# Patient Record
Sex: MALE | Race: WHITE | NOT HISPANIC OR LATINO | Employment: UNEMPLOYED | URBAN - NONMETROPOLITAN AREA
[De-identification: names, ages, dates, MRNs, and addresses within clinical notes are randomized per-mention and may not be internally consistent; named-entity substitution may affect disease eponyms.]

---

## 2017-01-03 ENCOUNTER — OFFICE VISIT (OUTPATIENT)
Dept: PULMONOLOGY | Facility: CLINIC | Age: 82
End: 2017-01-03

## 2017-01-03 VITALS
WEIGHT: 179 LBS | DIASTOLIC BLOOD PRESSURE: 70 MMHG | HEIGHT: 69 IN | OXYGEN SATURATION: 93 % | BODY MASS INDEX: 26.51 KG/M2 | SYSTOLIC BLOOD PRESSURE: 115 MMHG | TEMPERATURE: 97.6 F | HEART RATE: 99 BPM

## 2017-01-03 DIAGNOSIS — J44.9 CHRONIC OBSTRUCTIVE PULMONARY DISEASE, UNSPECIFIED COPD TYPE (HCC): Primary | ICD-10-CM

## 2017-01-03 LAB
FEV1: 2.31 LITERS
FVC VOL RESPIRATORY: 3.47 LITERS

## 2017-01-03 PROCEDURE — 94010 BREATHING CAPACITY TEST: CPT | Performed by: INTERNAL MEDICINE

## 2017-01-03 PROCEDURE — 99212 OFFICE O/P EST SF 10 MIN: CPT | Performed by: INTERNAL MEDICINE

## 2017-01-03 ASSESSMENT — PULMONARY FUNCTION TESTS
FVC: 3.47
FEV1: 2.31

## 2017-01-03 NOTE — PROGRESS NOTES
History of Present Illness 81-year-old gentleman returning for follow-up of COPD that seems to be mild and stable he has episodes of shortness of breath and to use his nebulizers 3 times a day and Advair twice a day and theophylline 600 mg once a day.  He also has obstructive apnea using CPAP at night      Review of Systems episodes of shortness of breath not much cough or expectoration    Active Problems:  Problem List Items Addressed This Visit     Chronic obstructive pulmonary disease - Primary    Relevant Orders    Pulmonary Function Test (Completed)          Past Medical History:  Past Medical History   Diagnosis Date   • COPD (chronic obstructive pulmonary disease)        Family History:  Family History   Problem Relation Age of Onset   • Hypertension Mother    • Arthritis Mother    • Hypertension Father    • Arthritis Father    • Diabetes Sister    • Cancer Sister    • Diabetes Brother        Social History:  Social History   Substance Use Topics   • Smoking status: Former Smoker     Years: 40.00     Types: Cigarettes   • Smokeless tobacco: Never Used   • Alcohol use Yes      Comment: occcasionally when younger        Current Medications:  Current Outpatient Prescriptions   Medication Sig Dispense Refill   • apixaban (ELIQUIS) 5 MG tablet tablet Take 1 tablet by mouth every 12 (twelve) hours. 60 tablet 5   • aspirin (ASPIRIN LOW DOSE) 81 MG tablet Take  by mouth daily.     • calcium carbonate (TUMS ULTRA) 1000 MG chewable tablet      • Camphor-Eucalyptus-Menthol (MEDICATED CHEST RUB) 4.73-1.2-2.6 % ointment      • Denture Care Products (DENTURE ADHESIVE) cream      • EAR WAX REMOVAL DROPS 6.5 % otic solution      • fluticasone-salmeterol (ADVAIR DISKUS) 250-50 MCG/DOSE DISKUS Advair Diskus 250-50 MCG/DOSE Inhalation Aerosol Powder Breath Activated; Patient Sig: Advair Diskus 250-50 MCG/DOSE Inhalation Aerosol Powder Breath Activated ; 0; 05-Apr-2016; Active     • furosemide (LASIX) 20 MG tablet Take 20 mg by  "mouth daily.     • gabapentin (NEURONTIN) 300 MG capsule Take 300 mg by mouth 2 (two) times a day.     • glucosamine-chondroitin 500-400 MG capsule capsule Take 1 capsule by mouth daily.     • lisinopril (PRINIVIL,ZESTRIL) 5 MG tablet 10 mg daily.     • metoprolol succinate XL (TOPROL-XL) 25 MG 24 hr tablet Take 50 mg by mouth daily.     • Omega-3 Fatty Acids (EQL FISH OIL) 1000 MG capsule Take  by mouth.     • ranitidine (ZANTAC) 150 MG tablet      • simvastatin (ZOCOR) 40 MG tablet      • theophylline (UNIPHYL) 600 MG 24 hr tablet        No current facility-administered medications for this visit.        Allergies:  No Known Allergies    Vitals:  Visit Vitals   • /70 (BP Location: Left arm, Patient Position: Sitting, Cuff Size: Adult)   • Pulse 99   • Temp 97.6 °F (36.4 °C) (Oral)   • Ht 69\" (175.3 cm)   • Wt 179 lb (81.2 kg)   • SpO2 93%   • BMI 26.43 kg/m2       Physical Exam no acute distress vital signs is stable lungs clear heart regular no edema clubbing or cyanosis    Imaging:    PFT: FVC 85 FEV1 79% compatible with mild obstructive impairment  Results for orders placed or performed in visit on 01/03/17   Pulmonary Function Test   Result Value Ref Range    FEV1 2.31 liters    FVC 3.47 liters           ASSESSMENT AND DIAGNOSIS:1-COPD mild and stable prescriptions written by Dr. Ojeda2-obstructive apnea per history using CPAP at night    Recommendation continue current medications and follow-up with Dr. Ojeda    Follow-Up: Return to us on when necessary                    "

## 2017-01-03 NOTE — LETTER
January 3, 2017     Rc Ojeda MD  [address]  [place] [address]    Patient: Felice Aiken   YOB: 1935   Date of Visit: 1/3/2017       Dear Dr. Rusty MD:    Felice Aiken was in my office today. Below are the relevant portions of my assessment and plan of care.           If you have questions, please do not hesitate to call me. I look forward to following Felice along with you.         Sincerely,        Marshal Garcia MD        CC: No Recipients

## 2017-01-03 NOTE — MR AVS SNAPSHOT
Felice Aiken   1/3/2017 9:00 AM   Office Visit    Dept Phone:  [phone]   Encounter #:  [id number]    Provider:  Marshal Garcia MD   Department:  [facility] PULMONOLOGY CRITICAL [facility]ARE                Your Full Care Plan              Your Updated Medication List          This list is accurate as of: 1/3/17  9:18 AM.  Always use your most recent med list.                ADVAIR DISKUS 250-50 MCG/DOSE DISKUS   Generic drug:  fluticasone-salmeterol       apixaban 5 MG tablet tablet   Commonly known as:  ELIQUIS   Take 1 tablet by mouth every 12 (twelve) hours.       ASPIRIN LOW DOSE 81 MG tablet   Generic drug:  aspirin       calcium carbonate 1000 MG chewable tablet   Commonly known as:  TUMS ULTRA       Denture Adhesive cream       EAR WAX REMOVAL DROPS 6.5 % otic solution   Generic drug:  carbamide peroxide       EQL FISH OIL 1000 MG capsule       furosemide 20 MG tablet   Commonly known as:  LASIX       gabapentin 300 MG capsule   Commonly known as:  NEURONTIN       glucosamine-chondroitin 500-400 MG capsule capsule       lisinopril 5 MG tablet   Commonly known as:  PRINIVIL,ZESTRIL       MEDICATED CHEST RUB 4.73-1.2-2.6 % ointment       metoprolol succinate XL 25 MG 24 hr tablet   Commonly known as:  TOPROL-XL       raNITIdine 150 MG tablet   Commonly known as:  ZANTAC       simvastatin 40 MG tablet   Commonly known as:  ZOCOR       theophylline 600 MG 24 hr tablet   Commonly known as:  UNIPHYL               We Performed the Following     Pulmonary Function Test       You Were Diagnosed With        Codes Comments    Chronic obstructive pulmonary disease, unspecified COPD type    -  Primary ICD-10-CM: J44.9  ICD-9-CM: 496       Instructions     None    Patient Instructions History      Upcoming Appointments     Visit Type Date Time Department    FOLLOW UP 1/3/2017  9:00 AM [facility]    FOLLOW UP 1/23/2017  2:20 PM [facility] Signup     [facility]  "Health Exosite allows you to send messages to your doctor, view your test results, renew your prescriptions, schedule appointments, and more. To sign up, go to "[employer] and click on the Sign Up Now link in the New User? box. Enter your Exosite Activation Code exactly as it appears below along with the last four digits of your Social Security Number and your Date of Birth () to complete the sign-up process. If you do not sign up before the expiration date, you must request a new code.    Exosite Activation Code: [email or web address]  Expires: 2017  9:18 AM    If you have questions, you can email [email or web address] or call [phone] to talk to our Exosite staff. Remember, Exosite is NOT to be used for urgent needs. For medical emergencies, dial 911.               Other Info from Your Visit           Your Appointments     2017  2:20 PM EST   Follow Up with Jay Everett MD   [facility] (--)    [address] [place] [address] [facility] [address] [phone]           Arrive 15 minutes prior to appointment.              Allergies     No Known Allergies      Reason for Visit     COPD           Vital Signs     Blood Pressure Pulse Temperature Height Weight Oxygen Saturation    115/70 (BP Location: Left arm, Patient Position: Sitting, Cuff Size: Adult) 99 97.6 °F (36.4 °C) (Oral) 69\" (175.3 cm) 179 lb (81.2 kg) 93%    Body Mass Index Smoking Status                26.43 kg/m2 Former Smoker          Problems and Diagnoses Noted     Chronic airway obstruction      Results     Pulmonary Function Test      Component Value Standard Range & Units    FEV1 2.31 liters    FVC 3.47 liters                    "

## 2017-01-23 ENCOUNTER — OFFICE VISIT (OUTPATIENT)
Dept: CARDIOLOGY | Facility: CLINIC | Age: 82
End: 2017-01-23

## 2017-01-23 VITALS
WEIGHT: 180.4 LBS | HEIGHT: 69 IN | SYSTOLIC BLOOD PRESSURE: 113 MMHG | DIASTOLIC BLOOD PRESSURE: 63 MMHG | HEART RATE: 68 BPM | BODY MASS INDEX: 26.72 KG/M2

## 2017-01-23 DIAGNOSIS — I10 ESSENTIAL HYPERTENSION: ICD-10-CM

## 2017-01-23 DIAGNOSIS — I73.9 PAD (PERIPHERAL ARTERY DISEASE) (HCC): ICD-10-CM

## 2017-01-23 DIAGNOSIS — I25.10 ARTERIOSCLEROTIC CARDIOVASCULAR DISEASE (ASCVD): Primary | ICD-10-CM

## 2017-01-23 DIAGNOSIS — R07.2 PRECORDIAL PAIN: ICD-10-CM

## 2017-01-23 DIAGNOSIS — Z79.899 DRUG THERAPY: ICD-10-CM

## 2017-01-23 DIAGNOSIS — I48.19 PERSISTENT ATRIAL FIBRILLATION (HCC): ICD-10-CM

## 2017-01-23 DIAGNOSIS — I42.9 CARDIOMYOPATHY (HCC): ICD-10-CM

## 2017-01-23 PROCEDURE — 93000 ELECTROCARDIOGRAM COMPLETE: CPT | Performed by: INTERNAL MEDICINE

## 2017-01-23 PROCEDURE — 99213 OFFICE O/P EST LOW 20 MIN: CPT | Performed by: INTERNAL MEDICINE

## 2017-01-23 RX ORDER — DILTIAZEM HYDROCHLORIDE 180 MG/1
180 CAPSULE, COATED, EXTENDED RELEASE ORAL DAILY
COMMUNITY
End: 2017-01-23

## 2017-01-23 RX ORDER — LORATADINE 10 MG/1
10 TABLET ORAL DAILY
COMMUNITY
End: 2017-08-31 | Stop reason: ALTCHOICE

## 2017-01-23 RX ORDER — METOPROLOL SUCCINATE 50 MG/1
50 TABLET, EXTENDED RELEASE ORAL DAILY
Qty: 30 TABLET | Refills: 5 | Status: SHIPPED | OUTPATIENT
Start: 2017-01-23 | End: 2017-03-07 | Stop reason: SDUPTHER

## 2017-01-23 NOTE — LETTER
"January 26, 2017     Rc Ojeda MD  [address]  [facility] [address]    Patient: Felice Aiken   YOB: 1935   Date of Visit: 1/23/2017       Dear Dr. Rusty MD:    Thank you for referring Felice Aiken to me for evaluation. Below are the relevant portions of my assessment and plan of care.    If you have questions, please do not hesitate to call me. I look forward to following Felice along with you.         Sincerely,        Jay Everett MD        CC: No Recipients  Jay Everett MD  1/26/2017  6:19 PM  Signed  Rc Ojeda MD  Felice Aiken  1935      Patient Active Problem List   Diagnosis   • Arteriosclerotic cardiovascular disease (ASCVD) s/p stenting   • Sleep apnea   • Pain and swelling of lower extremity   • Persistent atrial fibrillation   • History of ASCVD (atherosclerotic cardiovascular disease), s/p stenting   • PAD (peripheral artery disease), fem pop bypass,3/08   • Dyslipidemia   • Essential hypertension   • Chronic bronchitis       Dear Dr. Ojeda:    Subjective     Felice Aiken is a 81 y.o. male with the problems as listed above, presents for cardiology follow up of ASCVD.    History of Present Illness Mr. Aiken is a 81-year-old  male with history of known ASCVD, status post stenting, ischemic cardiomyopathy and persistent atrial fibrillation, is here for cardiac follow-up.  Patient states that he has intermittent chest discomfort, but he says it \"is nothing to worry about\", and it is not severe enough to take NTG.  He denies SOA, dizziness or syncopal episodes.  He says he has been enjoying daily activities, such as [employer] hunting.       No Known Allergies    Current Outpatient Prescriptions:   •  aspirin (ASPIRIN LOW DOSE) 81 MG tablet, Take  by mouth daily., Disp: , Rfl:   •  Denture Care Products (DENTURE ADHESIVE) cream, , Disp: , Rfl:   •  fluticasone-salmeterol (ADVAIR DISKUS) 250-50 MCG/DOSE DISKUS, Advair Diskus 250-50 " MCG/DOSE Inhalation Aerosol Powder Breath Activated; Patient Sig: Michaelle Lowry 250-50 MCG/DOSE Inhalation Aerosol Powder Breath Activated ; 0; 05-Apr-2016; Active, Disp: , Rfl:   •  furosemide (LASIX) 20 MG tablet, Take 20 mg by mouth daily., Disp: , Rfl:   •  gabapentin (NEURONTIN) 300 MG capsule, Take 300 mg by mouth 2 (two) times a day., Disp: , Rfl:   •  glucosamine-chondroitin 500-400 MG capsule capsule, Take 1 capsule by mouth daily., Disp: , Rfl:   •  lisinopril (PRINIVIL,ZESTRIL) 5 MG tablet, 10 mg daily., Disp: , Rfl:   •  loratadine (CLARITIN) 10 MG tablet, Take 10 mg by mouth Daily., Disp: , Rfl:   •  metoprolol succinate XL (TOPROL-XL) 50 MG 24 hr tablet, Take 1 tablet by mouth Daily., Disp: 30 tablet, Rfl: 5  •  Multiple Vitamin (MULTI VITAMIN PO), Take  by mouth., Disp: , Rfl:   •  Omega-3 Fatty Acids (EQL FISH OIL) 1000 MG capsule, Take  by mouth., Disp: , Rfl:   •  ranitidine (ZANTAC) 150 MG tablet, 150 mg., Disp: , Rfl:   •  rivaroxaban (XARELTO) 20 MG tablet, Take 20 mg by mouth Daily., Disp: , Rfl:   •  simvastatin (ZOCOR) 40 MG tablet, , Disp: , Rfl:   •  theophylline (UNIPHYL) 600 MG 24 hr tablet, , Disp: , Rfl:        The following portions of the patient's history were reviewed and updated as appropriate: allergies, current medications, past family history, past medical history, past social history, past surgical history and problem list.    Review of Systems   Constitution: Negative for chills and fever.   HENT: Positive for headaches. Negative for nosebleeds and sore throat.    Cardiovascular: Positive for chest pain. Negative for palpitations and syncope.   Respiratory: Positive for shortness of breath (in the heat). Negative for cough, hemoptysis and wheezing.    Gastrointestinal: Negative for abdominal pain, hematemesis, hematochezia, melena, nausea and vomiting.   Genitourinary: Negative for dysuria and hematuria.   Neurological: Positive for dizziness.       Objective   Blood pressure  "113/63, pulse 68, height 69\" (175.3 cm), weight 180 lb 6.4 oz (81.8 kg).  Last 2 weights    01/23/17  1359   Weight: 180 lb 6.4 oz (81.8 kg)     Body mass index is 26.64 kg/(m^2).        Physical Exam   Constitutional: He is oriented to person, place, and time. He appears well-developed and well-nourished.   HENT:   Head: Normocephalic.   Mouth/Throat: Oropharynx is clear and moist.   Eyes: Conjunctivae and EOM are normal. Pupils are equal, round, and reactive to light.   Neck: Normal range of motion. Neck supple. No JVD present. No tracheal deviation present. No thyromegaly present.   Cardiovascular: Normal rate, S1 normal and S2 normal.  An irregularly irregular rhythm present. Exam reveals no gallop and no friction rub.    No murmur heard.  Pulmonary/Chest: Effort normal. No respiratory distress. He has no wheezes. He has rhonchi (bilateral, scattered). He has no rales.   Abdominal: Soft. Bowel sounds are normal. He exhibits no mass. There is no tenderness.   Musculoskeletal: Normal range of motion. He exhibits edema (mild bilateral ankle edema ).   Lymphadenopathy:     He has no cervical adenopathy.   Neurological: He is alert and oriented to person, place, and time. No cranial nerve deficit.   Skin: Skin is warm and dry. No rash noted.   Psychiatric: He has a normal mood and affect.       Lab Results   Component Value Date     05/09/2016    K 4.0 05/09/2016     05/09/2016    CO2 31.0 05/09/2016    BUN 15 05/09/2016    CREATININE 0.80 05/09/2016    GLUCOSE 125 (H) 05/09/2016    CALCIUM 9.4 05/09/2016     (H) 05/07/2016    ALT 69 (H) 05/07/2016    ALKPHOS 114 05/07/2016    LABIL2 1.3 (L) 05/07/2016     Lab Results   Component Value Date    CKTOTAL 177 05/09/2016     Lab Results   Component Value Date    WBC 9.0 05/09/2016    HGB 10.9 (L) 05/09/2016    HCT 34.4 (L) 05/09/2016     05/09/2016     Lab Results   Component Value Date    INR 2.23 12/17/2014    INR 3.98 12/16/2014    INR 9.26 " 12/15/2014     Lab Results   Component Value Date    MG 2.1 05/09/2016     Lab Results   Component Value Date    TSH 1.113 05/07/2016      Lab Results   Component Value Date    BNP 92 01/07/2015         ECG 12 Lead  Date/Time: 1/23/2017 2:10 PM  Performed by: JAY MERCADO  Authorized by: JAY MERCADO   Rhythm: atrial fibrillation  BPM: 73  Comments: QTc 386          Assessment/Plan :  Felice was seen today for follow-up, shortness of breath and edema.  Diagnoses and all orders for this visit:    Atherosclerotic cardiovascular disease, status post stenting of the LAD in  2006 at [facility],with intermittent chest pain(mild)  A) Most recent nuclear stress test on 06/07/2012, revealed only fixed   defect, indicative with scar tissue with no acute ischemia.     B) History of mild ischemic cardiomyopathy with last known ejection   fraction of 45% to 50% on recent transthoracic echocardiogram on   10/06/2014, appears compensated    Persistent atrial fibrillation, with controlled ventricular rate.    PAD (peripheral artery disease), fem pop bypass,3/08       Recommendations:    We'll discontinue the Cardizem CD and increase the Toprol-XL to 50 mg daily due to the cardiomyopathy  Continue other medications.  Evaluate with echo doppler study.  Obtain a BMP in 1 week      Return in about 5 months (around 6/23/2017).    As always, I appreciate very much the opportunity to participate in the cardiovascular care of your patients.      With Best Regards,    Jay Mercado MD, [facility]

## 2017-01-23 NOTE — PROGRESS NOTES
"Rc Ojeda MD  Felice Aiken  1935      Patient Active Problem List   Diagnosis   • Arteriosclerotic cardiovascular disease (ASCVD) s/p stenting   • Sleep apnea   • Pain and swelling of lower extremity   • Persistent atrial fibrillation   • History of ASCVD (atherosclerotic cardiovascular disease), s/p stenting   • PAD (peripheral artery disease), fem pop bypass,3/08   • Dyslipidemia   • Essential hypertension   • Chronic bronchitis       Dear Dr. Ojeda:    Subjective     Felice Aiken is a 81 y.o. male with the problems as listed above, presents for cardiology follow up of ASCVD.    History of Present Illness Mr. Aiken is a 81-year-old  male with history of known ASCVD, status post stenting, ischemic cardiomyopathy and persistent atrial fibrillation, is here for cardiac follow-up.  Patient states that he has intermittent chest discomfort, but he says it \"is nothing to worry about\", and it is not severe enough to take NTG.  He denies SOA, dizziness or syncopal episodes.  He says he has been enjoying daily activities, such as [employer] hunting.       No Known Allergies    Current Outpatient Prescriptions:   •  aspirin (ASPIRIN LOW DOSE) 81 MG tablet, Take  by mouth daily., Disp: , Rfl:   •  Denture Care Products (DENTURE ADHESIVE) cream, , Disp: , Rfl:   •  fluticasone-salmeterol (ADVAIR DISKUS) 250-50 MCG/DOSE DISKUS, Advair Diskus 250-50 MCG/DOSE Inhalation Aerosol Powder Breath Activated; Patient Sig: Advair Diskus 250-50 MCG/DOSE Inhalation Aerosol Powder Breath Activated ; 0; 05-Apr-2016; Active, Disp: , Rfl:   •  furosemide (LASIX) 20 MG tablet, Take 20 mg by mouth daily., Disp: , Rfl:   •  gabapentin (NEURONTIN) 300 MG capsule, Take 300 mg by mouth 2 (two) times a day., Disp: , Rfl:   •  glucosamine-chondroitin 500-400 MG capsule capsule, Take 1 capsule by mouth daily., Disp: , Rfl:   •  lisinopril (PRINIVIL,ZESTRIL) 5 MG tablet, 10 mg daily., Disp: , Rfl:   •  loratadine (CLARITIN) 10 " "MG tablet, Take 10 mg by mouth Daily., Disp: , Rfl:   •  metoprolol succinate XL (TOPROL-XL) 50 MG 24 hr tablet, Take 1 tablet by mouth Daily., Disp: 30 tablet, Rfl: 5  •  Multiple Vitamin (MULTI VITAMIN PO), Take  by mouth., Disp: , Rfl:   •  Omega-3 Fatty Acids (EQL FISH OIL) 1000 MG capsule, Take  by mouth., Disp: , Rfl:   •  ranitidine (ZANTAC) 150 MG tablet, 150 mg., Disp: , Rfl:   •  rivaroxaban (XARELTO) 20 MG tablet, Take 20 mg by mouth Daily., Disp: , Rfl:   •  simvastatin (ZOCOR) 40 MG tablet, , Disp: , Rfl:   •  theophylline (UNIPHYL) 600 MG 24 hr tablet, , Disp: , Rfl:        The following portions of the patient's history were reviewed and updated as appropriate: allergies, current medications, past family history, past medical history, past social history, past surgical history and problem list.    Review of Systems   Constitution: Negative for chills and fever.   HENT: Positive for headaches. Negative for nosebleeds and sore throat.    Cardiovascular: Positive for chest pain. Negative for palpitations and syncope.   Respiratory: Positive for shortness of breath (in the heat). Negative for cough, hemoptysis and wheezing.    Gastrointestinal: Negative for abdominal pain, hematemesis, hematochezia, melena, nausea and vomiting.   Genitourinary: Negative for dysuria and hematuria.   Neurological: Positive for dizziness.       Objective   Blood pressure 113/63, pulse 68, height 69\" (175.3 cm), weight 180 lb 6.4 oz (81.8 kg).  Last 2 weights    01/23/17  1359   Weight: 180 lb 6.4 oz (81.8 kg)     Body mass index is 26.64 kg/(m^2).        Physical Exam   Constitutional: He is oriented to person, place, and time. He appears well-developed and well-nourished.   HENT:   Head: Normocephalic.   Mouth/Throat: Oropharynx is clear and moist.   Eyes: Conjunctivae and EOM are normal. Pupils are equal, round, and reactive to light.   Neck: Normal range of motion. Neck supple. No JVD present. No tracheal deviation " present. No thyromegaly present.   Cardiovascular: Normal rate, S1 normal and S2 normal.  An irregularly irregular rhythm present. Exam reveals no gallop and no friction rub.    No murmur heard.  Pulmonary/Chest: Effort normal. No respiratory distress. He has no wheezes. He has rhonchi (bilateral, scattered). He has no rales.   Abdominal: Soft. Bowel sounds are normal. He exhibits no mass. There is no tenderness.   Musculoskeletal: Normal range of motion. He exhibits edema (mild bilateral ankle edema ).   Lymphadenopathy:     He has no cervical adenopathy.   Neurological: He is alert and oriented to person, place, and time. No cranial nerve deficit.   Skin: Skin is warm and dry. No rash noted.   Psychiatric: He has a normal mood and affect.       Lab Results   Component Value Date     05/09/2016    K 4.0 05/09/2016     05/09/2016    CO2 31.0 05/09/2016    BUN 15 05/09/2016    CREATININE 0.80 05/09/2016    GLUCOSE 125 (H) 05/09/2016    CALCIUM 9.4 05/09/2016     (H) 05/07/2016    ALT 69 (H) 05/07/2016    ALKPHOS 114 05/07/2016    LABIL2 1.3 (L) 05/07/2016     Lab Results   Component Value Date    CKTOTAL 177 05/09/2016     Lab Results   Component Value Date    WBC 9.0 05/09/2016    HGB 10.9 (L) 05/09/2016    HCT 34.4 (L) 05/09/2016     05/09/2016     Lab Results   Component Value Date    INR 2.23 12/17/2014    INR 3.98 12/16/2014    INR 9.26 12/15/2014     Lab Results   Component Value Date    MG 2.1 05/09/2016     Lab Results   Component Value Date    TSH 1.113 05/07/2016      Lab Results   Component Value Date    BNP 92 01/07/2015         ECG 12 Lead  Date/Time: 1/23/2017 2:10 PM  Performed by: JOSE MERCADO  Authorized by: JOSE MERCADO   Rhythm: atrial fibrillation  BPM: 73  Comments: QTc 386          Assessment/Plan :  Felice was seen today for follow-up, shortness of breath and edema.  Diagnoses and all orders for this visit:    Atherosclerotic cardiovascular disease, status post  stenting of the LAD in  2006 at ,with intermittent chest pain(mild)  A) Most recent nuclear stress test on 06/07/2012, revealed only fixed   defect, indicative with scar tissue with no acute ischemia.     B) History of mild ischemic cardiomyopathy with last known ejection   fraction of 45% to 50% on recent transthoracic echocardiogram on   10/06/2014, appears compensated    Persistent atrial fibrillation, with controlled ventricular rate.    PAD (peripheral artery disease), fem pop bypass,3/08       Recommendations:    We'll discontinue the Cardizem CD and increase the Toprol-XL to 50 mg daily due to the cardiomyopathy  Continue other medications.  Evaluate with echo doppler study.  Obtain a BMP in 1 week      Return in about 5 months (around 6/23/2017).    As always, I appreciate very much the opportunity to participate in the cardiovascular care of your patients.      With Best Regards,    Jay Everett MD, FACC

## 2017-01-23 NOTE — MR AVS SNAPSHOT
Felice Aiken   1/23/2017 2:20 PM   Office Visit    Dept Phone:  [phone]   Encounter #:  [id number]    Provider:  Jay Everett MD   Department:  [facility] CARDIOLOGY                Your Full Care Plan              Today's Medication Changes          These changes are accurate as of: 1/23/17  3:16 PM.  If you have any questions, ask your nurse or doctor.               Medication(s)that have changed:     metoprolol succinate XL 50 MG 24 hr tablet   Commonly known as:  TOPROL-XL   Take 1 tablet by mouth Daily.   What changed:  medication strength   Changed by:  Jay Everett MD         Stop taking medication(s)listed here:     apixaban 5 MG tablet tablet   Commonly known as:  ELIQUIS   Stopped by:  Jay Everett MD           calcium carbonate 1000 MG chewable tablet   Commonly known as:  TUMS ULTRA   Stopped by:  Jay Everett MD           diltiaZEM  MG 24 hr capsule   Commonly known as:  CARDIZEM CD   Stopped by:  Jay Everett MD           EAR WAX REMOVAL DROPS 6.5 % otic solution   Generic drug:  carbamide peroxide   Stopped by:  Jay Everett MD           MEDICATED CHEST RUB 4.73-1.2-2.6 % ointment   Stopped by:  Jay Everett MD                Where to Get Your Medications      These medications were sent to [facility] [address] [facility] - [phone]  - [phone]   [address] [place] [address]     Phone:  [phone]     metoprolol succinate XL 50 MG 24 hr tablet                  Your Updated Medication List          This list is accurate as of: 1/23/17  3:16 PM.  Always use your most recent med list.                ADVAIR DISKUS 250-50 MCG/DOSE DISKUS   Generic drug:  fluticasone-salmeterol       ASPIRIN LOW DOSE 81 MG tablet   Generic drug:  aspirin       Denture Adhesive cream       EQL FISH OIL 1000 MG capsule       furosemide 20 MG tablet   Commonly known as:  LASIX       gabapentin 300 MG capsule   Commonly  known as:  NEURONTIN       glucosamine-chondroitin 500-400 MG capsule capsule       lisinopril 5 MG tablet   Commonly known as:  PRINIVIL,ZESTRIL       loratadine 10 MG tablet   Commonly known as:  CLARITIN       metoprolol succinate XL 50 MG 24 hr tablet   Commonly known as:  TOPROL-XL   Take 1 tablet by mouth Daily.       MULTI VITAMIN PO       raNITIdine 150 MG tablet   Commonly known as:  ZANTAC       rivaroxaban 20 MG tablet   Commonly known as:  XARELTO       simvastatin 40 MG tablet   Commonly known as:  ZOCOR       theophylline 600 MG 24 hr tablet   Commonly known as:  UNIPHYL               We Performed the Following     ECG 12 Lead       You Were Diagnosed With        Codes Comments    Arteriosclerotic cardiovascular disease (ASCVD)    -  Primary ICD-10-CM: I25.10  ICD-9-CM: 429.2, 440.9     Persistent atrial fibrillation     ICD-10-CM: I48.1  ICD-9-CM: 427.31     PAD (peripheral artery disease)     ICD-10-CM: I73.9  ICD-9-CM: 443.9     Essential hypertension     ICD-10-CM: I10  ICD-9-CM: 401.9     Cardiomyopathy     ICD-10-CM: I42.9  ICD-9-CM: 425.4     Drug therapy     ICD-10-CM: Z79.899  ICD-9-CM: V58.69     Precordial pain     ICD-10-CM: R07.2  ICD-9-CM: 786.51       Instructions     None    Patient Instructions History      Upcoming Appointments     Visit Type Date Time Department    FOLLOW UP 1/23/2017  2:20 PM [facility]    FOLLOW UP 6/29/2017  3:20 PM [facility] [place] Signup     Our records indicate that you have declined [facility] signup. If you would like to sign up for [employer], please email [facility] [email or web address] or call [phone] to obtain an activation code.             Other Info from Your Visit           Your Appointments     Jun 29, 2017  3:20 PM EDT   Follow Up with Jay Everett MD   [facility] (--)    [address] [place] [address] [phone]           Arrive 15 minutes prior to  "appointment.              Allergies     No Known Allergies      Reason for Visit     Follow-up           Vital Signs     Blood Pressure Pulse Height Weight Body Mass Index Smoking Status    113/63 (BP Location: Left arm, Patient Position: Sitting, Cuff Size: Adult) 68 69\" (175.3 cm) 180 lb 6.4 oz (81.8 kg) 26.64 kg/m2 Former Smoker      Problems and Diagnoses Noted     Arteriosclerotic cardiovascular disease (ASCVD)    High blood pressure    PAD (peripheral artery disease)    Atrial fibrillation (irregular heartbeat)    Heart muscle disorder        Pharmacologic therapy        Precordial chest pain            "

## 2017-01-27 ENCOUNTER — HOSPITAL ENCOUNTER (OUTPATIENT)
Dept: CARDIOLOGY | Facility: HOSPITAL | Age: 82
Discharge: HOME | End: 2017-01-27
Attending: INTERNAL MEDICINE | Admitting: INTERNAL MEDICINE

## 2017-01-27 DIAGNOSIS — R07.2 PRECORDIAL PAIN: ICD-10-CM

## 2017-01-27 PROCEDURE — 93306 TTE W/DOPPLER COMPLETE: CPT

## 2017-01-28 LAB
BH CV ECHO MEAS - ACS: 2.4 CM
BH CV ECHO MEAS - AO ROOT AREA (BSA CORRECTED): 1.5
BH CV ECHO MEAS - AO ROOT AREA: 6.9 CM^2
BH CV ECHO MEAS - AO ROOT DIAM: 3 CM
BH CV ECHO MEAS - BSA(HAYCOCK): 2 M^2
BH CV ECHO MEAS - BSA: 2 M^2
BH CV ECHO MEAS - BZI_BMI: 26.6 KILOGRAMS/M^2
BH CV ECHO MEAS - BZI_METRIC_HEIGHT: 175.3 CM
BH CV ECHO MEAS - BZI_METRIC_WEIGHT: 81.6 KG
BH CV ECHO MEAS - CONTRAST EF 4CH: 42.6 ML/M^2
BH CV ECHO MEAS - EDV(CUBED): 142.3 ML
BH CV ECHO MEAS - EDV(MOD-SP4): 141 ML
BH CV ECHO MEAS - EDV(TEICH): 130.7 ML
BH CV ECHO MEAS - EF(CUBED): 70.3 %
BH CV ECHO MEAS - EF(MOD-SP4): 42.6 %
BH CV ECHO MEAS - EF(TEICH): 61.5 %
BH CV ECHO MEAS - ESV(CUBED): 42.2 ML
BH CV ECHO MEAS - ESV(MOD-SP4): 81 ML
BH CV ECHO MEAS - ESV(TEICH): 50.3 ML
BH CV ECHO MEAS - FS: 33.3 %
BH CV ECHO MEAS - IVS/LVPW: 1.1
BH CV ECHO MEAS - IVSD: 0.93 CM
BH CV ECHO MEAS - LA DIMENSION: 4.5 CM
BH CV ECHO MEAS - LA/AO: 1.5
BH CV ECHO MEAS - LV DIASTOLIC VOL/BSA (35-75): 71.4 ML/M^2
BH CV ECHO MEAS - LV MASS(C)D: 163.6 GRAMS
BH CV ECHO MEAS - LV MASS(C)DI: 82.8 GRAMS/M^2
BH CV ECHO MEAS - LV SYSTOLIC VOL/BSA (12-30): 41 ML/M^2
BH CV ECHO MEAS - LVIDD: 5.2 CM
BH CV ECHO MEAS - LVIDS: 3.5 CM
BH CV ECHO MEAS - LVLD AP4: 8.1 CM
BH CV ECHO MEAS - LVLS AP4: 7.5 CM
BH CV ECHO MEAS - LVOT AREA (M): 3.1 CM^2
BH CV ECHO MEAS - LVOT AREA: 3.2 CM^2
BH CV ECHO MEAS - LVOT DIAM: 2 CM
BH CV ECHO MEAS - LVPWD: 0.81 CM
BH CV ECHO MEAS - RAP SYSTOLE: 10 MMHG
BH CV ECHO MEAS - RVSP: 26.6 MMHG
BH CV ECHO MEAS - SI(CUBED): 50.7 ML/M^2
BH CV ECHO MEAS - SI(MOD-SP4): 30.4 ML/M^2
BH CV ECHO MEAS - SI(TEICH): 40.7 ML/M^2
BH CV ECHO MEAS - SV(CUBED): 100.1 ML
BH CV ECHO MEAS - SV(MOD-SP4): 60 ML
BH CV ECHO MEAS - SV(TEICH): 80.4 ML
BH CV ECHO MEAS - TR MAX VEL: 203.6 CM/SEC

## 2017-01-28 PROCEDURE — 93306 TTE W/DOPPLER COMPLETE: CPT | Performed by: INTERNAL MEDICINE

## 2017-03-02 ENCOUNTER — TELEPHONE (OUTPATIENT)
Dept: PULMONOLOGY | Facility: CLINIC | Age: 82
End: 2017-03-02

## 2017-03-06 ENCOUNTER — TELEPHONE (OUTPATIENT)
Dept: PULMONOLOGY | Facility: CLINIC | Age: 82
End: 2017-03-06

## 2017-03-06 RX ORDER — THEOPHYLLINE 600 MG/1
600 TABLET, EXTENDED RELEASE ORAL DAILY
Qty: 30 TABLET | Refills: 5 | Status: SHIPPED | OUTPATIENT
Start: 2017-03-06 | End: 2018-08-14 | Stop reason: SDUPTHER

## 2017-03-07 RX ORDER — METOPROLOL SUCCINATE 50 MG/1
50 TABLET, EXTENDED RELEASE ORAL DAILY
Qty: 90 TABLET | Refills: 2 | Status: SHIPPED | OUTPATIENT
Start: 2017-03-07 | End: 2018-05-03 | Stop reason: SDUPTHER

## 2017-06-29 ENCOUNTER — OFFICE VISIT (OUTPATIENT)
Dept: CARDIOLOGY | Facility: CLINIC | Age: 82
End: 2017-06-29

## 2017-06-29 VITALS
HEART RATE: 76 BPM | HEIGHT: 69 IN | SYSTOLIC BLOOD PRESSURE: 122 MMHG | DIASTOLIC BLOOD PRESSURE: 70 MMHG | BODY MASS INDEX: 25.65 KG/M2 | WEIGHT: 173.2 LBS

## 2017-06-29 DIAGNOSIS — I73.9 PAD (PERIPHERAL ARTERY DISEASE) (HCC): ICD-10-CM

## 2017-06-29 DIAGNOSIS — R06.09 DYSPNEA ON EXERTION: ICD-10-CM

## 2017-06-29 DIAGNOSIS — I25.10 ARTERIOSCLEROTIC CARDIOVASCULAR DISEASE (ASCVD): Primary | ICD-10-CM

## 2017-06-29 DIAGNOSIS — J42 CHRONIC BRONCHITIS, UNSPECIFIED CHRONIC BRONCHITIS TYPE (HCC): ICD-10-CM

## 2017-06-29 DIAGNOSIS — I10 ESSENTIAL HYPERTENSION: ICD-10-CM

## 2017-06-29 DIAGNOSIS — I48.19 PERSISTENT ATRIAL FIBRILLATION (HCC): ICD-10-CM

## 2017-06-29 PROCEDURE — 99214 OFFICE O/P EST MOD 30 MIN: CPT | Performed by: INTERNAL MEDICINE

## 2017-06-29 RX ORDER — SPIRONOLACTONE 25 MG/1
25 TABLET ORAL DAILY
Qty: 30 TABLET | Refills: 3 | Status: SHIPPED | OUTPATIENT
Start: 2017-06-29 | End: 2017-08-08 | Stop reason: SDUPTHER

## 2017-06-29 RX ORDER — GABAPENTIN 600 MG/1
600 TABLET ORAL 2 TIMES DAILY
Status: ON HOLD | COMMUNITY
End: 2019-07-04

## 2017-06-29 RX ORDER — ONDANSETRON 4 MG/1
4 TABLET, FILM COATED ORAL EVERY 8 HOURS PRN
Status: ON HOLD | COMMUNITY
End: 2019-07-04

## 2017-06-29 NOTE — PROGRESS NOTES
Rc Ojeda MD  Felice Aiken  1935  06/29/2017    Patient Active Problem List   Diagnosis   • Knee pain   • Arteriosclerotic cardiovascular disease (ASCVD) s/p stenting   • Sleep apnea   • Pain and swelling of lower extremity   • Persistent atrial fibrillation   • History of ASCVD (atherosclerotic cardiovascular disease), s/p stenting   • PAD (peripheral artery disease), fem pop bypass,3/08   • Dyslipidemia   • Essential hypertension   • Chronic bronchitis   • Chronic obstructive pulmonary disease   • Status post total left knee replacement   • Neuropathy involving both lower extremities       Dear Rc Ojeda MD:    Subjective     Felice Aiken is a 82 y.o. male with the problems as listed above, presents      History of Present Illness:Mr. Aiken is a pleasant 82-year-old  male with history of known ASCVD, status post previous stenting, history of chronic atrial fibrillation, has been onEliquis for stroke prevention.  He denies any chest pains.  He has chronic dyspnea with mild exertion.  He also complains of having some blood coming from his mouth at times.  Denies any hematemesis or hematochezia hemoptysis. No Melena.        No Known Allergies:      Current Outpatient Prescriptions:   •  apixaban (ELIQUIS) 5 MG tablet tablet, Take 5 mg by mouth 2 (Two) Times a Day., Disp: , Rfl:   •  aspirin (ASPIRIN LOW DOSE) 81 MG tablet, Take  by mouth daily., Disp: , Rfl:   •  Denture Care Products (DENTURE ADHESIVE) cream, , Disp: , Rfl:   •  fluticasone-salmeterol (ADVAIR DISKUS) 250-50 MCG/DOSE DISKUS, Inhale 1 puff 2 (Two) Times a Day., Disp: 60 each, Rfl: 5  •  furosemide (LASIX) 20 MG tablet, Take 20 mg by mouth daily., Disp: , Rfl:   •  gabapentin (NEURONTIN) 600 MG tablet, Take 600 mg by mouth 3 (Three) Times a Day., Disp: , Rfl:   •  glucosamine-chondroitin 500-400 MG capsule capsule, Take 1 capsule by mouth daily., Disp: , Rfl:   •  lisinopril (PRINIVIL,ZESTRIL) 5 MG tablet, 10 mg  daily., Disp: , Rfl:   •  metoprolol succinate XL (TOPROL-XL) 50 MG 24 hr tablet, Take 1 tablet by mouth Daily., Disp: 90 tablet, Rfl: 2  •  Multiple Vitamin (MULTI VITAMIN PO), Take  by mouth., Disp: , Rfl:   •  Omega-3 Fatty Acids (EQL FISH OIL) 1000 MG capsule, Take  by mouth., Disp: , Rfl:   •  ondansetron (ZOFRAN) 4 MG tablet, Take 4 mg by mouth Every 8 (Eight) Hours As Needed for Nausea or Vomiting., Disp: , Rfl:   •  ranitidine (ZANTAC) 150 MG tablet, 150 mg., Disp: , Rfl:   •  simvastatin (ZOCOR) 40 MG tablet, , Disp: , Rfl:   •  theophylline (UNIPHYL) 600 MG 24 hr tablet, Take 1 tablet by mouth Daily., Disp: 30 tablet, Rfl: 5  •  loratadine (CLARITIN) 10 MG tablet, Take 10 mg by mouth Daily., Disp: , Rfl:   •  rivaroxaban (XARELTO) 20 MG tablet, Take 20 mg by mouth Daily., Disp: , Rfl:   •  spironolactone (ALDACTONE) 25 MG tablet, Take 1 tablet by mouth Daily., Disp: 30 tablet, Rfl: 3      The following portions of the patient's history were reviewed and updated as appropriate: allergies, current medications, past family history, past medical history, past social history, past surgical history and problem list.    Social History   Substance Use Topics   • Smoking status: Former Smoker     Years: 40.00     Types: Cigarettes   • Smokeless tobacco: Never Used   • Alcohol use Yes      Comment: occcasionally when younger        Review of Systems   Constitution: Negative for chills and fever.   HENT: Negative for headaches, nosebleeds and sore throat.    Cardiovascular: Negative for chest pain, leg swelling, palpitations and syncope.   Respiratory: Positive for shortness of breath (Pretty bad SOA. ). Negative for cough, hemoptysis and wheezing.    Gastrointestinal: Negative for abdominal pain, hematemesis, hematochezia, melena, nausea and vomiting.   Genitourinary: Negative for dysuria and hematuria.   Neurological: Negative for dizziness.       Objective   Vitals:    06/29/17 1523   BP: 122/70   BP Location:  "Right arm   Patient Position: Sitting   Cuff Size: Adult   Pulse: 76   Weight: 173 lb 3.2 oz (78.6 kg)   Height: 69\" (175.3 cm)     Body mass index is 25.58 kg/(m^2).        Physical Exam   Constitutional: He is oriented to person, place, and time. He appears well-developed and well-nourished.   HENT:   Head: Normocephalic.   Eyes: Conjunctivae and EOM are normal.   Neck: Normal range of motion. Neck supple. No JVD present. No tracheal deviation present. No thyromegaly present.   Cardiovascular: Normal rate, S1 normal and S2 normal.  An irregularly irregular rhythm present. Exam reveals no gallop, no S3, no S4 and no friction rub.    No murmur heard.  Pulmonary/Chest: No respiratory distress. He has no wheezes. He has rales (few rales at the right base.).   Abdominal: Soft. Bowel sounds are normal. He exhibits no mass. There is no tenderness.   Musculoskeletal: He exhibits no edema.   Neurological: He is alert and oriented to person, place, and time. No cranial nerve deficit.   Skin: Skin is warm and dry.   Psychiatric: He has a normal mood and affect.       Lab Results   Component Value Date     05/09/2016    K 4.0 05/09/2016     05/09/2016    CO2 31.0 05/09/2016    BUN 15 05/09/2016    CREATININE 0.80 05/09/2016    GLUCOSE 125 (H) 05/09/2016    CALCIUM 9.4 05/09/2016     (H) 05/07/2016    ALT 69 (H) 05/07/2016    ALKPHOS 114 05/07/2016    LABIL2 1.3 (L) 05/07/2016     Lab Results   Component Value Date    CKTOTAL 177 05/09/2016     Lab Results   Component Value Date    WBC 9.0 05/09/2016    HGB 10.9 (L) 05/09/2016    HCT 34.4 (L) 05/09/2016     05/09/2016     Lab Results   Component Value Date    INR 2.23 12/17/2014    INR 3.98 12/16/2014    INR 9.26 12/15/2014     Lab Results   Component Value Date    MG 2.1 05/09/2016     Lab Results   Component Value Date    TSH 1.113 05/07/2016      Lab Results   Component Value Date    BNP 92 01/07/2015     Echo -Doppler study in January 2017 " revealed:  · Normal left ventricular cavity size and wall thickness noted. Global left ventricular wall motion appears abnormal.  · Left ventricular function is mildly decreased. Estimated EF appears to be in the range of 41 - 45%  · The aortic valve is structurally normal. No aortic valve regurgitation is present. No aortic valve stenosis is present  · The mitral valve is abnormal in structure. Mild mitral valve regurgitation is present. No significant mitral valve stenosis is present.  · The tricuspid valve is normal. No tricuspid valve stenosis is present. No tricuspid valve regurgitation is present. Estimated right ventricular systolic pressure from tricuspid regurgitation is normal (<35 mmHg).  · There is no evidence of pericardial effusion.        ECG 12 Lead  Date/Time: 6/29/2017 3:21 PM  Performed by: AJY MERCADO  Authorized by: JAY MERCADO               Assessment/Plan    Diagnosis Plan   1. Arteriosclerotic cardiovascular disease (ASCVD) s/p stenting  Basic Metabolic Panel   2. Persistent atrial fibrillation     3. PAD (peripheral artery disease), fem pop bypass,3/08     4. Essential hypertension     5. Chronic bronchitis, unspecified chronic bronchitis type     6. Dyspnea on exertion  BNP    XR Chest PA & Lateral        Recommendations:    1. Continue with current medications.  2. Add spironolactone 25 mg daily.  3. Check basic panel, BNP and a chest x-ray in a week.  4. Follow-up in 2 months.  5. If he has recurrent bleeding from his mouth, he should see a dentist or oral surgeon.     Return in about 2 months (around 8/29/2017).    As always, I appreciate very much the opportunity to participate in the cardiovascular care of your patients.      With Best Regards,    Jay Mercado MD, [facility]    Dragon disclaimer:  Much of this encounter note is an electronic transcription/translation of spoken language to printed text. The electronic translation of spoken language may permit erroneous, or at  times, nonsensical words or phrases to be inadvertently transcribed; Although I have reviewed the note for such errors, some may still exist.

## 2017-07-06 ENCOUNTER — RESULTS ENCOUNTER (OUTPATIENT)
Dept: CARDIOLOGY | Facility: CLINIC | Age: 82
End: 2017-07-06

## 2017-07-06 ENCOUNTER — HOSPITAL ENCOUNTER (OUTPATIENT)
Dept: GENERAL RADIOLOGY | Facility: HOSPITAL | Age: 82
Discharge: HOME | End: 2017-07-06
Attending: INTERNAL MEDICINE | Admitting: INTERNAL MEDICINE

## 2017-07-06 DIAGNOSIS — R06.09 DYSPNEA ON EXERTION: ICD-10-CM

## 2017-07-06 DIAGNOSIS — I25.10 ARTERIOSCLEROTIC CARDIOVASCULAR DISEASE (ASCVD): ICD-10-CM

## 2017-07-06 PROCEDURE — 71020 XR CHEST PA AND LATERAL: CPT | Performed by: RADIOLOGY

## 2017-07-06 PROCEDURE — 71020 HC CHEST PA AND LATERAL: CPT

## 2017-07-11 ENCOUNTER — LAB (OUTPATIENT)
Dept: LAB | Facility: HOSPITAL | Age: 82
End: 2017-07-11
Attending: INTERNAL MEDICINE

## 2017-07-11 DIAGNOSIS — R06.09 DYSPNEA ON EXERTION: ICD-10-CM

## 2017-07-11 LAB
ANION GAP SERPL CALCULATED.3IONS-SCNC: 6 MMOL/L (ref 3.6–11.2)
BNP SERPL-MCNC: 93 PG/ML (ref 0–100)
BUN BLD-MCNC: 13 MG/DL (ref 7–21)
BUN/CREAT SERPL: 10.3 (ref 7–25)
CALCIUM SPEC-SCNC: 9.6 MG/DL (ref 7.7–10)
CHLORIDE SERPL-SCNC: 108 MMOL/L (ref 99–112)
CO2 SERPL-SCNC: 27 MMOL/L (ref 24.3–31.9)
CREAT BLD-MCNC: 1.26 MG/DL (ref 0.43–1.29)
GFR SERPL CREATININE-BSD FRML MDRD: 55 ML/MIN/1.73
GLUCOSE BLD-MCNC: 152 MG/DL (ref 70–110)
OSMOLALITY SERPL CALC.SUM OF ELEC: 284.3 MOSM/KG (ref 273–305)
POTASSIUM BLD-SCNC: 4.3 MMOL/L (ref 3.5–5.3)
SODIUM BLD-SCNC: 141 MMOL/L (ref 135–153)

## 2017-07-11 PROCEDURE — 83880 ASSAY OF NATRIURETIC PEPTIDE: CPT | Performed by: INTERNAL MEDICINE

## 2017-07-11 PROCEDURE — 36415 COLL VENOUS BLD VENIPUNCTURE: CPT | Performed by: INTERNAL MEDICINE

## 2017-07-11 PROCEDURE — 80048 BASIC METABOLIC PNL TOTAL CA: CPT | Performed by: INTERNAL MEDICINE

## 2017-07-12 DIAGNOSIS — Z79.899 DRUG THERAPY: Primary | ICD-10-CM

## 2017-07-20 ENCOUNTER — APPOINTMENT (OUTPATIENT)
Dept: LAB | Facility: HOSPITAL | Age: 82
End: 2017-07-20

## 2017-07-20 LAB
ANION GAP SERPL CALCULATED.3IONS-SCNC: 6.4 MMOL/L (ref 3.6–11.2)
BUN BLD-MCNC: 11 MG/DL (ref 7–21)
BUN/CREAT SERPL: 9.2 (ref 7–25)
CALCIUM SPEC-SCNC: 10 MG/DL (ref 7.7–10)
CHLORIDE SERPL-SCNC: 107 MMOL/L (ref 99–112)
CO2 SERPL-SCNC: 26.6 MMOL/L (ref 24.3–31.9)
CREAT BLD-MCNC: 1.2 MG/DL (ref 0.43–1.29)
GFR SERPL CREATININE-BSD FRML MDRD: 58 ML/MIN/1.73
GLUCOSE BLD-MCNC: 149 MG/DL (ref 70–110)
OSMOLALITY SERPL CALC.SUM OF ELEC: 281.6 MOSM/KG (ref 273–305)
POTASSIUM BLD-SCNC: 4.5 MMOL/L (ref 3.5–5.3)
SODIUM BLD-SCNC: 140 MMOL/L (ref 135–153)

## 2017-07-20 PROCEDURE — 80048 BASIC METABOLIC PNL TOTAL CA: CPT | Performed by: PHYSICIAN ASSISTANT

## 2017-07-20 PROCEDURE — 36415 COLL VENOUS BLD VENIPUNCTURE: CPT

## 2017-07-22 ENCOUNTER — RESULTS ENCOUNTER (OUTPATIENT)
Dept: CARDIOLOGY | Facility: CLINIC | Age: 82
End: 2017-07-22

## 2017-07-22 DIAGNOSIS — Z79.899 DRUG THERAPY: ICD-10-CM

## 2017-08-08 RX ORDER — LISINOPRIL 10 MG/1
10 TABLET ORAL DAILY
Qty: 30 TABLET | Refills: 3 | Status: SHIPPED | OUTPATIENT
Start: 2017-08-08 | End: 2018-02-26 | Stop reason: DRUGHIGH

## 2017-08-08 RX ORDER — SPIRONOLACTONE 25 MG/1
25 TABLET ORAL DAILY
Qty: 30 TABLET | Refills: 3 | Status: SHIPPED | OUTPATIENT
Start: 2017-08-08 | End: 2017-11-15 | Stop reason: SDUPTHER

## 2017-08-22 DIAGNOSIS — R06.02 SOB (SHORTNESS OF BREATH): Primary | ICD-10-CM

## 2017-08-23 ENCOUNTER — HOSPITAL ENCOUNTER (OUTPATIENT)
Dept: GENERAL RADIOLOGY | Facility: HOSPITAL | Age: 82
Discharge: HOME | End: 2017-08-23
Attending: INTERNAL MEDICINE | Admitting: INTERNAL MEDICINE

## 2017-08-23 DIAGNOSIS — R06.02 SOB (SHORTNESS OF BREATH): ICD-10-CM

## 2017-08-23 PROCEDURE — 71020 HC CHEST PA AND LATERAL: CPT

## 2017-08-23 PROCEDURE — 71020 XR CHEST PA AND LATERAL: CPT | Performed by: RADIOLOGY

## 2017-08-24 ENCOUNTER — OFFICE VISIT (OUTPATIENT)
Dept: PULMONOLOGY | Facility: CLINIC | Age: 82
End: 2017-08-24

## 2017-08-24 VITALS
WEIGHT: 175 LBS | DIASTOLIC BLOOD PRESSURE: 60 MMHG | HEART RATE: 86 BPM | SYSTOLIC BLOOD PRESSURE: 104 MMHG | HEIGHT: 71 IN | OXYGEN SATURATION: 95 % | BODY MASS INDEX: 24.5 KG/M2 | TEMPERATURE: 97.7 F

## 2017-08-24 DIAGNOSIS — J44.9 CHRONIC OBSTRUCTIVE PULMONARY DISEASE, UNSPECIFIED COPD TYPE (HCC): Primary | ICD-10-CM

## 2017-08-24 PROCEDURE — 99212 OFFICE O/P EST SF 10 MIN: CPT | Performed by: INTERNAL MEDICINE

## 2017-08-28 NOTE — PROGRESS NOTES
History of Present Illness 82-year-old gentleman To the office for follow-up Thursday, August 24.  He was seen in note was dictated that got lost in the system is being Re-Done Monday, August 28.  He has mild COPD that is relatively stable and has been seen in the office January 3 taking Theophylline 600 mg once a day and using his nebulizer twice a day singular CPAP for obstructive apnea for several years.  He also has hypertension coronary artery disease and is being followed by Dr. Everett of cardiology.  His wife is helping with his daily activities.  Has used portable oxygen the past that has been discontinued      Review of Systems minimal cough no expectoration some shortness of breath on exertion no chest pain review of systems: Noncontributory    Active Problems:  Problem List Items Addressed This Visit     None          Past Medical History:  Past Medical History:   Diagnosis Date   • COPD (chronic obstructive pulmonary disease)        Family History:  Family History   Problem Relation Age of Onset   • Hypertension Mother    • Arthritis Mother    • Hypertension Father    • Arthritis Father    • Diabetes Sister    • Cancer Sister    • Diabetes Brother        Social History:  Social History   Substance Use Topics   • Smoking status: Former Smoker     Years: 40.00     Types: Cigarettes   • Smokeless tobacco: Never Used   • Alcohol use Yes      Comment: occcasionally when younger        Current Medications:  Current Outpatient Prescriptions   Medication Sig Dispense Refill   • apixaban (ELIQUIS) 5 MG tablet tablet Take 5 mg by mouth 2 (Two) Times a Day.     • aspirin (ASPIRIN LOW DOSE) 81 MG tablet Take  by mouth daily.     • Denture Care Products (DENTURE ADHESIVE) cream      • fluticasone-salmeterol (ADVAIR DISKUS) 250-50 MCG/DOSE DISKUS Inhale 1 puff 2 (Two) Times a Day. 60 each 5   • furosemide (LASIX) 20 MG tablet Take 20 mg by mouth daily.     • gabapentin (NEURONTIN) 600 MG tablet Take 600 mg by mouth 3  "(Three) Times a Day.     • glucosamine-chondroitin 500-400 MG capsule capsule Take 1 capsule by mouth daily.     • lisinopril (PRINIVIL,ZESTRIL) 10 MG tablet Take 1 tablet by mouth Daily. 30 tablet 3   • loratadine (CLARITIN) 10 MG tablet Take 10 mg by mouth Daily.     • metoprolol succinate XL (TOPROL-XL) 50 MG 24 hr tablet Take 1 tablet by mouth Daily. 90 tablet 2   • Multiple Vitamin (MULTI VITAMIN PO) Take  by mouth.     • Omega-3 Fatty Acids (EQL FISH OIL) 1000 MG capsule Take  by mouth.     • ondansetron (ZOFRAN) 4 MG tablet Take 4 mg by mouth Every 8 (Eight) Hours As Needed for Nausea or Vomiting.     • ranitidine (ZANTAC) 150 MG tablet 150 mg.     • rivaroxaban (XARELTO) 20 MG tablet Take 20 mg by mouth Daily.     • simvastatin (ZOCOR) 40 MG tablet      • spironolactone (ALDACTONE) 25 MG tablet Take 1 tablet by mouth Daily. 30 tablet 3   • theophylline (UNIPHYL) 600 MG 24 hr tablet Take 1 tablet by mouth Daily. 30 tablet 5     No current facility-administered medications for this visit.        Allergies:  No Known Allergies    Vitals:  /60 (BP Location: Left arm, Patient Position: Sitting, Cuff Size: Adult)  Pulse 86  Temp 97.7 °F (36.5 °C) (Oral)   Ht 71\" (180.3 cm)  Wt 175 lb (79.4 kg)  SpO2 95%  BMI 24.41 kg/m2    Physical Exam chronically ill in no acute distress heart regular lungs clear O2 sat 90 4% on room air at rest.  Increased to 97% after walking 4 minutes and all they    Imaging: Chest x-ray of August 23 clear no cardiomegaly no indication of heart failure    PFT: No PFT this visit last visit january FVC was 85 FEV1 79%  Results for orders placed or performed in visit on 07/22/17   Basic Metabolic Panel   Result Value Ref Range    Glucose 149 (H) 70 - 110 mg/dL    BUN 11 7 - 21 mg/dL    Creatinine 1.20 0.43 - 1.29 mg/dL    Sodium 140 135 - 153 mmol/L    Potassium 4.5 3.5 - 5.3 mmol/L    Chloride 107 99 - 112 mmol/L    CO2 26.6 24.3 - 31.9 mmol/L    Calcium 10.0 7.7 - 10.0 mg/dL    " eGFR Non African Amer 58 (L) >60 mL/min/1.73    BUN/Creatinine Ratio 9.2 7.0 - 25.0    Anion Gap 6.4 3.6 - 11.2 mmol/L   Osmolality, Calculated   Result Value Ref Range    Osmolality Calc 281.6 273.0 - 305.0 mOsm/kg           ASSESSMENT AND DIAGNOSIS:COPD mild and stable no detectable hypoxia at rest and exercise so no need for oxygen as his wife was2-obstructive apnea per history using CPAP at night3-coronary artery disease being followed by Dr. Everett of cardiology  Recommendation continue theophylline tablet and nebulizer as needed CPAP at night reminded to him of getting his annual flu vaccine      Follow-Up: Return in 6 months with a PFT or earlier as needed

## 2017-08-28 NOTE — PROGRESS NOTES
History of Present Illness       Review of Systems    Active Problems:  Problem List Items Addressed This Visit     None          Past Medical History:  Past Medical History:   Diagnosis Date   • COPD (chronic obstructive pulmonary disease)        Family History:  Family History   Problem Relation Age of Onset   • Hypertension Mother    • Arthritis Mother    • Hypertension Father    • Arthritis Father    • Diabetes Sister    • Cancer Sister    • Diabetes Brother        Social History:  Social History   Substance Use Topics   • Smoking status: Former Smoker     Years: 40.00     Types: Cigarettes   • Smokeless tobacco: Never Used   • Alcohol use Yes      Comment: occcasionally when younger        Current Medications:  Current Outpatient Prescriptions   Medication Sig Dispense Refill   • apixaban (ELIQUIS) 5 MG tablet tablet Take 5 mg by mouth 2 (Two) Times a Day.     • aspirin (ASPIRIN LOW DOSE) 81 MG tablet Take  by mouth daily.     • Denture Care Products (DENTURE ADHESIVE) cream      • fluticasone-salmeterol (ADVAIR DISKUS) 250-50 MCG/DOSE DISKUS Inhale 1 puff 2 (Two) Times a Day. 60 each 5   • furosemide (LASIX) 20 MG tablet Take 20 mg by mouth daily.     • gabapentin (NEURONTIN) 600 MG tablet Take 600 mg by mouth 3 (Three) Times a Day.     • glucosamine-chondroitin 500-400 MG capsule capsule Take 1 capsule by mouth daily.     • lisinopril (PRINIVIL,ZESTRIL) 10 MG tablet Take 1 tablet by mouth Daily. 30 tablet 3   • loratadine (CLARITIN) 10 MG tablet Take 10 mg by mouth Daily.     • metoprolol succinate XL (TOPROL-XL) 50 MG 24 hr tablet Take 1 tablet by mouth Daily. 90 tablet 2   • Multiple Vitamin (MULTI VITAMIN PO) Take  by mouth.     • Omega-3 Fatty Acids (EQL FISH OIL) 1000 MG capsule Take  by mouth.     • ondansetron (ZOFRAN) 4 MG tablet Take 4 mg by mouth Every 8 (Eight) Hours As Needed for Nausea or Vomiting.     • ranitidine (ZANTAC) 150 MG tablet 150 mg.     • rivaroxaban (XARELTO) 20 MG tablet Take 20 mg  "by mouth Daily.     • simvastatin (ZOCOR) 40 MG tablet      • spironolactone (ALDACTONE) 25 MG tablet Take 1 tablet by mouth Daily. 30 tablet 3   • theophylline (UNIPHYL) 600 MG 24 hr tablet Take 1 tablet by mouth Daily. 30 tablet 5     No current facility-administered medications for this visit.        Allergies:  No Known Allergies    Vitals:  /60 (BP Location: Left arm, Patient Position: Sitting, Cuff Size: Adult)  Pulse 86  Temp 97.7 °F (36.5 °C) (Oral)   Ht 71\" (180.3 cm)  Wt 175 lb (79.4 kg)  SpO2 95%  BMI 24.41 kg/m2    Physical Exam    Imaging:    PFT:  Results for orders placed or performed in visit on 07/22/17   Basic Metabolic Panel   Result Value Ref Range    Glucose 149 (H) 70 - 110 mg/dL    BUN 11 7 - 21 mg/dL    Creatinine 1.20 0.43 - 1.29 mg/dL    Sodium 140 135 - 153 mmol/L    Potassium 4.5 3.5 - 5.3 mmol/L    Chloride 107 99 - 112 mmol/L    CO2 26.6 24.3 - 31.9 mmol/L    Calcium 10.0 7.7 - 10.0 mg/dL    eGFR Non African Amer 58 (L) >60 mL/min/1.73    BUN/Creatinine Ratio 9.2 7.0 - 25.0    Anion Gap 6.4 3.6 - 11.2 mmol/L   Osmolality, Calculated   Result Value Ref Range    Osmolality Calc 281.6 273.0 - 305.0 mOsm/kg           ASSESSMENT AND DIAGNOSIS:        Follow-Up:                    "

## 2017-08-31 ENCOUNTER — OFFICE VISIT (OUTPATIENT)
Dept: CARDIOLOGY | Facility: CLINIC | Age: 82
End: 2017-08-31

## 2017-08-31 VITALS
HEART RATE: 62 BPM | WEIGHT: 176.6 LBS | SYSTOLIC BLOOD PRESSURE: 107 MMHG | BODY MASS INDEX: 24.72 KG/M2 | DIASTOLIC BLOOD PRESSURE: 72 MMHG | HEIGHT: 71 IN

## 2017-08-31 DIAGNOSIS — I10 ESSENTIAL HYPERTENSION: ICD-10-CM

## 2017-08-31 DIAGNOSIS — I48.19 PERSISTENT ATRIAL FIBRILLATION (HCC): ICD-10-CM

## 2017-08-31 DIAGNOSIS — I25.10 ARTERIOSCLEROTIC CARDIOVASCULAR DISEASE (ASCVD): Primary | ICD-10-CM

## 2017-08-31 DIAGNOSIS — I73.9 PAD (PERIPHERAL ARTERY DISEASE) (HCC): ICD-10-CM

## 2017-08-31 DIAGNOSIS — N18.30 CHRONIC KIDNEY DISEASE, STAGE 3 (MODERATE): ICD-10-CM

## 2017-08-31 DIAGNOSIS — N18.30 CHRONIC KIDNEY DISEASE, STAGE III (MODERATE) (HCC): ICD-10-CM

## 2017-08-31 DIAGNOSIS — J44.9 CHRONIC OBSTRUCTIVE PULMONARY DISEASE, UNSPECIFIED COPD TYPE (HCC): ICD-10-CM

## 2017-08-31 PROCEDURE — 99213 OFFICE O/P EST LOW 20 MIN: CPT | Performed by: INTERNAL MEDICINE

## 2017-08-31 RX ORDER — CYPROHEPTADINE HYDROCHLORIDE 4 MG/1
4 TABLET ORAL 3 TIMES DAILY
COMMUNITY
End: 2018-02-26 | Stop reason: ALTCHOICE

## 2017-09-05 ENCOUNTER — RESULTS ENCOUNTER (OUTPATIENT)
Dept: CARDIOLOGY | Facility: CLINIC | Age: 82
End: 2017-09-05

## 2017-09-05 DIAGNOSIS — N18.30 CHRONIC KIDNEY DISEASE, STAGE 3 (MODERATE): ICD-10-CM

## 2017-09-12 ENCOUNTER — HOSPITAL ENCOUNTER (OUTPATIENT)
Dept: GENERAL RADIOLOGY | Facility: HOSPITAL | Age: 82
Discharge: HOME | End: 2017-09-12
Admitting: NURSE PRACTITIONER

## 2017-09-12 ENCOUNTER — TRANSCRIBE ORDERS (OUTPATIENT)
Dept: LAB | Facility: HOSPITAL | Age: 82
End: 2017-09-12

## 2017-09-12 DIAGNOSIS — M79.644 THUMB PAIN, RIGHT: ICD-10-CM

## 2017-09-12 DIAGNOSIS — M79.644 THUMB PAIN, RIGHT: Primary | ICD-10-CM

## 2017-09-12 PROCEDURE — 73140 X-RAY EXAM OF FINGER(S): CPT

## 2017-09-12 PROCEDURE — 73130 X-RAY EXAM OF HAND: CPT | Performed by: RADIOLOGY

## 2017-11-15 RX ORDER — SPIRONOLACTONE 25 MG/1
TABLET ORAL
Qty: 90 TABLET | Refills: 1 | Status: SHIPPED | OUTPATIENT
Start: 2017-11-15 | End: 2018-05-03 | Stop reason: SDUPTHER

## 2018-02-26 ENCOUNTER — OFFICE VISIT (OUTPATIENT)
Dept: CARDIOLOGY | Facility: CLINIC | Age: 83
End: 2018-02-26

## 2018-02-26 VITALS
DIASTOLIC BLOOD PRESSURE: 69 MMHG | BODY MASS INDEX: 27.49 KG/M2 | HEIGHT: 69 IN | SYSTOLIC BLOOD PRESSURE: 111 MMHG | HEART RATE: 76 BPM | OXYGEN SATURATION: 96 % | WEIGHT: 185.6 LBS

## 2018-02-26 DIAGNOSIS — I10 ESSENTIAL HYPERTENSION: ICD-10-CM

## 2018-02-26 DIAGNOSIS — R07.2 PRECORDIAL PAIN: Primary | ICD-10-CM

## 2018-02-26 DIAGNOSIS — I25.10 ARTERIOSCLEROTIC CARDIOVASCULAR DISEASE (ASCVD): ICD-10-CM

## 2018-02-26 DIAGNOSIS — E78.5 DYSLIPIDEMIA: ICD-10-CM

## 2018-02-26 DIAGNOSIS — I73.9 PAD (PERIPHERAL ARTERY DISEASE) (HCC): ICD-10-CM

## 2018-02-26 DIAGNOSIS — I48.19 PERSISTENT ATRIAL FIBRILLATION (HCC): ICD-10-CM

## 2018-02-26 DIAGNOSIS — Z79.01 LONG TERM CURRENT USE OF ANTICOAGULANT: ICD-10-CM

## 2018-02-26 PROCEDURE — 93000 ELECTROCARDIOGRAM COMPLETE: CPT | Performed by: PHYSICIAN ASSISTANT

## 2018-02-26 PROCEDURE — 99214 OFFICE O/P EST MOD 30 MIN: CPT | Performed by: PHYSICIAN ASSISTANT

## 2018-02-26 RX ORDER — LISINOPRIL 5 MG/1
5 TABLET ORAL DAILY
COMMUNITY
End: 2018-05-03 | Stop reason: SDUPTHER

## 2018-02-26 RX ORDER — ISOSORBIDE MONONITRATE 30 MG/1
30 TABLET, EXTENDED RELEASE ORAL DAILY
Qty: 30 TABLET | Refills: 3 | Status: SHIPPED | OUTPATIENT
Start: 2018-02-26 | End: 2018-05-03 | Stop reason: SDUPTHER

## 2018-02-26 NOTE — PROGRESS NOTES
Rc Ojeda MD  Felice Aiken  1935  02/26/2018    Patient Active Problem List   Diagnosis   • Knee pain   • Arteriosclerotic cardiovascular disease (ASCVD) s/p stenting   • Sleep apnea   • Pain and swelling of lower extremity   • Persistent atrial fibrillation   • History of ASCVD (atherosclerotic cardiovascular disease), s/p stenting   • PAD (peripheral artery disease), fem pop bypass,3/08   • Dyslipidemia   • Essential hypertension   • Chronic bronchitis   • Chronic obstructive pulmonary disease   • Status post total left knee replacement   • Neuropathy involving both lower extremities   • Chronic kidney disease, stage III (moderate)       Dear Rc Ojeda MD:    Chief Complaint   Patient presents with   • Follow-up   • Meds   • Chest Pain     Every once in a while.    • Shortness of Breath   • Edema     Feet and legs.    • Palpitations       Subjective     Felice Aiken is a 82 y.o. male with a past medical history significant for Atherosclerotic cardiovascular disease status post previous intervention, peripheral arterial disease status post more popliteal bypass in 03/2008, persistent/chronic atrial fibrillation, and chronic anticoagulation with developed less.  He presents to the office today for routine follow-up. He reports recent development of left-sided chest pain which radiates into the right chest and lower right ribs.  He denies any associated shortness of breath, nausea, vomiting, or diaphoresis.  He is unsure if it is similar to what he has had in the past.  It lasts about one hour and resolved spontaneously.  It occurs at random and is not necessarily associated with exertion.  He has not tried any sublingual nitroglycerin recently.      Current Outpatient Prescriptions:   •  apixaban (ELIQUIS) 5 MG tablet tablet, Take 1 tablet by mouth 2 (Two) Times a Day., Disp: 60 tablet, Rfl: 4  •  aspirin (ASPIRIN LOW DOSE) 81 MG tablet, Take  by mouth daily., Disp: , Rfl:   •  Denture  Care Products (DENTURE ADHESIVE) cream, , Disp: , Rfl:   •  furosemide (LASIX) 20 MG tablet, Take 20 mg by mouth daily., Disp: , Rfl:   •  gabapentin (NEURONTIN) 600 MG tablet, Take 600 mg by mouth 3 (Three) Times a Day., Disp: , Rfl:   •  Homeopathic Products (LEG CRAMP RELIEF PO), Take  by mouth., Disp: , Rfl:   •  lisinopril (PRINIVIL,ZESTRIL) 5 MG tablet, Take 5 mg by mouth Daily., Disp: , Rfl:   •  metoprolol succinate XL (TOPROL-XL) 50 MG 24 hr tablet, Take 1 tablet by mouth Daily., Disp: 90 tablet, Rfl: 2  •  Misc Natural Products (COSAMIN ASU FOR JOINT HEALTH PO), Take  by mouth., Disp: , Rfl:   •  Multiple Vitamin (MULTI VITAMIN PO), Take  by mouth., Disp: , Rfl:   •  Omega-3 Fatty Acids (EQL FISH OIL) 1000 MG capsule, Take 1,200 mg by mouth., Disp: , Rfl:   •  ondansetron (ZOFRAN) 4 MG tablet, Take 4 mg by mouth Every 8 (Eight) Hours As Needed for Nausea or Vomiting., Disp: , Rfl:   •  ranitidine (ZANTAC) 150 MG tablet, Take 300 mg by mouth 2 (Two) Times a Day., Disp: , Rfl:   •  simvastatin (ZOCOR) 40 MG tablet, Take 40 mg by mouth Every Night., Disp: , Rfl:   •  spironolactone (ALDACTONE) 25 MG tablet, TAKE 1 TABLET EVERY DAY, Disp: 90 tablet, Rfl: 1  •  theophylline (UNIPHYL) 600 MG 24 hr tablet, Take 1 tablet by mouth Daily., Disp: 30 tablet, Rfl: 5  •  isosorbide mononitrate (IMDUR) 30 MG 24 hr tablet, Take 1 tablet by mouth Daily., Disp: 30 tablet, Rfl: 3    The following portions of the patient's history were reviewed and updated as appropriate: allergies, current medications, past family history, past medical history, past social history, past surgical history and problem list.    Social History     Social History   • Marital status:      Spouse name: N/A   • Number of children: N/A   • Years of education: N/A     Occupational History   • Not on file.     Social History Main Topics   • Smoking status: Former Smoker     Years: 40.00     Types: Cigarettes   • Smokeless tobacco: Never Used   •  "Alcohol use Yes      Comment: occcasionally when younger    • Drug use: No   • Sexual activity: Not on file     Other Topics Concern   • Not on file     Social History Narrative     Review of Systems   Cardiovascular: Positive for chest pain, leg swelling and palpitations. Negative for syncope.   Respiratory: Positive for shortness of breath.    Neurological: Negative for dizziness.     Objective   Blood pressure 111/69, pulse 76, height 175.3 cm (69\"), weight 84.2 kg (185 lb 9.6 oz), SpO2 96 %.  Body mass index is 27.41 kg/(m^2).    Physical Exam   Constitutional: He is oriented to person, place, and time. He appears well-developed and well-nourished. No distress.   HENT:   Head: Normocephalic and atraumatic.   Eyes: Conjunctivae are normal. Right eye exhibits no discharge. Left eye exhibits no discharge.   Neck: Normal range of motion. Neck supple. Carotid bruit is not present.   Cardiovascular: Normal rate, regular rhythm and normal heart sounds.  Exam reveals no gallop and no friction rub.    No murmur heard.  Pulmonary/Chest: Effort normal and breath sounds normal. No respiratory distress. He has no wheezes. He has no rales. He exhibits no tenderness.   Musculoskeletal: Normal range of motion. He exhibits edema (Trace ankle edema bilaterally.).   Neurological: He is alert and oriented to person, place, and time.   Skin: Skin is warm and dry. No rash noted. He is not diaphoretic. No erythema. No pallor.   Psychiatric: He has a normal mood and affect. His behavior is normal.   Nursing note and vitals reviewed.      ECG 12 Lead  Date/Time: 2/26/2018 9:11 AM  Performed by: CALEB BELTRE  Authorized by: CALEB BELTRE   Comparison: compared with previous ECG   Similar to previous ECG  Rhythm: atrial fibrillation  Rate: normal  BPM: 74  T flattening: aVL  QRS axis: normal  Q waves: V1 and V2  Clinical impression: non-specific ECG  Comments: Atrial fibrillation at a rate of 74 bpm.  Nonspecific " T-wave flattening in aVL.  Old septal infarct.  No significant change compared to previous EKG.  .          Transthoracic echocardiogram 1/27/17  · Normal left ventricular cavity size and wall thickness noted. Global left ventricular wall motion appears abnormal.  · Left ventricular function is mildly decreased. Estimated EF appears to be in the range of 41 - 45%  · The aortic valve is structurally normal. No aortic valve regurgitation is present. No aortic valve stenosis is present  · The mitral valve is abnormal in structure. Mild mitral valve regurgitation is present. No significant mitral valve stenosis is present.  · The tricuspid valve is normal. No tricuspid valve stenosis is present. No tricuspid valve regurgitation is present. Estimated right ventricular systolic pressure from tricuspid regurgitation is normal (<35 mmHg).  · There is no evidence of pericardial effusion.  Assessment:          Diagnosis Plan   1. Precordial pain     2. Arteriosclerotic cardiovascular disease (ASCVD) s/p stenting  ECG 12 Lead   3. PAD (peripheral artery disease), fem pop bypass,3/08     4. Persistent atrial fibrillation  ECG 12 Lead   5. Essential hypertension     6. Dyslipidemia     7. Long term current use of anticoagulant          Plan:       1. Add isosorbide mononitrate 30 mg daily.  2. Continue aspirin, simvastatin, metoprolol, lisinopril, spironolactone, and Eliquis.   3. Due to his known history of coronary artery disease and cardiomyopathy, we'll reevaluate with a nuclear stress tests to rule out occult ischemia, and a transthoracic echocardiogram to evaluate cardiac wall motion and left ventricular systolic function.  4. We will follow-up in 3-5 weeks or sooner if needed.    No Follow-up on file.    I appreciate the opportunity to participate in this patient's cardiovascular care.    Best Regards,    Colette Earl PA-C

## 2018-02-26 NOTE — PATIENT INSTRUCTIONS
BMI for Adults  Body mass index (BMI) is a number that is calculated from a person's weight and height. In most adults, the number is used to find how much of an adult's weight is made up of fat. BMI is not as accurate as a direct measure of body fat.  HOW IS BMI CALCULATED?  BMI is calculated by dividing weight in kilograms by height in meters squared. It can also be calculated by dividing weight in pounds by height in inches squared, then multiplying the resulting number by 703. Charts are available to help you find your BMI quickly and easily without doing this calculation.   HOW IS BMI INTERPRETED?  Health care professionals use BMI charts to identify whether an adult is underweight, at a normal weight, or overweight based on the following guidelines:  · Underweight: BMI less than 18.5.  · Normal weight: BMI between 18.5 and 24.9.  · Overweight: BMI between 25 and 29.9.  · Obese: BMI of 30 and above.  BMI is usually interpreted the same for males and females.  Weight includes both fat and muscle, so someone with a muscular build, such as an athlete, may have a BMI that is higher than 24.9. In cases like these, BMI may not accurately depict body fat. To determine if excess body fat is the cause of a BMI of 25 or higher, further assessments may need to be done by a health care provider.  WHY IS BMI A USEFUL TOOL?  BMI is used to identify a possible weight problem that may be related to a medical problem or may increase the risk for medical problems. BMI can also be used to promote changes to reach a healthy weight.     This information is not intended to replace advice given to you by your health care provider. Make sure you discuss any questions you have with your health care provider.     Document Released: 08/29/2005 Document Revised: 01/08/2016 Document Reviewed: 05/15/2015  [email or web address] Interactive Patient Education ©2017 [email or web address]       Calorie Counting for Weight Loss  Calories are energy you get from the  things you eat and drink. Your body uses this energy to keep you going throughout the day. The number of calories you eat affects your weight. When you eat more calories than your body needs, your body stores the extra calories as fat. When you eat fewer calories than your body needs, your body burns fat to get the energy it needs.  Calorie counting means keeping track of how many calories you eat and drink each day. If you make sure to eat fewer calories than your body needs, you should lose weight. In order for calorie counting to work, you will need to eat the number of calories that are right for you in a day to lose a healthy amount of weight per week. A healthy amount of weight to lose per week is usually 1-2 lb (0.5-0.9 kg). A dietitian can determine how many calories you need in a day and give you suggestions on how to reach your calorie goal.   WHAT IS MY MY PLAN?  My goal is to have __________ calories per day.   If I have this many calories per day, I should lose around __________ pounds per week.  WHAT DO I NEED TO KNOW ABOUT CALORIE COUNTING?  In order to meet your daily calorie goal, you will need to:  · Find out how many calories are in each food you would like to eat. Try to do this before you eat.  · Decide how much of the food you can eat.  · Write down what you ate and how many calories it had. Doing this is called keeping a food log.  WHERE DO I FIND CALORIE INFORMATION?  The number of calories in a food can be found on a Nutrition Facts label. Note that all the information on a label is based on a specific serving of the food. If a food does not have a Nutrition Facts label, try to look up the calories online or ask your dietitian for help.  HOW DO I DECIDE HOW MUCH TO EAT?  To decide how much of the food you can eat, you will need to consider both the number of calories in one serving and the size of one serving. This information can be found on the Nutrition Facts label. If a food does not  have a Nutrition Facts label, look up the information online or ask your dietitian for help.  Remember that calories are listed per serving. If you choose to have more than one serving of a food, you will have to multiply the calories per serving by the amount of servings you plan to eat. For example, the label on a package of bread might say that a serving size is 1 slice and that there are 90 calories in a serving. If you eat 1 slice, you will have eaten 90 calories. If you eat 2 slices, you will have eaten 180 calories.  HOW DO I KEEP A FOOD LOG?  After each meal, record the following information in your food log:  · What you ate.  · How much of it you ate.  · How many calories it had.  · Then, add up your calories.  Keep your food log near you, such as in a small notebook in your pocket. Another option is to use a mobile gopal or website. Some programs will calculate calories for you and show you how many calories you have left each time you add an item to the log.  WHAT ARE SOME CALORIE COUNTING TIPS?  · Use your calories on foods and drinks that will fill you up and not leave you hungry. Some examples of this include foods like nuts and nut butters, vegetables, lean proteins, and high-fiber foods (more than 5 g fiber per serving).  · Eat nutritious foods and avoid empty calories. Empty calories are calories you get from foods or beverages that do not have many nutrients, such as candy and soda. It is better to have a nutritious high-calorie food (such as an avocado) than a food with few nutrients (such as a bag of chips).  · Know how many calories are in the foods you eat most often. This way, you do not have to look up how many calories they have each time you eat them.  · Look out for foods that may seem like low-calorie foods but are really high-calorie foods, such as baked goods, soda, and fat-free candy.  · Pay attention to calories in drinks. Drinks such as sodas, specialty coffee drinks, alcohol, and  juices have a lot of calories yet do not fill you up. Choose low-calorie drinks like water and diet drinks.  · Focus your calorie counting efforts on higher calorie items. Logging the calories in a garden salad that contains only vegetables is less important than calculating the calories in a milk shake.  · Find a way of tracking calories that works for you. Get creative. Most people who are successful find ways to keep track of how much they eat in a day, even if they do not count every calorie.  WHAT ARE SOME PORTION CONTROL TIPS?  · Know how many calories are in a serving. This will help you know how many servings of a certain food you can have.  · Use a measuring cup to measure serving sizes. This is helpful when you start out. With time, you will be able to estimate serving sizes for some foods.  · Take some time to put servings of different foods on your favorite plates, bowls, and cups so you know what a serving looks like.  · Try not to eat straight from a bag or box. Doing this can lead to overeating. Put the amount you would like to eat in a cup or on a plate to make sure you are eating the right portion.  · Use smaller plates, glasses, and bowls to prevent overeating. This is a quick and easy way to practice portion control. If your plate is smaller, less food can fit on it.  · Try not to multitask while eating, such as watching TV or using your computer. If it is time to eat, sit down at a table and enjoy your food. Doing this will help you to start recognizing when you are full. It will also make you more aware of what and how much you are eating.  HOW CAN I CALORIE COUNT WHEN EATING OUT?  · Ask for smaller portion sizes or child-sized portions.  · Consider sharing an entree and sides instead of getting your own entree.  · If you get your own entree, eat only half. Ask for a box at the beginning of your meal and put the rest of your entree in it so you are not tempted to eat it.  · Look for the calories  "on the menu. If calories are listed, choose the lower calorie options.  · Choose dishes that include vegetables, fruits, whole grains, low-fat dairy products, and lean protein. Focusing on smart food choices from each of the 5 food groups can help you stay on track at restaurants.  · Choose items that are boiled, broiled, grilled, or steamed.  · Choose water, milk, unsweetened iced tea, or other drinks without added sugars. If you want an alcoholic beverage, choose a lower calorie option. For example, a regular benoit can have up to 700 calories and a glass of wine has around 150.  · Stay away from items that are buttered, battered, fried, or served with cream sauce. Items labeled \"crispy\" are usually fried, unless stated otherwise.  · Ask for dressings, sauces, and syrups on the side. These are usually very high in calories, so do not eat much of them.  · Watch out for salads. Many people think salads are a healthy option, but this is often not the case. Many salads come with londono, fried chicken, lots of cheese, fried chips, and dressing. All of these items have a lot of calories. If you want a salad, choose a garden salad and ask for grilled meats or steak. Ask for the dressing on the side, or ask for olive oil and vinegar or lemon to use as dressing.  · Estimate how many servings of a food you are given. For example, a serving of cooked rice is ½ cup or about the size of half a tennis ball or one cupcake wrapper. Knowing serving sizes will help you be aware of how much food you are eating at restaurants. The list below tells you how big or small some common portion sizes are based on everyday objects.  ¨ 1 oz--4 stacked dice.  ¨ 3 oz--1 deck of cards.  ¨ 1 tsp--1 dice.  ¨ 1 Tbsp--½ a Ping-Pong ball.  ¨ 2 Tbsp--1 Ping-Pong ball.  ¨ ½ cup--1 tennis ball or 1 cupcake wrapper.  ¨ 1 cup--1 baseball.     This information is not intended to replace advice given to you by your health care provider. Make sure you " discuss any questions you have with your health care provider.     Document Released: 12/18/2006 Document Revised: 01/08/2016 Document Reviewed: 10/23/2014  [employer] Interactive Patient Education ©2017 [employer]

## 2018-03-06 ENCOUNTER — OFFICE VISIT (OUTPATIENT)
Dept: PULMONOLOGY | Facility: CLINIC | Age: 83
End: 2018-03-06

## 2018-03-06 VITALS
BODY MASS INDEX: 27.31 KG/M2 | WEIGHT: 184.4 LBS | HEART RATE: 64 BPM | SYSTOLIC BLOOD PRESSURE: 113 MMHG | TEMPERATURE: 97.7 F | HEIGHT: 69 IN | OXYGEN SATURATION: 93 % | DIASTOLIC BLOOD PRESSURE: 61 MMHG

## 2018-03-06 DIAGNOSIS — R06.02 SOB (SHORTNESS OF BREATH): Primary | ICD-10-CM

## 2018-03-06 DIAGNOSIS — J44.9 CHRONIC OBSTRUCTIVE PULMONARY DISEASE, UNSPECIFIED COPD TYPE (HCC): ICD-10-CM

## 2018-03-06 LAB
FEV1: 2.21 LITERS
FVC VOL RESPIRATORY: 3.51 LITERS

## 2018-03-06 PROCEDURE — 99212 OFFICE O/P EST SF 10 MIN: CPT | Performed by: INTERNAL MEDICINE

## 2018-03-06 PROCEDURE — 94010 BREATHING CAPACITY TEST: CPT | Performed by: INTERNAL MEDICINE

## 2018-03-06 ASSESSMENT — PULMONARY FUNCTION TESTS
FVC: 3.51
FEV1: 2.21

## 2018-03-06 NOTE — PROGRESS NOTES
History of Present Illness 82-year-old gentleman returning for follow-up of COPD that is mild and relatively stable he quit smoking 4 years ago.  He also has history of obstructive apnea for which she is on CPAP for the last 10 years.  Has history of coronary artery disease and stenting and arrhythmias being followed by Dr. Everett of cardiology      Review of Systems minimal shortness of breath on exertion and currently taking Advair once a day or twice a day and theophylline once a day review of system otherwise noncontributory specifically denies having Any Chest pain palpitation or edema    Active Problems:  Problem List Items Addressed This Visit        Respiratory    Chronic obstructive pulmonary disease      Other Visit Diagnoses     SOB (shortness of breath)    -  Primary    Relevant Orders    Pulmonary Function Test (Completed)          Past Medical History:  Past Medical History:   Diagnosis Date   • COPD (chronic obstructive pulmonary disease)        Family History:  Family History   Problem Relation Age of Onset   • Hypertension Mother    • Arthritis Mother    • Hypertension Father    • Arthritis Father    • Diabetes Sister    • Cancer Sister    • Diabetes Brother        Social History:  Social History   Substance Use Topics   • Smoking status: Former Smoker     Years: 40.00     Types: Cigarettes   • Smokeless tobacco: Never Used   • Alcohol use Yes      Comment: occcasionally when younger        Current Medications:  Current Outpatient Prescriptions   Medication Sig Dispense Refill   • apixaban (ELIQUIS) 5 MG tablet tablet Take 1 tablet by mouth 2 (Two) Times a Day. 60 tablet 4   • aspirin (ASPIRIN LOW DOSE) 81 MG tablet Take  by mouth daily.     • Denture Care Products (DENTURE ADHESIVE) cream      • furosemide (LASIX) 20 MG tablet Take 20 mg by mouth daily.     • gabapentin (NEURONTIN) 600 MG tablet Take 600 mg by mouth 3 (Three) Times a Day.     • Homeopathic Products (LEG CRAMP RELIEF PO) Take  by  "mouth.     • isosorbide mononitrate (IMDUR) 30 MG 24 hr tablet Take 1 tablet by mouth Daily. 30 tablet 3   • lisinopril (PRINIVIL,ZESTRIL) 5 MG tablet Take 5 mg by mouth Daily.     • metoprolol succinate XL (TOPROL-XL) 50 MG 24 hr tablet Take 1 tablet by mouth Daily. 90 tablet 2   • Misc Natural Products (COSAMIN ASU FOR JOINT HEALTH PO) Take  by mouth.     • Multiple Vitamin (MULTI VITAMIN PO) Take  by mouth.     • Omega-3 Fatty Acids (EQL FISH OIL) 1000 MG capsule Take 1,200 mg by mouth.     • ondansetron (ZOFRAN) 4 MG tablet Take 4 mg by mouth Every 8 (Eight) Hours As Needed for Nausea or Vomiting.     • ranitidine (ZANTAC) 150 MG tablet Take 300 mg by mouth 2 (Two) Times a Day.     • simvastatin (ZOCOR) 40 MG tablet Take 40 mg by mouth Every Night.     • spironolactone (ALDACTONE) 25 MG tablet TAKE 1 TABLET EVERY DAY 90 tablet 1   • theophylline (UNIPHYL) 600 MG 24 hr tablet Take 1 tablet by mouth Daily. 30 tablet 5     No current facility-administered medications for this visit.        Allergies:  No Known Allergies    Vitals:  /61 (BP Location: Left arm, Patient Position: Sitting)  Pulse 64  Temp 97.7 °F (36.5 °C)  Ht 175.3 cm (69.02\")  Wt 83.6 kg (184 lb 6.4 oz)  SpO2 93%  BMI 27.22 kg/m2    Physical Exam no acute distress vital signs stable O2 sat 93% on room air heart is regularly irregular 64 per minute no clubbing cyanosis or edema    Imaging: Chest x-ray Clear no cardiomegaly    PFT: PFT Normal with FVC of 91 FEV1 of 81%  Results for orders placed or performed in visit on 03/06/18   Pulmonary Function Test   Result Value Ref Range    FEV1 2.21 liters    FVC 3.51 liters           ASSESSMENT AND DIAGNOSIS:1-COPD mild and stable2-obstructive apnea per history on CPAP at night3-coronary artery disease with history of stenting and relatively on anticoagulation and being followed by cardiology    Recommendation renew the Advair and theophylline    Follow-Up: Follow-up per primary care and " cardiology and return to us as needed

## 2018-03-27 ENCOUNTER — HOSPITAL ENCOUNTER (OUTPATIENT)
Dept: NUCLEAR MEDICINE | Facility: HOSPITAL | Age: 83
Discharge: HOME | End: 2018-03-27

## 2018-03-27 ENCOUNTER — HOSPITAL ENCOUNTER (OUTPATIENT)
Dept: CARDIOLOGY | Facility: HOSPITAL | Age: 83
Discharge: HOME | End: 2018-03-27

## 2018-03-27 DIAGNOSIS — R07.2 PRECORDIAL PAIN: ICD-10-CM

## 2018-03-27 DIAGNOSIS — I25.10 ARTERIOSCLEROTIC CARDIOVASCULAR DISEASE (ASCVD): ICD-10-CM

## 2018-03-27 DIAGNOSIS — I48.19 PERSISTENT ATRIAL FIBRILLATION (HCC): ICD-10-CM

## 2018-03-27 LAB
BH CV ECHO MEAS - % IVS THICK: 1.3 %
BH CV ECHO MEAS - % LVPW THICK: 32.1 %
BH CV ECHO MEAS - ACS: 2.9 CM
BH CV ECHO MEAS - AO MAX PG: 4.5 MMHG
BH CV ECHO MEAS - AO MEAN PG: 2.4 MMHG
BH CV ECHO MEAS - AO ROOT AREA (BSA CORRECTED): 2.2
BH CV ECHO MEAS - AO ROOT AREA: 14.9 CM^2
BH CV ECHO MEAS - AO ROOT DIAM: 4.4 CM
BH CV ECHO MEAS - AO V2 MAX: 106.4 CM/SEC
BH CV ECHO MEAS - AO V2 MEAN: 70.8 CM/SEC
BH CV ECHO MEAS - AO V2 VTI: 22.4 CM
BH CV ECHO MEAS - BSA(HAYCOCK): 2 M^2
BH CV ECHO MEAS - BSA: 2 M^2
BH CV ECHO MEAS - BZI_BMI: 27.2 KILOGRAMS/M^2
BH CV ECHO MEAS - BZI_METRIC_HEIGHT: 175.3 CM
BH CV ECHO MEAS - BZI_METRIC_WEIGHT: 83.5 KG
BH CV ECHO MEAS - CONTRAST EF 4CH: 56.9 ML/M^2
BH CV ECHO MEAS - EDV(CUBED): 86.1 ML
BH CV ECHO MEAS - EDV(MOD-SP4): 58 ML
BH CV ECHO MEAS - EDV(TEICH): 88.4 ML
BH CV ECHO MEAS - EF(CUBED): 37.1 %
BH CV ECHO MEAS - EF(TEICH): 30.7 %
BH CV ECHO MEAS - ESV(CUBED): 54.1 ML
BH CV ECHO MEAS - ESV(MOD-SP4): 25 ML
BH CV ECHO MEAS - ESV(TEICH): 61.3 ML
BH CV ECHO MEAS - FS: 14.3 %
BH CV ECHO MEAS - IVS/LVPW: 1.1
BH CV ECHO MEAS - IVSD: 1.2 CM
BH CV ECHO MEAS - IVSS: 1.2 CM
BH CV ECHO MEAS - LA DIMENSION: 3.8 CM
BH CV ECHO MEAS - LA/AO: 0.87
BH CV ECHO MEAS - LV DIASTOLIC VOL/BSA (35-75): 29.1 ML/M^2
BH CV ECHO MEAS - LV MASS(C)D: 184.6 GRAMS
BH CV ECHO MEAS - LV MASS(C)DI: 92.6 GRAMS/M^2
BH CV ECHO MEAS - LV MASS(C)S: 183.6 GRAMS
BH CV ECHO MEAS - LV MASS(C)SI: 92.1 GRAMS/M^2
BH CV ECHO MEAS - LV SYSTOLIC VOL/BSA (12-30): 12.5 ML/M^2
BH CV ECHO MEAS - LVIDD: 4.4 CM
BH CV ECHO MEAS - LVIDS: 3.8 CM
BH CV ECHO MEAS - LVLD AP4: 7 CM
BH CV ECHO MEAS - LVLS AP4: 6.2 CM
BH CV ECHO MEAS - LVOT AREA (M): 4.5 CM^2
BH CV ECHO MEAS - LVOT AREA: 4.4 CM^2
BH CV ECHO MEAS - LVOT DIAM: 2.4 CM
BH CV ECHO MEAS - LVPWD: 1.1 CM
BH CV ECHO MEAS - LVPWS: 1.5 CM
BH CV ECHO MEAS - MV A MAX VEL: 30.6 CM/SEC
BH CV ECHO MEAS - MV E MAX VEL: 64.2 CM/SEC
BH CV ECHO MEAS - MV E/A: 2.1
BH CV ECHO MEAS - PA ACC SLOPE: 1121 CM/SEC^2
BH CV ECHO MEAS - PA ACC TIME: 0.08 SEC
BH CV ECHO MEAS - PA PR(ACCEL): 44.1 MMHG
BH CV ECHO MEAS - RAP SYSTOLE: 10 MMHG
BH CV ECHO MEAS - RVSP: 38.8 MMHG
BH CV ECHO MEAS - SI(AO): 168.1 ML/M^2
BH CV ECHO MEAS - SI(CUBED): 16 ML/M^2
BH CV ECHO MEAS - SI(MOD-SP4): 16.6 ML/M^2
BH CV ECHO MEAS - SI(TEICH): 13.6 ML/M^2
BH CV ECHO MEAS - SV(AO): 335.2 ML
BH CV ECHO MEAS - SV(CUBED): 31.9 ML
BH CV ECHO MEAS - SV(MOD-SP4): 33 ML
BH CV ECHO MEAS - SV(TEICH): 27.1 ML
BH CV ECHO MEAS - TR MAX VEL: 268.5 CM/SEC
BH CV NUCLEAR PRIOR STUDY: 3
BH CV STRESS BP STAGE 1: NORMAL
BH CV STRESS BP STAGE 2: NORMAL
BH CV STRESS COMMENTS STAGE 1: NORMAL
BH CV STRESS COMMENTS STAGE 2: NORMAL
BH CV STRESS DOSE REGADENOSON STAGE 1: 0.4
BH CV STRESS DURATION MIN STAGE 1: 0
BH CV STRESS DURATION MIN STAGE 2: 4
BH CV STRESS DURATION SEC STAGE 1: 10
BH CV STRESS DURATION SEC STAGE 2: 0
BH CV STRESS HR STAGE 1: 69
BH CV STRESS HR STAGE 2: 68
BH CV STRESS PROTOCOL 1: NORMAL
BH CV STRESS RECOVERY BP: NORMAL MMHG
BH CV STRESS RECOVERY HR: 72 BPM
BH CV STRESS STAGE 1: 1
BH CV STRESS STAGE 2: 2
LV EF NUC BP: 50 %
MAXIMAL PREDICTED HEART RATE: 139 BPM
MAXIMAL PREDICTED HEART RATE: 139 BPM
PERCENT MAX PREDICTED HR: 55.4 %
STRESS BASELINE BP: NORMAL MMHG
STRESS BASELINE HR: 54 BPM
STRESS PERCENT HR: 65 %
STRESS POST PEAK BP: NORMAL MMHG
STRESS POST PEAK HR: 77 BPM
STRESS TARGET HR: 118 BPM
STRESS TARGET HR: 118 BPM

## 2018-03-27 PROCEDURE — 78452 HT MUSCLE IMAGE SPECT MULT: CPT | Performed by: INTERNAL MEDICINE

## 2018-03-27 PROCEDURE — 93306 TTE W/DOPPLER COMPLETE: CPT

## 2018-03-27 PROCEDURE — 93306 TTE W/DOPPLER COMPLETE: CPT | Performed by: INTERNAL MEDICINE

## 2018-03-27 PROCEDURE — 93017 CV STRESS TEST TRACING ONLY: CPT

## 2018-03-27 PROCEDURE — 93018 CV STRESS TEST I&R ONLY: CPT | Performed by: INTERNAL MEDICINE

## 2018-03-27 PROCEDURE — 0 TECHNETIUM SESTAMIBI: Performed by: PHYSICIAN ASSISTANT

## 2018-03-27 PROCEDURE — 25010000002 REGADENOSON 0.4 MG/5ML SOLUTION: Performed by: PHYSICIAN ASSISTANT

## 2018-03-27 PROCEDURE — 78452 HT MUSCLE IMAGE SPECT MULT: CPT

## 2018-03-27 PROCEDURE — A9500 TC99M SESTAMIBI: HCPCS | Performed by: PHYSICIAN ASSISTANT

## 2018-03-27 RX ADMIN — REGADENOSON 0.4 MG: 0.08 INJECTION, SOLUTION INTRAVENOUS at 09:20

## 2018-03-27 RX ADMIN — TECHNETIUM TC 99M SESTAMIBI 1 DOSE: 1 INJECTION INTRAVENOUS at 08:15

## 2018-03-27 RX ADMIN — TECHNETIUM TC 99M SESTAMIBI 1 DOSE: 1 INJECTION INTRAVENOUS at 09:20

## 2018-05-03 ENCOUNTER — OFFICE VISIT (OUTPATIENT)
Dept: CARDIOLOGY | Facility: CLINIC | Age: 83
End: 2018-05-03

## 2018-05-03 VITALS
HEIGHT: 69 IN | HEART RATE: 90 BPM | SYSTOLIC BLOOD PRESSURE: 96 MMHG | WEIGHT: 184 LBS | BODY MASS INDEX: 27.25 KG/M2 | RESPIRATION RATE: 16 BRPM | DIASTOLIC BLOOD PRESSURE: 59 MMHG

## 2018-05-03 DIAGNOSIS — I73.9 PAD (PERIPHERAL ARTERY DISEASE) (HCC): ICD-10-CM

## 2018-05-03 DIAGNOSIS — I25.10 ARTERIOSCLEROTIC CARDIOVASCULAR DISEASE (ASCVD): ICD-10-CM

## 2018-05-03 DIAGNOSIS — R07.2 PRECORDIAL PAIN: Primary | ICD-10-CM

## 2018-05-03 DIAGNOSIS — Z79.01 LONG TERM CURRENT USE OF ANTICOAGULANT: ICD-10-CM

## 2018-05-03 DIAGNOSIS — I48.19 PERSISTENT ATRIAL FIBRILLATION (HCC): ICD-10-CM

## 2018-05-03 PROCEDURE — 99213 OFFICE O/P EST LOW 20 MIN: CPT | Performed by: PHYSICIAN ASSISTANT

## 2018-05-03 RX ORDER — FUROSEMIDE 20 MG/1
20 TABLET ORAL DAILY
Qty: 90 TABLET | Refills: 1 | Status: SHIPPED | OUTPATIENT
Start: 2018-05-03 | End: 2018-08-20 | Stop reason: SDUPTHER

## 2018-05-03 RX ORDER — FUROSEMIDE 20 MG/1
20 TABLET ORAL DAILY
Qty: 90 TABLET | Refills: 1 | Status: SHIPPED | OUTPATIENT
Start: 2018-05-03 | End: 2018-05-03 | Stop reason: SDUPTHER

## 2018-05-03 RX ORDER — ISOSORBIDE MONONITRATE 30 MG/1
30 TABLET, EXTENDED RELEASE ORAL DAILY
Qty: 90 TABLET | Refills: 1 | Status: SHIPPED | OUTPATIENT
Start: 2018-05-03 | End: 2018-05-03 | Stop reason: SDUPTHER

## 2018-05-03 RX ORDER — LISINOPRIL 5 MG/1
5 TABLET ORAL DAILY
Qty: 90 TABLET | Refills: 1 | Status: SHIPPED | OUTPATIENT
Start: 2018-05-03 | End: 2018-05-03 | Stop reason: SDUPTHER

## 2018-05-03 RX ORDER — METOPROLOL SUCCINATE 50 MG/1
50 TABLET, EXTENDED RELEASE ORAL DAILY
Qty: 90 TABLET | Refills: 1 | Status: ON HOLD | OUTPATIENT
Start: 2018-05-03 | End: 2019-07-04

## 2018-05-03 RX ORDER — SIMVASTATIN 40 MG
40 TABLET ORAL NIGHTLY
Qty: 90 TABLET | Refills: 1 | Status: SHIPPED | OUTPATIENT
Start: 2018-05-03 | End: 2018-11-21 | Stop reason: SDUPTHER

## 2018-05-03 RX ORDER — METOPROLOL SUCCINATE 50 MG/1
50 TABLET, EXTENDED RELEASE ORAL DAILY
Qty: 90 TABLET | Refills: 1 | Status: SHIPPED | OUTPATIENT
Start: 2018-05-03 | End: 2018-05-03 | Stop reason: SDUPTHER

## 2018-05-03 RX ORDER — SPIRONOLACTONE 25 MG/1
25 TABLET ORAL DAILY
Qty: 90 TABLET | Refills: 1 | Status: SHIPPED | OUTPATIENT
Start: 2018-05-03 | End: 2018-11-21 | Stop reason: SDUPTHER

## 2018-05-03 RX ORDER — SPIRONOLACTONE 25 MG/1
25 TABLET ORAL DAILY
Qty: 90 TABLET | Refills: 1 | Status: SHIPPED | OUTPATIENT
Start: 2018-05-03 | End: 2018-05-03 | Stop reason: SDUPTHER

## 2018-05-03 RX ORDER — LISINOPRIL 5 MG/1
5 TABLET ORAL DAILY
Qty: 90 TABLET | Refills: 1 | Status: SHIPPED | OUTPATIENT
Start: 2018-05-03 | End: 2018-08-10 | Stop reason: SDUPTHER

## 2018-05-03 RX ORDER — ISOSORBIDE MONONITRATE 30 MG/1
30 TABLET, EXTENDED RELEASE ORAL DAILY
Qty: 90 TABLET | Refills: 1 | Status: SHIPPED | OUTPATIENT
Start: 2018-05-03 | End: 2018-09-17 | Stop reason: SDUPTHER

## 2018-05-03 RX ORDER — SIMVASTATIN 40 MG
40 TABLET ORAL NIGHTLY
Qty: 90 TABLET | Refills: 1 | Status: SHIPPED | OUTPATIENT
Start: 2018-05-03 | End: 2018-05-03 | Stop reason: SDUPTHER

## 2018-05-03 NOTE — PROGRESS NOTES
Rc Ojeda MD  Felice Aiken  1935  05/03/2018    Patient Active Problem List   Diagnosis   • Knee pain   • Arteriosclerotic cardiovascular disease (ASCVD) s/p stenting   • Sleep apnea   • Pain and swelling of lower extremity   • Persistent atrial fibrillation   • History of ASCVD (atherosclerotic cardiovascular disease), s/p stenting   • PAD (peripheral artery disease), fem pop bypass,3/08   • Dyslipidemia   • Essential hypertension   • Chronic bronchitis   • Chronic obstructive pulmonary disease   • Status post total left knee replacement   • Neuropathy involving both lower extremities   • Chronic kidney disease, stage III (moderate)     Dear Rc Ojeda MD:    Chief Complaint   Patient presents with   • Follow-up     stress and echo findings   • Chest Pain     occas.   • Shortness of Breath     routine activity, mild exertion   • Edema     LE   • Other     meds, verbal     Subjective     Felice Aiken is a 83 y.o. male with a past medical history significant for atherosclerotic cardiovascular disease status post previous intervention, peripheral arterial disease status post femoralpopliteal bypass in 03/2008, persistent/chronic atrial fibrillation, and chronic anticoagulation with Eliquis. He presents to the office today for follow-up visit after recent testing.  He had a nuclear stress test revealing a small size infarct at the apex with no significant ischemia and was consistent with a low risk study.  He also had a transthoracic echocardiogram with a mildly decreased ejection fraction at 46-50%.  This is improved compared to one year ago when it was 40-45%.  He states his chest pains are very infrequent and has not had any recently.  Breathing is stable.  No pedal edema.  No bleeding appreciated.  Blood pressure generally does not run low.  Home health comes out and checks it every other week and it has been doing well.  No dizziness or near syncopal episodes.      Current Outpatient  Prescriptions:   •  apixaban (ELIQUIS) 5 MG tablet tablet, Take 1 tablet by mouth 2 (Two) Times a Day., Disp: 60 tablet, Rfl: 4  •  aspirin (ASPIRIN LOW DOSE) 81 MG tablet, Take  by mouth daily., Disp: , Rfl:   •  Denture Care Products (DENTURE ADHESIVE) cream, , Disp: , Rfl:   •  furosemide (LASIX) 20 MG tablet, Take 1 tablet by mouth Daily., Disp: 90 tablet, Rfl: 1  •  gabapentin (NEURONTIN) 600 MG tablet, Take 600 mg by mouth 3 (Three) Times a Day., Disp: , Rfl:   •  Homeopathic Products (LEG CRAMP RELIEF PO), Take  by mouth., Disp: , Rfl:   •  isosorbide mononitrate (IMDUR) 30 MG 24 hr tablet, Take 1 tablet by mouth Daily., Disp: 90 tablet, Rfl: 1  •  lisinopril (PRINIVIL,ZESTRIL) 5 MG tablet, Take 1 tablet by mouth Daily., Disp: 90 tablet, Rfl: 1  •  metoprolol succinate XL (TOPROL-XL) 50 MG 24 hr tablet, Take 1 tablet by mouth Daily., Disp: 90 tablet, Rfl: 1  •  Misc Natural Products (COSAMIN ASU FOR JOINT HEALTH PO), Take  by mouth., Disp: , Rfl:   •  Multiple Vitamin (MULTI VITAMIN PO), Take  by mouth., Disp: , Rfl:   •  Omega-3 Fatty Acids (EQL FISH OIL) 1000 MG capsule, Take 1,200 mg by mouth., Disp: , Rfl:   •  ondansetron (ZOFRAN) 4 MG tablet, Take 4 mg by mouth Every 8 (Eight) Hours As Needed for Nausea or Vomiting., Disp: , Rfl:   •  ranitidine (ZANTAC) 150 MG tablet, Take 300 mg by mouth 2 (Two) Times a Day., Disp: , Rfl:   •  simvastatin (ZOCOR) 40 MG tablet, Take 1 tablet by mouth Every Night., Disp: 90 tablet, Rfl: 1  •  spironolactone (ALDACTONE) 25 MG tablet, Take 1 tablet by mouth Daily., Disp: 90 tablet, Rfl: 1  •  theophylline (UNIPHYL) 600 MG 24 hr tablet, Take 1 tablet by mouth Daily., Disp: 30 tablet, Rfl: 5    The following portions of the patient's history were reviewed and updated as appropriate: allergies, current medications, past family history, past medical history, past social history, past surgical history and problem list.    Social History     Social History   • Marital status:  "     Spouse name: N/A   • Number of children: N/A   • Years of education: N/A     Occupational History   • Not on file.     Social History Main Topics   • Smoking status: Former Smoker     Years: 40.00     Types: Cigarettes   • Smokeless tobacco: Never Used   • Alcohol use Yes      Comment: occcasionally when younger    • Drug use: No   • Sexual activity: Not on file     Other Topics Concern   • Not on file     Social History Narrative   • No narrative on file     Review of Systems   Cardiovascular: Positive for chest pain, dyspnea on exertion and leg swelling. Negative for palpitations.   Respiratory: Positive for shortness of breath.      Objective   Blood pressure 96/59, pulse 90, resp. rate 16, height 175.3 cm (69\"), weight 83.5 kg (184 lb).  Body mass index is 27.17 kg/m².    Physical Exam   Constitutional: He is oriented to person, place, and time. He appears well-developed and well-nourished. No distress.   HENT:   Head: Normocephalic and atraumatic.   Eyes: Conjunctivae are normal. Right eye exhibits no discharge. Left eye exhibits no discharge.   Neck: Normal range of motion. Neck supple. Carotid bruit is not present.   Cardiovascular: Normal rate, regular rhythm and normal heart sounds.  Exam reveals no gallop and no friction rub.    No murmur heard.  Pulmonary/Chest: Effort normal and breath sounds normal. No respiratory distress. He has no wheezes. He has no rales. He exhibits no tenderness.   Musculoskeletal: Normal range of motion. He exhibits no edema.   Neurological: He is alert and oriented to person, place, and time.   Skin: Skin is warm and dry. No rash noted. He is not diaphoretic. No erythema. No pallor.   Psychiatric: He has a normal mood and affect. His behavior is normal.   Nursing note and vitals reviewed.    Procedures   Nuclear stress test 3/27/18  · Findings consistent with a normal ECG stress test.  · Myocardial perfusion imaging indicates a small-sized infarct located in the apex " with no significant ischemia noted.  · Left ventricular ejection fraction is mildly reduced (Calculated EF = 50%).  · Impressions are consistent with a low risk study.    Transthoracic echocardiogram 3/27/18  · Normal left ventricular cavity size and wall thickness noted.  · The following left ventricular wall segments are hypokinetic: basal anterolateral, mid anterolateral, apical lateral, apical inferior, mid inferior and basal inferior.  · Estimated EF appears to be in the range of 46 - 50%.  · The aortic valve is not well visualized. The aortic valve is abnormal in structure. There is mild calcification of the aortic valve.No significant aortic valve regurgitation is present. No hemodynamically significant aortic valve stenosis is present.  · The mitral valve is normal in structure. No mitral valve regurgitation is present. No significant mitral valve stenosis is present.  · The tricuspid valve is grossly normal. Mild tricuspid valve regurgitation is present. Estimated right ventricular systolic pressure from tricuspid regurgitation is mildly elevated (35-45 mmHg).  · There is no evidence of pericardial effusion.  Assessment:        Diagnosis Plan   1. Precordial pain     2. Arteriosclerotic cardiovascular disease (ASCVD) s/p stenting     3. PAD (peripheral artery disease), fem pop bypass,3/08     4. Persistent atrial fibrillation     5. Long term current use of anticoagulant          Plan:       1. I have reviewed the results of the stress test and echocardiogram with the patient and his wife.  2. Continue current therapy with aspirin, simvastatin, metoprolol XL, lisinopril, spironolactone, isosorbide mononitrate, and Eliquis.  3. Follow-up in 3-4 months or sooner if needed.    No Follow-up on file.    I appreciate the opportunity to participate in this patient's cardiovascular care.    Best Regards,    Colette Earl PA-C

## 2018-06-06 RX ORDER — IPRATROPIUM BROMIDE AND ALBUTEROL SULFATE 2.5; .5 MG/3ML; MG/3ML
SOLUTION RESPIRATORY (INHALATION)
Qty: 120 VIAL | Refills: 10 | OUTPATIENT
Start: 2018-06-06

## 2018-06-08 RX ORDER — IPRATROPIUM BROMIDE AND ALBUTEROL SULFATE 2.5; .5 MG/3ML; MG/3ML
3 SOLUTION RESPIRATORY (INHALATION) 4 TIMES DAILY PRN
Qty: 120 ML | Refills: 5 | Status: SHIPPED | OUTPATIENT
Start: 2018-06-08 | End: 2018-06-08 | Stop reason: SDUPTHER

## 2018-06-08 RX ORDER — IPRATROPIUM BROMIDE AND ALBUTEROL SULFATE 2.5; .5 MG/3ML; MG/3ML
3 SOLUTION RESPIRATORY (INHALATION) 4 TIMES DAILY PRN
Qty: 360 ML | Refills: 5 | Status: SHIPPED | OUTPATIENT
Start: 2018-06-08 | End: 2018-06-12 | Stop reason: SDUPTHER

## 2018-06-08 NOTE — TELEPHONE ENCOUNTER
Pharmacy needed dosage changed. Needed to be a month supply. Changed, re-ordered and sending for approval.

## 2018-06-12 RX ORDER — IPRATROPIUM BROMIDE AND ALBUTEROL SULFATE 2.5; .5 MG/3ML; MG/3ML
3 SOLUTION RESPIRATORY (INHALATION) 4 TIMES DAILY PRN
Qty: 360 ML | Refills: 5 | Status: ON HOLD | OUTPATIENT
Start: 2018-06-12 | End: 2019-07-04

## 2018-06-12 NOTE — TELEPHONE ENCOUNTER
Patient's pharmacy called. Needed the dosage to be different. Corrected and sending for approval.

## 2018-08-06 ENCOUNTER — TELEPHONE (OUTPATIENT)
Dept: PULMONOLOGY | Facility: CLINIC | Age: 83
End: 2018-08-06

## 2018-08-06 NOTE — TELEPHONE ENCOUNTER
She stated that patients sob has worsened even at rest. He's unable to perform any activities due to shortness of breath. His current neb treatments aren't helping and he's had periods of passing out. He's only wearing his oxygen at night. He's also been getting dizzy and passing out.    His blood pressure has also been running low.     Patient refused to go to the hospital.

## 2018-08-07 ENCOUNTER — OFFICE VISIT (OUTPATIENT)
Dept: PULMONOLOGY | Facility: CLINIC | Age: 83
End: 2018-08-07

## 2018-08-07 VITALS
HEIGHT: 69 IN | HEART RATE: 73 BPM | TEMPERATURE: 98.1 F | DIASTOLIC BLOOD PRESSURE: 65 MMHG | OXYGEN SATURATION: 95 % | WEIGHT: 184 LBS | BODY MASS INDEX: 27.25 KG/M2 | SYSTOLIC BLOOD PRESSURE: 112 MMHG

## 2018-08-07 DIAGNOSIS — I50.9 CHRONIC CONGESTIVE HEART FAILURE, UNSPECIFIED CONGESTIVE HEART FAILURE TYPE: ICD-10-CM

## 2018-08-07 DIAGNOSIS — J44.9 CHRONIC OBSTRUCTIVE PULMONARY DISEASE, UNSPECIFIED COPD TYPE (HCC): Primary | ICD-10-CM

## 2018-08-07 PROCEDURE — 99213 OFFICE O/P EST LOW 20 MIN: CPT | Performed by: INTERNAL MEDICINE

## 2018-08-07 NOTE — PROGRESS NOTES
History of Present Illness 83-year-old gentleman returning for follow-up of COPD that seems to be mild and relatively stable is obstructive apnea for which she is on CPAP he also has a diagnosis of coronary artery disease and atrial fibrillation being followed by Dr. Everett and is on Eliquis.  For his COPD takes theophylline 600 once a day and uses his nebulizer a couple times a day      Review of Systems patient seems to be hard of hearing however his wife claims that he Seems to be more short of breath that has no chest pain or swelling and no cough or expectoration    Active Problems:  Problem List Items Addressed This Visit        Respiratory    Chronic obstructive pulmonary disease (CMS/HCC) - Primary      Other Visit Diagnoses     Chronic congestive heart failure, unspecified congestive heart failure type (CMS/HCC)              Past Medical History:  Past Medical History:   Diagnosis Date   • COPD (chronic obstructive pulmonary disease) (CMS/HCC)        Family History:  Family History   Problem Relation Age of Onset   • Hypertension Mother    • Arthritis Mother    • Hypertension Father    • Arthritis Father    • Diabetes Sister    • Cancer Sister    • Diabetes Brother        Social History:  Social History   Substance Use Topics   • Smoking status: Former Smoker     Years: 40.00     Types: Cigarettes   • Smokeless tobacco: Never Used   • Alcohol use Yes      Comment: occcasionally when younger        Current Medications:  Current Outpatient Prescriptions   Medication Sig Dispense Refill   • apixaban (ELIQUIS) 5 MG tablet tablet Take 1 tablet by mouth 2 (Two) Times a Day. 60 tablet 4   • aspirin (ASPIRIN LOW DOSE) 81 MG tablet Take  by mouth daily.     • Denture Care Products (DENTURE ADHESIVE) cream      • furosemide (LASIX) 20 MG tablet Take 1 tablet by mouth Daily. 90 tablet 1   • gabapentin (NEURONTIN) 600 MG tablet Take 600 mg by mouth 3 (Three) Times a Day.     • Homeopathic Products (LEG CRAMP RELIEF PO)  "Take  by mouth.     • ipratropium-albuterol (DUO-NEB) 0.5-2.5 mg/3 ml nebulizer Take 3 mL by nebulization 4 (Four) Times a Day As Needed for Wheezing. 360 mL 5   • isosorbide mononitrate (IMDUR) 30 MG 24 hr tablet Take 1 tablet by mouth Daily. 90 tablet 1   • lisinopril (PRINIVIL,ZESTRIL) 5 MG tablet Take 1 tablet by mouth Daily. 90 tablet 1   • metoprolol succinate XL (TOPROL-XL) 50 MG 24 hr tablet Take 1 tablet by mouth Daily. 90 tablet 1   • Misc Natural Products (COSAMIN ASU FOR JOINT HEALTH PO) Take  by mouth.     • Multiple Vitamin (MULTI VITAMIN PO) Take  by mouth.     • Omega-3 Fatty Acids (EQL FISH OIL) 1000 MG capsule Take 1,200 mg by mouth.     • ondansetron (ZOFRAN) 4 MG tablet Take 4 mg by mouth Every 8 (Eight) Hours As Needed for Nausea or Vomiting.     • ranitidine (ZANTAC) 150 MG tablet Take 300 mg by mouth 2 (Two) Times a Day.     • simvastatin (ZOCOR) 40 MG tablet Take 1 tablet by mouth Every Night. 90 tablet 1   • spironolactone (ALDACTONE) 25 MG tablet Take 1 tablet by mouth Daily. 90 tablet 1   • theophylline (UNIPHYL) 600 MG 24 hr tablet Take 1 tablet by mouth Daily. 30 tablet 5     No current facility-administered medications for this visit.        Allergies:  No Known Allergies    Vitals:  /65   Pulse 73   Temp 98.1 °F (36.7 °C)   Ht 175.3 cm (69.02\")   Wt 83.5 kg (184 lb)   SpO2 95%   BMI 27.16 kg/m²     Physical Exam chronically ill but in no acute distress vital signs is stable O2 sat 95% on room air heart irregularly irregular lungs are clear no clubbing cyanosis edema or DVT    Imaging: Last chest x-ray in the system is August 23 17 that was clear borderline cardiac size    PFT: Last PFT was in March with FVC of 91 FEV1 of 81%  Results for orders placed or performed during the hospital encounter of 03/27/18   Stress Test With Myocardial Perfusion - One Day   Result Value Ref Range    Nuclear Prior Study 3     Target HR (85%) 118 bpm    Max. Pred. HR (100%) 139 bpm    BH CV " STRESS PROTOCOL 1 Pharmacologic     Stage 1 1     HR Stage 1 69     BP Stage 1 143/84     Duration Min Stage 1 0     Duration Sec Stage 1 10     Stress Dose Regadenoson Stage 1 0.4     Stress Comments Stage 1 10 sec bolus injection     Stage 2 2     HR Stage 2 68     BP Stage 2 143/84     Duration Min Stage 2 4     Duration Sec Stage 2 0     Stress Comments Stage 2 recovery     Baseline HR 54 bpm    Baseline /94 mmHg    Peak HR 77 bpm    Percent Max Pred HR 55.40 %    Percent Target HR 65 %    Peak /86 mmHg    Recovery HR 72 bpm    Recovery /89 mmHg    Nuc Stress EF 50 %           ASSESSMENT AND DIAGNOSIS:1-COPD mild and stable2-coronary artery disease with history of atrial fibrillation and congestive heart failure being followed by cardiology  Recommendation continue theophylline 600 mg daily and uses a nebulizer to 3 times a day as needed get the flu vaccine as soon as possible available      Follow-Up: Return to us in 5 months with a chest x-ray and PFT or earlier as needed

## 2018-08-10 RX ORDER — LISINOPRIL 10 MG/1
TABLET ORAL
Qty: 90 TABLET | Refills: 0 | Status: ON HOLD | OUTPATIENT
Start: 2018-08-10 | End: 2019-07-04

## 2018-08-14 RX ORDER — THEOPHYLLINE 600 MG/1
600 TABLET, EXTENDED RELEASE ORAL DAILY
Qty: 30 TABLET | Refills: 5 | Status: SHIPPED | OUTPATIENT
Start: 2018-08-14 | End: 2019-02-11 | Stop reason: SDUPTHER

## 2018-08-20 ENCOUNTER — HOSPITAL ENCOUNTER (OUTPATIENT)
Dept: GENERAL RADIOLOGY | Facility: HOSPITAL | Age: 83
Discharge: HOME | End: 2018-08-20
Admitting: PHYSICIAN ASSISTANT

## 2018-08-20 ENCOUNTER — LAB (OUTPATIENT)
Dept: LAB | Facility: HOSPITAL | Age: 83
End: 2018-08-20

## 2018-08-20 ENCOUNTER — OFFICE VISIT (OUTPATIENT)
Dept: CARDIOLOGY | Facility: CLINIC | Age: 83
End: 2018-08-20

## 2018-08-20 ENCOUNTER — TELEPHONE (OUTPATIENT)
Dept: CARDIOLOGY | Facility: CLINIC | Age: 83
End: 2018-08-20

## 2018-08-20 VITALS
BODY MASS INDEX: 26.63 KG/M2 | SYSTOLIC BLOOD PRESSURE: 125 MMHG | WEIGHT: 179.8 LBS | HEART RATE: 84 BPM | OXYGEN SATURATION: 97 % | DIASTOLIC BLOOD PRESSURE: 68 MMHG | HEIGHT: 69 IN

## 2018-08-20 DIAGNOSIS — I48.19 PERSISTENT ATRIAL FIBRILLATION (HCC): ICD-10-CM

## 2018-08-20 DIAGNOSIS — R31.9 HEMATURIA, UNSPECIFIED TYPE: ICD-10-CM

## 2018-08-20 DIAGNOSIS — R06.02 SHORTNESS OF BREATH: ICD-10-CM

## 2018-08-20 DIAGNOSIS — E78.5 DYSLIPIDEMIA: ICD-10-CM

## 2018-08-20 DIAGNOSIS — I25.10 ARTERIOSCLEROTIC CARDIOVASCULAR DISEASE (ASCVD): Primary | ICD-10-CM

## 2018-08-20 DIAGNOSIS — I73.9 PAD (PERIPHERAL ARTERY DISEASE) (HCC): ICD-10-CM

## 2018-08-20 DIAGNOSIS — I10 ESSENTIAL HYPERTENSION: ICD-10-CM

## 2018-08-20 LAB
ALBUMIN SERPL-MCNC: 4.3 G/DL (ref 3.4–4.8)
ALBUMIN/GLOB SERPL: 1.7 G/DL (ref 1.5–2.5)
ALP SERPL-CCNC: 73 U/L (ref 40–129)
ALT SERPL W P-5'-P-CCNC: 8 U/L (ref 10–44)
ANION GAP SERPL CALCULATED.3IONS-SCNC: 4.5 MMOL/L (ref 3.6–11.2)
AST SERPL-CCNC: 16 U/L (ref 10–34)
BASOPHILS # BLD AUTO: 0.05 10*3/MM3 (ref 0–0.3)
BASOPHILS NFR BLD AUTO: 0.7 % (ref 0–2)
BILIRUB SERPL-MCNC: 0.8 MG/DL (ref 0.2–1.8)
BNP SERPL-MCNC: 49 PG/ML (ref 0–100)
BUN BLD-MCNC: 9 MG/DL (ref 7–21)
BUN/CREAT SERPL: 7.5 (ref 7–25)
CALCIUM SPEC-SCNC: 9.6 MG/DL (ref 7.7–10)
CHLORIDE SERPL-SCNC: 108 MMOL/L (ref 99–112)
CO2 SERPL-SCNC: 27.5 MMOL/L (ref 24.3–31.9)
CREAT BLD-MCNC: 1.2 MG/DL (ref 0.43–1.29)
DEPRECATED RDW RBC AUTO: 45.5 FL (ref 37–54)
EOSINOPHIL # BLD AUTO: 0.28 10*3/MM3 (ref 0–0.7)
EOSINOPHIL NFR BLD AUTO: 4 % (ref 0–7)
ERYTHROCYTE [DISTWIDTH] IN BLOOD BY AUTOMATED COUNT: 13.6 % (ref 11.5–14.5)
GFR SERPL CREATININE-BSD FRML MDRD: 58 ML/MIN/1.73
GLOBULIN UR ELPH-MCNC: 2.5 GM/DL
GLUCOSE BLD-MCNC: 114 MG/DL (ref 70–110)
HCT VFR BLD AUTO: 39 % (ref 42–52)
HGB BLD-MCNC: 13.2 G/DL (ref 14–18)
IMM GRANULOCYTES # BLD: 0.01 10*3/MM3 (ref 0–0.03)
IMM GRANULOCYTES NFR BLD: 0.1 % (ref 0–0.5)
LYMPHOCYTES # BLD AUTO: 1.88 10*3/MM3 (ref 1–3)
LYMPHOCYTES NFR BLD AUTO: 27.1 % (ref 16–46)
MCH RBC QN AUTO: 31.1 PG (ref 27–33)
MCHC RBC AUTO-ENTMCNC: 33.8 G/DL (ref 33–37)
MCV RBC AUTO: 91.8 FL (ref 80–94)
MONOCYTES # BLD AUTO: 0.74 10*3/MM3 (ref 0.1–0.9)
MONOCYTES NFR BLD AUTO: 10.7 % (ref 0–12)
NEUTROPHILS # BLD AUTO: 3.97 10*3/MM3 (ref 1.4–6.5)
NEUTROPHILS NFR BLD AUTO: 57.4 % (ref 40–75)
NRBC BLD MANUAL-RTO: 0 /100 WBC (ref 0–0)
OSMOLALITY SERPL CALC.SUM OF ELEC: 278.9 MOSM/KG (ref 273–305)
PLATELET # BLD AUTO: 173 10*3/MM3 (ref 130–400)
PMV BLD AUTO: 10.3 FL (ref 6–10)
POTASSIUM BLD-SCNC: 4 MMOL/L (ref 3.5–5.3)
PROT SERPL-MCNC: 6.8 G/DL (ref 6–8)
RBC # BLD AUTO: 4.25 10*6/MM3 (ref 4.7–6.1)
SODIUM BLD-SCNC: 140 MMOL/L (ref 135–153)
WBC NRBC COR # BLD: 6.93 10*3/MM3 (ref 4.5–12.5)

## 2018-08-20 PROCEDURE — 99214 OFFICE O/P EST MOD 30 MIN: CPT | Performed by: PHYSICIAN ASSISTANT

## 2018-08-20 PROCEDURE — 71046 X-RAY EXAM CHEST 2 VIEWS: CPT

## 2018-08-20 PROCEDURE — 71046 X-RAY EXAM CHEST 2 VIEWS: CPT | Performed by: RADIOLOGY

## 2018-08-20 PROCEDURE — 36415 COLL VENOUS BLD VENIPUNCTURE: CPT | Performed by: INTERNAL MEDICINE

## 2018-08-20 PROCEDURE — 83880 ASSAY OF NATRIURETIC PEPTIDE: CPT

## 2018-08-20 PROCEDURE — 85025 COMPLETE CBC W/AUTO DIFF WBC: CPT

## 2018-08-20 PROCEDURE — 80053 COMPREHEN METABOLIC PANEL: CPT | Performed by: INTERNAL MEDICINE

## 2018-08-20 RX ORDER — FUROSEMIDE 20 MG/1
40 TABLET ORAL DAILY
Qty: 90 TABLET | Refills: 1 | Status: SHIPPED | OUTPATIENT
Start: 2018-08-20 | End: 2018-09-17 | Stop reason: SDUPTHER

## 2018-08-20 RX ORDER — SODIUM PHOSPHATE,MONO-DIBASIC 19G-7G/118
1 ENEMA (ML) RECTAL
COMMUNITY
End: 2019-07-16 | Stop reason: HOSPADM

## 2018-08-20 NOTE — PROGRESS NOTES
Rc Ojeda MD  Felice Aiken  1935  08/20/2018    Patient Active Problem List   Diagnosis   • Knee pain   • Arteriosclerotic cardiovascular disease (ASCVD) s/p stenting   • Sleep apnea   • Pain and swelling of lower extremity   • Persistent atrial fibrillation (CMS/HCC)   • History of ASCVD (atherosclerotic cardiovascular disease), s/p stenting   • PAD (peripheral artery disease), fem pop bypass,3/08   • Dyslipidemia   • Essential hypertension   • Chronic bronchitis (CMS/HCC)   • Chronic obstructive pulmonary disease (CMS/HCC)   • Status post total left knee replacement   • Neuropathy involving both lower extremities   • Chronic kidney disease, stage III (moderate)   • Hematuria   • Shortness of breath       Dear Rc Ojeda MD:    Subjective       History of Present Illness:    Chief Compliant: Follow-up for precordial pain    Felice Aiken is a pleasant 83 y.o. male with a past medical history significant for atherosclerotic cardiovascular disease status post previous intervention, peripheral arterial disease status post femoralpopliteal bypass in 03/2008, persistent/chronic atrial fibrillation, and chronic anticoagulation with Eliquis. He presents to the office today for follow-up.   Patient states that he has been having some hematuria.  He states that this only happens at night before bed he states when he first starts to urinate nothing but blood but after a couple seconds at times the urine.  He states other times throughout the day he does not notice any blood.  He states that he has no pain urinating or flank pain.  He is taking Lasix 20 mg by mouth daily   Patient also admits to shortness of breath stating that if he moves slowly at his normal pace he can get around without getting short of breath but walking any faster or walking and son will get him extremely short of breath and will have to rest often.  He'll also be short of breath carrying anything.  He denies any paroxysmal  nocturnal dyspnea stating that he sleeps only just one pillow at night without any trouble breathing.  He does admit to some pedal edema is worse throughout the day better in the morning.  He states that the shortness of breath is been chronic for the past year.   He does admit to chest pains however this is also been chronic for the last year and seems to be happening at random and is not soon-to-be related to exertion.  He states that the pain is in the center of his chest that does not radiate anywhere and describes it as an ache.  He does not get any more short of breath when he artery gives nor diaphoretic.      No Known Allergies:      Current Outpatient Prescriptions:   •  apixaban (ELIQUIS) 5 MG tablet tablet, Take 1 tablet by mouth 2 (Two) Times a Day., Disp: 60 tablet, Rfl: 4  •  aspirin (ASPIRIN LOW DOSE) 81 MG tablet, Take  by mouth daily., Disp: , Rfl:   •  Denture Care Products (DENTURE ADHESIVE) cream, , Disp: , Rfl:   •  furosemide (LASIX) 20 MG tablet, Take 2 tablets by mouth Daily., Disp: 90 tablet, Rfl: 1  •  gabapentin (NEURONTIN) 600 MG tablet, Take 600 mg by mouth 2 (Two) Times a Day., Disp: , Rfl:   •  glucosamine-chondroitin 500-400 MG capsule capsule, Take  by mouth 3 (Three) Times a Day With Meals., Disp: , Rfl:   •  Homeopathic Products (LEG CRAMP RELIEF PO), Take  by mouth., Disp: , Rfl:   •  ipratropium-albuterol (DUO-NEB) 0.5-2.5 mg/3 ml nebulizer, Take 3 mL by nebulization 4 (Four) Times a Day As Needed for Wheezing., Disp: 360 mL, Rfl: 5  •  isosorbide mononitrate (IMDUR) 30 MG 24 hr tablet, Take 1 tablet by mouth Daily., Disp: 90 tablet, Rfl: 1  •  lisinopril (PRINIVIL,ZESTRIL) 10 MG tablet, TAKE 1 TABLET EVERY DAY, Disp: 90 tablet, Rfl: 0  •  metoprolol succinate XL (TOPROL-XL) 50 MG 24 hr tablet, Take 1 tablet by mouth Daily., Disp: 90 tablet, Rfl: 1  •  Misc Natural Products (COSAMIN ASU FOR JOINT HEALTH PO), Take  by mouth., Disp: , Rfl:   •  Multiple Vitamin (MULTI VITAMIN PO),  Take  by mouth., Disp: , Rfl:   •  O2 (OXYGEN), Inhale 1 (One) Time. CPAP @@ hs, Disp: , Rfl:   •  Omega-3 Fatty Acids (EQL FISH OIL) 1000 MG capsule, Take 1,200 mg by mouth., Disp: , Rfl:   •  ondansetron (ZOFRAN) 4 MG tablet, Take 4 mg by mouth Every 8 (Eight) Hours As Needed for Nausea or Vomiting., Disp: , Rfl:   •  ranitidine (ZANTAC) 150 MG tablet, Take 300 mg by mouth 2 (Two) Times a Day., Disp: , Rfl:   •  simvastatin (ZOCOR) 40 MG tablet, Take 1 tablet by mouth Every Night., Disp: 90 tablet, Rfl: 1  •  spironolactone (ALDACTONE) 25 MG tablet, Take 1 tablet by mouth Daily., Disp: 90 tablet, Rfl: 1  •  theophylline (UNIPHYL) 600 MG 24 hr tablet, Take 1 tablet by mouth Daily., Disp: 30 tablet, Rfl: 5      The following portions of the patient's history were reviewed and updated as appropriate: allergies, current medications, past family history, past medical history, past social history, past surgical history and problem list.    Social History   Substance Use Topics   • Smoking status: Former Smoker     Years: 40.00     Types: Cigarettes   • Smokeless tobacco: Never Used   • Alcohol use Yes      Comment: occcasionally when younger        Review of Systems   Constitution: Positive for weight loss. Negative for fever.   HENT: Negative for nosebleeds and sore throat.    Eyes: Negative for blurred vision and double vision.   Cardiovascular: Positive for chest pain, dyspnea on exertion (per pulmonology, CHF) and leg swelling (increasing ). Negative for palpitations.   Respiratory: Positive for shortness of breath. Negative for hemoptysis.    Endocrine: Positive for polyphagia. Negative for polydipsia.   Hematologic/Lymphatic: Negative for bleeding problem. Does not bruise/bleed easily.   Musculoskeletal: Positive for joint pain. Negative for falls.   Gastrointestinal: Negative for abdominal pain and hematochezia.   Genitourinary: Positive for hematuria. Negative for flank pain.   Neurological: Positive for  "dizziness. Negative for headaches.       Objective   Vitals:    08/20/18 0840   BP: 125/68   BP Location: Right arm   Patient Position: Sitting   Cuff Size: Adult   Pulse: 84   SpO2: 97%   Weight: 81.6 kg (179 lb 12.8 oz)   Height: 175.3 cm (69.02\")     Body mass index is 26.54 kg/m².    Transthoracic echocardiogram on 3/27/2018  · Normal left ventricular cavity size and wall thickness noted.  · The following left ventricular wall segments are hypokinetic: basal anterolateral, mid anterolateral, apical lateral, apical inferior, mid inferior and basal inferior.  · Estimated EF appears to be in the range of 46 - 50%.  · The aortic valve is not well visualized. The aortic valve is abnormal in structure. There is mild calcification of the aortic valve.No significant aortic valve regurgitation is present. No hemodynamically significant aortic valve stenosis is present.  · The mitral valve is normal in structure. No mitral valve regurgitation is present. No significant mitral valve stenosis is present.  · The tricuspid valve is grossly normal. Mild tricuspid valve regurgitation is present. Estimated right ventricular systolic pressure from tricuspid regurgitation is mildly elevated (35-45 mmHg).  · There is no evidence of pericardial effusion    Physical Exam   Constitutional: He is oriented to person, place, and time.   HENT:   Head: Normocephalic.   Eyes: Pupils are equal, round, and reactive to light. Right eye exhibits no discharge. Left eye exhibits no discharge.   Neck: No JVD present. No tracheal deviation present.   Cardiovascular: Normal rate and normal heart sounds.  An irregularly irregular rhythm present. Exam reveals no gallop, no S3, no S4, no distant heart sounds, no friction rub, no midsystolic click and no opening snap.    No murmur heard.  Pulmonary/Chest: Effort normal. No respiratory distress. He has rales (bilateral rales in the bases).   Abdominal: Soft. Bowel sounds are normal. He exhibits no " distension. There is no tenderness. There is no rebound.   Musculoskeletal: He exhibits no edema.        Right shoulder: He exhibits pain ( left knee).   Neurological: He is alert and oriented to person, place, and time.   Skin: Skin is warm and dry.   Psychiatric: He has a normal mood and affect. His behavior is normal.       Lab Results   Component Value Date     07/20/2017    K 4.5 07/20/2017     07/20/2017    CO2 26.6 07/20/2017    BUN 11 07/20/2017    CREATININE 1.20 07/20/2017    GLUCOSE 149 (H) 07/20/2017    CALCIUM 10.0 07/20/2017     (H) 05/07/2016    ALT 69 (H) 05/07/2016    ALKPHOS 114 05/07/2016    LABIL2 1.3 (L) 05/07/2016     Lab Results   Component Value Date    CKTOTAL 177 05/09/2016     Lab Results   Component Value Date    WBC 9.0 05/09/2016    HGB 10.9 (L) 05/09/2016    HCT 34.4 (L) 05/09/2016     05/09/2016     Lab Results   Component Value Date    INR 2.23 12/17/2014    INR 3.98 12/16/2014    INR 9.26 12/15/2014     Lab Results   Component Value Date    MG 2.1 05/09/2016     Lab Results   Component Value Date    TSH 1.113 05/07/2016      Lab Results   Component Value Date    BNP 93.0 07/11/2017       During this visit the following were done:  Labs Reviewed [x]    Labs Ordered []    Radiology Reports Reviewed [x]    Radiology Ordered []    PCP Records Reviewed []    Referring Provider Records Reviewed []    ER Records Reviewed [x]    Hospital Records Reviewed [x]    History Obtained From Family []    Radiology Images Reviewed [x]    Other Reviewed [x]    Records Requested []      Procedures      Assessment/Plan    Diagnosis Plan   1. Arteriosclerotic cardiovascular disease (ASCVD) s/p stenting     2. Persistent atrial fibrillation (CMS/HCC)     3. PAD (peripheral artery disease), fem pop bypass,3/08     4. Essential hypertension     5. Dyslipidemia     6. Hematuria, unspecified type     7. Shortness of breath  CBC & Differential    Basic Metabolic Panel    BNP    XR  Chest 2 View                Recommendations:  1. For the patient's hematuria or will check a CBC and also have him follow-up with his primary care in a week to evaluate further.  I asked him to continue Eliquis for now since he only reports 3 or 4 bouts of hematuria that only had visible blood for the first couple seconds.  Also be checking a urinalysis with microscopy.  2. For shortness of breath LV checking his BNP, BNP, and chest x-ray as well as increasing his Lasix from 20 mg to 40 mg daily.  3. Follow-up in 3-4 weeks or sooner if symptoms worsen.     Return in about 4 weeks (around 9/17/2018).    As always, I appreciate very much the opportunity to participate in the cardiovascular care of your patients.      With Best Regards,    ANTHONY Davis disclaimer:  Much of this encounter note is an electronic transcription/translation of spoken language to printed text. The electronic translation of spoken language may permit erroneous, or at times, nonsensical words or phrases to be inadvertently transcribed; Although I have reviewed the note for such errors, some may still exist.

## 2018-08-20 NOTE — TELEPHONE ENCOUNTER
----- Message from Mohamud Ramos PA-C sent at 8/20/2018  3:20 PM EDT -----  Chest x ray looks good. Tell the patient that he can continue the higher dose of lasix for 5 days. If it helps his breathing call us. After the 5 days go back to 20 mg      Mohamud Ramos PA-C  [facility]             Blood count was only mildly low. I think he can continue his blood thinner until he is evaluated by his primary care this week.

## 2018-08-23 ENCOUNTER — TELEPHONE (OUTPATIENT)
Dept: CARDIOLOGY | Facility: CLINIC | Age: 83
End: 2018-08-23

## 2018-08-23 NOTE — TELEPHONE ENCOUNTER
Called PCP and they stated that Felice has an apt on 9.25.18 for labs and his apt to see  wasn't until 10.2.18. I advised her that Mohamud was wanting to know if he could get in sooner due to his hematuria. She made him and apt with Mat on Tuesday the 28th at 11 am she stated that  will be there if Mat has any questions.   I called pt and advised his wife. She expressed understanding.     ----- Message from Mohamud Ramos PA-C sent at 8/20/2018  9:50 AM EDT -----  Can you call the PCP and ask them to see him sooner than scheduled? He had hematuria reported to us today in our office. I have a CBC pending and a U/A. Patient is on eliquis for a fib. Hematuria is painless.

## 2018-09-17 ENCOUNTER — OFFICE VISIT (OUTPATIENT)
Dept: CARDIOLOGY | Facility: CLINIC | Age: 83
End: 2018-09-17

## 2018-09-17 VITALS
HEIGHT: 69 IN | DIASTOLIC BLOOD PRESSURE: 69 MMHG | SYSTOLIC BLOOD PRESSURE: 120 MMHG | HEART RATE: 72 BPM | WEIGHT: 186 LBS | BODY MASS INDEX: 27.55 KG/M2

## 2018-09-17 DIAGNOSIS — I48.19 PERSISTENT ATRIAL FIBRILLATION (HCC): ICD-10-CM

## 2018-09-17 DIAGNOSIS — E78.5 DYSLIPIDEMIA: ICD-10-CM

## 2018-09-17 DIAGNOSIS — I25.10 ARTERIOSCLEROTIC CARDIOVASCULAR DISEASE (ASCVD): Primary | ICD-10-CM

## 2018-09-17 DIAGNOSIS — N18.30 CHRONIC KIDNEY DISEASE, STAGE III (MODERATE) (HCC): ICD-10-CM

## 2018-09-17 DIAGNOSIS — I10 ESSENTIAL HYPERTENSION: ICD-10-CM

## 2018-09-17 DIAGNOSIS — I73.9 PAD (PERIPHERAL ARTERY DISEASE) (HCC): ICD-10-CM

## 2018-09-17 PROCEDURE — 93000 ELECTROCARDIOGRAM COMPLETE: CPT | Performed by: PHYSICIAN ASSISTANT

## 2018-09-17 PROCEDURE — 99214 OFFICE O/P EST MOD 30 MIN: CPT | Performed by: PHYSICIAN ASSISTANT

## 2018-09-17 RX ORDER — ISOSORBIDE MONONITRATE 60 MG/1
60 TABLET, EXTENDED RELEASE ORAL DAILY
Qty: 30 TABLET | Refills: 6 | Status: SHIPPED | OUTPATIENT
Start: 2018-09-17 | End: 2021-06-12

## 2018-09-17 RX ORDER — NITROGLYCERIN 0.4 MG/1
TABLET SUBLINGUAL
Qty: 25 TABLET | Refills: 0 | Status: SHIPPED | OUTPATIENT
Start: 2018-09-17 | End: 2019-01-30 | Stop reason: SDUPTHER

## 2018-09-17 RX ORDER — FUROSEMIDE 20 MG/1
20 TABLET ORAL DAILY
Qty: 90 TABLET | Refills: 3 | Status: SHIPPED | OUTPATIENT
Start: 2018-09-17 | End: 2018-10-23 | Stop reason: ALTCHOICE

## 2018-09-17 NOTE — PROGRESS NOTES
Rc Ojeda MD  Felice Aiken  1935  09/17/2018    Patient Active Problem List   Diagnosis   • Knee pain   • Arteriosclerotic cardiovascular disease (ASCVD) s/p stenting   • Sleep apnea   • Pain and swelling of lower extremity   • Persistent atrial fibrillation (CMS/HCC)   • History of ASCVD (atherosclerotic cardiovascular disease), s/p stenting   • PAD (peripheral artery disease), fem pop bypass,3/08   • Dyslipidemia   • Essential hypertension   • Chronic bronchitis (CMS/HCC)   • Chronic obstructive pulmonary disease (CMS/HCC)   • Status post total left knee replacement   • Neuropathy involving both lower extremities   • Chronic kidney disease, stage III (moderate)   • Hematuria   • Shortness of breath       Dear Rc Ojeda MD:    Subjective       History of Present Illness:    Chief Complaint   Patient presents with   • Coronary Artery Disease   • Chest Pain       Felice Aiken is a pleasant 83 y.o. male with a past medical history significant for atherosclerotic cardiovascular disease status post previous intervention, peripheral arterial disease status post femoralpopliteal bypass in 03/2008, persistent/chronic atrial fibrillation, and chronic anticoagulation with Eliquis. He presents to the office today for follow-up.    Patient states that he does still have his chest pains.  He describes them as a ache in his lower sternal border radiates to his right side.  He states that the pain seems to come on at random as he cannot reliably bring the pain on with exertion stating that he walks around 2 miles every single day and sometimes they occur when he walks and sometimes occur when he sits but he cannot bring them on reliably.  He does state that he gets increased shortness of breath but not diaphoretic when they come on.  He states that the longest the last around 30 minutes.  He has not noticed anything make some better or worse.  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    He also states that he does have shortness of breath however this is seems to be chronic and has not gotten worse recently.  He states that he sleeps laying flat without any problems and does not have worsening pedal edema.  He denies any dizziness or syncope.          No Known Allergies:      Current Outpatient Prescriptions:   •  apixaban (ELIQUIS) 5 MG tablet tablet, Take 1 tablet by mouth 2 (Two) Times a Day., Disp: 60 tablet, Rfl: 4  •  aspirin (ASPIRIN LOW DOSE) 81 MG tablet, Take  by mouth daily., Disp: , Rfl:   •  Denture Care Products (DENTURE ADHESIVE) cream, , Disp: , Rfl:   •  furosemide (LASIX) 20 MG tablet, Take 1 tablet by mouth Daily., Disp: 90 tablet, Rfl: 3  •  gabapentin (NEURONTIN) 600 MG tablet, Take 600 mg by mouth 2 (Two) Times a Day., Disp: , Rfl:   •  glucosamine-chondroitin 500-400 MG capsule capsule, Take  by mouth 3 (Three) Times a Day With Meals., Disp: , Rfl:   •  Homeopathic Products (LEG CRAMP RELIEF PO), Take  by mouth., Disp: , Rfl:   •  ipratropium-albuterol (DUO-NEB) 0.5-2.5 mg/3 ml nebulizer, Take 3 mL by nebulization 4 (Four) Times a Day As Needed for Wheezing., Disp: 360 mL, Rfl: 5  •  isosorbide mononitrate (IMDUR) 60 MG 24 hr tablet, Take 1 tablet by mouth Daily., Disp: 30 tablet, Rfl: 6  •  lisinopril (PRINIVIL,ZESTRIL) 10 MG tablet, TAKE 1 TABLET EVERY DAY, Disp: 90 tablet, Rfl: 0  •  metoprolol succinate XL (TOPROL-XL) 50 MG 24 hr tablet, Take 1 tablet by mouth Daily., Disp: 90 tablet, Rfl: 1  •  Misc Natural Products (COSAMIN ASU FOR JOINT HEALTH PO), Take  by mouth., Disp: , Rfl:   •  Multiple Vitamin (MULTI VITAMIN PO), Take  by mouth., Disp: , Rfl:   •  O2 (OXYGEN), Inhale 1 (One) Time. CPAP @@ hs, Disp: , Rfl:   •  Omega-3 Fatty Acids (EQL FISH OIL) 1000 MG capsule, Take 1,200 mg by mouth., Disp: , Rfl:   •  ondansetron (ZOFRAN) 4 MG tablet, Take 4 mg by mouth Every 8 (Eight) Hours As Needed for Nausea or  "Vomiting., Disp: , Rfl:   •  ranitidine (ZANTAC) 150 MG tablet, Take 300 mg by mouth 2 (Two) Times a Day., Disp: , Rfl:   •  simvastatin (ZOCOR) 40 MG tablet, Take 1 tablet by mouth Every Night., Disp: 90 tablet, Rfl: 1  •  spironolactone (ALDACTONE) 25 MG tablet, Take 1 tablet by mouth Daily., Disp: 90 tablet, Rfl: 1  •  theophylline (UNIPHYL) 600 MG 24 hr tablet, Take 1 tablet by mouth Daily., Disp: 30 tablet, Rfl: 5  •  nitroglycerin (NITROSTAT) 0.4 MG SL tablet, 1 under the tongue as needed for angina, may repeat q5mins for up three doses, Disp: 25 tablet, Rfl: 0      The following portions of the patient's history were reviewed and updated as appropriate: allergies, current medications, past family history, past medical history, past social history, past surgical history and problem list.    Social History   Substance Use Topics   • Smoking status: Former Smoker     Years: 40.00     Types: Cigarettes   • Smokeless tobacco: Never Used   • Alcohol use Yes      Comment: occcasionally when younger        Review of Systems   Constitution: Negative for weakness and malaise/fatigue.   Cardiovascular: Positive for chest pain, dyspnea on exertion and leg swelling.   Respiratory: Positive for cough and shortness of breath.    Hematologic/Lymphatic: Negative for bleeding problem. Bruises/bleeds easily.   Gastrointestinal: Negative for hematochezia and melena.   Neurological: Negative for dizziness and light-headedness.       Objective   Vitals:    09/17/18 0911   BP: 120/69   BP Location: Right arm   Patient Position: Sitting   Cuff Size: Adult   Pulse: 72   Weight: 84.4 kg (186 lb)   Height: 175.3 cm (69.02\")     Body mass index is 27.45 kg/m².        Physical Exam   Constitutional: He is oriented to person, place, and time. He appears well-developed and well-nourished. No distress.   HENT:   Head: Normocephalic and atraumatic.   Cardiovascular: Normal rate and normal heart sounds.  An irregularly irregular rhythm " present. Exam reveals no gallop.    No murmur heard.  Pulmonary/Chest: Effort normal and breath sounds normal. No respiratory distress. He has no wheezes.   Abdominal: Soft. Bowel sounds are normal.   Musculoskeletal: He exhibits no edema.   Neurological: He is alert and oriented to person, place, and time.   Skin: He is not diaphoretic.       Lab Results   Component Value Date     08/20/2018    K 4.0 08/20/2018     08/20/2018    CO2 27.5 08/20/2018    BUN 9 08/20/2018    CREATININE 1.20 08/20/2018    GLUCOSE 114 (H) 08/20/2018    CALCIUM 9.6 08/20/2018    AST 16 08/20/2018    ALT 8 (L) 08/20/2018    ALKPHOS 73 08/20/2018    LABIL2 1.3 (L) 05/07/2016     Lab Results   Component Value Date    CKTOTAL 177 05/09/2016     Lab Results   Component Value Date    WBC 6.93 08/20/2018    HGB 13.2 (L) 08/20/2018    HCT 39.0 (L) 08/20/2018     08/20/2018     Lab Results   Component Value Date    INR 2.23 12/17/2014    INR 3.98 12/16/2014    INR 9.26 12/15/2014     Lab Results   Component Value Date    MG 2.1 05/09/2016     Lab Results   Component Value Date    TSH 1.113 05/07/2016      Lab Results   Component Value Date    BNP 49.0 08/20/2018       During this visit the following were done:  Labs Reviewed [x]    Labs Ordered []    Radiology Reports Reviewed [x]    Radiology Ordered []    PCP Records Reviewed []    Referring Provider Records Reviewed []    ER Records Reviewed []    Hospital Records Reviewed []    History Obtained From Family []    Radiology Images Reviewed []    Other Reviewed []    Records Requested []        ECG 12 Lead  Date/Time: 9/17/2018 9:00 AM  Performed by: ЕЛЕНА ASH  Authorized by: ЕЛЕНА ASH   Comparison: compared with previous ECG from 2/26/2018  Similar to previous ECG  Rhythm: atrial fibrillation  Clinical impression: abnormal ECG and non-specific ECG  Comments: Chronic Q waves in V1 V2               Assessment/Plan    Diagnosis Plan   1. Arteriosclerotic  cardiovascular disease (ASCVD) s/p stenting     2. Persistent atrial fibrillation (CMS/HCC)     3. PAD (peripheral artery disease), fem pop bypass,3/08     4. Essential hypertension     5. Chronic kidney disease, stage III (moderate)     6. Dyslipidemia                  Recommendations:  1. For the patient's chest pains L increase his Imdur  60 mg by mouth daily.  I also prescribed him sublingual nitroglycerin for acute chest pains.  And discussed how to appropriately take the medicine  2. I also gave him more prescription of Lasix to take PRN.  I told him to check his weight daily and should he notice a weight gain of 2-3 pounds over the course of one to two days to start taking Lasix.  He should also take Lasix if he noticing his breathing worsening or him unable to sleep laying flat.  3. CBC revealed hemoglobin 3.2 patient will continue on Eliquis and aspirin. Also  continue Lasix, simvastatin, and metoprolol.  4. Follow-up in 4 weeks    Return in about 4 weeks (around 10/15/2018).    As always, I appreciate very much the opportunity to participate in the cardiovascular care of your patients.      With Best Regards,    ANTHONY Davis disclaimer:  Much of this encounter note is an electronic transcription/translation of spoken language to printed text. The electronic translation of spoken language may permit erroneous, or at times, nonsensical words or phrases to be inadvertently transcribed; Although I have reviewed the note for such errors, some may still exist.

## 2018-10-23 ENCOUNTER — OFFICE VISIT (OUTPATIENT)
Dept: CARDIOLOGY | Facility: CLINIC | Age: 83
End: 2018-10-23

## 2018-10-23 VITALS
SYSTOLIC BLOOD PRESSURE: 117 MMHG | HEART RATE: 89 BPM | WEIGHT: 178.6 LBS | DIASTOLIC BLOOD PRESSURE: 74 MMHG | HEIGHT: 69 IN | OXYGEN SATURATION: 99 % | BODY MASS INDEX: 26.45 KG/M2

## 2018-10-23 DIAGNOSIS — E78.5 DYSLIPIDEMIA: ICD-10-CM

## 2018-10-23 DIAGNOSIS — I10 ESSENTIAL HYPERTENSION: ICD-10-CM

## 2018-10-23 DIAGNOSIS — I25.10 ARTERIOSCLEROTIC CARDIOVASCULAR DISEASE (ASCVD): Primary | ICD-10-CM

## 2018-10-23 DIAGNOSIS — I73.9 PAD (PERIPHERAL ARTERY DISEASE) (HCC): ICD-10-CM

## 2018-10-23 DIAGNOSIS — J44.9 CHRONIC OBSTRUCTIVE PULMONARY DISEASE, UNSPECIFIED COPD TYPE (HCC): ICD-10-CM

## 2018-10-23 PROCEDURE — 99214 OFFICE O/P EST MOD 30 MIN: CPT | Performed by: PHYSICIAN ASSISTANT

## 2018-10-23 RX ORDER — BUMETANIDE 1 MG/1
1 TABLET ORAL DAILY
Qty: 30 TABLET | Refills: 3 | Status: SHIPPED | OUTPATIENT
Start: 2018-10-23 | End: 2019-03-12 | Stop reason: SDUPTHER

## 2018-10-23 NOTE — PROGRESS NOTES
Rc Ojeda MD  Felice Aiken  1935  10/23/2018    Patient Active Problem List   Diagnosis   • Knee pain   • Arteriosclerotic cardiovascular disease (ASCVD) s/p stenting   • Sleep apnea   • Pain and swelling of lower extremity   • Persistent atrial fibrillation (CMS/HCC)   • History of ASCVD (atherosclerotic cardiovascular disease), s/p stenting   • PAD (peripheral artery disease), fem pop bypass,3/08   • Dyslipidemia   • Essential hypertension   • Chronic bronchitis (CMS/HCC)   • Chronic obstructive pulmonary disease (CMS/HCC)   • Status post total left knee replacement   • Neuropathy involving both lower extremities   • Chronic kidney disease, stage III (moderate) (CMS/HCC)   • Hematuria   • Shortness of breath       Dear Rc Ojeda MD:    Subjective       History of Present Illness:    Chief Complaint   Patient presents with   • Follow-up   • Med Management     Verabl med list.    • Chest Pain   • Shortness of Breath   • Edema       Felice Aiken is a pleasant 83 y.o. male with a past medical history significant for post previous intervention, peripheral arterial disease status post femoralpopliteal bypass in 03/2008, persistent/chronic atrial fibrillation, and chronic anticoagulation with Eliquis. He presents to the office today for follow-up.     Patient states since increasing his Imdur his chest pains have resolved.  However he still has some dyspnea with exertion.  He states whenever he goes for walks of the words he can no longer goes for his use to.  He states this recently came on within the last couple months and has been relatively stable but he is used to walking every single day and is upset about not being able to do what he is use to. He denies any orthopnea, PND, or pedal edema. He also denies any palpations       No Known Allergies:      Current Outpatient Prescriptions:   •  apixaban (ELIQUIS) 5 MG tablet tablet, Take 1 tablet by mouth 2 (Two) Times a Day., Disp: 60 tablet,  Rfl: 4  •  aspirin (ASPIRIN LOW DOSE) 81 MG tablet, Take  by mouth daily., Disp: , Rfl:   •  Denture Care Products (DENTURE ADHESIVE) cream, , Disp: , Rfl:   •  gabapentin (NEURONTIN) 600 MG tablet, Take 600 mg by mouth 2 (Two) Times a Day., Disp: , Rfl:   •  glucosamine-chondroitin 500-400 MG capsule capsule, Take  by mouth 3 (Three) Times a Day With Meals., Disp: , Rfl:   •  Homeopathic Products (LEG CRAMP RELIEF PO), Take  by mouth., Disp: , Rfl:   •  ipratropium-albuterol (DUO-NEB) 0.5-2.5 mg/3 ml nebulizer, Take 3 mL by nebulization 4 (Four) Times a Day As Needed for Wheezing., Disp: 360 mL, Rfl: 5  •  isosorbide mononitrate (IMDUR) 60 MG 24 hr tablet, Take 1 tablet by mouth Daily., Disp: 30 tablet, Rfl: 6  •  lisinopril (PRINIVIL,ZESTRIL) 10 MG tablet, TAKE 1 TABLET EVERY DAY, Disp: 90 tablet, Rfl: 0  •  metoprolol succinate XL (TOPROL-XL) 50 MG 24 hr tablet, Take 1 tablet by mouth Daily., Disp: 90 tablet, Rfl: 1  •  Misc Natural Products (COSAMIN ASU FOR JOINT HEALTH PO), Take  by mouth., Disp: , Rfl:   •  Multiple Vitamin (MULTI VITAMIN PO), Take  by mouth., Disp: , Rfl:   •  nitroglycerin (NITROSTAT) 0.4 MG SL tablet, 1 under the tongue as needed for angina, may repeat q5mins for up three doses, Disp: 25 tablet, Rfl: 0  •  O2 (OXYGEN), Inhale 1 (One) Time. CPAP @@ hs, Disp: , Rfl:   •  Omega-3 Fatty Acids (EQL FISH OIL) 1000 MG capsule, Take 1,200 mg by mouth., Disp: , Rfl:   •  ondansetron (ZOFRAN) 4 MG tablet, Take 4 mg by mouth Every 8 (Eight) Hours As Needed for Nausea or Vomiting., Disp: , Rfl:   •  ranitidine (ZANTAC) 150 MG tablet, Take 300 mg by mouth 2 (Two) Times a Day., Disp: , Rfl:   •  simvastatin (ZOCOR) 40 MG tablet, Take 1 tablet by mouth Every Night., Disp: 90 tablet, Rfl: 1  •  spironolactone (ALDACTONE) 25 MG tablet, Take 1 tablet by mouth Daily., Disp: 90 tablet, Rfl: 1  •  theophylline (UNIPHYL) 600 MG 24 hr tablet, Take 1 tablet by mouth Daily., Disp: 30 tablet, Rfl: 5  •  bumetanide  "(BUMEX) 1 MG tablet, Take 1 tablet by mouth Daily., Disp: 30 tablet, Rfl: 3      The following portions of the patient's history were reviewed and updated as appropriate: allergies, current medications, past family history, past medical history, past social history, past surgical history and problem list.    Social History   Substance Use Topics   • Smoking status: Former Smoker     Years: 40.00     Types: Cigarettes   • Smokeless tobacco: Never Used   • Alcohol use Yes      Comment: occcasionally when younger        Review of Systems   Constitution: Positive for weakness. Negative for malaise/fatigue.   Cardiovascular: Positive for chest pain and leg swelling. Negative for palpitations and syncope.   Respiratory: Positive for shortness of breath. Negative for cough.    Hematologic/Lymphatic: Negative for bleeding problem. Does not bruise/bleed easily.   Neurological: Negative for dizziness.       Objective   Vitals:    10/23/18 1525   BP: 117/74   BP Location: Left arm   Patient Position: Sitting   Cuff Size: Adult   Pulse: 89   SpO2: 99%   Weight: 81 kg (178 lb 9.6 oz)   Height: 175.3 cm (69\")     Body mass index is 26.37 kg/m².        Physical Exam   Constitutional: He is oriented to person, place, and time. He appears well-developed and well-nourished. No distress.   HENT:   Head: Normocephalic and atraumatic.   Cardiovascular: Normal rate, regular rhythm and normal heart sounds.    No murmur heard.  Pulmonary/Chest: Effort normal. No respiratory distress. He has no wheezes. He has rales (bibasilar rales).   Musculoskeletal: He exhibits no edema.   Neurological: He is alert and oriented to person, place, and time.   Skin: He is not diaphoretic.       Lab Results   Component Value Date     08/20/2018    K 4.0 08/20/2018     08/20/2018    CO2 27.5 08/20/2018    BUN 9 08/20/2018    CREATININE 1.20 08/20/2018    GLUCOSE 114 (H) 08/20/2018    CALCIUM 9.6 08/20/2018    AST 16 08/20/2018    ALT 8 (L) " 08/20/2018    ALKPHOS 73 08/20/2018    LABIL2 1.3 (L) 05/07/2016     Lab Results   Component Value Date    CKTOTAL 177 05/09/2016     Lab Results   Component Value Date    WBC 6.93 08/20/2018    HGB 13.2 (L) 08/20/2018    HCT 39.0 (L) 08/20/2018     08/20/2018     Lab Results   Component Value Date    INR 2.23 12/17/2014    INR 3.98 12/16/2014    INR 9.26 12/15/2014     Lab Results   Component Value Date    MG 2.1 05/09/2016     Lab Results   Component Value Date    TSH 1.113 05/07/2016      Lab Results   Component Value Date    BNP 49.0 08/20/2018       During this visit the following were done:  Labs Reviewed [x]    Labs Ordered []    Radiology Reports Reviewed [x]    Radiology Ordered []    PCP Records Reviewed []    Referring Provider Records Reviewed []    ER Records Reviewed []    Hospital Records Reviewed []    History Obtained From Family []    Radiology Images Reviewed []    Other Reviewed []    Records Requested []       Procedures      Assessment/Plan    Diagnosis Plan   1. Arteriosclerotic cardiovascular disease (ASCVD) s/p stenting  Basic Metabolic Panel   2. PAD (peripheral artery disease), fem pop bypass,3/08     3. Essential hypertension     4. Chronic obstructive pulmonary disease, unspecified COPD type (CMS/HCC)     5. Dyslipidemia                  Recommendations:  1. I will start patient with Bumex 1 mg PO daily since he reports poor response to lasix and has rales bilaterally on exam today.   2. I will check a BMP in one week since starting bumex  3. Continue aspirin, eliquis, lisinopril, metoprolol succinate.   4. Follow up in 3 months.     Patient's Body mass index is 26.37 kg/m². BMI is above normal parameters. Recommendations include: no follow-up required.       Return in about 4 months (around 2/23/2019).    As always, I appreciate very much the opportunity to participate in the cardiovascular care of your patients.      With Best Regards,    ANTHONY Davis  disclaimer:  Much of this encounter note is an electronic transcription/translation of spoken language to printed text. The electronic translation of spoken language may permit erroneous, or at times, nonsensical words or phrases to be inadvertently transcribed; Although I have reviewed the note for such errors, some may still exist.

## 2018-10-28 ENCOUNTER — RESULTS ENCOUNTER (OUTPATIENT)
Dept: CARDIOLOGY | Facility: CLINIC | Age: 83
End: 2018-10-28

## 2018-10-28 DIAGNOSIS — I25.10 ARTERIOSCLEROTIC CARDIOVASCULAR DISEASE (ASCVD): ICD-10-CM

## 2018-10-29 ENCOUNTER — APPOINTMENT (OUTPATIENT)
Dept: LAB | Facility: HOSPITAL | Age: 83
End: 2018-10-29

## 2018-10-29 LAB
ANION GAP SERPL CALCULATED.3IONS-SCNC: 8 MMOL/L (ref 3.6–11.2)
BUN BLD-MCNC: 11 MG/DL (ref 7–21)
BUN/CREAT SERPL: 8.6 (ref 7–25)
CALCIUM SPEC-SCNC: 9.6 MG/DL (ref 7.7–10)
CHLORIDE SERPL-SCNC: 106 MMOL/L (ref 99–112)
CO2 SERPL-SCNC: 25 MMOL/L (ref 24.3–31.9)
CREAT BLD-MCNC: 1.28 MG/DL (ref 0.43–1.29)
GFR SERPL CREATININE-BSD FRML MDRD: 54 ML/MIN/1.73
GLUCOSE BLD-MCNC: 151 MG/DL (ref 70–110)
OSMOLALITY SERPL CALC.SUM OF ELEC: 279.9 MOSM/KG (ref 273–305)
POTASSIUM BLD-SCNC: 4.1 MMOL/L (ref 3.5–5.3)
SODIUM BLD-SCNC: 139 MMOL/L (ref 135–153)

## 2018-10-29 PROCEDURE — 80048 BASIC METABOLIC PNL TOTAL CA: CPT | Performed by: PHYSICIAN ASSISTANT

## 2018-10-29 PROCEDURE — 36415 COLL VENOUS BLD VENIPUNCTURE: CPT | Performed by: PHYSICIAN ASSISTANT

## 2018-11-21 RX ORDER — SIMVASTATIN 40 MG
40 TABLET ORAL NIGHTLY
Qty: 90 TABLET | Refills: 1 | Status: ON HOLD | OUTPATIENT
Start: 2018-11-21 | End: 2019-07-04

## 2018-11-21 RX ORDER — SPIRONOLACTONE 25 MG/1
TABLET ORAL
Qty: 90 TABLET | Refills: 1 | Status: ON HOLD | OUTPATIENT
Start: 2018-11-21 | End: 2019-07-04

## 2019-01-07 DIAGNOSIS — R06.02 SOB (SHORTNESS OF BREATH): Primary | ICD-10-CM

## 2019-01-08 ENCOUNTER — OFFICE VISIT (OUTPATIENT)
Dept: PULMONOLOGY | Facility: CLINIC | Age: 84
End: 2019-01-08

## 2019-01-08 VITALS
DIASTOLIC BLOOD PRESSURE: 63 MMHG | OXYGEN SATURATION: 95 % | HEART RATE: 78 BPM | WEIGHT: 177.6 LBS | SYSTOLIC BLOOD PRESSURE: 103 MMHG | HEIGHT: 69 IN | BODY MASS INDEX: 26.31 KG/M2

## 2019-01-08 DIAGNOSIS — I25.10 CORONARY ARTERY DISEASE INVOLVING NATIVE HEART WITHOUT ANGINA PECTORIS, UNSPECIFIED VESSEL OR LESION TYPE: ICD-10-CM

## 2019-01-08 DIAGNOSIS — J44.9 CHRONIC OBSTRUCTIVE PULMONARY DISEASE, UNSPECIFIED COPD TYPE (HCC): Primary | ICD-10-CM

## 2019-01-08 LAB
FEV1: 1.94 LITERS
FVC VOL RESPIRATORY: 3.12 LITERS

## 2019-01-08 PROCEDURE — 99212 OFFICE O/P EST SF 10 MIN: CPT | Performed by: INTERNAL MEDICINE

## 2019-01-08 PROCEDURE — 94010 BREATHING CAPACITY TEST: CPT | Performed by: INTERNAL MEDICINE

## 2019-01-08 ASSESSMENT — PULMONARY FUNCTION TESTS
FEV1: 1.94
FVC: 3.12

## 2019-01-08 NOTE — PROGRESS NOTES
Subjective   Chief Complaint   Patient presents with   • Shortness of Breath   • COPD       Felice Aiken is a 83 y.o. male     History of Present Illness 83-year-old gentleman returning for follow-up of what seems to be mild COPD he Quit smoking 40 years ago and had a heart attack and dispense had to obstructive apnea for which was on CPAP for a while but he would not use and has been on oxygen at night as percent nebulizer occasionally as needed and is followed by cardiology  Is on Eliquis aspirin and lisinopril and metoprolol propranolol.  He Came to the office with his wife) hard of hearing and difficult to get history    Review of Systems complaining of occasional chest pain but not much shortness of breath cough expectoration or edema review of systems difficult to obtain because of hearing impairment    Family History   Problem Relation Age of Onset   • Hypertension Mother    • Arthritis Mother    • Hypertension Father    • Arthritis Father    • Diabetes Sister    • Cancer Sister    • Diabetes Brother        Past Medical History:   Diagnosis Date   • COPD (chronic obstructive pulmonary disease) (CMS/[facility])        Past Surgical History:   Procedure Laterality Date   • HAND SURGERY     • KNEE SURGERY         Social History     Socioeconomic History   • Marital status:      Spouse name: Not on file   • Number of children: Not on file   • Years of education: Not on file   • Highest education level: Not on file   Social Needs   • Financial resource strain: Not on file   • Food insecurity - worry: Not on file   • Food insecurity - inability: Not on file   • Transportation needs - medical: Not on file   • Transportation needs - non-medical: Not on file   Occupational History   • Not on file   Tobacco Use   • Smoking status: Former Smoker     Years: 40.00     Types: Cigarettes   • Smokeless tobacco: Never Used   Substance and Sexual Activity   • Alcohol use: Yes     Comment: occcasionally when younger    •  Drug use: No   • Sexual activity: Defer   Other Topics Concern   • Not on file   Social History Narrative   • Not on file        Physical Exam: No acute distress vital signs is stable heart rate 7080 regular regular O2 sat 95% on room air    PFT: FVC 82 FEV1 72% mild obstructive impairment    Imaging: Chest x-ray in August was clear no cardiomegaly    Other Labs:       ASSESSMENT1-COPD very mild and [id number] sleep apnea for which she is on oxygen at night-3-coronary artery disease history of myocardial infarction and atrial fibrillation being followed by cardiology      Recommendations: Continue current medications get flu vaccine annually and followed by Dr. Ojeda the primary care physician and cardiology     Follow up:Return to our office as needed as I may retire in 6 months to 9 month

## 2019-01-23 ENCOUNTER — OFFICE VISIT (OUTPATIENT)
Dept: CARDIOLOGY | Facility: CLINIC | Age: 84
End: 2019-01-23

## 2019-01-23 VITALS
SYSTOLIC BLOOD PRESSURE: 100 MMHG | HEART RATE: 89 BPM | DIASTOLIC BLOOD PRESSURE: 60 MMHG | OXYGEN SATURATION: 91 % | WEIGHT: 182 LBS | HEIGHT: 69 IN | BODY MASS INDEX: 26.96 KG/M2

## 2019-01-23 DIAGNOSIS — I73.9 PAD (PERIPHERAL ARTERY DISEASE) (HCC): ICD-10-CM

## 2019-01-23 DIAGNOSIS — I25.10 ARTERIOSCLEROTIC CARDIOVASCULAR DISEASE (ASCVD): ICD-10-CM

## 2019-01-23 DIAGNOSIS — I50.22 CHRONIC SYSTOLIC HEART FAILURE (HCC): ICD-10-CM

## 2019-01-23 DIAGNOSIS — I10 ESSENTIAL HYPERTENSION: ICD-10-CM

## 2019-01-23 DIAGNOSIS — I48.19 PERSISTENT ATRIAL FIBRILLATION (HCC): Primary | ICD-10-CM

## 2019-01-23 PROCEDURE — 99213 OFFICE O/P EST LOW 20 MIN: CPT | Performed by: PHYSICIAN ASSISTANT

## 2019-01-23 RX ORDER — RANITIDINE 300 MG/1
TABLET ORAL
Status: ON HOLD | COMMUNITY
Start: 2019-01-16 | End: 2019-07-04

## 2019-01-23 NOTE — PROGRESS NOTES
Rc Ojeda MD  Felice Aiken  1935  01/23/2019    Patient Active Problem List   Diagnosis   • Knee pain   • Arteriosclerotic cardiovascular disease (ASCVD) s/p stenting   • Sleep apnea   • Pain and swelling of lower extremity   • Persistent atrial fibrillation (CMS/HCC)   • History of ASCVD (atherosclerotic cardiovascular disease), s/p stenting   • PAD (peripheral artery disease), fem pop bypass,3/08   • Dyslipidemia   • Essential hypertension   • Chronic bronchitis (CMS/HCC)   • Chronic obstructive pulmonary disease (CMS/HCC)   • Status post total left knee replacement   • Neuropathy involving both lower extremities   • Chronic kidney disease, stage III (moderate) (CMS/HCC)   • Hematuria   • Shortness of breath   • Chronic systolic heart failure (CMS/[facility])       Dear Rc Ojeda MD:    Subjective       History of Present Illness:    Chief Complaint   Patient presents with   • Coronary Artery Disease   • Med Management       Felice Aiken is a pleasant 83 y.o. male with a past medical history significant for previous intervention, peripheral arterial disease status post femoralpopliteal bypass in 03/2008, persistent/chronic atrial fibrillation, and chronic anticoagulation with Eliquis. He presents to the office today for follow-up.     Patient states he has been doing well.  He states since switching him from Lasix to Bumex he has been having very good urine output being every 15-20 minutes when he first takes. He does still have some weakness and fatigue but this has been chronic now for many months. He denies any chest pain or worsening of shortness of breath. He denies any orthopnea or pedal edema. He does state that walking up a steep incline is difficult for him.     No Known Allergies:      Current Outpatient Medications:   •  apixaban (ELIQUIS) 5 MG tablet tablet, Take 1 tablet by mouth 2 (Two) Times a Day., Disp: 60 tablet, Rfl: 4  •  aspirin (ASPIRIN LOW DOSE) 81 MG tablet, Take  by  mouth daily., Disp: , Rfl:   •  bumetanide (BUMEX) 1 MG tablet, Take 1 tablet by mouth Daily., Disp: 30 tablet, Rfl: 3  •  Denture Care Products (DENTURE ADHESIVE) cream, , Disp: , Rfl:   •  gabapentin (NEURONTIN) 600 MG tablet, Take 600 mg by mouth 2 (Two) Times a Day., Disp: , Rfl:   •  glucosamine-chondroitin 500-400 MG capsule capsule, Take  by mouth 3 (Three) Times a Day With Meals., Disp: , Rfl:   •  Homeopathic Products (LEG CRAMP RELIEF PO), Take  by mouth., Disp: , Rfl:   •  ipratropium-albuterol (DUO-NEB) 0.5-2.5 mg/3 ml nebulizer, Take 3 mL by nebulization 4 (Four) Times a Day As Needed for Wheezing., Disp: 360 mL, Rfl: 5  •  isosorbide mononitrate (IMDUR) 60 MG 24 hr tablet, Take 1 tablet by mouth Daily., Disp: 30 tablet, Rfl: 6  •  lisinopril (PRINIVIL,ZESTRIL) 10 MG tablet, TAKE 1 TABLET EVERY DAY, Disp: 90 tablet, Rfl: 0  •  metoprolol succinate XL (TOPROL-XL) 50 MG 24 hr tablet, Take 1 tablet by mouth Daily., Disp: 90 tablet, Rfl: 1  •  Misc Natural Products (COSAMIN ASU FOR JOINT HEALTH PO), Take  by mouth., Disp: , Rfl:   •  Multiple Vitamin (MULTI VITAMIN PO), Take  by mouth., Disp: , Rfl:   •  nitroglycerin (NITROSTAT) 0.4 MG SL tablet, 1 under the tongue as needed for angina, may repeat q5mins for up three doses, Disp: 25 tablet, Rfl: 0  •  O2 (OXYGEN), Inhale 1 (One) Time. CPAP @@ hs, Disp: , Rfl:   •  Omega-3 Fatty Acids (EQL FISH OIL) 1000 MG capsule, Take 1,200 mg by mouth., Disp: , Rfl:   •  ondansetron (ZOFRAN) 4 MG tablet, Take 4 mg by mouth Every 8 (Eight) Hours As Needed for Nausea or Vomiting., Disp: , Rfl:   •  raNITIdine (ZANTAC) 300 MG tablet, , Disp: , Rfl:   •  simvastatin (ZOCOR) 40 MG tablet, TAKE 1 TABLET BY MOUTH EVERY NIGHT., Disp: 90 tablet, Rfl: 1  •  spironolactone (ALDACTONE) 25 MG tablet, TAKE 1 TABLET EVERY DAY, Disp: 90 tablet, Rfl: 1  •  theophylline (UNIPHYL) 600 MG 24 hr tablet, Take 1 tablet by mouth Daily., Disp: 30 tablet, Rfl: 5      The following portions of  "the patient's history were reviewed and updated as appropriate: allergies, current medications, past family history, past medical history, past social history, past surgical history and problem list.    Social History     Tobacco Use   • Smoking status: Former Smoker     Years: 40.00     Types: Cigarettes   • Smokeless tobacco: Never Used   Substance Use Topics   • Alcohol use: Yes     Comment: occcasionally when younger    • Drug use: No       Review of Systems   Constitution: Negative for weakness and malaise/fatigue.   Cardiovascular: Negative for chest pain, dyspnea on exertion and irregular heartbeat.   Respiratory: Negative for cough and shortness of breath.    Hematologic/Lymphatic: Negative for bleeding problem. Does not bruise/bleed easily.   Gastrointestinal: Negative for nausea and vomiting.       Objective   Vitals:    01/23/19 1144   BP: 100/60   BP Location: Right arm   Patient Position: Sitting   Cuff Size: Adult   Pulse: 89   SpO2: 91%   Weight: 82.6 kg (182 lb)   Height: 175.3 cm (69\")     Body mass index is 26.88 kg/m².        Physical Exam   Constitutional: He is oriented to person, place, and time. He appears well-developed and well-nourished. No distress.   HENT:   Head: Normocephalic and atraumatic.   Cardiovascular: Normal rate, regular rhythm, normal heart sounds and intact distal pulses.   Pulmonary/Chest: Effort normal and breath sounds normal. No respiratory distress.   Decreased breath sounds bilaterally.    Musculoskeletal: He exhibits no edema.   Neurological: He is alert and oriented to person, place, and time.   Skin: He is not diaphoretic.       Lab Results   Component Value Date     10/29/2018    K 4.1 10/29/2018     10/29/2018    CO2 25.0 10/29/2018    BUN 11 10/29/2018    CREATININE 1.28 10/29/2018    GLUCOSE 151 (H) 10/29/2018    CALCIUM 9.6 10/29/2018    AST 16 08/20/2018    ALT 8 (L) 08/20/2018    ALKPHOS 73 08/20/2018    LABIL2 1.3 (L) 05/07/2016     Lab Results "   Component Value Date    CKTOTAL 177 05/09/2016     Lab Results   Component Value Date    WBC 6.93 08/20/2018    HGB 13.2 (L) 08/20/2018    HCT 39.0 (L) 08/20/2018     08/20/2018     Lab Results   Component Value Date    INR 2.23 12/17/2014    INR 3.98 12/16/2014    INR 9.26 12/15/2014     Lab Results   Component Value Date    MG 2.1 05/09/2016     Lab Results   Component Value Date    TSH 1.113 05/07/2016      Lab Results   Component Value Date    BNP 49.0 08/20/2018       During this visit the following were done:  Labs Reviewed [x]    Labs Ordered []    Radiology Reports Reviewed [x]    Radiology Ordered []    PCP Records Reviewed []    Referring Provider Records Reviewed []    ER Records Reviewed []    Hospital Records Reviewed []    History Obtained From Family []    Radiology Images Reviewed []    Other Reviewed []    Records Requested []       Procedures      Assessment/Plan    Diagnosis Plan   1. Persistent atrial fibrillation (CMS/HCC)     2. Arteriosclerotic cardiovascular disease (ASCVD) s/p stenting     3. PAD (peripheral artery disease), fem pop bypass,3/08     4. Essential hypertension     5. Chronic systolic heart failure (CMS/HCC)                  Recommendations:  1. Patient states that he saw his PCP last week who told him his kidney function is doing well. Will request the BMP.   2. Continue eliquis, bumex, imdur, lisinopril, metoprolol succinate, simvastatin, and spironolactone, and aspirin.   3. Follow up in 6 months.     Return in about 6 months (around 7/23/2019).    As always, I appreciate very much the opportunity to participate in the cardiovascular care of your patients.      With Best Regards,    ANTHONY Davis disclaimer:  Much of this encounter note is an electronic transcription/translation of spoken language to printed text. The electronic translation of spoken language may permit erroneous, or at times, nonsensical words or phrases to be inadvertently  transcribed; Although I have reviewed the note for such errors, some may still exist.

## 2019-01-30 RX ORDER — NITROGLYCERIN 0.4 MG/1
TABLET SUBLINGUAL
Qty: 25 TABLET | Refills: 0 | Status: SHIPPED | OUTPATIENT
Start: 2019-01-30 | End: 2021-06-12

## 2019-02-11 RX ORDER — THEOPHYLLINE 600 MG/1
600 TABLET, EXTENDED RELEASE ORAL DAILY
Qty: 30 TABLET | Refills: 5 | Status: SHIPPED | OUTPATIENT
Start: 2019-02-11 | End: 2019-02-13 | Stop reason: SDUPTHER

## 2019-02-13 RX ORDER — THEOPHYLLINE 600 MG/1
600 TABLET, EXTENDED RELEASE ORAL DAILY
Qty: 30 TABLET | Refills: 5 | Status: SHIPPED | OUTPATIENT
Start: 2019-02-13 | End: 2019-09-09 | Stop reason: SDUPTHER

## 2019-02-13 RX ORDER — THEOPHYLLINE 600 MG/1
600 TABLET, EXTENDED RELEASE ORAL DAILY
Qty: 30 TABLET | Refills: 5 | Status: SHIPPED | OUTPATIENT
Start: 2019-02-13 | End: 2019-02-13 | Stop reason: SDUPTHER

## 2019-03-12 RX ORDER — BUMETANIDE 1 MG/1
TABLET ORAL
Qty: 30 TABLET | Refills: 2 | Status: ON HOLD | OUTPATIENT
Start: 2019-03-12 | End: 2019-07-04

## 2019-04-01 RX ORDER — APIXABAN 5 MG/1
TABLET, FILM COATED ORAL
Qty: 60 TABLET | Refills: 3 | Status: SHIPPED | OUTPATIENT
Start: 2019-04-01 | End: 2019-08-02 | Stop reason: SDUPTHER

## 2019-04-14 ENCOUNTER — HOSPITAL ENCOUNTER (EMERGENCY)
Facility: HOSPITAL | Age: 84
Discharge: HOME | End: 2019-04-14
Attending: EMERGENCY MEDICINE | Admitting: EMERGENCY MEDICINE

## 2019-04-14 ENCOUNTER — APPOINTMENT (OUTPATIENT)
Dept: CT IMAGING | Facility: HOSPITAL | Age: 84
End: 2019-04-14

## 2019-04-14 VITALS
WEIGHT: 180 LBS | DIASTOLIC BLOOD PRESSURE: 82 MMHG | HEIGHT: 71 IN | SYSTOLIC BLOOD PRESSURE: 149 MMHG | OXYGEN SATURATION: 96 % | HEART RATE: 97 BPM | TEMPERATURE: 98 F | BODY MASS INDEX: 25.2 KG/M2 | RESPIRATION RATE: 20 BRPM

## 2019-04-14 DIAGNOSIS — G44.011 INTRACTABLE EPISODIC CLUSTER HEADACHE: Primary | ICD-10-CM

## 2019-04-14 LAB
ANION GAP SERPL CALCULATED.3IONS-SCNC: 15.2 MMOL/L
BASOPHILS # BLD AUTO: 0.05 10*3/MM3 (ref 0–0.2)
BASOPHILS NFR BLD AUTO: 0.6 % (ref 0–1.5)
BUN BLD-MCNC: 12 MG/DL (ref 8–23)
BUN/CREAT SERPL: 9.9 (ref 7–25)
CALCIUM SPEC-SCNC: 8.7 MG/DL (ref 8.6–10.5)
CHLORIDE SERPL-SCNC: 99 MMOL/L (ref 98–107)
CO2 SERPL-SCNC: 23.8 MMOL/L (ref 22–29)
CREAT BLD-MCNC: 1.21 MG/DL (ref 0.76–1.27)
DEPRECATED RDW RBC AUTO: 44.3 FL (ref 37–54)
EOSINOPHIL # BLD AUTO: 0.2 10*3/MM3 (ref 0–0.4)
EOSINOPHIL NFR BLD AUTO: 2.5 % (ref 0.3–6.2)
ERYTHROCYTE [DISTWIDTH] IN BLOOD BY AUTOMATED COUNT: 13.4 % (ref 12.3–15.4)
GFR SERPL CREATININE-BSD FRML MDRD: 57 ML/MIN/1.73
GLUCOSE BLD-MCNC: 121 MG/DL (ref 65–99)
HCT VFR BLD AUTO: 34.6 % (ref 37.5–51)
HGB BLD-MCNC: 11.7 G/DL (ref 13–17.7)
IMM GRANULOCYTES # BLD AUTO: 0.01 10*3/MM3 (ref 0–0.05)
IMM GRANULOCYTES NFR BLD AUTO: 0.1 % (ref 0–0.5)
LYMPHOCYTES # BLD AUTO: 1.13 10*3/MM3 (ref 0.7–3.1)
LYMPHOCYTES NFR BLD AUTO: 14.3 % (ref 19.6–45.3)
MCH RBC QN AUTO: 32.2 PG (ref 26.6–33)
MCHC RBC AUTO-ENTMCNC: 33.8 G/DL (ref 31.5–35.7)
MCV RBC AUTO: 95.3 FL (ref 79–97)
MONOCYTES # BLD AUTO: 0.51 10*3/MM3 (ref 0.1–0.9)
MONOCYTES NFR BLD AUTO: 6.4 % (ref 5–12)
NEUTROPHILS # BLD AUTO: 6.01 10*3/MM3 (ref 1.4–7)
NEUTROPHILS NFR BLD AUTO: 76.1 % (ref 42.7–76)
PLATELET # BLD AUTO: 152 10*3/MM3 (ref 140–450)
PMV BLD AUTO: 10.2 FL (ref 6–12)
POTASSIUM BLD-SCNC: 4.2 MMOL/L (ref 3.5–5.2)
RBC # BLD AUTO: 3.63 10*6/MM3 (ref 4.14–5.8)
SODIUM BLD-SCNC: 138 MMOL/L (ref 136–145)
WBC NRBC COR # BLD: 7.91 10*3/MM3 (ref 3.4–10.8)

## 2019-04-14 PROCEDURE — 93010 ELECTROCARDIOGRAM REPORT: CPT | Performed by: INTERNAL MEDICINE

## 2019-04-14 PROCEDURE — 36415 COLL VENOUS BLD VENIPUNCTURE: CPT

## 2019-04-14 PROCEDURE — 25010000002 PROCHLORPERAZINE EDISYLATE PER 10 MG: Performed by: EMERGENCY MEDICINE

## 2019-04-14 PROCEDURE — 70450 CT HEAD/BRAIN W/O DYE: CPT | Performed by: RADIOLOGY

## 2019-04-14 PROCEDURE — 99284 EMERGENCY DEPT VISIT MOD MDM: CPT

## 2019-04-14 PROCEDURE — 25010000002 MORPHINE PER 10 MG: Performed by: EMERGENCY MEDICINE

## 2019-04-14 PROCEDURE — 85025 COMPLETE CBC W/AUTO DIFF WBC: CPT | Performed by: EMERGENCY MEDICINE

## 2019-04-14 PROCEDURE — 70450 CT HEAD/BRAIN W/O DYE: CPT

## 2019-04-14 PROCEDURE — 96374 THER/PROPH/DIAG INJ IV PUSH: CPT

## 2019-04-14 PROCEDURE — 96375 TX/PRO/DX INJ NEW DRUG ADDON: CPT

## 2019-04-14 PROCEDURE — 80048 BASIC METABOLIC PNL TOTAL CA: CPT | Performed by: EMERGENCY MEDICINE

## 2019-04-14 PROCEDURE — 25010000002 KETOROLAC TROMETHAMINE PER 15 MG: Performed by: EMERGENCY MEDICINE

## 2019-04-14 PROCEDURE — 93005 ELECTROCARDIOGRAM TRACING: CPT | Performed by: EMERGENCY MEDICINE

## 2019-04-14 PROCEDURE — 94640 AIRWAY INHALATION TREATMENT: CPT

## 2019-04-14 RX ORDER — MORPHINE SULFATE 2 MG/ML
2 INJECTION, SOLUTION INTRAMUSCULAR; INTRAVENOUS ONCE
Status: COMPLETED | OUTPATIENT
Start: 2019-04-14 | End: 2019-04-14

## 2019-04-14 RX ORDER — IPRATROPIUM BROMIDE AND ALBUTEROL SULFATE 2.5; .5 MG/3ML; MG/3ML
3 SOLUTION RESPIRATORY (INHALATION) ONCE
Status: COMPLETED | OUTPATIENT
Start: 2019-04-14 | End: 2019-04-14

## 2019-04-14 RX ORDER — KETOROLAC TROMETHAMINE 30 MG/ML
30 INJECTION, SOLUTION INTRAMUSCULAR; INTRAVENOUS ONCE
Status: COMPLETED | OUTPATIENT
Start: 2019-04-14 | End: 2019-04-14

## 2019-04-14 RX ADMIN — IPRATROPIUM BROMIDE AND ALBUTEROL SULFATE 3 ML: .5; 3 SOLUTION RESPIRATORY (INHALATION) at 01:45

## 2019-04-14 RX ADMIN — MORPHINE SULFATE 2 MG: 2 INJECTION, SOLUTION INTRAMUSCULAR; INTRAVENOUS at 03:52

## 2019-04-14 RX ADMIN — PROCHLORPERAZINE EDISYLATE 10 MG: 5 INJECTION INTRAMUSCULAR; INTRAVENOUS at 01:42

## 2019-04-14 RX ADMIN — KETOROLAC TROMETHAMINE 30 MG: 30 INJECTION, SOLUTION INTRAMUSCULAR; INTRAVENOUS at 01:42

## 2019-07-04 ENCOUNTER — APPOINTMENT (OUTPATIENT)
Dept: CT IMAGING | Facility: HOSPITAL | Age: 84
End: 2019-07-04

## 2019-07-04 ENCOUNTER — HOSPITAL ENCOUNTER (INPATIENT)
Facility: HOSPITAL | Age: 84
LOS: 12 days | Discharge: HOME | End: 2019-07-16
Attending: EMERGENCY MEDICINE | Admitting: INTERNAL MEDICINE

## 2019-07-04 ENCOUNTER — APPOINTMENT (OUTPATIENT)
Dept: GENERAL RADIOLOGY | Facility: HOSPITAL | Age: 84
End: 2019-07-04

## 2019-07-04 DIAGNOSIS — R62.7 FAILURE TO THRIVE IN ADULT: Primary | ICD-10-CM

## 2019-07-04 LAB
6-ACETYL MORPHINE: NEGATIVE
ALBUMIN SERPL-MCNC: 3.48 G/DL (ref 3.5–5.2)
ALBUMIN/GLOB SERPL: 1.4 G/DL
ALP SERPL-CCNC: 62 U/L (ref 39–117)
ALT SERPL W P-5'-P-CCNC: 13 U/L (ref 1–41)
AMPHET+METHAMPHET UR QL: NEGATIVE
ANION GAP SERPL CALCULATED.3IONS-SCNC: 10.2 MMOL/L (ref 5–15)
APAP SERPL-MCNC: <5 MCG/ML (ref 10–30)
AST SERPL-CCNC: 18 U/L (ref 1–40)
ATMOSPHERIC PRESS: 728 MMHG
BARBITURATES UR QL SCN: NEGATIVE
BASE EXCESS BLDV CALC-SCNC: 2.8 MMOL/L
BASOPHILS # BLD AUTO: 0.04 10*3/MM3 (ref 0–0.2)
BASOPHILS NFR BLD AUTO: 0.6 % (ref 0–1.5)
BDY SITE: ABNORMAL
BENZODIAZ UR QL SCN: NEGATIVE
BILIRUB SERPL-MCNC: 0.5 MG/DL (ref 0.2–1.2)
BILIRUB UR QL STRIP: NEGATIVE
BODY TEMPERATURE: 98.6 C
BUN BLD-MCNC: 7 MG/DL (ref 8–23)
BUN/CREAT SERPL: 8.9 (ref 7–25)
BUPRENORPHINE SERPL-MCNC: NEGATIVE NG/ML
CALCIUM SPEC-SCNC: 9 MG/DL (ref 8.6–10.5)
CANNABINOIDS SERPL QL: NEGATIVE
CHLORIDE SERPL-SCNC: 105 MMOL/L (ref 98–107)
CK SERPL-CCNC: 80 U/L (ref 20–200)
CK SERPL-CCNC: 83 U/L (ref 20–200)
CLARITY UR: CLEAR
CO2 SERPL-SCNC: 26.8 MMOL/L (ref 22–29)
COCAINE UR QL: NEGATIVE
COLOR UR: YELLOW
CREAT BLD-MCNC: 0.79 MG/DL (ref 0.76–1.27)
CRP SERPL-MCNC: 0.2 MG/DL (ref 0–0.5)
D-LACTATE SERPL-SCNC: 1.9 MMOL/L (ref 0.5–2)
DEPRECATED RDW RBC AUTO: 48.3 FL (ref 37–54)
EOSINOPHIL # BLD AUTO: 0.13 10*3/MM3 (ref 0–0.4)
EOSINOPHIL NFR BLD AUTO: 1.8 % (ref 0.3–6.2)
ERYTHROCYTE [DISTWIDTH] IN BLOOD BY AUTOMATED COUNT: 14.3 % (ref 12.3–15.4)
ETHANOL BLD-MCNC: <10 MG/DL (ref 0–10)
ETHANOL UR QL: <0.01 %
GFR SERPL CREATININE-BSD FRML MDRD: 94 ML/MIN/1.73
GLOBULIN UR ELPH-MCNC: 2.4 GM/DL
GLUCOSE BLD-MCNC: 112 MG/DL (ref 65–99)
GLUCOSE UR STRIP-MCNC: NEGATIVE MG/DL
HCO3 BLDV-SCNC: 29.2 MMOL/L
HCT VFR BLD AUTO: 37.2 % (ref 37.5–51)
HGB BLD-MCNC: 11.6 G/DL (ref 13–17.7)
HGB BLDA-MCNC: 11.6 G/DL (ref 12–16)
HGB UR QL STRIP.AUTO: NEGATIVE
HOROWITZ INDEX BLD+IHG-RTO: 21 %
IMM GRANULOCYTES # BLD AUTO: 0.01 10*3/MM3 (ref 0–0.05)
IMM GRANULOCYTES NFR BLD AUTO: 0.1 % (ref 0–0.5)
KETONES UR QL STRIP: NEGATIVE
LEUKOCYTE ESTERASE UR QL STRIP.AUTO: NEGATIVE
LYMPHOCYTES # BLD AUTO: 1.84 10*3/MM3 (ref 0.7–3.1)
LYMPHOCYTES NFR BLD AUTO: 25.3 % (ref 19.6–45.3)
MCH RBC QN AUTO: 30.2 PG (ref 26.6–33)
MCHC RBC AUTO-ENTMCNC: 31.2 G/DL (ref 31.5–35.7)
MCV RBC AUTO: 96.9 FL (ref 79–97)
METHADONE UR QL SCN: NEGATIVE
MODALITY: ABNORMAL
MONOCYTES # BLD AUTO: 0.75 10*3/MM3 (ref 0.1–0.9)
MONOCYTES NFR BLD AUTO: 10.3 % (ref 5–12)
NEUTROPHILS # BLD AUTO: 4.49 10*3/MM3 (ref 1.7–7)
NEUTROPHILS NFR BLD AUTO: 61.9 % (ref 42.7–76)
NITRITE UR QL STRIP: NEGATIVE
NT-PROBNP SERPL-MCNC: 3728 PG/ML (ref 5–1800)
OPIATES UR QL: NEGATIVE
OXYCODONE UR QL SCN: NEGATIVE
PCO2 BLDV: 53.2 MM HG
PCP UR QL SCN: NEGATIVE
PH BLDV: 7.36 PH UNITS
PH UR STRIP.AUTO: 7 [PH] (ref 5–8)
PLATELET # BLD AUTO: 193 10*3/MM3 (ref 140–450)
PMV BLD AUTO: 10.4 FL (ref 6–12)
PO2 BLDV: <27.9 MM HG
POTASSIUM BLD-SCNC: 3 MMOL/L (ref 3.5–5.2)
PROT SERPL-MCNC: 5.9 G/DL (ref 6–8.5)
PROT UR QL STRIP: NEGATIVE
RBC # BLD AUTO: 3.84 10*6/MM3 (ref 4.14–5.8)
SALICYLATES SERPL-MCNC: <0.3 MG/DL
SAO2 % BLDCOV: 24.6 %
SODIUM BLD-SCNC: 142 MMOL/L (ref 136–145)
SP GR UR STRIP: <=1.005 (ref 1–1.03)
THEOPHYLLINE SERPL-MCNC: <0.8 MCG/ML (ref 10–20)
TROPONIN T SERPL-MCNC: 0.05 NG/ML (ref 0–0.03)
TSH SERPL DL<=0.05 MIU/L-ACNC: 1.84 MIU/ML (ref 0.27–4.2)
UROBILINOGEN UR QL STRIP: NORMAL
VALPROATE SERPL-MCNC: <2.8 MCG/ML (ref 50–125)
WBC NRBC COR # BLD: 7.26 10*3/MM3 (ref 3.4–10.8)

## 2019-07-04 PROCEDURE — 83735 ASSAY OF MAGNESIUM: CPT | Performed by: INTERNAL MEDICINE

## 2019-07-04 PROCEDURE — 86140 C-REACTIVE PROTEIN: CPT | Performed by: PHYSICIAN ASSISTANT

## 2019-07-04 PROCEDURE — 83605 ASSAY OF LACTIC ACID: CPT | Performed by: PHYSICIAN ASSISTANT

## 2019-07-04 PROCEDURE — 87040 BLOOD CULTURE FOR BACTERIA: CPT | Performed by: PHYSICIAN ASSISTANT

## 2019-07-04 PROCEDURE — 70450 CT HEAD/BRAIN W/O DYE: CPT | Performed by: RADIOLOGY

## 2019-07-04 PROCEDURE — 84443 ASSAY THYROID STIM HORMONE: CPT | Performed by: HOSPITALIST

## 2019-07-04 PROCEDURE — 70450 CT HEAD/BRAIN W/O DYE: CPT

## 2019-07-04 PROCEDURE — 84484 ASSAY OF TROPONIN QUANT: CPT | Performed by: HOSPITALIST

## 2019-07-04 PROCEDURE — 80307 DRUG TEST PRSMV CHEM ANLYZR: CPT | Performed by: PHYSICIAN ASSISTANT

## 2019-07-04 PROCEDURE — 71275 CT ANGIOGRAPHY CHEST: CPT | Performed by: RADIOLOGY

## 2019-07-04 PROCEDURE — 80164 ASSAY DIPROPYLACETIC ACD TOT: CPT | Performed by: HOSPITALIST

## 2019-07-04 PROCEDURE — 71045 X-RAY EXAM CHEST 1 VIEW: CPT | Performed by: RADIOLOGY

## 2019-07-04 PROCEDURE — 83880 ASSAY OF NATRIURETIC PEPTIDE: CPT | Performed by: PHYSICIAN ASSISTANT

## 2019-07-04 PROCEDURE — 85025 COMPLETE CBC W/AUTO DIFF WBC: CPT | Performed by: PHYSICIAN ASSISTANT

## 2019-07-04 PROCEDURE — 36415 COLL VENOUS BLD VENIPUNCTURE: CPT

## 2019-07-04 PROCEDURE — 82746 ASSAY OF FOLIC ACID SERUM: CPT | Performed by: HOSPITALIST

## 2019-07-04 PROCEDURE — 80053 COMPREHEN METABOLIC PANEL: CPT | Performed by: PHYSICIAN ASSISTANT

## 2019-07-04 PROCEDURE — 0 IOVERSOL 74 % SOLUTION: Performed by: EMERGENCY MEDICINE

## 2019-07-04 PROCEDURE — 84484 ASSAY OF TROPONIN QUANT: CPT | Performed by: PHYSICIAN ASSISTANT

## 2019-07-04 PROCEDURE — 80198 ASSAY OF THEOPHYLLINE: CPT | Performed by: PHYSICIAN ASSISTANT

## 2019-07-04 PROCEDURE — 99284 EMERGENCY DEPT VISIT MOD MDM: CPT

## 2019-07-04 PROCEDURE — 82550 ASSAY OF CK (CPK): CPT | Performed by: PHYSICIAN ASSISTANT

## 2019-07-04 PROCEDURE — 93005 ELECTROCARDIOGRAM TRACING: CPT | Performed by: PHYSICIAN ASSISTANT

## 2019-07-04 PROCEDURE — 71045 X-RAY EXAM CHEST 1 VIEW: CPT

## 2019-07-04 PROCEDURE — 71275 CT ANGIOGRAPHY CHEST: CPT

## 2019-07-04 PROCEDURE — 82550 ASSAY OF CK (CPK): CPT | Performed by: HOSPITALIST

## 2019-07-04 PROCEDURE — 81003 URINALYSIS AUTO W/O SCOPE: CPT | Performed by: PHYSICIAN ASSISTANT

## 2019-07-04 PROCEDURE — 82805 BLOOD GASES W/O2 SATURATION: CPT | Performed by: PHYSICIAN ASSISTANT

## 2019-07-04 PROCEDURE — 99223 1ST HOSP IP/OBS HIGH 75: CPT | Performed by: HOSPITALIST

## 2019-07-04 RX ORDER — SODIUM CHLORIDE 0.9 % (FLUSH) 0.9 %
10 SYRINGE (ML) INJECTION AS NEEDED
Status: DISCONTINUED | OUTPATIENT
Start: 2019-07-04 | End: 2019-07-16 | Stop reason: HOSPADM

## 2019-07-04 RX ORDER — METOPROLOL SUCCINATE 50 MG/1
50 TABLET, EXTENDED RELEASE ORAL
Status: DISCONTINUED | OUTPATIENT
Start: 2019-07-04 | End: 2019-07-16 | Stop reason: HOSPADM

## 2019-07-04 RX ORDER — FUROSEMIDE 20 MG/1
20 TABLET ORAL
Status: DISCONTINUED | OUTPATIENT
Start: 2019-07-04 | End: 2019-07-16 | Stop reason: HOSPADM

## 2019-07-04 RX ORDER — TRAMADOL HYDROCHLORIDE 50 MG/1
25 TABLET ORAL EVERY 8 HOURS PRN
Status: DISCONTINUED | OUTPATIENT
Start: 2019-07-04 | End: 2019-07-16 | Stop reason: HOSPADM

## 2019-07-04 RX ORDER — ASPIRIN 325 MG
325 TABLET ORAL ONCE
Status: COMPLETED | OUTPATIENT
Start: 2019-07-04 | End: 2019-07-04

## 2019-07-04 RX ORDER — DIVALPROEX SODIUM 125 MG/1
125 CAPSULE, COATED PELLETS ORAL 2 TIMES DAILY
Status: DISCONTINUED | OUTPATIENT
Start: 2019-07-04 | End: 2019-07-16 | Stop reason: HOSPADM

## 2019-07-04 RX ORDER — FUROSEMIDE 20 MG/1
20 TABLET ORAL 2 TIMES DAILY
COMMUNITY
End: 2019-09-06 | Stop reason: SDUPTHER

## 2019-07-04 RX ORDER — ATORVASTATIN CALCIUM 40 MG/1
40 TABLET, FILM COATED ORAL NIGHTLY
Status: DISCONTINUED | OUTPATIENT
Start: 2019-07-04 | End: 2019-07-16 | Stop reason: HOSPADM

## 2019-07-04 RX ORDER — ATORVASTATIN CALCIUM 20 MG/1
20 TABLET, FILM COATED ORAL DAILY
Status: CANCELLED | OUTPATIENT
Start: 2019-07-05

## 2019-07-04 RX ORDER — DIVALPROEX SODIUM 125 MG/1
125 TABLET, DELAYED RELEASE ORAL 2 TIMES DAILY
COMMUNITY
End: 2021-06-12

## 2019-07-04 RX ORDER — POTASSIUM CHLORIDE 20 MEQ/1
20 TABLET, EXTENDED RELEASE ORAL ONCE
Status: COMPLETED | OUTPATIENT
Start: 2019-07-04 | End: 2019-07-04

## 2019-07-04 RX ORDER — ASPIRIN 81 MG/1
81 TABLET ORAL DAILY
Status: DISCONTINUED | OUTPATIENT
Start: 2019-07-05 | End: 2019-07-04 | Stop reason: ALTCHOICE

## 2019-07-04 RX ORDER — ISOSORBIDE MONONITRATE 60 MG/1
60 TABLET, EXTENDED RELEASE ORAL DAILY
Status: DISCONTINUED | OUTPATIENT
Start: 2019-07-05 | End: 2019-07-16 | Stop reason: HOSPADM

## 2019-07-04 RX ORDER — TRAMADOL HYDROCHLORIDE 50 MG/1
50 TABLET ORAL EVERY 8 HOURS PRN
COMMUNITY
End: 2019-07-16 | Stop reason: HOSPADM

## 2019-07-04 RX ORDER — ASPIRIN 325 MG
325 TABLET ORAL DAILY
Status: COMPLETED | OUTPATIENT
Start: 2019-07-05 | End: 2019-07-05

## 2019-07-04 RX ORDER — SODIUM CHLORIDE 0.9 % (FLUSH) 0.9 %
3-10 SYRINGE (ML) INJECTION AS NEEDED
Status: DISCONTINUED | OUTPATIENT
Start: 2019-07-04 | End: 2019-07-16 | Stop reason: HOSPADM

## 2019-07-04 RX ORDER — SIMVASTATIN 40 MG
40 TABLET ORAL NIGHTLY
COMMUNITY
End: 2021-06-12

## 2019-07-04 RX ORDER — NITROGLYCERIN 0.4 MG/1
0.4 TABLET SUBLINGUAL
Status: DISCONTINUED | OUTPATIENT
Start: 2019-07-04 | End: 2019-07-16 | Stop reason: HOSPADM

## 2019-07-04 RX ORDER — SODIUM CHLORIDE 0.9 % (FLUSH) 0.9 %
3 SYRINGE (ML) INJECTION EVERY 12 HOURS SCHEDULED
Status: DISCONTINUED | OUTPATIENT
Start: 2019-07-04 | End: 2019-07-16 | Stop reason: HOSPADM

## 2019-07-04 RX ORDER — ASPIRIN 81 MG/1
81 TABLET ORAL DAILY
Status: DISCONTINUED | OUTPATIENT
Start: 2019-07-04 | End: 2019-07-04 | Stop reason: SDUPTHER

## 2019-07-04 RX ORDER — THEOPHYLLINE 400 MG/1
600 TABLET, EXTENDED RELEASE ORAL DAILY
Status: DISCONTINUED | OUTPATIENT
Start: 2019-07-05 | End: 2019-07-16 | Stop reason: HOSPADM

## 2019-07-04 RX ADMIN — POTASSIUM CHLORIDE 20 MEQ: 1500 TABLET, EXTENDED RELEASE ORAL at 17:31

## 2019-07-04 RX ADMIN — ASPIRIN 325 MG: 325 TABLET ORAL at 21:36

## 2019-07-04 RX ADMIN — SODIUM CHLORIDE 1000 ML: 9 INJECTION, SOLUTION INTRAVENOUS at 15:08

## 2019-07-04 RX ADMIN — ATORVASTATIN CALCIUM 40 MG: 40 TABLET, FILM COATED ORAL at 21:36

## 2019-07-04 RX ADMIN — FUROSEMIDE 20 MG: 20 TABLET ORAL at 21:36

## 2019-07-04 RX ADMIN — DIVALPROEX SODIUM 125 MG: 125 CAPSULE ORAL at 21:36

## 2019-07-04 RX ADMIN — IOVERSOL 100 ML: 741 INJECTION INTRA-ARTERIAL; INTRAVENOUS at 16:43

## 2019-07-04 RX ADMIN — METOPROLOL SUCCINATE 50 MG: 50 TABLET, FILM COATED, EXTENDED RELEASE ORAL at 21:36

## 2019-07-04 RX ADMIN — APIXABAN 2.5 MG: 2.5 TABLET, FILM COATED ORAL at 21:36

## 2019-07-04 NOTE — ED NOTES
Patient requested a tray, Randolph verbalized to order tray. Tray ordered for patient      Mitesh Nicholson RN  07/04/19 [id number]
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
## 2019-07-04 NOTE — ED NOTES
IV contrast consent signed by patient. He verbalized he has never had IV contrast consent before.      Mitesh Nicholson, XIOMARA  07/04/19 8444

## 2019-07-05 ENCOUNTER — APPOINTMENT (OUTPATIENT)
Dept: CARDIOLOGY | Facility: HOSPITAL | Age: 84
End: 2019-07-05

## 2019-07-05 LAB
ANION GAP SERPL CALCULATED.3IONS-SCNC: 7.1 MMOL/L (ref 5–15)
ANION GAP SERPL CALCULATED.3IONS-SCNC: 7.9 MMOL/L (ref 5–15)
BASOPHILS # BLD AUTO: 0.05 10*3/MM3 (ref 0–0.2)
BASOPHILS NFR BLD AUTO: 0.8 % (ref 0–1.5)
BH CV ECHO MEAS - ACS: 1.8 CM
BH CV ECHO MEAS - AO MAX PG: 1.7 MMHG
BH CV ECHO MEAS - AO MEAN PG: 1 MMHG
BH CV ECHO MEAS - AO ROOT AREA (BSA CORRECTED): 2.5
BH CV ECHO MEAS - AO ROOT AREA: 13.6 CM^2
BH CV ECHO MEAS - AO ROOT DIAM: 4.2 CM
BH CV ECHO MEAS - AO V2 MAX: 65.2 CM/SEC
BH CV ECHO MEAS - AO V2 MEAN: 47.5 CM/SEC
BH CV ECHO MEAS - AO V2 VTI: 11.5 CM
BH CV ECHO MEAS - BSA(HAYCOCK): 1.6 M^2
BH CV ECHO MEAS - BSA: 1.7 M^2
BH CV ECHO MEAS - BZI_BMI: 17.6 KILOGRAMS/M^2
BH CV ECHO MEAS - BZI_METRIC_HEIGHT: 177.8 CM
BH CV ECHO MEAS - BZI_METRIC_WEIGHT: 55.8 KG
BH CV ECHO MEAS - EDV(CUBED): 121.7 ML
BH CV ECHO MEAS - EDV(MOD-SP4): 57 ML
BH CV ECHO MEAS - EDV(TEICH): 115.8 ML
BH CV ECHO MEAS - EF(CUBED): 44.2 %
BH CV ECHO MEAS - EF(MOD-SP4): 29.8 %
BH CV ECHO MEAS - EF(TEICH): 36.7 %
BH CV ECHO MEAS - ESV(CUBED): 67.9 ML
BH CV ECHO MEAS - ESV(MOD-SP4): 40 ML
BH CV ECHO MEAS - ESV(TEICH): 73.3 ML
BH CV ECHO MEAS - FS: 17.7 %
BH CV ECHO MEAS - IVS/LVPW: 1.2
BH CV ECHO MEAS - IVSD: 1.3 CM
BH CV ECHO MEAS - LA DIMENSION: 4.4 CM
BH CV ECHO MEAS - LA/AO: 1
BH CV ECHO MEAS - LV DIASTOLIC VOL/BSA (35-75): 33.6 ML/M^2
BH CV ECHO MEAS - LV MASS(C)D: 226.5 GRAMS
BH CV ECHO MEAS - LV MASS(C)DI: 133.4 GRAMS/M^2
BH CV ECHO MEAS - LV SYSTOLIC VOL/BSA (12-30): 23.6 ML/M^2
BH CV ECHO MEAS - LVIDD: 5 CM
BH CV ECHO MEAS - LVIDS: 4.1 CM
BH CV ECHO MEAS - LVLD AP4: 7.1 CM
BH CV ECHO MEAS - LVLS AP4: 6.1 CM
BH CV ECHO MEAS - LVOT AREA (M): 3.1 CM^2
BH CV ECHO MEAS - LVOT AREA: 3.2 CM^2
BH CV ECHO MEAS - LVOT DIAM: 2 CM
BH CV ECHO MEAS - LVPWD: 1.1 CM
BH CV ECHO MEAS - MV E MAX VEL: 62.2 CM/SEC
BH CV ECHO MEAS - PA ACC SLOPE: 654.2 CM/SEC^2
BH CV ECHO MEAS - PA ACC TIME: 0.12 SEC
BH CV ECHO MEAS - PA PR(ACCEL): 26.7 MMHG
BH CV ECHO MEAS - RAP SYSTOLE: 10 MMHG
BH CV ECHO MEAS - RVSP: 30.8 MMHG
BH CV ECHO MEAS - SI(AO): 92.7 ML/M^2
BH CV ECHO MEAS - SI(CUBED): 31.7 ML/M^2
BH CV ECHO MEAS - SI(MOD-SP4): 10 ML/M^2
BH CV ECHO MEAS - SI(TEICH): 25 ML/M^2
BH CV ECHO MEAS - SV(AO): 157.4 ML
BH CV ECHO MEAS - SV(CUBED): 53.9 ML
BH CV ECHO MEAS - SV(MOD-SP4): 17 ML
BH CV ECHO MEAS - SV(TEICH): 42.5 ML
BH CV ECHO MEAS - TR MAX VEL: 228 CM/SEC
BUN BLD-MCNC: 6 MG/DL (ref 8–23)
BUN BLD-MCNC: 7 MG/DL (ref 8–23)
BUN/CREAT SERPL: 8.5 (ref 7–25)
BUN/CREAT SERPL: 9.7 (ref 7–25)
CALCIUM SPEC-SCNC: 8.4 MG/DL (ref 8.6–10.5)
CALCIUM SPEC-SCNC: 8.4 MG/DL (ref 8.6–10.5)
CHLORIDE SERPL-SCNC: 107 MMOL/L (ref 98–107)
CHLORIDE SERPL-SCNC: 107 MMOL/L (ref 98–107)
CK SERPL-CCNC: 67 U/L (ref 20–200)
CO2 SERPL-SCNC: 26.9 MMOL/L (ref 22–29)
CO2 SERPL-SCNC: 30.1 MMOL/L (ref 22–29)
CREAT BLD-MCNC: 0.71 MG/DL (ref 0.76–1.27)
CREAT BLD-MCNC: 0.72 MG/DL (ref 0.76–1.27)
DEPRECATED RDW RBC AUTO: 49.3 FL (ref 37–54)
EOSINOPHIL # BLD AUTO: 0.28 10*3/MM3 (ref 0–0.4)
EOSINOPHIL NFR BLD AUTO: 4.5 % (ref 0.3–6.2)
ERYTHROCYTE [DISTWIDTH] IN BLOOD BY AUTOMATED COUNT: 14.6 % (ref 12.3–15.4)
FOLATE SERPL-MCNC: 13.5 NG/ML (ref 4.78–24.2)
GFR SERPL CREATININE-BSD FRML MDRD: 105 ML/MIN/1.73
GFR SERPL CREATININE-BSD FRML MDRD: 106 ML/MIN/1.73
GLUCOSE BLD-MCNC: 100 MG/DL (ref 65–99)
GLUCOSE BLD-MCNC: 123 MG/DL (ref 65–99)
HCT VFR BLD AUTO: 35.2 % (ref 37.5–51)
HGB BLD-MCNC: 10.7 G/DL (ref 13–17.7)
IMM GRANULOCYTES # BLD AUTO: 0.01 10*3/MM3 (ref 0–0.05)
IMM GRANULOCYTES NFR BLD AUTO: 0.2 % (ref 0–0.5)
LYMPHOCYTES # BLD AUTO: 2.61 10*3/MM3 (ref 0.7–3.1)
LYMPHOCYTES NFR BLD AUTO: 42.4 % (ref 19.6–45.3)
MAGNESIUM SERPL-MCNC: 1.9 MG/DL (ref 1.6–2.4)
MAXIMAL PREDICTED HEART RATE: 138 BPM
MCH RBC QN AUTO: 29.7 PG (ref 26.6–33)
MCHC RBC AUTO-ENTMCNC: 30.4 G/DL (ref 31.5–35.7)
MCV RBC AUTO: 97.8 FL (ref 79–97)
MONOCYTES # BLD AUTO: 0.57 10*3/MM3 (ref 0.1–0.9)
MONOCYTES NFR BLD AUTO: 9.3 % (ref 5–12)
NEUTROPHILS # BLD AUTO: 2.64 10*3/MM3 (ref 1.7–7)
NEUTROPHILS NFR BLD AUTO: 42.8 % (ref 42.7–76)
PLATELET # BLD AUTO: 182 10*3/MM3 (ref 140–450)
PMV BLD AUTO: 10 FL (ref 6–12)
POTASSIUM BLD-SCNC: 3.1 MMOL/L (ref 3.5–5.2)
POTASSIUM BLD-SCNC: 4.4 MMOL/L (ref 3.5–5.2)
RBC # BLD AUTO: 3.6 10*6/MM3 (ref 4.14–5.8)
SODIUM BLD-SCNC: 141 MMOL/L (ref 136–145)
SODIUM BLD-SCNC: 145 MMOL/L (ref 136–145)
STRESS TARGET HR: 117 BPM
TROPONIN T SERPL-MCNC: 0.05 NG/ML (ref 0–0.03)
TROPONIN T SERPL-MCNC: 0.06 NG/ML (ref 0–0.03)
WBC NRBC COR # BLD: 6.16 10*3/MM3 (ref 3.4–10.8)

## 2019-07-05 PROCEDURE — 84484 ASSAY OF TROPONIN QUANT: CPT | Performed by: HOSPITALIST

## 2019-07-05 PROCEDURE — 93306 TTE W/DOPPLER COMPLETE: CPT | Performed by: INTERNAL MEDICINE

## 2019-07-05 PROCEDURE — 82550 ASSAY OF CK (CPK): CPT | Performed by: HOSPITALIST

## 2019-07-05 PROCEDURE — 99222 1ST HOSP IP/OBS MODERATE 55: CPT | Performed by: INTERNAL MEDICINE

## 2019-07-05 PROCEDURE — 99232 SBSQ HOSP IP/OBS MODERATE 35: CPT | Performed by: HOSPITALIST

## 2019-07-05 PROCEDURE — 85025 COMPLETE CBC W/AUTO DIFF WBC: CPT | Performed by: HOSPITALIST

## 2019-07-05 PROCEDURE — 80048 BASIC METABOLIC PNL TOTAL CA: CPT | Performed by: INTERNAL MEDICINE

## 2019-07-05 PROCEDURE — 93005 ELECTROCARDIOGRAM TRACING: CPT | Performed by: HOSPITALIST

## 2019-07-05 PROCEDURE — 80048 BASIC METABOLIC PNL TOTAL CA: CPT | Performed by: HOSPITALIST

## 2019-07-05 PROCEDURE — 93306 TTE W/DOPPLER COMPLETE: CPT

## 2019-07-05 PROCEDURE — 94799 UNLISTED PULMONARY SVC/PX: CPT

## 2019-07-05 RX ORDER — POTASSIUM CHLORIDE 1.5 G/1.77G
40 POWDER, FOR SOLUTION ORAL AS NEEDED
Status: DISCONTINUED | OUTPATIENT
Start: 2019-07-05 | End: 2019-07-16 | Stop reason: HOSPADM

## 2019-07-05 RX ORDER — POTASSIUM CHLORIDE 20 MEQ/1
40 TABLET, EXTENDED RELEASE ORAL AS NEEDED
Status: DISCONTINUED | OUTPATIENT
Start: 2019-07-05 | End: 2019-07-16 | Stop reason: HOSPADM

## 2019-07-05 RX ORDER — ASPIRIN 81 MG/1
81 TABLET, CHEWABLE ORAL DAILY
Status: DISCONTINUED | OUTPATIENT
Start: 2019-07-06 | End: 2019-07-16 | Stop reason: HOSPADM

## 2019-07-05 RX ORDER — POTASSIUM CHLORIDE 20 MEQ/1
40 TABLET, EXTENDED RELEASE ORAL EVERY 4 HOURS
Status: COMPLETED | OUTPATIENT
Start: 2019-07-05 | End: 2019-07-05

## 2019-07-05 RX ORDER — POTASSIUM CHLORIDE 7.45 MG/ML
10 INJECTION INTRAVENOUS
Status: DISCONTINUED | OUTPATIENT
Start: 2019-07-05 | End: 2019-07-16 | Stop reason: HOSPADM

## 2019-07-05 RX ADMIN — DIVALPROEX SODIUM 125 MG: 125 CAPSULE ORAL at 21:29

## 2019-07-05 RX ADMIN — THEOPHYLLINE 600 MG: 400 TABLET, EXTENDED RELEASE ORAL at 09:23

## 2019-07-05 RX ADMIN — APIXABAN 2.5 MG: 2.5 TABLET, FILM COATED ORAL at 20:25

## 2019-07-05 RX ADMIN — POTASSIUM CHLORIDE 40 MEQ: 1500 TABLET, EXTENDED RELEASE ORAL at 02:09

## 2019-07-05 RX ADMIN — POTASSIUM CHLORIDE 40 MEQ: 1500 TABLET, EXTENDED RELEASE ORAL at 05:28

## 2019-07-05 RX ADMIN — METOPROLOL SUCCINATE 50 MG: 50 TABLET, FILM COATED, EXTENDED RELEASE ORAL at 09:23

## 2019-07-05 RX ADMIN — APIXABAN 2.5 MG: 2.5 TABLET, FILM COATED ORAL at 09:24

## 2019-07-05 RX ADMIN — SODIUM CHLORIDE, PRESERVATIVE FREE 3 ML: 5 INJECTION INTRAVENOUS at 21:30

## 2019-07-05 RX ADMIN — ISOSORBIDE MONONITRATE 60 MG: 60 TABLET, EXTENDED RELEASE ORAL at 09:23

## 2019-07-05 RX ADMIN — FUROSEMIDE 20 MG: 20 TABLET ORAL at 09:23

## 2019-07-05 RX ADMIN — SODIUM CHLORIDE, PRESERVATIVE FREE 3 ML: 5 INJECTION INTRAVENOUS at 09:25

## 2019-07-05 RX ADMIN — TRAMADOL HYDROCHLORIDE 25 MG: 50 TABLET, COATED ORAL at 21:35

## 2019-07-05 RX ADMIN — ASPIRIN 325 MG: 325 TABLET ORAL at 09:24

## 2019-07-05 RX ADMIN — ATORVASTATIN CALCIUM 40 MG: 40 TABLET, FILM COATED ORAL at 20:25

## 2019-07-05 RX ADMIN — POTASSIUM CHLORIDE 40 MEQ: 1500 TABLET, EXTENDED RELEASE ORAL at 09:23

## 2019-07-05 RX ADMIN — DIVALPROEX SODIUM 125 MG: 125 CAPSULE ORAL at 09:24
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
## 2019-07-05 NOTE — PHARMACY PATIENT ASSISTANCE
Pharmacy checked on the price of Eliquis, which is one of the patient's home medications. When Mr. Aiken last picked up Eliquis, his copay was $47.00 for a 90 day supply. No issues have been reported at this time.    Thank you,    Liz Dudley [facility]  07/05/19  9:45 AM

## 2019-07-05 NOTE — DISCHARGE PLACEMENT REQUEST
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 clinical acumen, EKG, Troponin and serial changes to determine if it is an Acute Myocardial Infarction or myocardial injury due to an underlying chronic condition.     CK [[id number]]  (Normal) Collected:  07/04/19 1944    Specimen:  Blood Updated:  07/04/19 2030     Creatine Kinase 80 U/L     TSH [[id number]]  (Normal) Collected:  07/04/19 1747    Specimen:  Blood from Arm, Right Updated:  07/04/19 1957     TSH 1.840 mIU/mL     Vitamin B12 [[id number]] Collected:  07/04/19 1747    Specimen:  Blood from Arm, Right Updated:  07/04/19 1935    Troponin [[id number]]  (Abnormal) Collected:  07/04/19 1747    Specimen:  Blood from Arm, Right Updated:  07/04/19 1820     Troponin T 0.054 ng/mL     Narrative:       Troponin T Reference Range:  <= 0.03 ng/mL-   Negative for AMI  >0.03 ng/mL-     Abnormal for myocardial necrosis.  Clinicians would have to utilize clinical acumen, EKG, Troponin and serial changes to determine if it is an Acute Myocardial Infarction or myocardial injury due to an underlying chronic condition.     Urine Drug Screen - Urine, Clean Catch [[id number]]  (Normal) Collected:  07/04/19 1614    Specimen:  Urine, Clean Catch Updated:  07/04/19 1656     Amphetamine Screen, Urine Negative     Barbiturates  Screen, Urine Negative     Benzodiazepine Screen, Urine Negative     Cocaine Screen, Urine Negative     Methadone Screen, Urine Negative     Opiate Screen Negative     Phencyclidine (PCP), Urine Negative     THC, Screen, Urine Negative     6-ACETYL MORPHINE Negative     Buprenorphine, Screen, Urine Negative     Oxycodone Screen, Urine Negative    Narrative:       Negative Thresholds For Drugs Screened:                  Amphetamines              1000 ng/ml               Barbiturates               200 ng/ml               Benzodiazepines            200 ng/ml              Cocaine                    300 ng/ml              Methadone                  300 ng/ml              Opiates                    300 ng/ml               Phencyclidine               25 ng/ml               THC                         50 ng/ml              6-Acetyl Morphine           10 ng/ml              Buprenorphine                5 ng/ml              Oxycodone                  300 ng/ml    The reference range for all drugs tested is negative. This report includes final unconfirmed qualitative results to be used for medical treatment purposes only. Unconfirmed results must not be used for non-medical purposes such as employment or legal testing. Clinical consideration should be applied to any drug of abuse test, especially when unconfirmed quantitative results are used.      Lactic Acid, Plasma [[id number]]  (Normal) Collected:  07/04/19 1606    Specimen:  Blood from Arm, Right Updated:  07/04/19 1648     Lactate 1.9 mmol/L     Theophylline Level [[id number]]  (Abnormal) Collected:  07/04/19 1504    Specimen:  Blood Updated:  07/04/19 1635     Theophylline Level <0.8 mcg/mL     Blood Culture - Blood, Hand, Right [[id number]] Collected:  07/04/19 1606    Specimen:  Blood from Hand, Right Updated:  07/04/19 1634    Blood Culture - Blood, Arm, Right [[id number]] Collected:  07/04/19 1606    Specimen:  Blood from Arm, Right Updated:  07/04/19 1634    Urinalysis  With Culture If Indicated - Urine, Clean Catch [[id number]]  (Normal) Collected:  07/04/19 1614    Specimen:  Urine, Clean Catch Updated:  07/04/19 1624     Color, UA Yellow     Appearance, UA Clear     pH, UA 7.0     Specific Gravity, UA <=1.005     Glucose, UA Negative     Ketones, UA Negative     Bilirubin, UA Negative     Blood, UA Negative     Protein, UA Negative     Leuk Esterase, UA Negative     Nitrite, UA Negative     Urobilinogen, UA 0.2 E.U./dL    Narrative:       Urine microscopic not indicated.        Imaging Results (last 24 hours)     Procedure Component Value Units Date/Time    CT Chest Pulmonary Embolism With Contrast [[id number]] Collected:  07/04/19 1843     Updated:  07/04/19 1845    Narrative:       CT CHEST PULMONARY EMBOLISM W CONTRAST-     CLINICAL INDICATION: new onset afib  05/04/2016     COMPARISON:     PROCEDURE: Thin cut axial images were acquired through the pulmonary  vessels during the rapid infusion of IV contrast.     Additional 3-D reformatted images obtained via post-processing for  improved diagnostic accuracy and procedural planning.     Radiation dose reduction techniques were utilized per ALARA protocol.  Automated exposure control was initiated through either or [employer] or  DoseRight software packages by  protocol.           FINDINGS: Today's study demonstrates opacification of the central  pulmonary vessels.   There are no filling defects.   There is no truncation.     No evidence of a pulmonary embolus.     Otherwise there are no parenchymal soft tissue nodules or masses.     There is no mediastinal lymph node enlargement     No pericardial or pleural effusion.          Impression:       No evidence of a pulmonary embolus.     This report was finalized on 7/4/2019 6:43 PM by Dr. Easton Hamm MD.       CT Head Without Contrast [[id number]] Collected:  07/04/19 1842     Updated:  07/04/19 1844    Narrative:       CT HEAD WO CONTRAST-     CLINICAL INDICATION:  Confusion/delirium, altered LOC, unexplained        COMPARISON: 04/14/2019      TECHNIQUE: Axial images of the brain were obtained with out intravenous  contrast.  Reformatted images were created in the sagittal and coronal  planes.     DOSE:     Radiation dose reduction techniques were utilized per ALARA protocol.  Automated exposure control was initiated through either or [employer] or  DoseRight software packages by  protocol.           FINDINGS:   Today's study shows no mass, hemorrhage, or midline shift.   The ventricles, cisterns, and sulci are unremarkable. There is no  hydrocephalus.   There is no evidence of acute ischemia.  I do not see epidural or subdural hematoma.  The gray-white differentiation is appropriate.   The bone window setting images show no destructive calvarial lesion or  acute calvarial fracture.   The posterior fossa is unremarkable.          Impression:       No acute intracranial pathology. Nothing is seen on this exam to  specifically account for the patient's symptoms.     This report was finalized on 7/4/2019 6:42 PM by Dr. Easton Hamm MD.       XR Chest 1 View [[id number]] Collected:  07/04/19 1811     Updated:  07/04/19 1814    Narrative:       XR CHEST 1 VW-     CLINICAL INDICATION: ams        COMPARISON: 8/20/2018      TECHNIQUE: Single frontal view of the chest.     FINDINGS:     There is no focal alveolar infiltrate or effusion.  The cardiac silhouette is normal. The pulmonary vasculature is  unremarkable.  There is no evidence of an acute osseous abnormality.   There are no suspicious-appearing parenchymal soft tissue nodules.          Impression:       No evidence of active or acute cardiopulmonary disease on today's chest  radiograph.     This report was finalized on 7/4/2019 6:12 PM by Dr. Easton Hamm MD.           Orders (last 24 hrs)     Start     Ordered    07/06/19 0900  aspirin chewable tablet 81 mg  Daily      07/05/19 1601    07/05/19 1700  Basic  Metabolic Panel  Timed      07/05/19 1600    07/05/19 1506  PT Consult: Eval & Treat as tolerated; Safety transfer evaluation and gait safety evaluation  Once      07/05/19 1506    07/05/19 1036  Inpatient Cardiology Consult  Once     Specialty:  Cardiology  Provider:  Barron Rivera MD    07/05/19 1035    07/05/19 0900  aspirin tablet 325 mg  Daily      07/04/19 1921 07/05/19 0900  aspirin EC tablet 81 mg  Daily,   Status:  Discontinued      07/04/19 2034 07/05/19 0900  isosorbide mononitrate (IMDUR) 24 hr tablet 60 mg  Daily      07/04/19 2034 07/05/19 0900  theophylline (UNIPHYL) 24 hr tablet 600 mg  Daily      07/04/19 2034 07/05/19 0600  CK  Morning Draw      07/04/19 1921 07/05/19 0600  Troponin  Morning Draw,   Status:  Canceled      07/04/19 1921 07/05/19 0600  CBC & Differential  Morning Draw      07/04/19 1921 07/05/19 0600  Basic Metabolic Panel  Morning Draw      07/04/19 1921 07/05/19 0600  CBC Auto Differential  PROCEDURE ONCE      07/05/19 0002    07/05/19 0500  ECG 12 Lead  Tomorrow AM      07/04/19 1921 07/05/19 0130  potassium chloride (K-DUR,KLOR-CON) CR tablet 40 mEq  Every 4 Hours      07/05/19 0035    07/05/19 0014  Magnesium  Once      07/05/19 0013    07/05/19 0013  potassium chloride (K-DUR,KLOR-CON) CR tablet 40 mEq  As Needed      07/05/19 0013    07/05/19 0013  potassium chloride (KLOR-CON) packet 40 mEq  As Needed      07/05/19 0013    07/05/19 0013  potassium chloride 10 mEq in 100 mL IVPB  Every 1 Hour PRN      07/05/19 0013    07/04/19 2130  Divalproex Sodium (DEPAKOTE SPRINKLE) capsule 125 mg  2 Times Daily      07/04/19 2034 07/04/19 2130  furosemide (LASIX) tablet 20 mg  2 Times Daily (Diuretics)      07/04/19 2034 07/04/19 2100  sodium chloride 0.9 % flush 3 mL  Every 12 Hours Scheduled      07/04/19 1920 07/04/19 2100  atorvastatin (LIPITOR) tablet 40 mg  Nightly      07/04/19 1921 07/04/19 2100  apixaban (ELIQUIS) tablet 2.5  mg  Every 12 Hours Scheduled      07/04/19 1921 07/04/19 2045  metoprolol succinate XL (TOPROL-XL) 24 hr tablet 50 mg  Every 24 Hours Scheduled      07/04/19 1921 07/04/19 2045  aspirin tablet 325 mg  Once      07/04/19 1921 07/04/19 2032  Valproic Acid Level, Total  Once      07/04/19 2034 07/04/19 2028  traMADol (ULTRAM) tablet 25 mg  Every 8 Hours PRN      07/04/19 2034 07/04/19 2028  nitroglycerin (NITROSTAT) SL tablet 0.4 mg  Every 5 Minutes PRN      07/04/19 2034 07/04/19 2015  aspirin EC tablet 81 mg  Daily,   Status:  Discontinued      07/04/19 1921 07/04/19 2000  Vital Signs  Every 4 Hours      07/04/19 1920 07/04/19 1921  Intake & Output  Every Shift      07/04/19 1920 07/04/19 1921  Weigh Patient  Once      07/04/19 1920 07/04/19 1921  Oxygen Therapy- Nasal Cannula; Titrate for SPO2: 90% - 95%  Continuous      07/04/19 1920 07/04/19 1921  Insert Peripheral IV  Once      07/04/19 1920 07/04/19 1921  Saline Lock & Maintain IV Access  Continuous      07/04/19 1920 07/04/19 1921  VTE Prophylaxis Not Indicated:  Once      07/04/19 1920 07/04/19 1921  Activity - Ad Adry  Until Discontinued      07/04/19 1920 07/04/19 1921  Cardiac Monitoring  Continuous      07/04/19 1920 07/04/19 1921  Diet Regular; Cardiac  Diet Effective Now      07/04/19 1920 07/04/19 1921  Inpatient Psychiatrist Consult  Once     Specialty:  Psychiatry  Provider:  Peter Lopez MD    07/04/19 1921 07/04/19 1921  Case Management  Consult  Once     Provider:  (Not yet assigned)    07/04/19 1921 07/04/19 1921  CK  STAT      07/04/19 1921 07/04/19 1921  Troponin  Now Then Every 6 Hours      07/04/19 1921 07/04/19 1921  TSH  STAT      07/04/19 1921 07/04/19 1921  Vitamin B12  Once      07/04/19 1921 07/04/19 1921  Folate  Once      07/04/19 1921 07/04/19 1921  Adult Transthoracic Echo Complete W/ Cont if Necessary Per Protocol  Once      07/04/19 1921     19 1920  sodium chloride 0.9 % flush 3-10 mL  As Needed      19 1920    19 1833  Code Status and Medical Interventions:  Continuous      19 1835    19 1829  Inpatient Admission  Once      19 1829    19 1829  Med / Surg Bed Request  Once      19 1829    19 1800  Hospitalist (on-call MD unless specified)  Once     Specialty:  Hospitalist  Provider:  Vanessa Velázquez MD    19 1759    19 1726  Troponin  STAT      19 1725    19 1653  potassium chloride (K-DUR,KLOR-CON) CR tablet 20 mEq  Once      19 1651    19 1643  Ioversol (OPTIRAY 350) injection 100 mL  Once in Imaging      19 1641    19 1451  sodium chloride 0.9 % flush 10 mL  As Needed      19 1451    Unscheduled  Magnesium  As Needed      19 0013    Unscheduled  Potassium  As Needed      19 0013    --  divalproex (DEPAKOTE) 125 MG DR tablet  2 Times Daily      19 153    --  furosemide (LASIX) 20 MG tablet  2 Times Daily      19    --  traMADol (ULTRAM) 50 MG tablet  Every 8 Hours PRN      19    --  simvastatin (ZOCOR) 40 MG tablet  Nightly      19          Operative/Procedure Notes (most recent note)     No notes of this type exist for this encounter.           Physician Progress Notes (most recent note)      Vanessa Velázquez MD at 2019  2:59 PM              [facility] HOSPITALIST PROGRESS NOTE     Patient Identification:  Name:  Felice Aiken  Age:  84 y.o.  Sex:  male  :  1935  MRN:  [id number]  Visit Number:  [id number]  Primary Care Provider:  Rc Ojeda MD    Length of stay:  1    Subjective: Patient seen and examined assisted by Patricia Palmer RN.  Patient has no complaints, denies chest pain, he forgot or cannot remember if I saw him yesterday.  Nursing staff reported patient still is very forgetful.   so far is unable to contact patient's wife.   Sister who lives nearby did confirm the patient is very forgetful and demented.  We were also able to get information that the wife is the POA.  Troponin pretty much has not changed and its around 0.05, 2D echo still pending, patient denies chest pain.    Chief Complaint: Elevated troponin  ----------------------------------------------------------------------------------------------------------------------  Current [facility] Meds:    apixaban 2.5 mg Oral Q12H   atorvastatin 40 mg Oral Nightly   Divalproex Sodium 125 mg Oral BID   furosemide 20 mg Oral BID   isosorbide mononitrate 60 mg Oral Daily   metoprolol succinate XL 50 mg Oral Q24H   sodium chloride 3 mL Intravenous Q12H   theophylline 600 mg Oral Daily        ----------------------------------------------------------------------------------------------------------------------  Vital Signs:  Temp:  [97.9 °F (36.6 °C)-98.4 °F (36.9 °C)] 98 °F (36.7 °C)  Heart Rate:  [68-91] 77  Resp:  [18] 18  BP: (126-153)/(68-84) 126/80       Tele: Atrial fibrillation rate of 76 bpm      07/04/19  1428 07/04/19 1914   Weight: 54.4 kg (120 lb) 55.9 kg (123 lb 4 oz)     Body mass index is 17.68 kg/m².    Intake/Output Summary (Last 24 hours) at 7/5/2019 145  Last data filed at 7/5/2019 0825  Gross per 24 hour   Intake 1360 ml   Output 1250 ml   Net 110 ml     Diet Regular; Cardiac  ----------------------------------------------------------------------------------------------------------------------  Physical exam:  General: Comfortable,awake, alert, oriented to self, place, also with year but not month.  Yesterday he was able to remember and year and month even the day. well-developed and well-nourished.  No respiratory distress.    Skin:  Skin is warm and dry. No rash noted. No pallor.    HENT:  Head:  Normocephalic and atraumatic.  Mouth:  Moist mucous membranes.    Eyes:  Conjunctivae and EOM are normal.  Pupils are equal, round, and reactive to light.  No scleral  icterus.    Neck:  Neck supple.  No JVD present.    Pulmonary/Chest:  No respiratory distress, no wheezes, no crackles, with normal breath sounds and good air movement.  Cardiovascular:  Normal rate, irregular regular rhythm I could not hear any murmur or rub  Abdominal:  Soft.  Bowel sounds are normal.  No distension and no tenderness.   Extremities:  No edema, no tenderness, and no deformity.  No red or swollen joints anywhere.  Strong pulses in all 4 extremities with no clubbing, no cyanosis, left knee with surgical scar.  Neurological:  Motor strength equal no obvious deficit, sensory grossly intact.   No cranial nerve deficit.  No tongue deviation.  No facial droop.  No slurred speech.    Genitourinary: No Sue catheter    ----------------------------------------------------------------------------------------------------------------------  ----------------------------------------------------------------------------------------------------------------------  Results from last 7 days   Lab Units 07/05/19  0735 07/05/19  0144 07/04/19  1944  07/04/19  1504   CK TOTAL U/L  --  67 80  --  83   TROPONIN T ng/mL 0.050* 0.056* 0.050*   < > 0.051*    < > = values in this interval not displayed.     Results from last 7 days   Lab Units 07/05/19  0144 07/04/19  1606 07/04/19  1504   CRP mg/dL  --   --  0.20   LACTATE mmol/L  --  1.9  --    WBC 10*3/mm3 6.16  --  7.26   HEMOGLOBIN g/dL 10.7*  --  11.6*   HEMATOCRIT % 35.2*  --  37.2*   MCV fL 97.8*  --  96.9   MCHC g/dL 30.4*  --  31.2*   PLATELETS 10*3/mm3 182  --  193         Results from last 7 days   Lab Units 07/05/19  0144 07/04/19  1944 07/04/19  1504   SODIUM mmol/L 145  --  142   POTASSIUM mmol/L 3.1*  --  3.0*   MAGNESIUM mg/dL  --  1.9  --    CHLORIDE mmol/L 107  --  105   CO2 mmol/L 30.1*  --  26.8   BUN mg/dL 6*  --  7*   CREATININE mg/dL 0.71*  --  0.79   EGFR IF NONAFRICN AM mL/min/1.73 106  --  94   CALCIUM mg/dL 8.4*  --  9.0   GLUCOSE mg/dL 100*  --   112*   ALBUMIN g/dL  --   --  3.48*   BILIRUBIN mg/dL  --   --  0.5   ALK PHOS U/L  --   --  62   AST (SGOT) U/L  --   --  18   ALT (SGPT) U/L  --   --  13   Estimated Creatinine Clearance: 56.3 mL/min (A) (by C-G formula based on SCr of 0.71 mg/dL (L)).    No results found for: AMMONIA                    I have personally looked at the labs and they are summarized above.  ----------------------------------------------------------------------------------------------------------------------  Imaging Results (last 24 hours)     Procedure Component Value Units Date/Time    CT Chest Pulmonary Embolism With Contrast [[id number]] Collected:  07/04/19 1843     Updated:  07/04/19 1845    Narrative:       CT CHEST PULMONARY EMBOLISM W CONTRAST-     CLINICAL INDICATION: new onset afib  05/04/2016     COMPARISON:     PROCEDURE: Thin cut axial images were acquired through the pulmonary  vessels during the rapid infusion of IV contrast.     Additional 3-D reformatted images obtained via post-processing for  improved diagnostic accuracy and procedural planning.     Radiation dose reduction techniques were utilized per ALARA protocol.  Automated exposure control was initiated through either or [employer] or  [employer] software packages by  protocol.           FINDINGS: Today's study demonstrates opacification of the central  pulmonary vessels.   There are no filling defects.   There is no truncation.     No evidence of a pulmonary embolus.     Otherwise there are no parenchymal soft tissue nodules or masses.     There is no mediastinal lymph node enlargement     No pericardial or pleural effusion.          Impression:       No evidence of a pulmonary embolus.     This report was finalized on 7/4/2019 6:43 PM by Dr. Easton Hamm MD.       CT Head Without Contrast [[id number]] Collected:  07/04/19 1842     Updated:  07/04/19 1844    Narrative:       CT HEAD WO CONTRAST-     CLINICAL INDICATION: Confusion/delirium, altered  LOC, unexplained        COMPARISON: 04/14/2019      TECHNIQUE: Axial images of the brain were obtained with out intravenous  contrast.  Reformatted images were created in the sagittal and coronal  planes.     DOSE:     Radiation dose reduction techniques were utilized per ALARA protocol.  Automated exposure control was initiated through either or [employer] or  [employer] software packages by  protocol.           FINDINGS:   Today's study shows no mass, hemorrhage, or midline shift.   The ventricles, cisterns, and sulci are unremarkable. There is no  hydrocephalus.   There is no evidence of acute ischemia.  I do not see epidural or subdural hematoma.  The gray-white differentiation is appropriate.   The bone window setting images show no destructive calvarial lesion or  acute calvarial fracture.   The posterior fossa is unremarkable.          Impression:       No acute intracranial pathology. Nothing is seen on this exam to  specifically account for the patient's symptoms.     This report was finalized on 7/4/2019 6:42 PM by Dr. Easton Hamm MD.       XR Chest 1 View [[id number]] Collected:  07/04/19 1811     Updated:  07/04/19 1814    Narrative:       XR CHEST 1 VW-     CLINICAL INDICATION: ams        COMPARISON: 8/20/2018      TECHNIQUE: Single frontal view of the chest.     FINDINGS:     There is no focal alveolar infiltrate or effusion.  The cardiac silhouette is normal. The pulmonary vasculature is  unremarkable.  There is no evidence of an acute osseous abnormality.   There are no suspicious-appearing parenchymal soft tissue nodules.          Impression:       No evidence of active or acute cardiopulmonary disease on today's chest  radiograph.     This report was finalized on 7/4/2019 6:12 PM by Dr. Easton Hamm MD.           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  Follow-up 2D echo, cardiology has been requested to evaluate the elevated troponin.  Ambulate.  PT OT eval relation for safety transfer and gait safety evaluation    Vanessa Velázquez MD  07/05/19  2:59 PM    Electronically signed by Vanessa Velázquez MD at 7/5/2019  3:05 PM       Consult Notes (most recent note)     No notes of this type exist for this encounter.        Nutrition Notes (most recent note)     No notes of this type exist for this encounter.        Physical Therapy Notes (most recent note)     No notes of this type exist for this encounter.        Occupational Therapy Notes (most recent note)     No notes of this type exist for this encounter.        Speech Language Pathology Notes (most recent note)     No notes of this type exist for this encounter.        Respiratory Therapy Notes (most recent note)     No notes of this type exist for this encounter.        ADL Documentation (most recent)      Most Recent Value   Presence of Pain  denies pain/discomfort   Transferring  2 - assistive person   Toileting  2 - assistive person   Bathing  2 - assistive person   Dressing  0 - independent   Eating  0 - independent   Communication  0 - understands/communicates without difficulty   Swallowing  0 - swallows foods/liquids without difficulty   Equipment Currently Used at Home  walker, rolling, wheelchair, oxygen [Pt states having home oxygen, however the liter flow is unknown, a wheelchair and a rolling walker at home  from unknown provider. ]

## 2019-07-05 NOTE — PAYOR COMM NOTE
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 mEq  Freq: Once Route: PO  Start: 07/04/19 1653 End: 07/04/19 1731    Admin Instructions:   Do not crush. Take with food.            1731         potassium chloride (K-DUR,KLOR-CON) CR tablet 40 mEq   Dose: 40 mEq  Freq: Every 4 Hours Route: PO  Start: 07/05/19 0130 End: 07/05/19 0923    Admin Instructions:   Potassium 3.1 or Less Give KCl 40 mEq q4h x3 Doses   Potassium 3.2 - 3.6 Give KCl 40 mEq q4h x2 Doses     Check Potassium 4 Hours After Last Dose Given   Check Magnesium if Potassium Level Remains Low After Replacement   DO NOT GIVE if CrCl is Less Than 30 mL/minute or Urine Output Less Than 30 mL/hr             0209 0528     0923        sodium chloride 0.9 % bolus 1,000 mL   Dose: 1,000 mL  Freq: Once Route: IV  Last Dose: Stopped (07/04/19 1538)  Start: 07/04/19 1453 End: 07/04/19 1538            1508     1538         sodium chloride 0.9 % flush 3 mL   Dose: 3 mL  Freq: Every 12 Hours Scheduled Route: IV  Start: 07/04/19 2100            2303      0925     2100        theophylline (UNIPHYL) 24 hr tablet 600 mg   Dose: 600 mg  Freq: Daily Route: PO  Start: 07/05/19 0900    Admin Instructions:   Swallow whole. Do not crush or chew.               0923         Medications 06/26 06/27 06/28 06/29 06/30 07/01 07/02 07/03 07/04 07/05       Continuous Meds Sorted by Name   for Felice Aiken as of 07/05/19 1300    Legend:                           Inactive     Active     Other Encounter    Linked               Medications 06/26 06/27 06/28 06/29 06/30 07/01 07/02 07/03 07/04 07/05       PRN Meds Sorted by Name   for Felice Aiken as of 07/05/19 1300    Legend:                           Inactive     Active     Other Encounter    Linked               Medications 06/26 06/27 06/28 06/29 06/30 07/01 07/02 07/03 07/04 07/05   nitroglycerin (NITROSTAT) SL tablet 0.4 mg   Dose: 0.4 mg  Freq: Every 5 Minutes PRN Route: SL  PRN Reason: Chest Pain  Start: 07/04/19 2028    Admin Instructions:   May administer up to 3 doses per episode.                potassium chloride (K-DUR,KLOR-CON) CR tablet 40 mEq   Dose: 40 mEq  Freq: As Needed Route: PO  PRN Comment: Potassium Replacement.  See Admin Instructions  Start: 07/05/19 0013    Admin Instructions:   Potassium 3.1 or Less Give KCl 40 mEq q4h x3 Doses   Potassium 3.2 - 3.6 Give KCl 40 mEq q4h x2 Doses     Check Potassium 4 Hours After Last Dose Given   Check Magnesium if Potassium Level Remains Low After Replacement   DO NOT GIVE if CrCl is Less Than 30 mL/minute or Urine Output Less Than 30 mL/hr                Or  potassium chloride (KLOR-CON) packet 40 mEq   Dose: 40 mEq  Freq: As Needed Route: PO  PRN Comment: potassium replacement, see admin instructions  Start: 07/05/19 0013    Admin Instructions:   Potassium 3.1 or Less Give KCl 40 mEq q4h x3 Doses   Potassium 3.2 - 3.6 Give KCl 40 mEq q4h x2 Doses     Check Potassium 4 Hours After Last Dose Given   Check Magnesium if Potassium Level Remains Low After Replacement   DO NOT GIVE if CrCl is Less Than 30 mL/minute or Urine Output Less Than 30 mL/hr                Or  potassium chloride 10 mEq in 100 mL IVPB   Dose: 10 mEq  Freq: Every 1 Hour PRN Route: IV  PRN Comment:  Potassium Replacement - See Admin Instructions  Start: 07/05/19 0013    Admin Instructions:   Peripheral or Central IV  Potassium 3.1 or Less Give KCl 10 mEq/100 mL NS IV q1h x6 Doses  Potassium 3.2 - 3.6 Give KCl 10 mEq/100 mL NS q1h x4 Doses    Check Potassium 4 Hours After Last Dose Given  Check Magnesium if Potassium Remains Low After Replacement  DO NOT GIVE if CrCl is Less Than 30 mL/minute or Urine Output Less Than 30 mL/hr.     Rates Greater Than 10 mEq/hr Require ECG Monitoring.  OUTPATIENT/NON-MONITORED UNITS: Potassium Chloride standard bolus infusion rate is a maximum of 10 mEq/hr on unmonitored patients    MONITORED UNITS: Potassium Chloride standard bolus infusion rate is a maximum of 20 mEq/hr on ECG monitored patients ONLY                sodium chloride 0.9 % flush 10 mL   Dose: 10 mL  Freq: As Needed Route: IV  PRN Reason: Line Care  Start: 07/04/19 1451                sodium chloride 0.9 % flush 3-10 mL   Dose: 3-10 mL  Freq: As Needed Route: IV  PRN Reason: Line Care  Start: 07/04/19 1920                traMADol (ULTRAM) tablet 25 mg   Dose: 25 mg  Freq: Every 8 Hours PRN Route: PO  PRN Reason: Moderate Pain   Start: 07/04/19 2028    Admin Instructions:   Hold for SBP <110, oversedation, or for respiratory depression.      Caution: Look alike/sound alike drug alert    If given for pain, use the following pain scale:  Mild Pain = Pain Score of 1-3, CPOT 1-2  Moderate Pain = Pain Score of 4-6, CPOT 3-4  Severe Pain = Pain Score of 7-10, CPOT 5-8                Medications 06/26 06/27 06/28 06/29 06/30 07/01 07/02 07/03 07/04 07/05

## 2019-07-05 NOTE — PLAN OF CARE
Problem: Patient Care Overview  Goal: Plan of Care Review  Outcome: Ongoing (interventions implemented as appropriate)   07/04/19 2300 07/05/19 0920   Coping/Psychosocial   Plan of Care Reviewed With --  patient   Plan of Care Review   Progress no change --        Problem: Fall Risk (Adult)  Goal: Identify Related Risk Factors and Signs and Symptoms  Outcome: Ongoing (interventions implemented as appropriate)      Problem: Skin Injury Risk (Adult)  Goal: Identify Related Risk Factors and Signs and Symptoms  Outcome: Ongoing (interventions implemented as appropriate)   07/04/19 2300   Skin Injury Risk (Adult)   Related Risk Factors (Skin Injury Risk) advanced age;cognitive impairment       Problem: Pain, Chronic (Adult)  Goal: Identify Related Risk Factors and Signs and Symptoms  Outcome: Ongoing (interventions implemented as appropriate)   07/04/19 2300   Pain, Chronic (Adult)   Signs and Symptoms (Chronic Pain) verbalization of pain descriptors       Problem: Pain, Acute (Adult)  Goal: Identify Related Risk Factors and Signs and Symptoms  Outcome: Ongoing (interventions implemented as appropriate)   07/04/19 2300   Pain, Acute (Adult)   Signs and Symptoms (Acute Pain) fatigue/weakness;verbalization of pain descriptors

## 2019-07-05 NOTE — PROGRESS NOTES
[facility] HOSPITALIST PROGRESS NOTE     Patient Identification:  Name:  Felice Aiken  Age:  84 y.o.  Sex:  male  :  1935  MRN:  [id number]  Visit Number:  [id number]  Primary Care Provider:  Rc Ojeda MD    Length of stay:  1    Subjective: Patient seen and examined assisted by Patricia Palmer RN.  Patient has no complaints, denies chest pain, he forgot or cannot remember if I saw him yesterday.  Nursing staff reported patient still is very forgetful.   so far is unable to contact patient's wife.  Sister who lives nearby did confirm the patient is very forgetful and demented.  We were also able to get information that the wife is the POA.  Troponin pretty much has not changed and its around 0.05, 2D echo still pending, patient denies chest pain.    Chief Complaint: Elevated troponin  ----------------------------------------------------------------------------------------------------------------------  Current [facility] Meds:    apixaban 2.5 mg Oral Q12H   atorvastatin 40 mg Oral Nightly   Divalproex Sodium 125 mg Oral BID   furosemide 20 mg Oral BID   isosorbide mononitrate 60 mg Oral Daily   metoprolol succinate XL 50 mg Oral Q24H   sodium chloride 3 mL Intravenous Q12H   theophylline 600 mg Oral Daily        ----------------------------------------------------------------------------------------------------------------------  Vital Signs:  Temp:  [97.9 °F (36.6 °C)-98.4 °F (36.9 °C)] 98 °F (36.7 °C)  Heart Rate:  [68-91] 77  Resp:  [18] 18  BP: (126-153)/(68-84) 126/80       Tele: Atrial fibrillation rate of 76 bpm      19  1428 19  1914   Weight: 54.4 kg (120 lb) 55.9 kg (123 lb 4 oz)     Body mass index is 17.68 kg/m².    Intake/Output Summary (Last 24 hours) at 2019 1454  Last data filed at 2019 0825  Gross per 24 hour   Intake 1360 ml   Output 1250 ml   Net 110 ml     Diet Regular;  Cardiac  ----------------------------------------------------------------------------------------------------------------------  Physical exam:  General: Comfortable,awake, alert, oriented to self, place, also with year but not month.  Yesterday he was able to remember and year and month even the day. well-developed and well-nourished.  No respiratory distress.    Skin:  Skin is warm and dry. No rash noted. No pallor.    HENT:  Head:  Normocephalic and atraumatic.  Mouth:  Moist mucous membranes.    Eyes:  Conjunctivae and EOM are normal.  Pupils are equal, round, and reactive to light.  No scleral icterus.    Neck:  Neck supple.  No JVD present.    Pulmonary/Chest:  No respiratory distress, no wheezes, no crackles, with normal breath sounds and good air movement.  Cardiovascular:  Normal rate, irregular regular rhythm I could not hear any murmur or rub  Abdominal:  Soft.  Bowel sounds are normal.  No distension and no tenderness.   Extremities:  No edema, no tenderness, and no deformity.  No red or swollen joints anywhere.  Strong pulses in all 4 extremities with no clubbing, no cyanosis, left knee with surgical scar.  Neurological:  Motor strength equal no obvious deficit, sensory grossly intact.   No cranial nerve deficit.  No tongue deviation.  No facial droop.  No slurred speech.    Genitourinary: No Sue catheter    ----------------------------------------------------------------------------------------------------------------------  ----------------------------------------------------------------------------------------------------------------------  Results from last 7 days   Lab Units 07/05/19  0735 07/05/19  0144 07/04/19  1944  07/04/19  1504   CK TOTAL U/L  --  67 80  --  83   TROPONIN T ng/mL 0.050* 0.056* 0.050*   < > 0.051*    < > = values in this interval not displayed.     Results from last 7 days   Lab Units 07/05/19  0144 07/04/19  1606 07/04/19  1504   CRP mg/dL  --   --  0.20   LACTATE mmol/L   --  1.9  --    WBC 10*3/mm3 6.16  --  7.26   HEMOGLOBIN g/dL 10.7*  --  11.6*   HEMATOCRIT % 35.2*  --  37.2*   MCV fL 97.8*  --  96.9   MCHC g/dL 30.4*  --  31.2*   PLATELETS 10*3/mm3 182  --  193         Results from last 7 days   Lab Units 07/05/19  0144 07/04/19  1944 07/04/19  1504   SODIUM mmol/L 145  --  142   POTASSIUM mmol/L 3.1*  --  3.0*   MAGNESIUM mg/dL  --  1.9  --    CHLORIDE mmol/L 107  --  105   CO2 mmol/L 30.1*  --  26.8   BUN mg/dL 6*  --  7*   CREATININE mg/dL 0.71*  --  0.79   EGFR IF NONAFRICN AM mL/min/1.73 106  --  94   CALCIUM mg/dL 8.4*  --  9.0   GLUCOSE mg/dL 100*  --  112*   ALBUMIN g/dL  --   --  3.48*   BILIRUBIN mg/dL  --   --  0.5   ALK PHOS U/L  --   --  62   AST (SGOT) U/L  --   --  18   ALT (SGPT) U/L  --   --  13   Estimated Creatinine Clearance: 56.3 mL/min (A) (by C-G formula based on SCr of 0.71 mg/dL (L)).    No results found for: AMMONIA                    I have personally looked at the labs and they are summarized above.  ----------------------------------------------------------------------------------------------------------------------  Imaging Results (last 24 hours)     Procedure Component Value Units Date/Time    CT Chest Pulmonary Embolism With Contrast [[id number]] Collected:  07/04/19 1843     Updated:  07/04/19 1845    Narrative:       CT CHEST PULMONARY EMBOLISM W CONTRAST-     CLINICAL INDICATION: new onset afib  05/04/2016     COMPARISON:     PROCEDURE: Thin cut axial images were acquired through the pulmonary  vessels during the rapid infusion of IV contrast.     Additional 3-D reformatted images obtained via post-processing for  improved diagnostic accuracy and procedural planning.     Radiation dose reduction techniques were utilized per ALARA protocol.  Automated exposure control was initiated through either or [employer] or  [employer] software packages by  protocol.           FINDINGS: Today's study demonstrates opacification of the  central  pulmonary vessels.   There are no filling defects.   There is no truncation.     No evidence of a pulmonary embolus.     Otherwise there are no parenchymal soft tissue nodules or masses.     There is no mediastinal lymph node enlargement     No pericardial or pleural effusion.          Impression:       No evidence of a pulmonary embolus.     This report was finalized on 7/4/2019 6:43 PM by Dr. Easton Hamm MD.       CT Head Without Contrast [[id number]] Collected:  07/04/19 1842     Updated:  07/04/19 1844    Narrative:       CT HEAD WO CONTRAST-     CLINICAL INDICATION: Confusion/delirium, altered LOC, unexplained        COMPARISON: 04/14/2019      TECHNIQUE: Axial images of the brain were obtained with out intravenous  contrast.  Reformatted images were created in the sagittal and coronal  planes.     DOSE:     Radiation dose reduction techniques were utilized per ALARA protocol.  Automated exposure control was initiated through either or [employer] or  [employer] software packages by  protocol.           FINDINGS:   Today's study shows no mass, hemorrhage, or midline shift.   The ventricles, cisterns, and sulci are unremarkable. There is no  hydrocephalus.   There is no evidence of acute ischemia.  I do not see epidural or subdural hematoma.  The gray-white differentiation is appropriate.   The bone window setting images show no destructive calvarial lesion or  acute calvarial fracture.   The posterior fossa is unremarkable.          Impression:       No acute intracranial pathology. Nothing is seen on this exam to  specifically account for the patient's symptoms.     This report was finalized on 7/4/2019 6:42 PM by Dr. Easton Hamm MD.       XR Chest 1 View [[id number]] Collected:  07/04/19 1811     Updated:  07/04/19 1814    Narrative:       XR CHEST 1 VW-     CLINICAL INDICATION: ams        COMPARISON: 8/20/2018      TECHNIQUE: Single frontal view of the chest.     FINDINGS:     There is no  focal alveolar infiltrate or effusion.  The cardiac silhouette is normal. The pulmonary vasculature is  unremarkable.  There is no evidence of an acute osseous abnormality.   There are no suspicious-appearing parenchymal soft tissue nodules.          Impression:       No evidence of active or acute cardiopulmonary disease on today's chest  radiograph.     This report was finalized on 7/4/2019 6:12 PM by Dr. Easton Hamm MD.           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  Follow-up 2D echo, cardiology has been requested to evaluate the elevated troponin.  Ambulate.  PT OT eval relation for safety transfer and gait safety evaluation    Vanessa Velázquez MD  07/05/19  2:59 PM

## 2019-07-05 NOTE — NURSING NOTE
2000: Pt had elevated troponin level of 0.050, notified Dr. Burks. Also informed her of patient's K+ level of 3.0 result from the ER visit.     0300- Troponin level of 0.056 reported to Dr. Burks. No new orders. Pt is asymptomatic.
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
## 2019-07-05 NOTE — NURSING NOTE
Dr. Velázquez states to discontinue Psych consult since we were able to get a hold of the patients wife and she states she has power of  over the patient to make decisions for him. Psych was made aware.
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
## 2019-07-05 NOTE — PLAN OF CARE
Problem: Patient Care Overview  Goal: Plan of Care Review  Outcome: Ongoing (interventions implemented as appropriate)   07/04/19 2300   Coping/Psychosocial   Plan of Care Reviewed With patient   Plan of Care Review   Progress no change       Problem: Fall Risk (Adult)  Goal: Identify Related Risk Factors and Signs and Symptoms  Outcome: Ongoing (interventions implemented as appropriate)   07/04/19 2300   Fall Risk (Adult)   Related Risk Factors (Fall Risk) age-related changes;bladder function altered;fatigue/slow reaction;gait/mobility problems;environment unfamiliar   Signs and Symptoms (Fall Risk) presence of risk factors     Goal: Absence of Fall  Outcome: Ongoing (interventions implemented as appropriate)   07/04/19 2300   Fall Risk (Adult)   Absence of Fall making progress toward outcome       Problem: Skin Injury Risk (Adult)  Goal: Identify Related Risk Factors and Signs and Symptoms  Outcome: Ongoing (interventions implemented as appropriate)   07/04/19 2300   Skin Injury Risk (Adult)   Related Risk Factors (Skin Injury Risk) advanced age;cognitive impairment     Goal: Skin Health and Integrity  Outcome: Ongoing (interventions implemented as appropriate)   07/04/19 2300   Skin Injury Risk (Adult)   Skin Health and Integrity making progress toward outcome       Problem: Pain, Chronic (Adult)  Goal: Identify Related Risk Factors and Signs and Symptoms  Outcome: Ongoing (interventions implemented as appropriate)   07/04/19 2300   Pain, Chronic (Adult)   Signs and Symptoms (Chronic Pain) verbalization of pain descriptors     Goal: Acceptable Pain/Comfort Level and Functional Ability  Outcome: Ongoing (interventions implemented as appropriate)   07/04/19 2300   Pain, Chronic (Adult)   Acceptable Pain/Comfort Level and Functional Ability making progress toward outcome       Problem: Pain, Acute (Adult)  Goal: Identify Related Risk Factors and Signs and Symptoms  Outcome: Ongoing (interventions implemented as  appropriate)   07/04/19 2300   Pain, Acute (Adult)   Signs and Symptoms (Acute Pain) fatigue/weakness;verbalization of pain descriptors     Goal: Acceptable Pain Control/Comfort Level  Outcome: Ongoing (interventions implemented as appropriate)   07/04/19 2300   Pain, Acute (Adult)   Acceptable Pain Control/Comfort Level making progress toward outcome

## 2019-07-06 PROCEDURE — 94799 UNLISTED PULMONARY SVC/PX: CPT

## 2019-07-06 PROCEDURE — 99232 SBSQ HOSP IP/OBS MODERATE 35: CPT | Performed by: HOSPITALIST

## 2019-07-06 RX ADMIN — DIVALPROEX SODIUM 125 MG: 125 CAPSULE ORAL at 08:25

## 2019-07-06 RX ADMIN — FUROSEMIDE 20 MG: 20 TABLET ORAL at 08:25

## 2019-07-06 RX ADMIN — DIVALPROEX SODIUM 125 MG: 125 CAPSULE ORAL at 22:14

## 2019-07-06 RX ADMIN — ASPIRIN 81 MG: 81 TABLET, CHEWABLE ORAL at 08:25

## 2019-07-06 RX ADMIN — TRAMADOL HYDROCHLORIDE 25 MG: 50 TABLET, COATED ORAL at 21:17

## 2019-07-06 RX ADMIN — APIXABAN 2.5 MG: 2.5 TABLET, FILM COATED ORAL at 08:25

## 2019-07-06 RX ADMIN — ATORVASTATIN CALCIUM 40 MG: 40 TABLET, FILM COATED ORAL at 21:17

## 2019-07-06 RX ADMIN — METOPROLOL SUCCINATE 50 MG: 50 TABLET, FILM COATED, EXTENDED RELEASE ORAL at 08:25

## 2019-07-06 RX ADMIN — THEOPHYLLINE 600 MG: 400 TABLET, EXTENDED RELEASE ORAL at 08:25

## 2019-07-06 RX ADMIN — APIXABAN 2.5 MG: 2.5 TABLET, FILM COATED ORAL at 21:17

## 2019-07-06 RX ADMIN — SODIUM CHLORIDE, PRESERVATIVE FREE 3 ML: 5 INJECTION INTRAVENOUS at 08:26

## 2019-07-06 RX ADMIN — ISOSORBIDE MONONITRATE 60 MG: 60 TABLET, EXTENDED RELEASE ORAL at 08:25

## 2019-07-06 RX ADMIN — SODIUM CHLORIDE, PRESERVATIVE FREE 3 ML: 5 INJECTION INTRAVENOUS at 21:18

## 2019-07-06 NOTE — PLAN OF CARE
Problem: Patient Care Overview  Goal: Plan of Care Review  Outcome: Ongoing (interventions implemented as appropriate)   07/05/19 2000 07/06/19 0221   Coping/Psychosocial   Plan of Care Reviewed With patient --    Plan of Care Review   Progress --  no change       Problem: Fall Risk (Adult)  Goal: Identify Related Risk Factors and Signs and Symptoms  Outcome: Ongoing (interventions implemented as appropriate)    Goal: Absence of Fall  Outcome: Ongoing (interventions implemented as appropriate)      Problem: Skin Injury Risk (Adult)  Goal: Identify Related Risk Factors and Signs and Symptoms  Outcome: Ongoing (interventions implemented as appropriate)    Goal: Skin Health and Integrity  Outcome: Ongoing (interventions implemented as appropriate)      Problem: Pain, Chronic (Adult)  Goal: Identify Related Risk Factors and Signs and Symptoms  Outcome: Ongoing (interventions implemented as appropriate)    Goal: Acceptable Pain/Comfort Level and Functional Ability  Outcome: Ongoing (interventions implemented as appropriate)      Problem: Pain, Acute (Adult)  Goal: Identify Related Risk Factors and Signs and Symptoms  Outcome: Ongoing (interventions implemented as appropriate)    Goal: Acceptable Pain Control/Comfort Level  Outcome: Ongoing (interventions implemented as appropriate)

## 2019-07-06 NOTE — PLAN OF CARE
Problem: Patient Care Overview  Goal: Plan of Care Review  Outcome: Ongoing (interventions implemented as appropriate)      Problem: Fall Risk (Adult)  Goal: Identify Related Risk Factors and Signs and Symptoms  Outcome: Ongoing (interventions implemented as appropriate)      Problem: Skin Injury Risk (Adult)  Goal: Identify Related Risk Factors and Signs and Symptoms  Outcome: Ongoing (interventions implemented as appropriate)      Problem: Pain, Chronic (Adult)  Goal: Identify Related Risk Factors and Signs and Symptoms  Outcome: Ongoing (interventions implemented as appropriate)      Problem: Pain, Acute (Adult)  Goal: Identify Related Risk Factors and Signs and Symptoms  Outcome: Ongoing (interventions implemented as appropriate)

## 2019-07-06 NOTE — PROGRESS NOTES
[facility]     Patient Identification:  Name:  Felice Aiken  Age:  84 y.o.  Sex:  male  :  1935  MRN:  [id number]  Visit Number:  [id number]  Primary Care Provider:  Rc Ojeda MD    Length of stay:  2    Subjective: Seen and examined assisted by Patricia Palmer RN.  Patient no complaints except for stiffness of his right knee from staying in bed, he is ambulating with supervision for fall precaution.  He had bowel movement today.  He denies chest discomfort.   was able to contact family and wanted nursing home placement for increased needs for assistance in taking care of the patient.    Chief Complaint: Knee pain  ----------------------------------------------------------------------------------------------------------------------  Current [facility] Meds:    apixaban 2.5 mg Oral Q12H   aspirin 81 mg Oral Daily   atorvastatin 40 mg Oral Nightly   Divalproex Sodium 125 mg Oral BID   furosemide 20 mg Oral BID   isosorbide mononitrate 60 mg Oral Daily   metoprolol succinate XL 50 mg Oral Q24H   sodium chloride 3 mL Intravenous Q12H   theophylline 600 mg Oral Daily        ----------------------------------------------------------------------------------------------------------------------  Vital Signs:  Temp:  [97.6 °F (36.4 °C)-98.2 °F (36.8 °C)] 97.6 °F (36.4 °C)  Heart Rate:  [64-77] 68  Resp:  [16-18] 18  BP: (103-138)/(56-85) 103/67       Tele:       19  1428 19  864   Weight: 54.4 kg (120 lb) 55.9 kg (123 lb 4 oz)     Body mass index is 17.68 kg/m².    Intake/Output Summary (Last 24 hours) at 2019 1628  Last data filed at 2019 0626  Gross per 24 hour   Intake 360 ml   Output 750 ml   Net -390 ml     Diet Regular; Cardiac  ----------------------------------------------------------------------------------------------------------------------  Physical exam:  General: Comfortable,awake, alert, oriented to self, place but  not time.  He is pleasant,   No respiratory distress.    Skin:  Skin is warm and dry. No rash noted. No pallor.    HENT:  Head:  Normocephalic and atraumatic.  Mouth:  Moist mucous membranes.    Eyes:  Conjunctivae and EOM are normal.  Pupils are equal, round, and reactive to light.  No scleral icterus.    Neck:  Neck supple.  No JVD present.    Pulmonary/Chest:  No respiratory distress, no wheezes, no crackles, with normal breath sounds and good air movement.  Cardiovascular:  Normal rate, irregular regular rhythm I could not hear any murmur or rub  Abdominal:  Soft.  Bowel sounds are normal.  No distension and no tenderness.   Extremities:  No edema, no tenderness, and no deformity.  No red or swollen joints anywhere.  Strong pulses in all 4 extremities with no clubbing, no cyanosis, left knee with surgical scar.  Neurological:  Motor strength equal no obvious deficit, sensory grossly intact.   No cranial nerve deficit.  No tongue deviation.  No facial droop.  No slurred speech.    Genitourinary: No Sue catheter    ----------------------------------------------------------------------------------------------------------------------  ----------------------------------------------------------------------------------------------------------------------  Results from last 7 days   Lab Units 07/05/19  0735 07/05/19  0144 07/04/19  1944  07/04/19  1504   CK TOTAL U/L  --  67 80  --  83   TROPONIN T ng/mL 0.050* 0.056* 0.050*   < > 0.051*    < > = values in this interval not displayed.     Results from last 7 days   Lab Units 07/05/19  0144 07/04/19  1606 07/04/19  1504   CRP mg/dL  --   --  0.20   LACTATE mmol/L  --  1.9  --    WBC 10*3/mm3 6.16  --  7.26   HEMOGLOBIN g/dL 10.7*  --  11.6*   HEMATOCRIT % 35.2*  --  37.2*   MCV fL 97.8*  --  96.9   MCHC g/dL 30.4*  --  31.2*   PLATELETS 10*3/mm3 182  --  193         Results from last 7 days   Lab Units 07/05/19  1626 07/05/19  0144 07/04/19  1944 07/04/19  1504    SODIUM mmol/L 141 145  --  142   POTASSIUM mmol/L 4.4 3.1*  --  3.0*   MAGNESIUM mg/dL  --   --  1.9  --    CHLORIDE mmol/L 107 107  --  105   CO2 mmol/L 26.9 30.1*  --  26.8   BUN mg/dL 7* 6*  --  7*   CREATININE mg/dL 0.72* 0.71*  --  0.79   EGFR IF NONAFRICN AM mL/min/1.73 105 106  --  94   CALCIUM mg/dL 8.4* 8.4*  --  9.0   GLUCOSE mg/dL 123* 100*  --  112*   ALBUMIN g/dL  --   --   --  3.48*   BILIRUBIN mg/dL  --   --   --  0.5   ALK PHOS U/L  --   --   --  62   AST (SGOT) U/L  --   --   --  18   ALT (SGPT) U/L  --   --   --  13   Estimated Creatinine Clearance: 56.3 mL/min (A) (by C-G formula based on SCr of 0.72 mg/dL (L)).    No results found for: AMMONIA      Blood Culture   Date Value Ref Range Status   07/04/2019 No growth at 24 hours  Preliminary   07/04/2019 No growth at 24 hours  Preliminary                I have personally looked at the labs and they are summarized above.  ----------------------------------------------------------------------------------------------------------------------  Imaging Results (last 24 hours)     ** No results found for the last 24 hours. **        ----------------------------------------------------------------------------------------------------------------------  Assessment and Plan:  -Chronic persistent atrial fibrillation rate controlled  -History of systolic heart failure with ejection fraction on this admission is 45 to 50%  -Indeterminate troponin patient is asymptomatic with no obvious acute EKG changes  -History of COPD  -Moderate dementia with occasional behavioral disturbances  -Chronic anticoagulation  -CKD stage III  -Coronary disease status post stent placement  -Arterial disease status post bilateral femoropopliteal    We will repeat troponin in the morning, continue current treatment, for nursing home placement.    Vanessa Velázquez MD  07/06/19  4:25 PM

## 2019-07-07 PROCEDURE — 94799 UNLISTED PULMONARY SVC/PX: CPT

## 2019-07-07 PROCEDURE — 99232 SBSQ HOSP IP/OBS MODERATE 35: CPT | Performed by: HOSPITALIST

## 2019-07-07 RX ADMIN — THEOPHYLLINE 600 MG: 400 TABLET, EXTENDED RELEASE ORAL at 09:03

## 2019-07-07 RX ADMIN — SODIUM CHLORIDE, PRESERVATIVE FREE 3 ML: 5 INJECTION INTRAVENOUS at 20:13

## 2019-07-07 RX ADMIN — APIXABAN 2.5 MG: 2.5 TABLET, FILM COATED ORAL at 20:13

## 2019-07-07 RX ADMIN — DIVALPROEX SODIUM 125 MG: 125 CAPSULE ORAL at 09:03

## 2019-07-07 RX ADMIN — METOPROLOL SUCCINATE 50 MG: 50 TABLET, FILM COATED, EXTENDED RELEASE ORAL at 09:03

## 2019-07-07 RX ADMIN — ATORVASTATIN CALCIUM 40 MG: 40 TABLET, FILM COATED ORAL at 20:13

## 2019-07-07 RX ADMIN — ASPIRIN 81 MG: 81 TABLET, CHEWABLE ORAL at 09:03

## 2019-07-07 RX ADMIN — FUROSEMIDE 20 MG: 20 TABLET ORAL at 09:03

## 2019-07-07 RX ADMIN — APIXABAN 2.5 MG: 2.5 TABLET, FILM COATED ORAL at 09:03

## 2019-07-07 RX ADMIN — ISOSORBIDE MONONITRATE 60 MG: 60 TABLET, EXTENDED RELEASE ORAL at 09:03

## 2019-07-07 RX ADMIN — SODIUM CHLORIDE, PRESERVATIVE FREE 3 ML: 5 INJECTION INTRAVENOUS at 09:04

## 2019-07-07 NOTE — PROGRESS NOTES
[facility]     Patient Identification:  Name:  Felice Aiken  Age:  84 y.o.  Sex:  male  :  1935  MRN:  [id number]  Visit Number:  [id number]  Primary Care Provider:  Rc Ojeda MD    Length of stay:  3    Subjective: Patient seen and examined, he is eating well, vital signs are stable, troponin is persistently and chronically mildly elevated, highest is 0.056, today it is  0.05 but patient is symptomatic.  EKG is unremarkable.  Patient is awaiting for nursing home placement due to significant dementia As requested by family.    Chief Complaint: Knee pain  ----------------------------------------------------------------------------------------------------------------------  Current Hospital Meds:    apixaban 2.5 mg Oral Q12H   aspirin 81 mg Oral Daily   atorvastatin 40 mg Oral Nightly   Divalproex Sodium 125 mg Oral BID   furosemide 20 mg Oral BID   isosorbide mononitrate 60 mg Oral Daily   metoprolol succinate XL 50 mg Oral Q24H   sodium chloride 3 mL Intravenous Q12H   theophylline 600 mg Oral Daily        ----------------------------------------------------------------------------------------------------------------------  Vital Signs:  Temp:  [97.7 °F (36.5 °C)-98.4 °F (36.9 °C)] 97.7 °F (36.5 °C)  Heart Rate:  [60-79] 65  Resp:  [16-20] 20  BP: ()/(55-80) 96/55       Tele: Atrial fibrillation rate of 82 bpm      19  1428 19  1914   Weight: 54.4 kg (120 lb) 55.9 kg (123 lb 4 oz)     Body mass index is 17.68 kg/m².    Intake/Output Summary (Last 24 hours) at 2019 1709  Last data filed at 2019 1412  Gross per 24 hour   Intake 690 ml   Output 1525 ml   Net -835 ml     Diet Regular; Cardiac  ----------------------------------------------------------------------------------------------------------------------  Physical exam:  General: Comfortable,awake, alert, oriented to self, place but not time.  He is pleasant,   No respiratory  distress.    Skin:  Skin is warm and dry. No rash noted. No pallor.    HENT:  Head:  Normocephalic and atraumatic.  Mouth:  Moist mucous membranes.    Eyes:  Conjunctivae and EOM are normal.  Pupils are equal, round, and reactive to light.  No scleral icterus.    Neck:  Neck supple.  No JVD present.    Pulmonary/Chest:  No respiratory distress, no wheezes, no crackles, with normal breath sounds and good air movement.  Cardiovascular:  Normal rate, irregular regular rhythm I could not hear any murmur or rub  Abdominal:  Soft.  Bowel sounds are normal.  No distension and no tenderness.   Extremities:  No edema, no tenderness, and no deformity.  No red or swollen joints anywhere.  Strong pulses in all 4 extremities with no clubbing, no cyanosis, left knee with surgical scar.  Neurological:  Motor strength equal no obvious deficit, sensory grossly intact.   No cranial nerve deficit.  No tongue deviation.  No facial droop.  No slurred speech.    Genitourinary: No Sue catheter     ----------------------------------------------------------------------------------------------------------------------  ----------------------------------------------------------------------------------------------------------------------  Results from last 7 days   Lab Units 07/05/19  0735 07/05/19  0144 07/04/19  1944  07/04/19  1504   CK TOTAL U/L  --  67 80  --  83   TROPONIN T ng/mL 0.050* 0.056* 0.050*   < > 0.051*    < > = values in this interval not displayed.     Results from last 7 days   Lab Units 07/05/19  0144 07/04/19  1606 07/04/19  1504   CRP mg/dL  --   --  0.20   LACTATE mmol/L  --  1.9  --    WBC 10*3/mm3 6.16  --  7.26   HEMOGLOBIN g/dL 10.7*  --  11.6*   HEMATOCRIT % 35.2*  --  37.2*   MCV fL 97.8*  --  96.9   MCHC g/dL 30.4*  --  31.2*   PLATELETS 10*3/mm3 182  --  193         Results from last 7 days   Lab Units 07/05/19  1626 07/05/19  0144 07/04/19  1944 07/04/19  1504   SODIUM mmol/L 141 145  --  142   POTASSIUM  mmol/L 4.4 3.1*  --  3.0*   MAGNESIUM mg/dL  --   --  1.9  --    CHLORIDE mmol/L 107 107  --  105   CO2 mmol/L 26.9 30.1*  --  26.8   BUN mg/dL 7* 6*  --  7*   CREATININE mg/dL 0.72* 0.71*  --  0.79   EGFR IF NONAFRICN AM mL/min/1.73 105 106  --  94   CALCIUM mg/dL 8.4* 8.4*  --  9.0   GLUCOSE mg/dL 123* 100*  --  112*   ALBUMIN g/dL  --   --   --  3.48*   BILIRUBIN mg/dL  --   --   --  0.5   ALK PHOS U/L  --   --   --  62   AST (SGOT) U/L  --   --   --  18   ALT (SGPT) U/L  --   --   --  13   Estimated Creatinine Clearance: 56.3 mL/min (A) (by C-G formula based on SCr of 0.72 mg/dL (L)).    No results found for: AMMONIA      Blood Culture   Date Value Ref Range Status   07/04/2019 No growth at 3 days  Preliminary   07/04/2019 No growth at 3 days  Preliminary                I have personally looked at the labs and they are summarized above.  ----------------------------------------------------------------------------------------------------------------------  Imaging Results (last 24 hours)     ** No results found for the last 24 hours. **        ----------------------------------------------------------------------------------------------------------------------  Assessment and Plan:  -Indeterminate troponin, patient is a symptomatic  -Chronic persistent atrial fibrillation  -Chronic anticoagulation for stroke prophylaxis  -Chronic systolic heart failure ejection fraction 45 to 50% compensated  -Dementia with occasional behavioral disturbances  -Coronary artery disease status post stent placement  -Peripheral arterial disease status post bilateral femoral-popliteal bypass  -History of COPD  -Status post left total knee arthroplasty  -Right knee osteoarthritis    Patient is a symptomatic, ambulating with assistance, patient is for nursing home placement, awaiting bed.   is working on patient's case.      Vanessa Velázquez MD  07/07/19  5:09 PM

## 2019-07-07 NOTE — PLAN OF CARE
Problem: Patient Care Overview  Goal: Discharge Needs Assessment  Outcome: Ongoing (interventions implemented as appropriate)      Problem: Fall Risk (Adult)  Goal: Absence of Fall  Outcome: Ongoing (interventions implemented as appropriate)      Problem: Skin Injury Risk (Adult)  Goal: Identify Related Risk Factors and Signs and Symptoms  Outcome: Ongoing (interventions implemented as appropriate)      Problem: Pain, Chronic (Adult)  Goal: Acceptable Pain/Comfort Level and Functional Ability  Outcome: Ongoing (interventions implemented as appropriate)      Problem: Pain, Acute (Adult)  Goal: Acceptable Pain Control/Comfort Level  Outcome: Ongoing (interventions implemented as appropriate)

## 2019-07-07 NOTE — PLAN OF CARE
Problem: Patient Care Overview  Goal: Plan of Care Review  Outcome: Ongoing (interventions implemented as appropriate)   07/06/19 0221 07/06/19 5723   Coping/Psychosocial   Plan of Care Reviewed With --  patient   Plan of Care Review   Progress no change --        Problem: Fall Risk (Adult)  Goal: Identify Related Risk Factors and Signs and Symptoms  Outcome: Ongoing (interventions implemented as appropriate)    Goal: Absence of Fall  Outcome: Ongoing (interventions implemented as appropriate)      Problem: Skin Injury Risk (Adult)  Goal: Identify Related Risk Factors and Signs and Symptoms  Outcome: Ongoing (interventions implemented as appropriate)    Goal: Skin Health and Integrity  Outcome: Ongoing (interventions implemented as appropriate)      Problem: Pain, Chronic (Adult)  Goal: Identify Related Risk Factors and Signs and Symptoms  Outcome: Ongoing (interventions implemented as appropriate)    Goal: Acceptable Pain/Comfort Level and Functional Ability  Outcome: Ongoing (interventions implemented as appropriate)      Problem: Pain, Acute (Adult)  Goal: Identify Related Risk Factors and Signs and Symptoms  Outcome: Ongoing (interventions implemented as appropriate)    Goal: Acceptable Pain Control/Comfort Level  Outcome: Ongoing (interventions implemented as appropriate)

## 2019-07-08 PROCEDURE — 99232 SBSQ HOSP IP/OBS MODERATE 35: CPT | Performed by: INTERNAL MEDICINE

## 2019-07-08 PROCEDURE — 97116 GAIT TRAINING THERAPY: CPT | Performed by: PHYSICAL THERAPIST

## 2019-07-08 PROCEDURE — 97165 OT EVAL LOW COMPLEX 30 MIN: CPT

## 2019-07-08 PROCEDURE — 97162 PT EVAL MOD COMPLEX 30 MIN: CPT | Performed by: PHYSICAL THERAPIST

## 2019-07-08 PROCEDURE — 94799 UNLISTED PULMONARY SVC/PX: CPT

## 2019-07-08 RX ADMIN — DIVALPROEX SODIUM 125 MG: 125 CAPSULE ORAL at 22:45

## 2019-07-08 RX ADMIN — THEOPHYLLINE 600 MG: 400 TABLET, EXTENDED RELEASE ORAL at 08:22

## 2019-07-08 RX ADMIN — DIVALPROEX SODIUM 125 MG: 125 CAPSULE ORAL at 08:23

## 2019-07-08 RX ADMIN — ATORVASTATIN CALCIUM 40 MG: 40 TABLET, FILM COATED ORAL at 20:09

## 2019-07-08 RX ADMIN — APIXABAN 2.5 MG: 2.5 TABLET, FILM COATED ORAL at 08:23

## 2019-07-08 RX ADMIN — APIXABAN 2.5 MG: 2.5 TABLET, FILM COATED ORAL at 20:09

## 2019-07-08 RX ADMIN — SODIUM CHLORIDE, PRESERVATIVE FREE 3 ML: 5 INJECTION INTRAVENOUS at 08:23

## 2019-07-08 RX ADMIN — SODIUM CHLORIDE, PRESERVATIVE FREE 3 ML: 5 INJECTION INTRAVENOUS at 21:52

## 2019-07-08 RX ADMIN — FUROSEMIDE 20 MG: 20 TABLET ORAL at 17:39

## 2019-07-08 RX ADMIN — ASPIRIN 81 MG: 81 TABLET, CHEWABLE ORAL at 08:22

## 2019-07-08 NOTE — DISCHARGE PLACEMENT REQUEST
"Felice Mckeon (84 y.o. Male)     Date of Birth Social Security Number Address Home Phone MRN    1935  [id number]   [facility] [id number] [phone] [id number]    [employer] Marital Status          None        Admission Date Admission Type Admitting Provider Attending Provider Department, Room/Bed    19 Emergency Vanessa Velázquez MD Baibars, Mhd Motaz, MD [address], 3346/2S    Discharge Date Discharge Disposition Discharge Destination                       Attending Provider:  Yury Lopez MD    Allergies:  No Known Allergies    Isolation:  None   Infection:  None   Code Status:  CPR    Ht:  177.8 cm (70\")   Wt:  55.9 kg (123 lb 4 oz)    Admission Cmt:  None   Principal Problem:  None                Active Insurance as of 2019     Primary Coverage     Payor Plan Insurance Group Employer/Plan Group    HUMANA MEDICARE REPLACEMENT HUMANA MEDICARE REPL [id number]     Payor Plan Address Payor Plan Phone Number Payor Plan Fax Number Effective Dates    [address] [address] [phone]  2015 - None Entered    [facility] [address]       Subscriber Name Subscriber Birth Date Member ID       FELICE MCKEON 1935 [id number]                 Emergency Contacts      (Rel.) Home Phone Work Phone Mobile Phone    Annmarie Kenny (Sister) [phone] -- --    Kary Mendez (Spouse) [phone] -- --               Occupational Therapy Notes (most recent note)      Gosia Whatley, OT at 2019  2:46 PM          Acute Care - Occupational Therapy Initial Evaluation  [facility]     Patient Name: Felice Mckeon  : 1935  MRN: [id number]  Today's Date: 2019     Date of Referral to OT: 19  Referring Physician: Dr. Velázquez    Admit Date: 2019       ICD-10-CM ICD-9-CM   1. Failure to thrive in adult R62.7 783.7     Patient Active Problem List   Diagnosis   • Knee pain   • Arteriosclerotic cardiovascular disease (ASCVD) s/p stenting   • Sleep apnea "   • Pain and swelling of lower extremity   • Persistent atrial fibrillation (CMS/HCC)   • History of ASCVD (atherosclerotic cardiovascular disease), s/p stenting   • PAD (peripheral artery disease), fem pop bypass,3/08   • Dyslipidemia   • Essential hypertension   • Chronic bronchitis (CMS/HCC)   • Chronic obstructive pulmonary disease (CMS/HCC)   • Status post total left knee replacement   • Neuropathy involving both lower extremities   • Chronic kidney disease, stage III (moderate) (CMS/HCC)   • Hematuria   • Shortness of breath   • Chronic systolic heart failure (CMS/HCC)   • Failure to thrive in adult     Past Medical History:   Diagnosis Date   • COPD (chronic obstructive pulmonary disease) (CMS/HCC)      Past Surgical History:   Procedure Laterality Date   • HAND SURGERY     • KNEE SURGERY            OT ASSESSMENT FLOWSHEET (last 12 hours)      Occupational Therapy Evaluation     Row Name 07/08/19 1400 07/08/19 1145                OT Evaluation Time/Intention    Subjective Information  no complaints  -LM  --       Document Type  evaluation  -LM  --       Mode of Treatment  occupational therapy  -LM  --       Patient Effort  good  -LM  --          General Information    Patient Profile Reviewed?  yes  -LM  --       Patient Observations  alert;agree to therapy;cooperative  -LM  --       Patient/Family Observations  sister present  -LM  --       Prior Level of Function  min assist:;ADL's;all household mobility;independent:  -LM  --       Equipment Currently Used at Home  oxygen;walker, rolling  -LM  --       Pertinent History of Current Functional Problem  FTT, debility.  Med hx:  cad-stenting, apnea, a-fib, pad, htn, hld, L TKR, CKD, dementia, neuropathy  -LM  --       Existing Precautions/Restrictions  fall  -LM  --       Benefits Reviewed  patient and family:;improve function;increase independence  -LM  --          Relationship/Environment    Lives With  spouse;child(jamar), adult  -LM  --           Resource/Environmental Concerns    Current Living Arrangements  home/apartment/condo  -LM  --          Cognitive Assessment/Intervention- PT/OT    Orientation Status (Cognition)  oriented to;person;place  -LM  --       Follows Commands (Cognition)  follows one step commands;follows two step commands;physical/tactile prompts required;repetition of directions required;verbal cues/prompting required  -LM  --       Safety Deficit (Cognitive)  impulsivity;insight into deficits/self awareness;safety precautions awareness  -LM  --          General ROM    GENERAL ROM COMMENTS  wfl  -LM  --          MMT (Manual Muscle Testing)    General MMT Comments  wfl, BUE 3+/5  -LM  --          Plan of Care Review    Plan of Care Reviewed With  patient  -LM  --          Clinical Impression (OT)    Date of Referral to OT  07/04/19  -LM  --       OT Diagnosis  debility  -LM  --       Patient/Family Goals Statement (OT Eval)  return home  -LM  --       Criteria for Skilled Therapeutic Interventions Met (OT Eval)  yes  -LM  --       Rehab Potential (OT Eval)  good, to achieve stated therapy goals  -LM  --       Therapy Frequency (OT Eval)  -- 3-5 times week  -LM  --       Care Plan Review (OT)  evaluation/treatment results reviewed;care plan/treatment goals reviewed;patient/other agree to care plan  -LM  --          Planned OT Interventions    Planned Therapy Interventions (OT Eval)  activity tolerance training;BADL retraining;passive ROM/stretching;ROM/therapeutic exercise;strengthening exercise;transfer/mobility retraining  -  --          OT Goals    Transfer Goal Selection (OT)  transfer, OT goal 1  -LM  --       Toileting Goal Selection (OT)  --  toileting, OT goal 1  -LM       Activity Tolerance Goal Selection (OT)  --  activity tolerance, OT goal 1  -LM       Additional Documentation  Activity Tolerance Goal Selection (OT) (Row)  -  --          Transfer Goal 1 (OT)    Activity/Assistive Device (Transfer Goal 1, OT)  --  commode,  3-in-1  -LM       Walker Level/Cues Needed (Transfer Goal 1, OT)  --  minimum assist (75% or more patient effort);verbal cues required;tactile cues required  -LM       Time Frame (Transfer Goal 1, OT)  --  1 week  -LM           Activity Tolerance Goal 1 (OT)    Activity Tolerance Goal 1 (OT)  --  increase fxl activity tolerance to assist with adl  -LM       Activity Level (Endurance Goal 1, OT)  --  20 min activity  -LM       Time Frame (Activity Tolerance Goal 1, OT)  --  1 week  -LM         User Key  (r) = Recorded By, (t) = Taken By, (c) = Cosigned By    Initials Name Effective Dates     Gosia Whatley OT 03/14/16 -          Occupational Therapy Education     Title: PT OT SLP Therapies (Done)     Topic: Occupational Therapy (Done)     Point: ADL training (Done)     Description: Instruct learner(s) on proper safety adaptation and remediation techniques during self care or transfers.   Instruct in proper use of assistive devices.    Learning Progress Summary           Patient Acceptance, E,D, Bed IU by LM at 7/8/2019  2:45 PM                               User Key     Initials Effective Dates Name Provider Type Discipline     03/14/16 -  Gosia Whatley, OT Occupational Therapist OT                  OT Recommendation and Plan  Planned Therapy Interventions (OT Eval): activity tolerance training, BADL retraining, passive ROM/stretching, ROM/therapeutic exercise, strengthening exercise, transfer/mobility retraining  Therapy Frequency (OT Eval): (3-5 times week)  Plan of Care Review  Plan of Care Reviewed With: patient  Plan of Care Reviewed With: patient  Outcome Summary: OT evaluation completed.  Agreeable to POC.    Outcome Measures     Row Name 07/08/19 1100             How much help from another person do you currently need...    Turning from your back to your side while in flat bed without using bedrails?  4  -AD      Moving from lying on back to sitting on the side of a flat bed without bedrails?  4   -AD      Moving to and from a bed to a chair (including a wheelchair)?  4  -AD      Standing up from a chair using your arms (e.g., wheelchair, bedside chair)?  4  -AD      Climbing 3-5 steps with a railing?  3  -AD      To walk in hospital room?  4  -AD      AM-PAC 6 Clicks Score (PT)  23  -AD         Functional Assessment    Outcome Measure Options  AM-PAC 6 Clicks Basic Mobility (PT)  -AD        User Key  (r) = Recorded By, (t) = Taken By, (c) = Cosigned By    Initials Name Provider Type    AD Dalton, Ashley Claudene, PT Physical Therapist          Time Calculation:   Time Calculation- OT     Row Name 07/08/19 1446 07/08/19 1100          Time Calculation- OT    Total Timed Code Minutes- OT  60 minute(s)  -LM  --     OT Non-Billable Time (min)  15 min  -LM  --        Timed Charges    54040 - Gait Training Minutes   --  10  -AD       User Key  (r) = Recorded By, (t) = Taken By, (c) = Cosigned By    Initials Name Provider Type    AD Dalton, Ashley Claudene, PT Physical Therapist    LM Gosia Whatley OT Occupational Therapist        Therapy Charges for Today     Code Description Service Date Service Provider Modifiers Qty    [id number]  OT EVAL LOW COMPLEXITY 4 7/8/2019 Gosia Whatley OT GO 1               Gosia Whatley OT  7/8/2019    Electronically signed by Gosia Whatley OT at 7/8/2019  2:47 PM

## 2019-07-08 NOTE — THERAPY EVALUATION
Acute Care - Occupational Therapy Initial Evaluation   [facility]     Patient Name: Felice Aiken  : 1935  MRN: [id number]  Today's Date: 2019     Date of Referral to OT: 19  Referring Physician: Dr. Velázquez    Admit Date: 2019       ICD-10-CM ICD-9-CM   1. Failure to thrive in adult R62.7 783.7     Patient Active Problem List   Diagnosis   • Knee pain   • Arteriosclerotic cardiovascular disease (ASCVD) s/p stenting   • Sleep apnea   • Pain and swelling of lower extremity   • Persistent atrial fibrillation (CMS/HCC)   • History of ASCVD (atherosclerotic cardiovascular disease), s/p stenting   • PAD (peripheral artery disease), fem pop bypass,3/08   • Dyslipidemia   • Essential hypertension   • Chronic bronchitis (CMS/HCC)   • Chronic obstructive pulmonary disease (CMS/HCC)   • Status post total left knee replacement   • Neuropathy involving both lower extremities   • Chronic kidney disease, stage III (moderate) (CMS/HCC)   • Hematuria   • Shortness of breath   • Chronic systolic heart failure (CMS/HCC)   • Failure to thrive in adult     Past Medical History:   Diagnosis Date   • COPD (chronic obstructive pulmonary disease) (CMS/HCC)      Past Surgical History:   Procedure Laterality Date   • HAND SURGERY     • KNEE SURGERY            OT ASSESSMENT FLOWSHEET (last 12 hours)      Occupational Therapy Evaluation     Row Name 19 1400 19 1145                OT Evaluation Time/Intention    Subjective Information  no complaints  -LM  --       Document Type  evaluation  -LM  --       Mode of Treatment  occupational therapy  -LM  --       Patient Effort  good  -LM  --          General Information    Patient Profile Reviewed?  yes  -LM  --       Patient Observations  alert;agree to therapy;cooperative  -LM  --       Patient/Family Observations  sister present  -LM  --       Prior Level of Function  min assist:;ADL's;all household mobility;independent:  -LM  --       Equipment Currently Used at  Home  oxygen;walker, rolling  -LM  --       Pertinent History of Current Functional Problem  FTT, debility.  Med hx:  cad-stenting, apnea, a-fib, pad, htn, hld, L TKR, CKD, dementia, neuropathy  -LM  --       Existing Precautions/Restrictions  fall  -LM  --       Benefits Reviewed  patient and family:;improve function;increase independence  -LM  --          Relationship/Environment    Lives With  spouse;child(jamar), adult  -LM  --          Resource/Environmental Concerns    Current Living Arrangements  home/apartment/condo  -LM  --          Cognitive Assessment/Intervention- PT/OT    Orientation Status (Cognition)  oriented to;person;place  -LM  --       Follows Commands (Cognition)  follows one step commands;follows two step commands;physical/tactile prompts required;repetition of directions required;verbal cues/prompting required  -LM  --       Safety Deficit (Cognitive)  impulsivity;insight into deficits/self awareness;safety precautions awareness  -LM  --          General ROM    GENERAL ROM COMMENTS  wfl  -LM  --          MMT (Manual Muscle Testing)    General MMT Comments  wfl, BUE 3+/5  -LM  --          Plan of Care Review    Plan of Care Reviewed With  patient  -LM  --          Clinical Impression (OT)    Date of Referral to OT  07/04/19  -LM  --       OT Diagnosis  debility  -LM  --       Patient/Family Goals Statement (OT Eval)  return home  -LM  --       Criteria for Skilled Therapeutic Interventions Met (OT Eval)  yes  -LM  --       Rehab Potential (OT Eval)  good, to achieve stated therapy goals  -LM  --       Therapy Frequency (OT Eval)  -- 3-5 times week  -LM  --       Care Plan Review (OT)  evaluation/treatment results reviewed;care plan/treatment goals reviewed;patient/other agree to care plan  -LM  --          Planned OT Interventions    Planned Therapy Interventions (OT Eval)  activity tolerance training;BADL retraining;passive ROM/stretching;ROM/therapeutic exercise;strengthening  exercise;transfer/mobility retraining  -LM  --          OT Goals    Transfer Goal Selection (OT)  transfer, OT goal 1  -LM  --       Toileting Goal Selection (OT)  --  toileting, OT goal 1  -LM       Activity Tolerance Goal Selection (OT)  --  activity tolerance, OT goal 1  -LM       Additional Documentation  Activity Tolerance Goal Selection (OT) (Row)  -LM  --          Transfer Goal 1 (OT)    Activity/Assistive Device (Transfer Goal 1, OT)  --  commode, 3-in-1  -LM       [place] Level/Cues Needed (Transfer Goal 1, OT)  --  minimum assist (75% or more patient effort);verbal cues required;tactile cues required  -LM       Time Frame (Transfer Goal 1, OT)  --  1 week  -LM           Activity Tolerance Goal 1 (OT)    Activity Tolerance Goal 1 (OT)  --  increase fxl activity tolerance to assist with adl  -LM       Activity Level (Endurance Goal 1, OT)  --  20 min activity  -LM       Time Frame (Activity Tolerance Goal 1, OT)  --  1 week  -LM         User Key  (r) = Recorded By, (t) = Taken By, (c) = Cosigned By    Initials Name Effective Dates    LM Gosia Whatley OT 03/14/16 -          Occupational Therapy Education     Title: PT OT SLP Therapies (Done)     Topic: Occupational Therapy (Done)     Point: ADL training (Done)     Description: Instruct learner(s) on proper safety adaptation and remediation techniques during self care or transfers.   Instruct in proper use of assistive devices.    Learning Progress Summary           Patient Acceptance, E,D, Bed IU by  at 7/8/2019  2:45 PM                               User Key     Initials Effective Dates Name Provider Type Discipline     03/14/16 -  Gosia Whatley, OT Occupational Therapist OT                  OT Recommendation and Plan  Planned Therapy Interventions (OT Eval): activity tolerance training, BADL retraining, passive ROM/stretching, ROM/therapeutic exercise, strengthening exercise, transfer/mobility retraining  Therapy Frequency (OT Eval): (3-5 times  week)  Plan of Care Review  Plan of Care Reviewed With: patient  Plan of Care Reviewed With: patient  Outcome Summary: OT evaluation completed.  Agreeable to POC.    Outcome Measures     Row Name 07/08/19 1100             How much help from another person do you currently need...    Turning from your back to your side while in flat bed without using bedrails?  4  -AD      Moving from lying on back to sitting on the side of a flat bed without bedrails?  4  -AD      Moving to and from a bed to a chair (including a wheelchair)?  4  -AD      Standing up from a chair using your arms (e.g., wheelchair, bedside chair)?  4  -AD      Climbing 3-5 steps with a railing?  3  -AD      To walk in hospital room?  4  -AD      AM-PAC 6 Clicks Score (PT)  23  -AD         Functional Assessment    Outcome Measure Options  AM-PAC 6 Clicks Basic Mobility (PT)  -AD        User Key  (r) = Recorded By, (t) = Taken By, (c) = Cosigned By    Initials Name Provider Type    AD Dalton, Ashley Claudene, PT Physical Therapist          Time Calculation:   Time Calculation- OT     Row Name 07/08/19 1446 07/08/19 1100          Time Calculation- OT    Total Timed Code Minutes- OT  60 minute(s)  -LM  --     OT Non-Billable Time (min)  15 min  -LM  --        Timed Charges    84435 - Gait Training Minutes   --  10  -AD       User Key  (r) = Recorded By, (t) = Taken By, (c) = Cosigned By    Initials Name Provider Type    AD Dalton, Ashley Claudene, PT Physical Therapist    LM Gosia Whatley OT Occupational Therapist        Therapy Charges for Today     Code Description Service Date Service Provider Modifiers Qty    [id number]  OT EVAL LOW COMPLEXITY 4 7/8/2019 Gosia Whatley OT GO 1               Gosia Whatley OT  7/8/2019

## 2019-07-08 NOTE — DISCHARGE PLACEMENT REQUEST
"Felice Mckeon (84 y.o. Male)     Date of Birth Social Security Number Address Home Phone MRN    1935  [id number]   [facility] [address] [phone] [id number]    Congregation Marital Status          None        Admission Date Admission Type Admitting Provider Attending Provider Department, Room/Bed    19 Emergency Vanessa Velázquez MD Baibars, Mhd Motaz, MD [address], 3346/2S    Discharge Date Discharge Disposition Discharge Destination                       Attending Provider:  Yury Lopez MD    Allergies:  No Known Allergies    Isolation:  None   Infection:  None   Code Status:  CPR    Ht:  177.8 cm (70\")   Wt:  55.9 kg (123 lb 4 oz)    Admission Cmt:  None   Principal Problem:  None                Active Insurance as of 2019     Primary Coverage     Payor Plan Insurance Group Employer/Plan Group    HUMANA MEDICARE REPLACEMENT HUMANA MEDICARE REPL [id number]     Payor Plan Address Payor Plan Phone Number Payor Plan Fax Number Effective Dates    [address] [address] [phone]  2015 - None Entered    [facility] [address]       Subscriber Name Subscriber Birth Date Member ID       FELICE MCKEON 1935 [id number]                 Emergency Contacts      (Rel.) Home Phone Work Phone Mobile Phone    Annmarie Kenny (Sister) [phone] -- --    Kary Mendez (Spouse) [phone] -- --               Physical Therapy Notes (most recent note)      Dalton, Ashley Claudene, PT at 2019 11:49 AM  Version 1 of 1         Acute Care - Physical Therapy Initial Eval/Discharge  [facility]     Patient Name: Felice Mckeon  : 1935  MRN: [id number]  Today's Date: 2019      Date of Referral to PT: 19  Referring Physician: Dr. Velázquez      Admit Date: 2019    Visit Dx:    ICD-10-CM ICD-9-CM   1. Failure to thrive in adult R62.7 783.7     Patient Active Problem List   Diagnosis   • Knee pain   • Arteriosclerotic cardiovascular disease " (ASCVD) s/p stenting   • Sleep apnea   • Pain and swelling of lower extremity   • Persistent atrial fibrillation (CMS/HCC)   • History of ASCVD (atherosclerotic cardiovascular disease), s/p stenting   • PAD (peripheral artery disease), fem pop bypass,3/08   • Dyslipidemia   • Essential hypertension   • Chronic bronchitis (CMS/HCC)   • Chronic obstructive pulmonary disease (CMS/HCC)   • Status post total left knee replacement   • Neuropathy involving both lower extremities   • Chronic kidney disease, stage III (moderate) (CMS/HCC)   • Hematuria   • Shortness of breath   • Chronic systolic heart failure (CMS/HCC)   • Failure to thrive in adult     Past Medical History:   Diagnosis Date   • COPD (chronic obstructive pulmonary disease) (CMS/HCC)      Past Surgical History:   Procedure Laterality Date   • HAND SURGERY     • KNEE SURGERY            PT ASSESSMENT (last 12 hours)      Physical Therapy Evaluation     Row Name 07/08/19 1100          PT Evaluation Time/Intention    Subjective Information  no complaints  -AD     Document Type  evaluation;discharge evaluation/summary Due to level of independence,pt not appropriate at this time  -AD     Mode of Treatment  physical therapy  -AD     Patient Effort  good  -AD     Symptoms Noted During/After Treatment  none  -AD     Comment  Pt tolerated physical therapy evaluation well with no reports of fatigue or distress. He required supervision with bed mobility and SBA with transfers and gait. No assistive device was used during gait training. Due to current level of independence, the patient is not appropriate for skilled physical therapy at this time.  -AD     Row Name 07/08/19 1100          General Information    Patient Profile Reviewed?  yes  -AD     Referring Physician  Dr. Velázquez  -AD     Patient Observations  alert;cooperative;agree to therapy  -AD     Patient/Family Observations  No family or visitors present at time of evaluation.  -AD     General Observations of  "Patient  Prior to PT evaluation, patient was supine in bed with no apparent distress.  -AD     Prior Level of Function  independent:;min assist: Per pt report  -AD     Equipment Currently Used at Home  oxygen;wheelchair;walker, rolling Per chart review  -AD     Pertinent History of Current Functional Problem  Pt reported he was \"not sure why I'm in the hospital\". He was unable to provide history of current functional problem. He stated he lives with his wife and child, to which he plans to be discharged.  -AD     Existing Precautions/Restrictions  fall  -AD     Limitations/Impairments  safety/cognitive  -AD     Equipment Issued to Patient  gait belt  -AD     Risks Reviewed  patient:;LOB;nausea/vomiting;dizziness;increased discomfort;change in vital signs  -AD     Benefits Reviewed  patient:;improve function;increase independence;increase strength;increase balance;decrease pain;decrease risk of DVT;improve skin integrity;increase knowledge  -AD     Row Name 07/08/19 1100          Cognitive Assessment/Intervention- PT/OT    Orientation Status (Cognition)  oriented to;person;place  -AD     Follows Commands (Cognition)  follows one step commands;over 90% accuracy  -AD     Safety Deficit (Cognitive)  impulsivity;safety precautions awareness  -AD     Row Name 07/08/19 1100          Bed Mobility Assessment/Treatment    Bed Mobility Assessment/Treatment  rolling left;rolling right;scooting/bridging;supine-sit-supine  -AD     Rolling Left [place] (Bed Mobility)  verbal cues;nonverbal cues (demo/gesture);supervision  -AD     Rolling Right [place] (Bed Mobility)  verbal cues;nonverbal cues (demo/gesture);supervision  -AD     Scooting/Bridging [place] (Bed Mobility)  verbal cues;nonverbal cues (demo/gesture);supervision  -AD     Supine-Sit-Supine [place] (Bed Mobility)  verbal cues;nonverbal cues (demo/gesture);supervision  -AD     Assistive Device (Bed Mobility)  -- None  -AD     Row Name 07/08/19 1100    "       Transfer Assessment/Treatment    Transfer Assessment/Treatment  sit-stand transfer;stand-sit transfer  -AD     Maintains Weight-bearing Status (Transfers)  verbal cues to maintain;nonverbal cues (demo/gesture) to maintain  -AD     Sit-Stand [place] (Transfers)  verbal cues;nonverbal cues (demo/gesture);stand by assist  -AD     Stand-Sit [place] (Transfers)  verbal cues;nonverbal cues (demo/gesture);stand by assist  -AD     Row Name 07/08/19 1100          Sit-Stand Transfer    Assistive Device (Sit-Stand Transfers)  -- None  -AD     Row Name 07/08/19 1100          Stand-Sit Transfer    Assistive Device (Stand-Sit Transfers)  -- None  -AD     Row Name 07/08/19 1100          Gait/Stairs Assessment/Training    21509 - Gait Training Minutes   10  -AD     Gait/Stairs Assessment/Training  gait/ambulation independence;gait/ambulation assistive device  -AD     [place] Level (Gait)  verbal cues;nonverbal cues (demo/gesture);stand by assist  -AD     Assistive Device (Gait)  -- None  -AD     Distance in Feet (Gait)  100  -AD     Pattern (Gait)  step-through  -AD     Row Name 07/08/19 1100          General ROM    GENERAL ROM COMMENTS  WFL  -AD     Row Name 07/08/19 1100          MMT (Manual Muscle Testing)    General MMT Comments  Grossly 4/5 BLE strength  -AD     Row Name 07/08/19 1100          Pain Assessment    Additional Documentation  Pain Scale: Numbers Pre/Post-Treatment (Group)  -AD     Row Name 07/08/19 1100          Pain Scale: Numbers Pre/Post-Treatment    Pain Scale: Numbers, Pretreatment  0/10 - no pain  -AD     Pain Scale: Numbers, Post-Treatment  0/10 - no pain  -AD     Row Name 07/08/19 1100          Plan of Care Review    Plan of Care Reviewed With  patient  -AD     Row Name 07/08/19 1100          Physical Therapy Clinical Impression    Date of Referral to PT  07/05/19  -AD     Prognosis (PT Clinical Impression)  Due to current level of independence, pt is not appropriate for skilled  physical therapy at this time.  -AD     Functional Level at Time of Evaluation (PT Clinical Impression)  Supervision/SBA  -AD     Patient/Family Goals Statement (PT Clinical Impression)  Discharge home with family  -AD     Criteria for Skilled Interventions Met (PT Clinical Impression)  no problems identified which require skilled intervention  -AD     Row Name 07/08/19 1100          Patient Education Goal (PT)    Activity (Patient Education Goal, PT)  Pt educated on home safety, verbalized understanding.  -AD     Row Name 07/08/19 1100          Positioning and Restraints    Pre-Treatment Position  in bed  -AD     Post Treatment Position  bed  -AD     In Bed  notified nsg;supine;call light within reach;encouraged to call for assist;side rails up x2  -AD     Row Name 07/08/19 1100          Physical Therapy Discharge Summary    Additional Documentation  Discharge Summary, PT Eval (Group)  -AD     Row Name 07/08/19 1100          Discharge Summary, PT Eval    Reason for Discharge (PT Discharge Summary)  no further needs identified  -AD     Outcomes Achieved Upon Discharge (PT Discharge Summary)  -- Pt not appropriate for skilled physical therapy.  -AD       User Key  (r) = Recorded By, (t) = Taken By, (c) = Cosigned By    Initials Name Provider Type    AD Dalton, Ashley Claudene, PT Physical Therapist          Physical Therapy Education     Title: PT OT SLP Therapies (Done)     Topic: Physical Therapy (Done)     Point: Mobility training (Done)     Learning Progress Summary           Patient Acceptance, E,TB, VU by AD at 7/8/2019 11:45 AM                   Point: Home exercise program (Done)     Learning Progress Summary           Patient Acceptance, E,TB, VU by AD at 7/8/2019 11:45 AM                   Point: Body mechanics (Done)     Learning Progress Summary           Patient Acceptance, E,TB, VU by AD at 7/8/2019 11:45 AM                   Point: Precautions (Done)     Learning Progress Summary           Patient  Acceptance, E,TB, VU by AD at 7/8/2019 11:45 AM                               User Key     Initials Effective Dates Name Provider Type Discipline    AD 04/03/18 -  Dalton, Ashley Claudene, PT Physical Therapist PT                PT Recommendation and Plan  Anticipated Discharge Disposition (PT): home with assist  Outcome Summary/Treatment Plan (PT)  Anticipated Discharge Disposition (PT): home with assist  Reason for Discharge (PT Discharge Summary): no further needs identified  Plan of Care Reviewed With: patient  Outcome Summary: Due to current level of function, the patient is not appropriate for skilled physical therapy at this time. He required supervision with bed mobility and SBA with transfers and ambuating 100' without an assistive device.     Outcome Measures     Row Name 07/08/19 1100             How much help from another person do you currently need...    Turning from your back to your side while in flat bed without using bedrails?  4  -AD      Moving from lying on back to sitting on the side of a flat bed without bedrails?  4  -AD      Moving to and from a bed to a chair (including a wheelchair)?  4  -AD      Standing up from a chair using your arms (e.g., wheelchair, bedside chair)?  4  -AD      Climbing 3-5 steps with a railing?  3  -AD      To walk in hospital room?  4  -AD      AM-PAC 6 Clicks Score (PT)  23  -AD         Functional Assessment    Outcome Measure Options  AM-PAC 6 Clicks Basic Mobility (PT)  -AD        User Key  (r) = Recorded By, (t) = Taken By, (c) = Cosigned By    Initials Name Provider Type    AD Dalton, Ashley Claudene, PT Physical Therapist           Time Calculation:   PT Charges     Row Name 07/08/19 1148 07/08/19 1100          Time Calculation    Start Time  -- 30 minutes  -AD  --        Timed Charges    30073 - Gait Training Minutes   --  10  -AD       User Key  (r) = Recorded By, (t) = Taken By, (c) = Cosigned By    Initials Name Provider Type    Patricia Roldan  Claudene, PT Physical Therapist        Therapy Charges for Today     Code Description Service Date Service Provider Modifiers Qty    18820344610  GAIT TRAINING EA 15 MIN 7/8/2019 Dalton, Ashley Claudene, PT GP 1    [id number]  PT EVAL MOD COMPLEXITY 1 7/8/2019 Dalton, Ashley Claudene, PT GP 1          PT G-Codes  Outcome Measure Options: AM-PAC 6 Clicks Basic Mobility (PT)  AM-PAC 6 Clicks Score (PT): 23    PT Discharge Summary  Anticipated Discharge Disposition (PT): home with assist  Reason for Discharge: (Not appropriate for skilling physical therapy due to current level of independence.)    Ashley Claudene Dalton, PT  7/8/2019         Electronically signed by Dalton, Ashley Claudene, PT at 7/8/2019 11:49 AM

## 2019-07-08 NOTE — THERAPY DISCHARGE NOTE
Acute Care - Physical Therapy Initial Eval/Discharge  [facility]     Patient Name: Felice Aiken  : 1935  MRN: [id number]  Today's Date: 2019      Date of Referral to PT: 19  Referring Physician: Dr. Velázquez      Admit Date: 2019    Visit Dx:    ICD-10-CM ICD-9-CM   1. Failure to thrive in adult R62.7 783.7     Patient Active Problem List   Diagnosis   • Knee pain   • Arteriosclerotic cardiovascular disease (ASCVD) s/p stenting   • Sleep apnea   • Pain and swelling of lower extremity   • Persistent atrial fibrillation (CMS/HCC)   • History of ASCVD (atherosclerotic cardiovascular disease), s/p stenting   • PAD (peripheral artery disease), fem pop bypass,3/08   • Dyslipidemia   • Essential hypertension   • Chronic bronchitis (CMS/HCC)   • Chronic obstructive pulmonary disease (CMS/HCC)   • Status post total left knee replacement   • Neuropathy involving both lower extremities   • Chronic kidney disease, stage III (moderate) (CMS/HCC)   • Hematuria   • Shortness of breath   • Chronic systolic heart failure (CMS/HCC)   • Failure to thrive in adult     Past Medical History:   Diagnosis Date   • COPD (chronic obstructive pulmonary disease) (CMS/HCC)      Past Surgical History:   Procedure Laterality Date   • HAND SURGERY     • KNEE SURGERY            PT ASSESSMENT (last 12 hours)      Physical Therapy Evaluation     Row Name 19 1100          PT Evaluation Time/Intention    Subjective Information  no complaints  -AD     Document Type  evaluation;discharge evaluation/summary Due to level of independence,pt not appropriate at this time  -AD     Mode of Treatment  physical therapy  -AD     Patient Effort  good  -AD     Symptoms Noted During/After Treatment  none  -AD     Comment  Pt tolerated physical therapy evaluation well with no reports of fatigue or distress. He required supervision with bed mobility and SBA with transfers and gait. No assistive device was used during gait training. Due to  "current level of independence, the patient is not appropriate for skilled physical therapy at this time.  -AD     Row Name 07/08/19 1100          General Information    Patient Profile Reviewed?  yes  -AD     Referring Physician  Dr. Velázquez  -AD     Patient Observations  alert;cooperative;agree to therapy  -AD     Patient/Family Observations  No family or visitors present at time of evaluation.  -AD     General Observations of Patient  Prior to PT evaluation, patient was supine in bed with no apparent distress.  -AD     Prior Level of Function  independent:;min assist: Per pt report  -AD     Equipment Currently Used at Home  oxygen;wheelchair;walker, rolling Per chart review  -AD     Pertinent History of Current Functional Problem  Pt reported he was \"not sure why I'm in the hospital\". He was unable to provide history of current functional problem. He stated he lives with his wife and child, to which he plans to be discharged.  -AD     Existing Precautions/Restrictions  fall  -AD     Limitations/Impairments  safety/cognitive  -AD     Equipment Issued to Patient  gait belt  -AD     Risks Reviewed  patient:;LOB;nausea/vomiting;dizziness;increased discomfort;change in vital signs  -AD     Benefits Reviewed  patient:;improve function;increase independence;increase strength;increase balance;decrease pain;decrease risk of DVT;improve skin integrity;increase knowledge  -AD     Row Name 07/08/19 1100          Cognitive Assessment/Intervention- PT/OT    Orientation Status (Cognition)  oriented to;person;place  -AD     Follows Commands (Cognition)  follows one step commands;over 90% accuracy  -AD     Safety Deficit (Cognitive)  impulsivity;safety precautions awareness  -AD     Row Name 07/08/19 1100          Bed Mobility Assessment/Treatment    Bed Mobility Assessment/Treatment  rolling left;rolling right;scooting/bridging;supine-sit-supine  -AD     Rolling Left [place] (Bed Mobility)  verbal cues;nonverbal cues " (demo/gesture);supervision  -AD     Rolling Right [place] (Bed Mobility)  verbal cues;nonverbal cues (demo/gesture);supervision  -AD     Scooting/Bridging [place] (Bed Mobility)  verbal cues;nonverbal cues (demo/gesture);supervision  -AD     Supine-Sit-Supine [place] (Bed Mobility)  verbal cues;nonverbal cues (demo/gesture);supervision  -AD     Assistive Device (Bed Mobility)  -- None  -AD     Row Name 07/08/19 1100          Transfer Assessment/Treatment    Transfer Assessment/Treatment  sit-stand transfer;stand-sit transfer  -AD     Maintains Weight-bearing Status (Transfers)  verbal cues to maintain;nonverbal cues (demo/gesture) to maintain  -AD     Sit-Stand [place] (Transfers)  verbal cues;nonverbal cues (demo/gesture);stand by assist  -AD     Stand-Sit [place] (Transfers)  verbal cues;nonverbal cues (demo/gesture);stand by assist  -AD     Row Name 07/08/19 1100          Sit-Stand Transfer    Assistive Device (Sit-Stand Transfers)  -- None  -AD     Row Name 07/08/19 1100          Stand-Sit Transfer    Assistive Device (Stand-Sit Transfers)  -- None  -AD     Row Name 07/08/19 1100          Gait/Stairs Assessment/Training    85911 - Gait Training Minutes   10  -AD     Gait/Stairs Assessment/Training  gait/ambulation independence;gait/ambulation assistive device  -AD     [place] Level (Gait)  verbal cues;nonverbal cues (demo/gesture);stand by assist  -AD     Assistive Device (Gait)  -- None  -AD     Distance in Feet (Gait)  100  -AD     Pattern (Gait)  step-through  -AD     Row Name 07/08/19 1100          General ROM    GENERAL ROM COMMENTS  WFL  -AD     Row Name 07/08/19 1100          MMT (Manual Muscle Testing)    General MMT Comments  Grossly 4/5 BLE strength  -AD     Row Name 07/08/19 1100          Pain Assessment    Additional Documentation  Pain Scale: Numbers Pre/Post-Treatment (Group)  -AD     Row Name 07/08/19 1100          Pain Scale: Numbers Pre/Post-Treatment    Pain Scale:  Numbers, Pretreatment  0/10 - no pain  -AD     Pain Scale: Numbers, Post-Treatment  0/10 - no pain  -AD     Row Name 07/08/19 1100          Plan of Care Review    Plan of Care Reviewed With  patient  -AD     Row Name 07/08/19 1100          Physical Therapy Clinical Impression    Date of Referral to PT  07/05/19  -AD     Prognosis (PT Clinical Impression)  Due to current level of independence, pt is not appropriate for skilled physical therapy at this time.  -AD     Functional Level at Time of Evaluation (PT Clinical Impression)  Supervision/SBA  -AD     Patient/Family Goals Statement (PT Clinical Impression)  Discharge home with family  -AD     Criteria for Skilled Interventions Met (PT Clinical Impression)  no problems identified which require skilled intervention  -AD     Row Name 07/08/19 1100          Patient Education Goal (PT)    Activity (Patient Education Goal, PT)  Pt educated on home safety, verbalized understanding.  -AD     Row Name 07/08/19 1100          Positioning and Restraints    Pre-Treatment Position  in bed  -AD     Post Treatment Position  bed  -AD     In Bed  notified nsg;supine;call light within reach;encouraged to call for assist;side rails up x2  -AD     Row Name 07/08/19 1100          Physical Therapy Discharge Summary    Additional Documentation  Discharge Summary, PT Eval (Group)  -AD     Row Name 07/08/19 1100          Discharge Summary, PT Eval    Reason for Discharge (PT Discharge Summary)  no further needs identified  -AD     Outcomes Achieved Upon Discharge (PT Discharge Summary)  -- Pt not appropriate for skilled physical therapy.  -AD       User Key  (r) = Recorded By, (t) = Taken By, (c) = Cosigned By    Initials Name Provider Type    AD Dalton, Ashley Claudene, PT Physical Therapist          Physical Therapy Education     Title: PT OT SLP Therapies (Done)     Topic: Physical Therapy (Done)     Point: Mobility training (Done)     Learning Progress Summary           Patient  Acceptance, E,TB, VU by AD at 7/8/2019 11:45 AM                   Point: Home exercise program (Done)     Learning Progress Summary           Patient Acceptance, E,TB, VU by AD at 7/8/2019 11:45 AM                   Point: Body mechanics (Done)     Learning Progress Summary           Patient Acceptance, E,TB, VU by AD at 7/8/2019 11:45 AM                   Point: Precautions (Done)     Learning Progress Summary           Patient Acceptance, E,TB, VU by AD at 7/8/2019 11:45 AM                               User Key     Initials Effective Dates Name Provider Type Discipline    AD 04/03/18 -  Dalton, Ashley Claudene, PT Physical Therapist PT                PT Recommendation and Plan  Anticipated Discharge Disposition (PT): home with assist  Outcome Summary/Treatment Plan (PT)  Anticipated Discharge Disposition (PT): home with assist  Reason for Discharge (PT Discharge Summary): no further needs identified  Plan of Care Reviewed With: patient  Outcome Summary: Due to current level of function, the patient is not appropriate for skilled physical therapy at this time. He required supervision with bed mobility and SBA with transfers and ambuating 100' without an assistive device.     Outcome Measures     Row Name 07/08/19 1100             How much help from another person do you currently need...    Turning from your back to your side while in flat bed without using bedrails?  4  -AD      Moving from lying on back to sitting on the side of a flat bed without bedrails?  4  -AD      Moving to and from a bed to a chair (including a wheelchair)?  4  -AD      Standing up from a chair using your arms (e.g., wheelchair, bedside chair)?  4  -AD      Climbing 3-5 steps with a railing?  3  -AD      To walk in hospital room?  4  -AD      AM-PAC 6 Clicks Score (PT)  23  -AD         Functional Assessment    Outcome Measure Options  AM-PAC 6 Clicks Basic Mobility (PT)  -AD        User Key  (r) = Recorded By, (t) = Taken By, (c) =  Cosigned By    Initials Name Provider Type    AD Dalton, Ashley Claudene, PT Physical Therapist           Time Calculation:   PT Charges     Row Name 07/08/19 1148 07/08/19 1100          Time Calculation    Start Time  -- 30 minutes  -AD  --        Timed Charges    43431 - Gait Training Minutes   --  10  -AD       User Key  (r) = Recorded By, (t) = Taken By, (c) = Cosigned By    Initials Name Provider Type    AD Dalton, Ashley Claudene, DAVI Physical Therapist        Therapy Charges for Today     Code Description Service Date Service Provider Modifiers Qty    79365358552 HC GAIT TRAINING EA 15 MIN 7/8/2019 Dalton, Ashley Claudene, PT GP 1    02397252476 HC PT EVAL MOD COMPLEXITY 1 7/8/2019 Dalton, Ashley Claudene, PT GP 1          PT G-Codes  Outcome Measure Options: AM-PAC 6 Clicks Basic Mobility (PT)  AM-PAC 6 Clicks Score (PT): 23    PT Discharge Summary  Anticipated Discharge Disposition (PT): home with assist  Reason for Discharge: (Not appropriate for skilling physical therapy due to current level of independence.)    Ashley Claudene Dalton, PT  7/8/2019

## 2019-07-08 NOTE — PLAN OF CARE
Problem: Patient Care Overview  Goal: Plan of Care Review   07/06/19 0221 07/07/19 2000   Coping/Psychosocial   Plan of Care Reviewed With --  patient   Plan of Care Review   Progress no change --      Goal: Discharge Needs Assessment  Outcome: Ongoing (interventions implemented as appropriate)   07/04/19 1953 07/05/19 1153   Disability   Equipment Currently Used at Home --  walker, rolling;wheelchair;oxygen  (Pt states having home oxygen, however the liter flow is unknown, a wheelchair and a rolling walker at home from unknown provider. )   Discharge Needs Assessment   Patient/Family Anticipates Transition to --  home with family   Patient/Family Anticipated Services at Transition none --    Transportation Anticipated --  family or friend will provide  (Pt states his spouse drives and will provide transportation at discharge.)     Goal: Interprofessional Rounds/Family Conf  Outcome: Ongoing (interventions implemented as appropriate)   07/07/19 2218   Interdisciplinary Rounds/Family Conf   Participants nursing;patient       Problem: Fall Risk (Adult)  Goal: Identify Related Risk Factors and Signs and Symptoms  Outcome: Ongoing (interventions implemented as appropriate)   07/04/19 2300   Fall Risk (Adult)   Related Risk Factors (Fall Risk) age-related changes;bladder function altered;fatigue/slow reaction;gait/mobility problems;environment unfamiliar   Signs and Symptoms (Fall Risk) presence of risk factors     Goal: Absence of Fall  Outcome: Ongoing (interventions implemented as appropriate)   07/07/19 0045   Fall Risk (Adult)   Absence of Fall making progress toward outcome       Problem: Skin Injury Risk (Adult)  Goal: Identify Related Risk Factors and Signs and Symptoms  Outcome: Ongoing (interventions implemented as appropriate)   07/04/19 2300   Skin Injury Risk (Adult)   Related Risk Factors (Skin Injury Risk) advanced age;cognitive impairment     Goal: Skin Health and Integrity  Outcome: Ongoing  (interventions implemented as appropriate)   07/07/19 0045   Skin Injury Risk (Adult)   Skin Health and Integrity making progress toward outcome       Problem: Pain, Chronic (Adult)  Goal: Identify Related Risk Factors and Signs and Symptoms  Outcome: Ongoing (interventions implemented as appropriate)   07/04/19 2300   Pain, Chronic (Adult)   Signs and Symptoms (Chronic Pain) verbalization of pain descriptors     Goal: Acceptable Pain/Comfort Level and Functional Ability  Outcome: Ongoing (interventions implemented as appropriate)   07/07/19 0045   Pain, Chronic (Adult)   Acceptable Pain/Comfort Level and Functional Ability making progress toward outcome       Problem: Pain, Acute (Adult)  Goal: Identify Related Risk Factors and Signs and Symptoms  Outcome: Ongoing (interventions implemented as appropriate)   07/04/19 2300   Pain, Acute (Adult)   Signs and Symptoms (Acute Pain) fatigue/weakness;verbalization of pain descriptors     Goal: Acceptable Pain Control/Comfort Level  Outcome: Ongoing (interventions implemented as appropriate)   07/06/19 0221   Pain, Acute (Adult)   Acceptable Pain Control/Comfort Level making progress toward outcome

## 2019-07-08 NOTE — PLAN OF CARE
Problem: Patient Care Overview  Goal: Plan of Care Review  Outcome: Ongoing (interventions implemented as appropriate)      Problem: Fall Risk (Adult)  Goal: Absence of Fall  Outcome: Ongoing (interventions implemented as appropriate)      Problem: Skin Injury Risk (Adult)  Goal: Skin Health and Integrity  Outcome: Ongoing (interventions implemented as appropriate)   07/08/19 0934   Skin Injury Risk (Adult)   Skin Health and Integrity making progress toward outcome       Problem: Pain, Chronic (Adult)  Goal: Acceptable Pain/Comfort Level and Functional Ability  Outcome: Ongoing (interventions implemented as appropriate)   07/08/19 0934   Pain, Chronic (Adult)   Acceptable Pain/Comfort Level and Functional Ability making progress toward outcome       Problem: Pain, Acute (Adult)  Goal: Acceptable Pain Control/Comfort Level  Outcome: Ongoing (interventions implemented as appropriate)   07/08/19 0934   Pain, Acute (Adult)   Acceptable Pain Control/Comfort Level making progress toward outcome

## 2019-07-08 NOTE — PROGRESS NOTES
Discharge Planning Assessment   [place]     Patient Name: Felice Aiken  MRN: [id number]  Today's Date: 7/8/2019    Admit Date: 7/4/2019      Discharge Plan     Row Name 07/08/19 1057       Plan    Plan SS [facility] this morning who states plans to visit pt today. SS spoke to pt's spouse Sally Collins who continues to request nursing home placement. [facility] present to visit pt. Marissa Keita states they need PT/OT evaluations to start pre-auth with [employer]. SS notified Dr. Lopez that pt needs a OT consult. SS to follow.     15:49 PT/OT notes are available. SS faxed PT/OT notes to Marissa Morrison. SS to follow.         Destination      Service Provider Request Status Selected Services Address Phone Number Fax Number    [facility] Pending - No Request Sent N/A [address] [facility] [address] [phone]     Kelli Larsen

## 2019-07-08 NOTE — PLAN OF CARE
Problem: Patient Care Overview  Goal: Plan of Care Review  Outcome: Outcome(s) achieved Date Met: 07/08/19 07/08/19 5156   Coping/Psychosocial   Plan of Care Reviewed With patient   OTHER   Outcome Summary Due to current level of function, the patient is not appropriate for skilled physical therapy at this time. He required supervision with bed mobility and SBA with transfers and ambuating 100' without an assistive device.

## 2019-07-08 NOTE — PROGRESS NOTES
[facility]     Patient Identification:  Name:  Felice Aiken  Age:  84 y.o.  Sex:  male  :  1935  MRN:  [id number]  Visit Number:  [id number]  Primary Care Provider:  Rc Ojeda MD    Length of stay:  4    Subjective:   Patient seen and examined reviewed his condition, no apparent cardiopulmonary distress, awaiting nursing home placement still.    Chief Complaint: Knee pain  ----------------------------------------------------------------------------------------------------------------------  Current Hospital Meds:    apixaban 2.5 mg Oral Q12H   aspirin 81 mg Oral Daily   atorvastatin 40 mg Oral Nightly   Divalproex Sodium 125 mg Oral BID   furosemide 20 mg Oral BID   isosorbide mononitrate 60 mg Oral Daily   metoprolol succinate XL 50 mg Oral Q24H   sodium chloride 3 mL Intravenous Q12H   theophylline 600 mg Oral Daily        ----------------------------------------------------------------------------------------------------------------------  Vital Signs:  Temp:  [97.6 °F (36.4 °C)-98.7 °F (37.1 °C)] 98.5 °F (36.9 °C)  Heart Rate:  [68-88] 84  Resp:  [18-20] 18  BP: (108-133)/(67-86) 125/83       Tele: Atrial fibrillation rate of 82 bpm      19  1428 19   Weight: 54.4 kg (120 lb) 55.9 kg (123 lb 4 oz)     Body mass index is 17.68 kg/m².    Intake/Output Summary (Last 24 hours) at 2019  Last data filed at 2019 1500  Gross per 24 hour   Intake 660 ml   Output 825 ml   Net -165 ml     Diet Regular; Cardiac  ----------------------------------------------------------------------------------------------------------------------  Physical exam:  General: Comfortable,awake, alert, oriented to self, place but not time.  He is pleasant,   No respiratory distress.    Skin:  Skin is warm and dry. No rash noted. No pallor.    HENT:  Head:  Normocephalic and atraumatic.  Mouth:  Moist mucous membranes.    Eyes:  Conjunctivae and EOM are  normal.  Pupils are equal, round, and reactive to light.  No scleral icterus.    Neck:  Neck supple.  No JVD present.    Pulmonary/Chest:  No respiratory distress, no wheezes, no crackles, with normal breath sounds and good air movement.  Cardiovascular:  Normal rate, irregular regular rhythm I could not hear any murmur or rub  Abdominal:  Soft.  Bowel sounds are normal.  No distension and no tenderness.   Extremities:  No edema, no tenderness, and no deformity.  No red or swollen joints anywhere.  Strong pulses in all 4 extremities with no clubbing, no cyanosis, left knee with surgical scar.  Neurological:  Motor strength equal no obvious deficit, sensory grossly intact.   No cranial nerve deficit.  No tongue deviation.  No facial droop.  No slurred speech.    Genitourinary: No Sue catheter     ----------------------------------------------------------------------------------------------------------------------  ----------------------------------------------------------------------------------------------------------------------  Results from last 7 days   Lab Units 07/05/19  0735 07/05/19  0144 07/04/19  1944  07/04/19  1504   CK TOTAL U/L  --  67 80  --  83   TROPONIN T ng/mL 0.050* 0.056* 0.050*   < > 0.051*    < > = values in this interval not displayed.     Results from last 7 days   Lab Units 07/05/19  0144 07/04/19  1606 07/04/19  1504   CRP mg/dL  --   --  0.20   LACTATE mmol/L  --  1.9  --    WBC 10*3/mm3 6.16  --  7.26   HEMOGLOBIN g/dL 10.7*  --  11.6*   HEMATOCRIT % 35.2*  --  37.2*   MCV fL 97.8*  --  96.9   MCHC g/dL 30.4*  --  31.2*   PLATELETS 10*3/mm3 182  --  193         Results from last 7 days   Lab Units 07/05/19  1626 07/05/19  0144 07/04/19  1944 07/04/19  1504   SODIUM mmol/L 141 145  --  142   POTASSIUM mmol/L 4.4 3.1*  --  3.0*   MAGNESIUM mg/dL  --   --  1.9  --    CHLORIDE mmol/L 107 107  --  105   CO2 mmol/L 26.9 30.1*  --  26.8   BUN mg/dL 7* 6*  --  7*   CREATININE mg/dL 0.72* 0.71*   --  0.79   EGFR IF NONAFRICN AM mL/min/1.73 105 106  --  94   CALCIUM mg/dL 8.4* 8.4*  --  9.0   GLUCOSE mg/dL 123* 100*  --  112*   ALBUMIN g/dL  --   --   --  3.48*   BILIRUBIN mg/dL  --   --   --  0.5   ALK PHOS U/L  --   --   --  62   AST (SGOT) U/L  --   --   --  18   ALT (SGPT) U/L  --   --   --  13   Estimated Creatinine Clearance: 56.3 mL/min (A) (by C-G formula based on SCr of 0.72 mg/dL (L)).    No results found for: AMMONIA      Blood Culture   Date Value Ref Range Status   07/04/2019 No growth at 3 days  Preliminary   07/04/2019 No growth at 3 days  Preliminary                I have personally looked at the labs and they are summarized above.  ----------------------------------------------------------------------------------------------------------------------  Imaging Results (last 24 hours)     ** No results found for the last 24 hours. **        ----------------------------------------------------------------------------------------------------------------------      Assessment and Plan:    -Indeterminate troponin, patient is a symptomatic stable numbers  -Chronic persistent atrial fibrillation  -Chronic anticoagulation for stroke prophylaxis  -Chronic systolic heart failure ejection fraction 45 to 50% compensated  -Dementia with occasional behavioral disturbances  -Coronary artery disease status post stent placement  -Peripheral arterial disease status post bilateral femoral-popliteal bypass  -History of COPD  -Status post left total knee arthroplasty  -Right knee osteoarthritis    --We will awaiting nursing home placement will continue the current therapy for now, discussed with the       Yury Lopez MD  07/08/19  6:37 PM

## 2019-07-08 NOTE — PLAN OF CARE
Problem: Patient Care Overview  Goal: Plan of Care Review  Outcome: Ongoing (interventions implemented as appropriate)   07/08/19 4377   Coping/Psychosocial   Plan of Care Reviewed With patient   Plan of Care Review   Progress no change   OTHER   Outcome Summary OT evaluation completed. Agreeable to POC.

## 2019-07-09 LAB
BACTERIA SPEC AEROBE CULT: NORMAL
BACTERIA SPEC AEROBE CULT: NORMAL

## 2019-07-09 PROCEDURE — 97530 THERAPEUTIC ACTIVITIES: CPT

## 2019-07-09 PROCEDURE — 99231 SBSQ HOSP IP/OBS SF/LOW 25: CPT | Performed by: INTERNAL MEDICINE

## 2019-07-09 RX ADMIN — SODIUM CHLORIDE, PRESERVATIVE FREE 3 ML: 5 INJECTION INTRAVENOUS at 20:15

## 2019-07-09 RX ADMIN — SODIUM CHLORIDE, PRESERVATIVE FREE 3 ML: 5 INJECTION INTRAVENOUS at 09:18

## 2019-07-09 RX ADMIN — APIXABAN 2.5 MG: 2.5 TABLET, FILM COATED ORAL at 09:18

## 2019-07-09 RX ADMIN — ATORVASTATIN CALCIUM 40 MG: 40 TABLET, FILM COATED ORAL at 20:15

## 2019-07-09 RX ADMIN — THEOPHYLLINE 600 MG: 400 TABLET, EXTENDED RELEASE ORAL at 09:18

## 2019-07-09 RX ADMIN — DIVALPROEX SODIUM 125 MG: 125 CAPSULE ORAL at 20:15

## 2019-07-09 RX ADMIN — DIVALPROEX SODIUM 125 MG: 125 CAPSULE ORAL at 09:19

## 2019-07-09 RX ADMIN — METOPROLOL SUCCINATE 50 MG: 50 TABLET, FILM COATED, EXTENDED RELEASE ORAL at 09:18

## 2019-07-09 RX ADMIN — ISOSORBIDE MONONITRATE 60 MG: 60 TABLET, EXTENDED RELEASE ORAL at 09:18

## 2019-07-09 RX ADMIN — FUROSEMIDE 20 MG: 20 TABLET ORAL at 09:19

## 2019-07-09 RX ADMIN — FUROSEMIDE 20 MG: 20 TABLET ORAL at 17:34

## 2019-07-09 RX ADMIN — APIXABAN 2.5 MG: 2.5 TABLET, FILM COATED ORAL at 20:15

## 2019-07-09 RX ADMIN — ASPIRIN 81 MG: 81 TABLET, CHEWABLE ORAL at 09:19

## 2019-07-09 NOTE — PROGRESS NOTES
Discharge Planning Assessment   [place]     Patient Name: Felice Aiken  MRN: [id number]  Today's Date: 7/9/2019    Admit Date: 7/4/2019      Discharge Plan     Row Name 07/09/19 1241       Plan    Plan SS spoke to Neela at [facility] who states pre-auth has been started. SS to follow.         Destination      Service Provider Request Status Selected Services Address Phone Number Fax Number    [facility] Pending - No Request Sent N/A [address] [facility] [id number] [phone]     Kelli Larsen

## 2019-07-09 NOTE — THERAPY TREATMENT NOTE
Acute Care - Occupational Therapy Treatment Note   Donny     Patient Name: Felice Aiken  : 1935  MRN: [id number]  Today's Date: 2019     Date of Referral to OT: 19  Referring Physician: Dr. Velázquez    Admit Date: 2019       ICD-10-CM ICD-9-CM   1. Failure to thrive in adult R62.7 783.7     Patient Active Problem List   Diagnosis   • Knee pain   • Arteriosclerotic cardiovascular disease (ASCVD) s/p stenting   • Sleep apnea   • Pain and swelling of lower extremity   • Persistent atrial fibrillation (CMS/HCC)   • History of ASCVD (atherosclerotic cardiovascular disease), s/p stenting   • PAD (peripheral artery disease), fem pop bypass,3/08   • Dyslipidemia   • Essential hypertension   • Chronic bronchitis (CMS/HCC)   • Chronic obstructive pulmonary disease (CMS/HCC)   • Status post total left knee replacement   • Neuropathy involving both lower extremities   • Chronic kidney disease, stage III (moderate) (CMS/HCC)   • Hematuria   • Shortness of breath   • Chronic systolic heart failure (CMS/HCC)   • Failure to thrive in adult     Past Medical History:   Diagnosis Date   • COPD (chronic obstructive pulmonary disease) (CMS/HCC)      Past Surgical History:   Procedure Laterality Date   • HAND SURGERY     • KNEE SURGERY         Therapy Treatment    Rehabilitation Treatment Summary     Row Name 19 4122             Treatment Time/Intention    Discipline  occupational therapist  -LM      Document Type  therapy note (daily note)  -LM      Subjective Information  no complaints  -LM      Mode of Treatment  occupational therapy  -LM      Patient Effort  adequate  -LM      Comment  Patient seen for light BUE aromt tasks, bedside.  -LM      Recorded by [LM] Gosia Whatley OT 19 1340      Row Name 19 8236             Cognitive Assessment/Intervention- PT/OT    Orientation Status (Cognition)  person;place  -LM      Follows Commands (Cognition)  follows one step commands;follows two step  commands  -LM      Recorded by [LM] Gosia Whatley OT 07/09/19 1340      Row Name 07/09/19 1336             Motor Skills Assessment/Interventions    Additional Documentation  Therapeutic Exercise (Group)  -LM      Recorded by [LM] Gosia Whatley, OT 07/09/19 1340      Row Name 07/09/19 1336             Therapeutic Exercise    Upper Extremity (Therapeutic Exercise)  other (see comments) light BUE arom tasks  -LM      Upper Extremity Range of Motion (Therapeutic Exercise)  shoulder flexion/extension, bilateral;shoulder abduction/adduction, bilateral;elbow flexion/extension, bilateral  -LM      Exercise Type (Therapeutic Exercise)  AROM (active range of motion)  -LM      Position (Therapeutic Exercise)  supine  -LM      Comment (Therapeutic Exercise)  Frequent rest breaks, light towel therex  -LM      Recorded by [LM] Gosia Whatley OT 07/09/19 1340      Row Name 07/09/19 1336             Positioning and Restraints    Post Treatment Position  bed  -LM      In Bed  encouraged to call for assist;call light within reach  -LM      Recorded by [LM] Gosia Whatley OT 07/09/19 1340      Row Name 07/09/19 1336             Plan of Care Review    Plan of Care Reviewed With  patient  -LM      Recorded by [LM] Gosia Whatley, OT 07/09/19 1340        User Key  (r) = Recorded By, (t) = Taken By, (c) = Cosigned By    Initials Name Effective Dates Discipline    LM Gosia Whatley, OT 03/14/16 -  OT             Occupational Therapy Education     Title: PT OT SLP Therapies (Done)     Topic: Occupational Therapy (Done)     Point: ADL training (Done)     Description: Instruct learner(s) on proper safety adaptation and remediation techniques during self care or transfers.   Instruct in proper use of assistive devices.    Learning Progress Summary           Patient Acceptance, E,D, Bed IU by LM at 7/9/2019  1:40 PM    Acceptance, E,TB, VU by DM at 7/9/2019 11:58 AM    Acceptance, E,D, Bed IU by LM at 7/8/2019  2:45 PM                                User Key     Initials Effective Dates Name Provider Type Discipline    DM 06/16/16 -  Ana Luisa Singer RN Registered Nurse Nurse    LM 03/14/16 -  Gosia Whatley, OT Occupational Therapist OT                OT Recommendation and Plan  Planned Therapy Interventions (OT Eval): activity tolerance training, BADL retraining, passive ROM/stretching, ROM/therapeutic exercise, strengthening exercise, transfer/mobility retraining  Therapy Frequency (OT Eval): (3-5 times week)  Plan of Care Review  Plan of Care Reviewed With: patient  Plan of Care Reviewed With: patient  Outcome Summary: Participated in light BUE arom activities.  Continue POC.  Outcome Measures     Row Name 07/08/19 1100             How much help from another person do you currently need...    Turning from your back to your side while in flat bed without using bedrails?  4  -AD      Moving from lying on back to sitting on the side of a flat bed without bedrails?  4  -AD      Moving to and from a bed to a chair (including a wheelchair)?  4  -AD      Standing up from a chair using your arms (e.g., wheelchair, bedside chair)?  4  -AD      Climbing 3-5 steps with a railing?  3  -AD      To walk in hospital room?  4  -AD      AM-PAC 6 Clicks Score (PT)  23  -AD         Functional Assessment    Outcome Measure Options  AM-PAC 6 Clicks Basic Mobility (PT)  -AD        User Key  (r) = Recorded By, (t) = Taken By, (c) = Cosigned By    Initials Name Provider Type    AD Dalton, Ashley Claudene, PT Physical Therapist           Time Calculation:   Time Calculation- OT     Row Name 07/09/19 1341             Time Calculation- OT    Total Timed Code Minutes- OT  15 minute(s)  -LM      OT Non-Billable Time (min)  5 min  -LM        User Key  (r) = Recorded By, (t) = Taken By, (c) = Cosigned By    Initials Name Provider Type    LM Gosia Whatley, OT Occupational Therapist        Therapy Charges for Today     Code Description Service Date Service Provider Modifiers Qty     [id number]  OT EVAL LOW COMPLEXITY 4 7/8/2019 Gosia Whatley, OT GO 1    [id number]  OT THERAPEUTIC ACT EA 15 MIN 7/9/2019 Gosia Whatley, OT GO 1               Gosia Whatley OT  7/9/2019

## 2019-07-09 NOTE — PLAN OF CARE
Problem: Patient Care Overview  Goal: Plan of Care Review  Outcome: Ongoing (interventions implemented as appropriate)   07/08/19 1445 07/08/19 2000   Coping/Psychosocial   Plan of Care Reviewed With --  patient   Plan of Care Review   Progress no change --    OTHER   Outcome Summary OT evaluation completed. Agreeable to POC. --      Goal: Discharge Needs Assessment  Outcome: Ongoing (interventions implemented as appropriate)   07/04/19 1953 07/05/19 1153   Disability   Equipment Currently Used at Home --  walker, rolling;wheelchair;oxygen  (Pt states having home oxygen, however the liter flow is unknown, a wheelchair and a rolling walker at home from unknown provider. )   Discharge Needs Assessment   Patient/Family Anticipates Transition to --  home with family   Patient/Family Anticipated Services at Transition none --    Transportation Anticipated --  family or friend will provide  (Pt states his spouse drives and will provide transportation at discharge.)     Goal: Interprofessional Rounds/Family Conf  Outcome: Ongoing (interventions implemented as appropriate)   07/07/19 2218   Interdisciplinary Rounds/Family Conf   Participants nursing;patient       Problem: Fall Risk (Adult)  Goal: Identify Related Risk Factors and Signs and Symptoms  Outcome: Ongoing (interventions implemented as appropriate)   07/04/19 2300   Fall Risk (Adult)   Related Risk Factors (Fall Risk) age-related changes;bladder function altered;fatigue/slow reaction;gait/mobility problems;environment unfamiliar   Signs and Symptoms (Fall Risk) presence of risk factors     Goal: Absence of Fall  Outcome: Ongoing (interventions implemented as appropriate)   07/08/19 0934   Fall Risk (Adult)   Absence of Fall making progress toward outcome       Problem: Skin Injury Risk (Adult)  Goal: Identify Related Risk Factors and Signs and Symptoms  Outcome: Ongoing (interventions implemented as appropriate)   07/04/19 2300   Skin Injury Risk (Adult)   Related  Risk Factors (Skin Injury Risk) advanced age;cognitive impairment     Goal: Skin Health and Integrity  Outcome: Ongoing (interventions implemented as appropriate)   07/08/19 0934   Skin Injury Risk (Adult)   Skin Health and Integrity making progress toward outcome       Problem: Pain, Chronic (Adult)  Goal: Identify Related Risk Factors and Signs and Symptoms  Outcome: Ongoing (interventions implemented as appropriate)   07/04/19 2300   Pain, Chronic (Adult)   Signs and Symptoms (Chronic Pain) verbalization of pain descriptors     Goal: Acceptable Pain/Comfort Level and Functional Ability  Outcome: Ongoing (interventions implemented as appropriate)   07/08/19 0934   Pain, Chronic (Adult)   Acceptable Pain/Comfort Level and Functional Ability making progress toward outcome       Problem: Pain, Acute (Adult)  Goal: Identify Related Risk Factors and Signs and Symptoms  Outcome: Ongoing (interventions implemented as appropriate)   07/04/19 2300   Pain, Acute (Adult)   Signs and Symptoms (Acute Pain) fatigue/weakness;verbalization of pain descriptors     Goal: Acceptable Pain Control/Comfort Level  Outcome: Ongoing (interventions implemented as appropriate)   07/08/19 0934   Pain, Acute (Adult)   Acceptable Pain Control/Comfort Level making progress toward outcome

## 2019-07-09 NOTE — PLAN OF CARE
Problem: Patient Care Overview  Goal: Plan of Care Review  Outcome: Ongoing (interventions implemented as appropriate)   07/08/19 1445 07/09/19 0750   Coping/Psychosocial   Plan of Care Reviewed With --  patient   Plan of Care Review   Progress no change --      Goal: Individualization and Mutuality  Outcome: Ongoing (interventions implemented as appropriate)    Goal: Discharge Needs Assessment  Outcome: Ongoing (interventions implemented as appropriate)   07/04/19 1953 07/05/19 1153   Disability   Equipment Currently Used at Home --  walker, rolling;wheelchair;oxygen  (Pt states having home oxygen, however the liter flow is unknown, a wheelchair and a rolling walker at home from unknown provider. )   Discharge Needs Assessment   Patient/Family Anticipated Services at Transition none --    Transportation Anticipated --  family or friend will provide  (Pt states his spouse drives and will provide transportation at discharge.)     Goal: Interprofessional Rounds/Family Conf  Outcome: Ongoing (interventions implemented as appropriate)   07/07/19 2218   Interdisciplinary Rounds/Family Conf   Participants nursing;patient       Problem: Fall Risk (Adult)  Goal: Identify Related Risk Factors and Signs and Symptoms  Outcome: Ongoing (interventions implemented as appropriate)   07/04/19 2300   Fall Risk (Adult)   Related Risk Factors (Fall Risk) age-related changes;bladder function altered;fatigue/slow reaction;gait/mobility problems;environment unfamiliar   Signs and Symptoms (Fall Risk) presence of risk factors     Goal: Absence of Fall  Outcome: Ongoing (interventions implemented as appropriate)   07/08/19 0934   Fall Risk (Adult)   Absence of Fall making progress toward outcome       Problem: Skin Injury Risk (Adult)  Goal: Identify Related Risk Factors and Signs and Symptoms  Outcome: Ongoing (interventions implemented as appropriate)   07/04/19 2300   Skin Injury Risk (Adult)   Related Risk Factors (Skin Injury Risk)  advanced age;cognitive impairment     Goal: Skin Health and Integrity  Outcome: Ongoing (interventions implemented as appropriate)   07/08/19 0934   Skin Injury Risk (Adult)   Skin Health and Integrity making progress toward outcome       Problem: Pain, Chronic (Adult)  Goal: Identify Related Risk Factors and Signs and Symptoms  Outcome: Ongoing (interventions implemented as appropriate)   07/04/19 2300   Pain, Chronic (Adult)   Signs and Symptoms (Chronic Pain) verbalization of pain descriptors     Goal: Acceptable Pain/Comfort Level and Functional Ability  Outcome: Ongoing (interventions implemented as appropriate)   07/08/19 0934   Pain, Chronic (Adult)   Acceptable Pain/Comfort Level and Functional Ability making progress toward outcome       Problem: Pain, Acute (Adult)  Goal: Identify Related Risk Factors and Signs and Symptoms  Outcome: Outcome(s) achieved Date Met: 07/09/19 07/04/19 2300   Pain, Acute (Adult)   Signs and Symptoms (Acute Pain) fatigue/weakness;verbalization of pain descriptors     Goal: Acceptable Pain Control/Comfort Level  Outcome: Ongoing (interventions implemented as appropriate)   07/08/19 0934   Pain, Acute (Adult)   Acceptable Pain Control/Comfort Level making progress toward outcome

## 2019-07-09 NOTE — PROGRESS NOTES
[facility]     Patient Identification:  Name:  Felice Aiken  Age:  84 y.o.  Sex:  male  :  1935  MRN:  [id number]  Visit Number:  [id number]  Primary Care Provider:  Rc Ojeda MD    Length of stay:  5    Subjective:   Patient seen and examined, no new events thus far, still pending placement    Chief Complaint: Knee pain  ----------------------------------------------------------------------------------------------------------------------  Current Hospital Meds:    apixaban 2.5 mg Oral Q12H   aspirin 81 mg Oral Daily   atorvastatin 40 mg Oral Nightly   Divalproex Sodium 125 mg Oral BID   furosemide 20 mg Oral BID   isosorbide mononitrate 60 mg Oral Daily   metoprolol succinate XL 50 mg Oral Q24H   sodium chloride 3 mL Intravenous Q12H   theophylline 600 mg Oral Daily        ----------------------------------------------------------------------------------------------------------------------  Vital Signs:  Temp:  [97.4 °F (36.3 °C)-98.5 °F (36.9 °C)] 97.4 °F (36.3 °C)  Heart Rate:  [79-97] 97  Resp:  [18-20] 20  BP: (100-128)/(60-86) 123/73       Tele: Atrial fibrillation rate of 82 bpm      19  1428 19  403   Weight: 54.4 kg (120 lb) 55.9 kg (123 lb 4 oz)     Body mass index is 17.68 kg/m².    Intake/Output Summary (Last 24 hours) at 2019 1153  Last data filed at 2019 0253  Gross per 24 hour   Intake 360 ml   Output 1800 ml   Net -1440 ml     Diet Regular; Cardiac  ----------------------------------------------------------------------------------------------------------------------  Physical exam:  General: Comfortable,awake, alert, oriented to self, place but not time.  He is pleasant,   No respiratory distress.    Skin:  Skin is warm and dry. No rash noted. No pallor.    HENT:  Head:  Normocephalic and atraumatic.  Mouth:  Moist mucous membranes.    Eyes:  Conjunctivae and EOM are normal.  Pupils are equal, round, and reactive to  light.  No scleral icterus.    Neck:  Neck supple.  No JVD present.    Pulmonary/Chest:  No respiratory distress, no wheezes, no crackles, with normal breath sounds and good air movement.  Cardiovascular:  Normal rate, irregular regular rhythm I could not hear any murmur or rub  Abdominal:  Soft.  Bowel sounds are normal.  No distension and no tenderness.   Extremities:  No edema, no tenderness, and no deformity.  No red or swollen joints anywhere.  Strong pulses in all 4 extremities with no clubbing, no cyanosis, left knee with surgical scar.  Neurological:  Motor strength equal no obvious deficit, sensory grossly intact.   No cranial nerve deficit.  No tongue deviation.  No facial droop.  No slurred speech.    Genitourinary: No Sue catheter     ----------------------------------------------------------------------------------------------------------------------  ----------------------------------------------------------------------------------------------------------------------  Results from last 7 days   Lab Units 07/05/19  0735 07/05/19  0144 07/04/19  1944  07/04/19  1504   CK TOTAL U/L  --  67 80  --  83   TROPONIN T ng/mL 0.050* 0.056* 0.050*   < > 0.051*    < > = values in this interval not displayed.     Results from last 7 days   Lab Units 07/05/19  0144 07/04/19  1606 07/04/19  1504   CRP mg/dL  --   --  0.20   LACTATE mmol/L  --  1.9  --    WBC 10*3/mm3 6.16  --  7.26   HEMOGLOBIN g/dL 10.7*  --  11.6*   HEMATOCRIT % 35.2*  --  37.2*   MCV fL 97.8*  --  96.9   MCHC g/dL 30.4*  --  31.2*   PLATELETS 10*3/mm3 182  --  193         Results from last 7 days   Lab Units 07/05/19  1626 07/05/19  0144 07/04/19  1944 07/04/19  1504   SODIUM mmol/L 141 145  --  142   POTASSIUM mmol/L 4.4 3.1*  --  3.0*   MAGNESIUM mg/dL  --   --  1.9  --    CHLORIDE mmol/L 107 107  --  105   CO2 mmol/L 26.9 30.1*  --  26.8   BUN mg/dL 7* 6*  --  7*   CREATININE mg/dL 0.72* 0.71*  --  0.79   EGFR IF NONAFRICN AM mL/min/1.73 105  106  --  94   CALCIUM mg/dL 8.4* 8.4*  --  9.0   GLUCOSE mg/dL 123* 100*  --  112*   ALBUMIN g/dL  --   --   --  3.48*   BILIRUBIN mg/dL  --   --   --  0.5   ALK PHOS U/L  --   --   --  62   AST (SGOT) U/L  --   --   --  18   ALT (SGPT) U/L  --   --   --  13   Estimated Creatinine Clearance: 56.3 mL/min (A) (by C-G formula based on SCr of 0.72 mg/dL (L)).    No results found for: AMMONIA      Blood Culture   Date Value Ref Range Status   07/04/2019 No growth at 3 days  Preliminary   07/04/2019 No growth at 3 days  Preliminary                I have personally looked at the labs and they are summarized above.    ----------------------------------------------------------------------------------------------------------------------      Assessment and Plan:    -Indeterminate troponin, patient is a symptomatic stable numbers  -Chronic persistent atrial fibrillation  -Chronic anticoagulation for stroke prophylaxis  -Chronic systolic heart failure ejection fraction 45 to 50% compensated  -Dementia with occasional behavioral disturbances  -Coronary artery disease status post stent placement  -Peripheral arterial disease status post bilateral femoral-popliteal bypass  -History of COPD  -Status post left total knee arthroplasty  -Right knee osteoarthritis    --awaiting nursing home placement will continue the current therapy for now, discussed with the       Yury Lopez MD  07/09/19  11:53 AM

## 2019-07-09 NOTE — PLAN OF CARE
Problem: Patient Care Overview  Goal: Plan of Care Review  Outcome: Ongoing (interventions implemented as appropriate)   07/09/19 1341   Coping/Psychosocial   Plan of Care Reviewed With patient   Plan of Care Review   Progress improving   OTHER   Outcome Summary Participated in light BUE arom activities. Continue POC.

## 2019-07-10 PROCEDURE — 97530 THERAPEUTIC ACTIVITIES: CPT

## 2019-07-10 PROCEDURE — 99231 SBSQ HOSP IP/OBS SF/LOW 25: CPT | Performed by: INTERNAL MEDICINE

## 2019-07-10 RX ADMIN — SODIUM CHLORIDE, PRESERVATIVE FREE 3 ML: 5 INJECTION INTRAVENOUS at 08:32

## 2019-07-10 RX ADMIN — APIXABAN 2.5 MG: 2.5 TABLET, FILM COATED ORAL at 22:00

## 2019-07-10 RX ADMIN — METOPROLOL SUCCINATE 50 MG: 50 TABLET, FILM COATED, EXTENDED RELEASE ORAL at 08:33

## 2019-07-10 RX ADMIN — ASPIRIN 81 MG: 81 TABLET, CHEWABLE ORAL at 08:33

## 2019-07-10 RX ADMIN — FUROSEMIDE 20 MG: 20 TABLET ORAL at 08:34

## 2019-07-10 RX ADMIN — THEOPHYLLINE 600 MG: 400 TABLET, EXTENDED RELEASE ORAL at 08:33

## 2019-07-10 RX ADMIN — DIVALPROEX SODIUM 125 MG: 125 CAPSULE ORAL at 08:33

## 2019-07-10 RX ADMIN — ISOSORBIDE MONONITRATE 60 MG: 60 TABLET, EXTENDED RELEASE ORAL at 08:33

## 2019-07-10 RX ADMIN — ATORVASTATIN CALCIUM 40 MG: 40 TABLET, FILM COATED ORAL at 22:00

## 2019-07-10 RX ADMIN — DIVALPROEX SODIUM 125 MG: 125 CAPSULE ORAL at 23:00

## 2019-07-10 RX ADMIN — APIXABAN 2.5 MG: 2.5 TABLET, FILM COATED ORAL at 08:33

## 2019-07-10 NOTE — PROGRESS NOTES
[facility]     Patient Identification:  Name:  Felice Aiken  Age:  84 y.o.  Sex:  male  :  1935  MRN:  [id number]  Visit Number:  [id number]  Primary Care Provider:  Rc Ojeda MD    Length of stay:  6    Subjective:   Patient seen and examined, no new events thus far, still pending placement again    Chief Complaint: Knee pain  ----------------------------------------------------------------------------------------------------------------------  Current Hospital Meds:    apixaban 2.5 mg Oral Q12H   aspirin 81 mg Oral Daily   atorvastatin 40 mg Oral Nightly   Divalproex Sodium 125 mg Oral BID   furosemide 20 mg Oral BID   isosorbide mononitrate 60 mg Oral Daily   metoprolol succinate XL 50 mg Oral Q24H   sodium chloride 3 mL Intravenous Q12H   theophylline 600 mg Oral Daily        ----------------------------------------------------------------------------------------------------------------------  Vital Signs:  Temp:  [97.5 °F (36.4 °C)-98.5 °F (36.9 °C)] 97.8 °F (36.6 °C)  Heart Rate:  [] 79  Resp:  [18-20] 20  BP: ()/(50-79) 105/73       Tele: Atrial fibrillation rate of 82 bpm      19  1428 07/041914   Weight: 54.4 kg (120 lb) 55.9 kg (123 lb 4 oz)     Body mass index is 17.68 kg/m².    Intake/Output Summary (Last 24 hours) at 7/10/2019 124  Last data filed at 7/10/2019 1200  Gross per 24 hour   Intake 480 ml   Output 300 ml   Net 180 ml     Diet Regular; Cardiac  ----------------------------------------------------------------------------------------------------------------------  Physical exam:  General: Comfortable,awake, alert, oriented to self, place but not time.     Skin:  Skin is warm and dry. No rash noted. No pallor.    HENT:  Head:  Normocephalic and atraumatic.  Mouth:  Moist mucous membranes.    Eyes:  Conjunctivae and EOM are normal.  Pupils are equal, round, and reactive to light.  No scleral icterus.    Neck:   Neck supple.  No JVD present.    Pulmonary/Chest:  No respiratory distress, no wheezes, no crackles, with normal breath sounds and good air movement.  Cardiovascular:  Normal rate, irregular regular rhythm I could not hear any murmur or rub  Abdominal:  Soft.  Bowel sounds are normal.  No distension and no tenderness.   Extremities:  No edema, no tenderness, and no deformity.  No red or swollen joints anywhere.  Strong pulses in all 4 extremities with no clubbing, no cyanosis, left knee with surgical scar.  Neurological:  stable as yesterday   ----------------------------------------------------------------------------------------------------------------------  ----------------------------------------------------------------------------------------------------------------------  Results from last 7 days   Lab Units 07/05/19  0735 07/05/19  0144 07/04/19  1944  07/04/19  1504   CK TOTAL U/L  --  67 80  --  83   TROPONIN T ng/mL 0.050* 0.056* 0.050*   < > 0.051*    < > = values in this interval not displayed.     Results from last 7 days   Lab Units 07/05/19  0144 07/04/19  1606 07/04/19  1504   CRP mg/dL  --   --  0.20   LACTATE mmol/L  --  1.9  --    WBC 10*3/mm3 6.16  --  7.26   HEMOGLOBIN g/dL 10.7*  --  11.6*   HEMATOCRIT % 35.2*  --  37.2*   MCV fL 97.8*  --  96.9   MCHC g/dL 30.4*  --  31.2*   PLATELETS 10*3/mm3 182  --  193         Results from last 7 days   Lab Units 07/05/19  1626 07/05/19  0144 07/04/19  1944 07/04/19  1504   SODIUM mmol/L 141 145  --  142   POTASSIUM mmol/L 4.4 3.1*  --  3.0*   MAGNESIUM mg/dL  --   --  1.9  --    CHLORIDE mmol/L 107 107  --  105   CO2 mmol/L 26.9 30.1*  --  26.8   BUN mg/dL 7* 6*  --  7*   CREATININE mg/dL 0.72* 0.71*  --  0.79   EGFR IF NONAFRICN AM mL/min/1.73 105 106  --  94   CALCIUM mg/dL 8.4* 8.4*  --  9.0   GLUCOSE mg/dL 123* 100*  --  112*   ALBUMIN g/dL  --   --   --  3.48*   BILIRUBIN mg/dL  --   --   --  0.5   ALK PHOS U/L  --   --   --  62   AST (SGOT) U/L   --   --   --  18   ALT (SGPT) U/L  --   --   --  13   Estimated Creatinine Clearance: 56.3 mL/min (A) (by C-G formula based on SCr of 0.72 mg/dL (L)).    No results found for: AMMONIA      Blood Culture   Date Value Ref Range Status   07/04/2019 No growth at 3 days  Preliminary   07/04/2019 No growth at 3 days  Preliminary                I have personally looked at the labs and they are summarized above.    ----------------------------------------------------------------------------------------------------------------------      Assessment and Plan:    -Indeterminate troponin, patient is a symptomatic stable numbers  -Chronic persistent atrial fibrillation  -Chronic anticoagulation for stroke prophylaxis  -Chronic systolic heart failure ejection fraction 45 to 50% compensated  -Dementia with occasional behavioral disturbances  -Coronary artery disease status post stent placement  -Peripheral arterial disease status post bilateral femoral-popliteal bypass  -History of COPD  -Status post left total knee arthroplasty  -Right knee osteoarthritis    --awaiting nursing home placement will continue the current therapy for now, discussed with the  again today      Yury Lopez MD  07/10/19  12:49 PM

## 2019-07-10 NOTE — PLAN OF CARE
Problem: Patient Care Overview  Goal: Plan of Care Review  Outcome: Ongoing (interventions implemented as appropriate)   07/09/19 1341 07/09/19 2000   Coping/Psychosocial   Plan of Care Reviewed With --  patient   Plan of Care Review   Progress improving --    OTHER   Outcome Summary Participated in light BUE arom activities. Continue POC. --      Goal: Discharge Needs Assessment  Outcome: Ongoing (interventions implemented as appropriate)   07/04/19 1953 07/05/19 1153   Disability   Equipment Currently Used at Home --  walker, rolling;wheelchair;oxygen  (Pt states having home oxygen, however the liter flow is unknown, a wheelchair and a rolling walker at home from unknown provider. )   Discharge Needs Assessment   Patient/Family Anticipates Transition to --  home with family   Patient/Family Anticipated Services at Transition none --    Transportation Anticipated --  family or friend will provide  (Pt states his spouse drives and will provide transportation at discharge.)     Goal: Interprofessional Rounds/Family Conf  Outcome: Ongoing (interventions implemented as appropriate)   07/07/19 2218   Interdisciplinary Rounds/Family Conf   Participants nursing;patient       Problem: Fall Risk (Adult)  Goal: Identify Related Risk Factors and Signs and Symptoms  Outcome: Ongoing (interventions implemented as appropriate)   07/04/19 2300   Fall Risk (Adult)   Related Risk Factors (Fall Risk) age-related changes;bladder function altered;fatigue/slow reaction;gait/mobility problems;environment unfamiliar   Signs and Symptoms (Fall Risk) presence of risk factors     Goal: Absence of Fall  Outcome: Ongoing (interventions implemented as appropriate)   07/08/19 0934   Fall Risk (Adult)   Absence of Fall making progress toward outcome       Problem: Skin Injury Risk (Adult)  Goal: Identify Related Risk Factors and Signs and Symptoms  Outcome: Ongoing (interventions implemented as appropriate)   07/04/19 2300   Skin Injury Risk  (Adult)   Related Risk Factors (Skin Injury Risk) advanced age;cognitive impairment     Goal: Skin Health and Integrity  Outcome: Ongoing (interventions implemented as appropriate)   07/08/19 0934   Skin Injury Risk (Adult)   Skin Health and Integrity making progress toward outcome       Problem: Pain, Chronic (Adult)  Goal: Identify Related Risk Factors and Signs and Symptoms  Outcome: Ongoing (interventions implemented as appropriate)   07/04/19 2300   Pain, Chronic (Adult)   Signs and Symptoms (Chronic Pain) verbalization of pain descriptors     Goal: Acceptable Pain/Comfort Level and Functional Ability  Outcome: Ongoing (interventions implemented as appropriate)   07/08/19 0934   Pain, Chronic (Adult)   Acceptable Pain/Comfort Level and Functional Ability making progress toward outcome       Problem: Pain, Acute (Adult)  Goal: Acceptable Pain Control/Comfort Level  Outcome: Ongoing (interventions implemented as appropriate)   07/08/19 0934   Pain, Acute (Adult)   Acceptable Pain Control/Comfort Level making progress toward outcome

## 2019-07-10 NOTE — PROGRESS NOTES
Discharge Planning Assessment   Donny     Patient Name: Felice Aiken  MRN: [id number]  Today's Date: 7/10/2019    Admit Date: 7/4/2019    Discharge Plan     Row Name 07/10/19 1008       Plan    Plan SS attempted contact with Neela and Sury at [facility] and left message. SS to follow.     11:15 SS spoke to Sury at [facility] who states Neela will call SS back. SS to follow.     16:08 SS received call from Sury at [facility] who states insurance requested additional clinicals and additional clinicals have been submitted to insurance. SS to follow.         Destination      Service Provider Request Status Selected Services Address Phone Number Fax Number    [facility] Pending - No Request Sent N/A [address] [facility] [id number] [phone]     Kelli Larsen

## 2019-07-10 NOTE — THERAPY TREATMENT NOTE
Acute Care - Occupational Therapy Treatment Note   Donny     Patient Name: Felice Aiken  : 1935  MRN: [id number]  Today's Date: 7/10/2019     Date of Referral to OT: 19  Referring Physician: Dr. Velázquez    Admit Date: 2019       ICD-10-CM ICD-9-CM   1. Failure to thrive in adult R62.7 783.7     Patient Active Problem List   Diagnosis   • Knee pain   • Arteriosclerotic cardiovascular disease (ASCVD) s/p stenting   • Sleep apnea   • Pain and swelling of lower extremity   • Persistent atrial fibrillation (CMS/HCC)   • History of ASCVD (atherosclerotic cardiovascular disease), s/p stenting   • PAD (peripheral artery disease), fem pop bypass,3/08   • Dyslipidemia   • Essential hypertension   • Chronic bronchitis (CMS/HCC)   • Chronic obstructive pulmonary disease (CMS/HCC)   • Status post total left knee replacement   • Neuropathy involving both lower extremities   • Chronic kidney disease, stage III (moderate) (CMS/HCC)   • Hematuria   • Shortness of breath   • Chronic systolic heart failure (CMS/HCC)   • Failure to thrive in adult     Past Medical History:   Diagnosis Date   • COPD (chronic obstructive pulmonary disease) (CMS/HCC)      Past Surgical History:   Procedure Laterality Date   • HAND SURGERY     • KNEE SURGERY         Therapy Treatment    Rehabilitation Treatment Summary     Row Name 07/10/19 153             Treatment Time/Intention    Discipline  occupational therapist  -LM      Document Type  therapy note (daily note)  -LM      Subjective Information  no complaints  -LM      Mode of Treatment  occupational therapy  -LM      Patient Effort  adequate  -LM      Comment  light BADL tasks/retraining bedside.  -LM      Recorded by [LM] Gosia Whatley OT 07/10/19 1536      Row Name 07/10/19 1537             Cognitive Assessment/Intervention- PT/OT    Orientation Status (Cognition)  oriented to;person;place  -LM      Follows Commands (Cognition)  follows one step commands;follows two step  commands  -LM      Recorded by [LM] Gosia Whatley OT 07/10/19 1536      Row Name 07/10/19 1532             ADL Assessment/Intervention    BADL Assessment/Intervention  feeding;grooming  -LM      Recorded by [LM] Gosia Whatley, OT 07/10/19 1536      Row Name 07/10/19 1532             Grooming Assessment/Training    Dante Level (Grooming)  minimum assist (75% patient effort);moderate assist (50% patient effort);hair care, combing/brushing  -LM      Grooming Position  sitting up in bed  -LM      Recorded by [LM] Gosia Whatley OT 07/10/19 1536      Row Name 07/10/19 1532             Self-Feeding Assessment/Training    Dante Level (Feeding)  set up  -LM      Self-Feeding Assess/Train, Spillage Amount  minimal  -LM      Position (Self-Feeding)  sitting up in bed  -LM      Recorded by [LM] Gosia Whatley OT 07/10/19 1536      Row Name 07/10/19 1532             Positioning and Restraints    Post Treatment Position  bed  -LM      In Bed  encouraged to call for assist;call light within reach  -LM      Recorded by [LM] Gosia Whatley OT 07/10/19 1536      Row Name 07/10/19 1532             Plan of Care Review    Plan of Care Reviewed With  patient  -LM      Recorded by [LM] Gosia Whatley OT 07/10/19 1536      Row Name 07/10/19 1532             Outcome Summary/Treatment Plan (SLP)    Daily Summary of Progress (SLP)  progress towards functional goals is fair  -LM      Recorded by [LM] Gosia Whatley OT 07/10/19 1536        User Key  (r) = Recorded By, (t) = Taken By, (c) = Cosigned By    Initials Name Effective Dates Discipline    LM Gosia Whatley, OT 03/14/16 -  OT             Occupational Therapy Education     Title: PT OT SLP Therapies (Done)     Topic: Occupational Therapy (Done)     Point: ADL training (Done)     Description: Instruct learner(s) on proper safety adaptation and remediation techniques during self care or transfers.   Instruct in proper use of assistive devices.    Learning Progress  Summary           Patient Acceptance, E,D, VU,DU by LM at 7/10/2019  3:36 PM    Acceptance, E,TB, VU by DM at 7/10/2019 11:06 AM    Acceptance, E,D, Bed IU by LM at 7/9/2019  1:40 PM    Acceptance, E,TB, VU by DM at 7/9/2019 11:58 AM    Acceptance, E,D, Bed IU by LM at 7/8/2019  2:45 PM                               User Key     Initials Effective Dates Name Provider Type Discipline    DM 06/16/16 -  Ana Luisa Singer, RN Registered Nurse Nurse     03/14/16 -  Gosia Whatley, OT Occupational Therapist OT                OT Recommendation and Plan  Planned Therapy Interventions (OT Eval): activity tolerance training, BADL retraining, passive ROM/stretching, ROM/therapeutic exercise, strengthening exercise, transfer/mobility retraining  Therapy Frequency (OT Eval): (3-5 times week)  Plan of Care Review  Plan of Care Reviewed With: patient  Plan of Care Reviewed With: patient  Outcome Summary: Continue POC.  Outcome Measures     Row Name 07/08/19 1100             How much help from another person do you currently need...    Turning from your back to your side while in flat bed without using bedrails?  4  -AD      Moving from lying on back to sitting on the side of a flat bed without bedrails?  4  -AD      Moving to and from a bed to a chair (including a wheelchair)?  4  -AD      Standing up from a chair using your arms (e.g., wheelchair, bedside chair)?  4  -AD      Climbing 3-5 steps with a railing?  3  -AD      To walk in hospital room?  4  -AD      AM-PAC 6 Clicks Score (PT)  23  -AD         Functional Assessment    Outcome Measure Options  AM-PAC 6 Clicks Basic Mobility (PT)  -AD        User Key  (r) = Recorded By, (t) = Taken By, (c) = Cosigned By    Initials Name Provider Type    AD Dalton, Ashley Claudene, PT Physical Therapist           Time Calculation:   Time Calculation- OT     Row Name 07/10/19 1537             Time Calculation- OT    Total Timed Code Minutes- OT  15 minute(s)  -      OT Non-Billable Time  (min)  5 min  -LM        User Key  (r) = Recorded By, (t) = Taken By, (c) = Cosigned By    Initials Name Provider Type    LM Gosia Whatley, OT Occupational Therapist        Therapy Charges for Today     Code Description Service Date Service Provider Modifiers Qty    62448288047 HC OT THERAPEUTIC ACT EA 15 MIN 7/9/2019 Gosia Whatley, OT GO 1    32910179557 HC OT THERAPEUTIC ACT EA 15 MIN 7/10/2019 Gosia Whatley, OT GO 1               Gosia Whatley OT  7/10/2019

## 2019-07-10 NOTE — PLAN OF CARE
Problem: Patient Care Overview  Goal: Plan of Care Review  Outcome: Ongoing (interventions implemented as appropriate)   07/10/19 1530   Coping/Psychosocial   Plan of Care Reviewed With patient   Plan of Care Review   Progress improving   OTHER   Outcome Summary Continue POC.

## 2019-07-10 NOTE — PLAN OF CARE
Problem: Patient Care Overview  Goal: Plan of Care Review  Outcome: Ongoing (interventions implemented as appropriate)   07/09/19 1341 07/10/19 0730   Coping/Psychosocial   Plan of Care Reviewed With --  patient   Plan of Care Review   Progress improving --      Goal: Individualization and Mutuality  Outcome: Ongoing (interventions implemented as appropriate)    Goal: Discharge Needs Assessment  Outcome: Ongoing (interventions implemented as appropriate)   07/04/19 1953 07/05/19 1153   Disability   Equipment Currently Used at Home --  walker, rolling;wheelchair;oxygen  (Pt states having home oxygen, however the liter flow is unknown, a wheelchair and a rolling walker at home from unknown provider. )   Discharge Needs Assessment   Patient/Family Anticipates Transition to --  home with family   Patient/Family Anticipated Services at Transition none --    Transportation Anticipated --  family or friend will provide  (Pt states his spouse drives and will provide transportation at discharge.)     Goal: Interprofessional Rounds/Family Conf  Outcome: Ongoing (interventions implemented as appropriate)   07/07/19 2218   Interdisciplinary Rounds/Family Conf   Participants nursing;patient       Problem: Fall Risk (Adult)  Goal: Identify Related Risk Factors and Signs and Symptoms  Outcome: Ongoing (interventions implemented as appropriate)   07/04/19 2300   Fall Risk (Adult)   Related Risk Factors (Fall Risk) age-related changes;bladder function altered;fatigue/slow reaction;gait/mobility problems;environment unfamiliar   Signs and Symptoms (Fall Risk) presence of risk factors     Goal: Absence of Fall  Outcome: Ongoing (interventions implemented as appropriate)   07/08/19 0934   Fall Risk (Adult)   Absence of Fall making progress toward outcome       Problem: Skin Injury Risk (Adult)  Goal: Identify Related Risk Factors and Signs and Symptoms  Outcome: Ongoing (interventions implemented as appropriate)   07/04/19 2300   Skin  Injury Risk (Adult)   Related Risk Factors (Skin Injury Risk) advanced age;cognitive impairment     Goal: Skin Health and Integrity  Outcome: Ongoing (interventions implemented as appropriate)   07/08/19 0934   Skin Injury Risk (Adult)   Skin Health and Integrity making progress toward outcome       Problem: Pain, Chronic (Adult)  Goal: Identify Related Risk Factors and Signs and Symptoms  Outcome: Ongoing (interventions implemented as appropriate)   07/04/19 2300   Pain, Chronic (Adult)   Signs and Symptoms (Chronic Pain) verbalization of pain descriptors     Goal: Acceptable Pain/Comfort Level and Functional Ability  Outcome: Ongoing (interventions implemented as appropriate)   07/08/19 0934   Pain, Chronic (Adult)   Acceptable Pain/Comfort Level and Functional Ability making progress toward outcome       Problem: Pain, Acute (Adult)  Goal: Acceptable Pain Control/Comfort Level  Outcome: Ongoing (interventions implemented as appropriate)   07/08/19 0934   Pain, Acute (Adult)   Acceptable Pain Control/Comfort Level making progress toward outcome      07/08/19 0934   Pain, Acute (Adult)   Acceptable Pain Control/Comfort Level making progress toward outcome

## 2019-07-10 NOTE — PROGRESS NOTES
Malnutrition Severity Assessment    Patient Name:  Felice Aiken  YOB: 1935  MRN: [id number]  Admit Date:  7/4/2019    Patient meets criteria for : Severe Malnutrition    Comments:  If Md agrees with findings please cosign.     Unintentional wt loss of >10%     Malnutrition Severity Assessment  Malnutrition Type: Chronic Disease - Related Malnutrition     Malnutrition Type (last 8 hours)      Malnutrition Severity Assessment     Row Name 07/10/19 1832       Malnutrition Severity Assessment    Malnutrition Type  Chronic Disease - Related Malnutrition    Row Name 07/10/19 1832       Insufficient Energy Intake     Insufficient Energy Intake   < or equal to 50% of est. energy requirement for > or equal to 5d)    Row Name 07/10/19 1832       Muscle Loss    Needles Region  Moderate - slight depression    Clavicle Bone Region  Severe - protruding prominent bone    Acromion Bone Region  Severe - squared shoulders, bones, and acromion process protrusion prominent    Scapular Bone Region  Severe - prominent bones, depressions easily visible between ribs, scapula, spine, shoulders    Patellar Region  Severe - prominent bone, square looking, very little muscle definition    Row Name 07/10/19 1832       Fat Loss    Orbital Region   Severe - pronounced hollowness/depression, dark circles, loose saggy skin    Upper Arm Region  Severe - mostly skin, very little space between folds, fingers touch    Thoracic & Lumbar Region  Moderate - ribs visible with mild depressions, iliac crest somewhat prominent    Row Name 07/10/19 1832       Criteria Met (Must meet criteria for severity in at least 2 of these categories: M Wasting, Fat Loss, Fluid, Secondary Signs, Wt. Status, Intake)    Patient meets criteria for   Severe Malnutrition          Electronically signed by:  Kathie Montes RD  07/10/19 6:41 PM

## 2019-07-11 PROCEDURE — 94799 UNLISTED PULMONARY SVC/PX: CPT

## 2019-07-11 PROCEDURE — 97530 THERAPEUTIC ACTIVITIES: CPT

## 2019-07-11 PROCEDURE — 99231 SBSQ HOSP IP/OBS SF/LOW 25: CPT | Performed by: INTERNAL MEDICINE

## 2019-07-11 RX ADMIN — APIXABAN 2.5 MG: 2.5 TABLET, FILM COATED ORAL at 21:12

## 2019-07-11 RX ADMIN — FUROSEMIDE 20 MG: 20 TABLET ORAL at 08:29

## 2019-07-11 RX ADMIN — DIVALPROEX SODIUM 125 MG: 125 CAPSULE ORAL at 21:12

## 2019-07-11 RX ADMIN — DIVALPROEX SODIUM 125 MG: 125 CAPSULE ORAL at 08:33

## 2019-07-11 RX ADMIN — THEOPHYLLINE 600 MG: 400 TABLET, EXTENDED RELEASE ORAL at 08:33

## 2019-07-11 RX ADMIN — SODIUM CHLORIDE, PRESERVATIVE FREE 3 ML: 5 INJECTION INTRAVENOUS at 00:18

## 2019-07-11 RX ADMIN — SODIUM CHLORIDE, PRESERVATIVE FREE 3 ML: 5 INJECTION INTRAVENOUS at 08:33

## 2019-07-11 RX ADMIN — APIXABAN 2.5 MG: 2.5 TABLET, FILM COATED ORAL at 08:33

## 2019-07-11 RX ADMIN — SODIUM CHLORIDE, PRESERVATIVE FREE 3 ML: 5 INJECTION INTRAVENOUS at 21:13

## 2019-07-11 RX ADMIN — METOPROLOL SUCCINATE 50 MG: 50 TABLET, FILM COATED, EXTENDED RELEASE ORAL at 08:29

## 2019-07-11 RX ADMIN — ASPIRIN 81 MG: 81 TABLET, CHEWABLE ORAL at 08:32

## 2019-07-11 RX ADMIN — ISOSORBIDE MONONITRATE 60 MG: 60 TABLET, EXTENDED RELEASE ORAL at 08:33

## 2019-07-11 RX ADMIN — ATORVASTATIN CALCIUM 40 MG: 40 TABLET, FILM COATED ORAL at 21:12

## 2019-07-11 RX ADMIN — FUROSEMIDE 20 MG: 20 TABLET ORAL at 17:17

## 2019-07-11 NOTE — THERAPY TREATMENT NOTE
Acute Care - Occupational Therapy Treatment Note   Donny     Patient Name: Felice Aiken  : 1935  MRN: [id number]  Today's Date: 2019     Date of Referral to OT: 19  Referring Physician: Dr. Velázquez    Admit Date: 2019       ICD-10-CM ICD-9-CM   1. Failure to thrive in adult R62.7 783.7     Patient Active Problem List   Diagnosis   • Knee pain   • Arteriosclerotic cardiovascular disease (ASCVD) s/p stenting   • Sleep apnea   • Pain and swelling of lower extremity   • Persistent atrial fibrillation (CMS/HCC)   • History of ASCVD (atherosclerotic cardiovascular disease), s/p stenting   • PAD (peripheral artery disease), fem pop bypass,3/08   • Dyslipidemia   • Essential hypertension   • Chronic bronchitis (CMS/HCC)   • Chronic obstructive pulmonary disease (CMS/HCC)   • Status post total left knee replacement   • Neuropathy involving both lower extremities   • Chronic kidney disease, stage III (moderate) (CMS/HCC)   • Hematuria   • Shortness of breath   • Chronic systolic heart failure (CMS/HCC)   • Failure to thrive in adult     Past Medical History:   Diagnosis Date   • COPD (chronic obstructive pulmonary disease) (CMS/HCC)      Past Surgical History:   Procedure Laterality Date   • HAND SURGERY     • KNEE SURGERY         Therapy Treatment    Rehabilitation Treatment Summary     Row Name 19 973             Treatment Time/Intention    Discipline  occupational therapist  -LM      Document Type  therapy note (daily note)  -LM      Subjective Information  no complaints  -LM      Mode of Treatment  occupational therapy  -LM      Comment  light BUE arom tasks, towel therex  -LM      Existing Precautions/Restrictions  fall  -LM      Recorded by [LM] Gosia Whatley OT 19 1701      Row Name 19 9258             Cognitive Assessment/Intervention- PT/OT    Orientation Status (Cognition)  oriented x 3  -LM      Follows Commands (Cognition)  follows one step commands;follows two step  commands  -LM      Recorded by [LM] Gosia Whatley OT 07/11/19 1701      Row Name 07/11/19 1657             Therapeutic Exercise    Upper Extremity (Therapeutic Exercise)  other (see comments) towel therex  -LM      Upper Extremity Range of Motion (Therapeutic Exercise)  shoulder flexion/extension, bilateral;shoulder abduction/adduction, bilateral;elbow flexion/extension, bilateral  -LM      Exercise Type (Therapeutic Exercise)  AROM (active range of motion)  -LM      Position (Therapeutic Exercise)  supine  -LM      Comment (Therapeutic Exercise)  frequent rest breaks  -LM      Recorded by [LM] Gosia Whatley OT 07/11/19 1701      Row Name 07/11/19 1657             Positioning and Restraints    Post Treatment Position  bed  -LM      In Bed  call light within reach;encouraged to call for assist  -LM      Recorded by [LM] Gosia Whatley OT 07/11/19 1701      Row Name 07/11/19 1657             Plan of Care Review    Plan of Care Reviewed With  patient  -LM      Recorded by [LM] Gosia Whatley OT 07/11/19 1701        User Key  (r) = Recorded By, (t) = Taken By, (c) = Cosigned By    Initials Name Effective Dates Discipline    LM Gosia Whatley, OT 03/14/16 -  OT             Occupational Therapy Education     Title: PT OT SLP Therapies (Done)     Topic: Occupational Therapy (Done)     Point: ADL training (Done)     Description: Instruct learner(s) on proper safety adaptation and remediation techniques during self care or transfers.   Instruct in proper use of assistive devices.    Learning Progress Summary           Patient Acceptance, E,D, Bed IU by LM at 7/11/2019  5:01 PM    Acceptance, E,TB, VU by DM at 7/11/2019 10:17 AM    Acceptance, E,D, VU,DU by LM at 7/10/2019  3:36 PM    Acceptance, E,TB, VU by DM at 7/10/2019 11:06 AM    Acceptance, E,D, Bed IU by LM at 7/9/2019  1:40 PM    Acceptance, E,TB, VU by DM at 7/9/2019 11:58 AM    Acceptance, E,D, Bed IU by LM at 7/8/2019  2:45 PM                                User Key     Initials Effective Dates Name Provider Type Discipline    DM 06/16/16 -  Ana Luisa Singer RN Registered Nurse Nurse     03/14/16 -  Gosia Whatley, OT Occupational Therapist OT                OT Recommendation and Plan  Planned Therapy Interventions (OT Eval): activity tolerance training, BADL retraining, passive ROM/stretching, ROM/therapeutic exercise, strengthening exercise, transfer/mobility retraining  Therapy Frequency (OT Eval): (3-5 times week)  Plan of Care Review  Plan of Care Reviewed With: patient  Plan of Care Reviewed With: patient  Outcome Summary: Light BUE AROM bedside.  Continue POC.       Time Calculation:   Time Calculation- OT     Row Name 07/11/19 1702             Time Calculation- OT    Total Timed Code Minutes- OT  15 minute(s)  -LM      OT Non-Billable Time (min)  5 min  -LM        User Key  (r) = Recorded By, (t) = Taken By, (c) = Cosigned By    Initials Name Provider Type    LM Gosia Whatley, OT Occupational Therapist        Therapy Charges for Today     Code Description Service Date Service Provider Modifiers Qty    60662186180 HC OT THERAPEUTIC ACT EA 15 MIN 7/10/2019 Gosia Whatley OT GO 1    90720999947 HC OT THERAPEUTIC ACT EA 15 MIN 7/11/2019 Gosia Whatley OT GO 1               Gosia Whatley OT  7/11/2019

## 2019-07-11 NOTE — PLAN OF CARE
Problem: Patient Care Overview  Goal: Plan of Care Review  Outcome: Ongoing (interventions implemented as appropriate)      Problem: Fall Risk (Adult)  Goal: Identify Related Risk Factors and Signs and Symptoms  Outcome: Ongoing (interventions implemented as appropriate)    Goal: Absence of Fall  Outcome: Ongoing (interventions implemented as appropriate)      Problem: Pain, Chronic (Adult)  Goal: Identify Related Risk Factors and Signs and Symptoms  Outcome: Ongoing (interventions implemented as appropriate)    Goal: Acceptable Pain/Comfort Level and Functional Ability  Outcome: Ongoing (interventions implemented as appropriate)      Problem: Pain, Acute (Adult)  Goal: Acceptable Pain Control/Comfort Level  Outcome: Ongoing (interventions implemented as appropriate)

## 2019-07-11 NOTE — PLAN OF CARE
Problem: Patient Care Overview  Goal: Plan of Care Review  Outcome: Ongoing (interventions implemented as appropriate)   07/10/19 1537 07/10/19 2130   Coping/Psychosocial   Plan of Care Reviewed With --  patient   Plan of Care Review   Progress improving --    OTHER   Outcome Summary Continue POC. --      Goal: Individualization and Mutuality  Outcome: Ongoing (interventions implemented as appropriate)    Goal: Discharge Needs Assessment  Outcome: Ongoing (interventions implemented as appropriate)   07/04/19 1953 07/05/19 1153   Disability   Equipment Currently Used at Home --  walker, rolling;wheelchair;oxygen  (Pt states having home oxygen, however the liter flow is unknown, a wheelchair and a rolling walker at home from unknown provider. )   Discharge Needs Assessment   Patient/Family Anticipates Transition to --  home with family   Patient/Family Anticipated Services at Transition none --    Transportation Anticipated --  family or friend will provide  (Pt states his spouse drives and will provide transportation at discharge.)     Goal: Interprofessional Rounds/Family Conf  Outcome: Ongoing (interventions implemented as appropriate)   07/07/19 2218   Interdisciplinary Rounds/Family Conf   Participants nursing;patient       Problem: Fall Risk (Adult)  Goal: Identify Related Risk Factors and Signs and Symptoms  Outcome: Ongoing (interventions implemented as appropriate)   07/04/19 2300   Fall Risk (Adult)   Related Risk Factors (Fall Risk) age-related changes;bladder function altered;fatigue/slow reaction;gait/mobility problems;environment unfamiliar   Signs and Symptoms (Fall Risk) presence of risk factors     Goal: Absence of Fall  Outcome: Ongoing (interventions implemented as appropriate)   07/11/19 0331   Fall Risk (Adult)   Absence of Fall making progress toward outcome       Problem: Skin Injury Risk (Adult)  Intervention: Prevent or Minimize Pressure   07/11/19 0740 07/11/19 0800   Skin Interventions    Pressure Reduction Techniques frequent weight shift encouraged --    Positioning   Body Position --  side-lying, right       Goal: Identify Related Risk Factors and Signs and Symptoms  Outcome: Ongoing (interventions implemented as appropriate)   07/04/19 2300   Skin Injury Risk (Adult)   Related Risk Factors (Skin Injury Risk) advanced age;cognitive impairment     Goal: Skin Health and Integrity  Outcome: Ongoing (interventions implemented as appropriate)   07/08/19 0934   Skin Injury Risk (Adult)   Skin Health and Integrity making progress toward outcome       Problem: Pain, Chronic (Adult)  Goal: Identify Related Risk Factors and Signs and Symptoms  Outcome: Ongoing (interventions implemented as appropriate)   07/04/19 2300   Pain, Chronic (Adult)   Signs and Symptoms (Chronic Pain) verbalization of pain descriptors     Goal: Acceptable Pain/Comfort Level and Functional Ability  Outcome: Ongoing (interventions implemented as appropriate)   07/11/19 0331   Pain, Chronic (Adult)   Acceptable Pain/Comfort Level and Functional Ability making progress toward outcome       Problem: Pain, Acute (Adult)  Goal: Acceptable Pain Control/Comfort Level  Outcome: Ongoing (interventions implemented as appropriate)   07/11/19 0331   Pain, Acute (Adult)   Acceptable Pain Control/Comfort Level making progress toward outcome

## 2019-07-11 NOTE — PLAN OF CARE
Problem: Patient Care Overview  Goal: Plan of Care Review  Outcome: Ongoing (interventions implemented as appropriate)   07/11/19 2647   Coping/Psychosocial   Plan of Care Reviewed With patient   Plan of Care Review   Progress improving   OTHER   Outcome Summary Light BUE AROM bedside. Continue POC.

## 2019-07-11 NOTE — PROGRESS NOTES
[facility] PROGRESS NOTE     Patient Identification:  Name:  Felice Aiken  Age:  84 y.o.  Sex:  male  :  1935  MRN:  [id number]  Visit Number:  [id number]  Primary Care Provider:  Rc Ojeda MD    Length of stay:  7    Subjective:   Patient seen and examined no new complaints awaiting acceptance placement, pending insurance approval    Chief Complaint: Knee pain  ----------------------------------------------------------------------------------------------------------------------  Current Hospital Meds:    apixaban 2.5 mg Oral Q12H   aspirin 81 mg Oral Daily   atorvastatin 40 mg Oral Nightly   Divalproex Sodium 125 mg Oral BID   furosemide 20 mg Oral BID   isosorbide mononitrate 60 mg Oral Daily   metoprolol succinate XL 50 mg Oral Q24H   sodium chloride 3 mL Intravenous Q12H   theophylline 600 mg Oral Daily        ----------------------------------------------------------------------------------------------------------------------  Vital Signs:  Temp:  [97.7 °F (36.5 °C)-97.9 °F (36.6 °C)] 97.8 °F (36.6 °C)  Heart Rate:  [59-87] 76  Resp:  [18-20] 18  BP: ()/(55-69) 101/69       Tele: Atrial fibrillation rate of 82 bpm      19  1428 07/041914   Weight: 54.4 kg (120 lb) 55.9 kg (123 lb 4 oz)     Body mass index is 17.68 kg/m².    Intake/Output Summary (Last 24 hours) at 2019 1203  Last data filed at 2019 0900  Gross per 24 hour   Intake 480 ml   Output 550 ml   Net -70 ml     Diet Regular  ----------------------------------------------------------------------------------------------------------------------  Physical exam:  General: Comfortable,awake, alert, oriented to self, place but not time.     Skin:  Skin is warm and dry. No rash noted. No pallor.    HENT:  Head:  Normocephalic and atraumatic.  Mouth:  Moist mucous membranes.    Eyes:  Conjunctivae and EOM are normal.  Pupils are equal, round, and reactive to light.  No scleral icterus.     Neck:  Neck supple.  No JVD present.    Pulmonary/Chest: No added sounds, normal chest expansion.  Cardiovascular:  Normal rate, irregular regular rhythm I could not hear any murmur or rub  Abdominal:  Soft.  Bowel sounds are normal.  No distension and no tenderness.   Extremities: No new changes.  Neurological:  stable as yesterday   ----------------------------------------------------------------------------------------------------------------------  ----------------------------------------------------------------------------------------------------------------------  Results from last 7 days   Lab Units 07/05/19  0735 07/05/19  0144 07/04/19  1944  07/04/19  1504   CK TOTAL U/L  --  67 80  --  83   TROPONIN T ng/mL 0.050* 0.056* 0.050*   < > 0.051*    < > = values in this interval not displayed.     Results from last 7 days   Lab Units 07/05/19  0144 07/04/19  1606 07/04/19  1504   CRP mg/dL  --   --  0.20   LACTATE mmol/L  --  1.9  --    WBC 10*3/mm3 6.16  --  7.26   HEMOGLOBIN g/dL 10.7*  --  11.6*   HEMATOCRIT % 35.2*  --  37.2*   MCV fL 97.8*  --  96.9   MCHC g/dL 30.4*  --  31.2*   PLATELETS 10*3/mm3 182  --  193         Results from last 7 days   Lab Units 07/05/19  1626 07/05/19  0144 07/04/19  1944 07/04/19  1504   SODIUM mmol/L 141 145  --  142   POTASSIUM mmol/L 4.4 3.1*  --  3.0*   MAGNESIUM mg/dL  --   --  1.9  --    CHLORIDE mmol/L 107 107  --  105   CO2 mmol/L 26.9 30.1*  --  26.8   BUN mg/dL 7* 6*  --  7*   CREATININE mg/dL 0.72* 0.71*  --  0.79   EGFR IF NONAFRICN AM mL/min/1.73 105 106  --  94   CALCIUM mg/dL 8.4* 8.4*  --  9.0   GLUCOSE mg/dL 123* 100*  --  112*   ALBUMIN g/dL  --   --   --  3.48*   BILIRUBIN mg/dL  --   --   --  0.5   ALK PHOS U/L  --   --   --  62   AST (SGOT) U/L  --   --   --  18   ALT (SGPT) U/L  --   --   --  13   Estimated Creatinine Clearance: 56.3 mL/min (A) (by C-G formula based on SCr of 0.72 mg/dL (L)).    No results found for: AMMONIA      Blood Culture   Date  Value Ref Range Status   07/04/2019 No growth at 3 days  Preliminary   07/04/2019 No growth at 3 days  Preliminary                I have personally looked at the labs and they are summarized above.    ----------------------------------------------------------------------------------------------------------------------      Assessment and Plan:    -Indeterminate troponin, patient is a symptomatic stable numbers  -Chronic persistent atrial fibrillation  -Chronic anticoagulation for stroke prophylaxis  -Chronic systolic heart failure ejection fraction 45 to 50% compensated  -Dementia with occasional behavioral disturbances  -Coronary artery disease status post stent placement  -Peripheral arterial disease status post bilateral femoral-popliteal bypass  -History of COPD  -Status post left total knee arthroplasty  -Right knee osteoarthritis    --Patient remained the same, ready for discharge to nursing home whenever insurance approval comes through      christina Lopez MD  07/11/19  12:03 PM

## 2019-07-11 NOTE — PROGRESS NOTES
Discharge Planning Assessment   [place]     Patient Name: Felice Aiken  MRN: [id number]  Today's Date: 7/11/2019    Admit Date: 7/4/2019      Discharge Plan     Row Name 07/11/19 1058       Plan    Plan SS spoke to Sury at [facility] today who states pre-auth with [employer] Medicare Replacement is still pending. SS to follow.     12:20 SS spoke to Sury at [facility] who states [employer] has denied. SS ask Sury at [facility] to see if pt can be accepted Medicaid pending. SS to follow.         Destination      Service Provider Request Status Selected Services Address Phone Number Fax Number    [facility] - No Request Sent N/A [address] [facility] [facility] [address] [phone]     Kelli Larsen

## 2019-07-12 PROCEDURE — 99231 SBSQ HOSP IP/OBS SF/LOW 25: CPT | Performed by: INTERNAL MEDICINE

## 2019-07-12 PROCEDURE — 94799 UNLISTED PULMONARY SVC/PX: CPT

## 2019-07-12 RX ADMIN — FUROSEMIDE 20 MG: 20 TABLET ORAL at 08:10

## 2019-07-12 RX ADMIN — DIVALPROEX SODIUM 125 MG: 125 CAPSULE ORAL at 08:09

## 2019-07-12 RX ADMIN — ASPIRIN 81 MG: 81 TABLET, CHEWABLE ORAL at 08:09

## 2019-07-12 RX ADMIN — METOPROLOL SUCCINATE 50 MG: 50 TABLET, FILM COATED, EXTENDED RELEASE ORAL at 08:09

## 2019-07-12 RX ADMIN — SODIUM CHLORIDE, PRESERVATIVE FREE 3 ML: 5 INJECTION INTRAVENOUS at 08:10

## 2019-07-12 RX ADMIN — APIXABAN 2.5 MG: 2.5 TABLET, FILM COATED ORAL at 08:09

## 2019-07-12 RX ADMIN — APIXABAN 2.5 MG: 2.5 TABLET, FILM COATED ORAL at 19:33

## 2019-07-12 RX ADMIN — ATORVASTATIN CALCIUM 40 MG: 40 TABLET, FILM COATED ORAL at 19:33

## 2019-07-12 RX ADMIN — DIVALPROEX SODIUM 125 MG: 125 CAPSULE ORAL at 19:33

## 2019-07-12 RX ADMIN — ISOSORBIDE MONONITRATE 60 MG: 60 TABLET, EXTENDED RELEASE ORAL at 08:09

## 2019-07-12 RX ADMIN — THEOPHYLLINE 600 MG: 400 TABLET, EXTENDED RELEASE ORAL at 08:09

## 2019-07-12 NOTE — PROGRESS NOTES
Discharge Planning Assessment   [place]     Patient Name: Felice Aiken  MRN: [id number]  Today's Date: 7/12/2019    Admit Date: 7/4/2019      Discharge Plan     Row Name 07/12/19 0825       Plan    Plan SS contacted Neela at [facility] who states she will f/u with Sury regarding Medicaid pending. SS to follow.     8:31 SS received call from Corine at [facility] who states Eris will be visiting pt today. SS to follow.     10:24 [facility] per Eris present and is visiting pt. SS to follow.     15:31 SS attempted contact with Sury Muro at [facility] and left voice message. SS to follow.         Destination      Service Provider Request Status Selected Services Address Phone Number Fax Number    [facility] Pending - No Request Sent N/A [address] [facility] [address] [phone]     Kelli Larsen

## 2019-07-12 NOTE — SIGNIFICANT NOTE
07/12/19 1553   Rehab Treatment   Discipline occupational therapist   Reason Treatment Not Performed patient/family declined treatment  (fatigue/sleeping)

## 2019-07-12 NOTE — PROGRESS NOTES
[facility]     Patient Identification:  Name:  Felice Aiken  Age:  84 y.o.  Sex:  male  :  1935  MRN:  [id number]  Visit Number:  [id number]  Primary Care Provider:  Rc Ojeda MD    Length of stay:  8    Subjective:   Patient seen and examined reviewed his condition no new changes, still awaiting bed availability and insurance approval discussed with  unfortunately his insurance initially denied and nursing home placement which does not make any sense as the patient has no where to go      Chief Complaint: Knee pain  ----------------------------------------------------------------------------------------------------------------------  Current Hospital Meds:    apixaban 2.5 mg Oral Q12H   aspirin 81 mg Oral Daily   atorvastatin 40 mg Oral Nightly   Divalproex Sodium 125 mg Oral BID   furosemide 20 mg Oral BID   isosorbide mononitrate 60 mg Oral Daily   metoprolol succinate XL 50 mg Oral Q24H   sodium chloride 3 mL Intravenous Q12H   theophylline 600 mg Oral Daily        ----------------------------------------------------------------------------------------------------------------------  Vital Signs:  Temp:  [97.8 °F (36.6 °C)-98.2 °F (36.8 °C)] 97.8 °F (36.6 °C)  Heart Rate:  [66-83] 71  Resp:  [18-19] 18  BP: ()/(50-79) 95/50       Tele: Atrial fibrillation rate of 82 bpm      19  1428 19  1914 19  0519   Weight: 54.4 kg (120 lb) 55.9 kg (123 lb 4 oz) 57.4 kg (126 lb 8 oz)     Body mass index is 18.15 kg/m².    Intake/Output Summary (Last 24 hours) at 2019 1339  Last data filed at 2019 1249  Gross per 24 hour   Intake 840 ml   Output 625 ml   Net 215 ml     Diet Regular  ----------------------------------------------------------------------------------------------------------------------  Physical exam:  General: Comfortable,awake, alert, oriented to self, place but not time.     Skin:  Skin is warm and dry.  No rash noted. No pallor.    HENT:  Head:  Normocephalic and atraumatic.  Mouth:  Moist mucous membranes.    Eyes:  Conjunctivae and EOM are normal.  Pupils are equal, round, and reactive to light.  No scleral icterus.    Neck:  Neck supple.  No JVD present.    Pulmonary/Chest: No added sounds, normal chest expansion.  Cardiovascular:  Normal rate, irregular regular rhythm I could not hear any murmur or rub  Abdominal:  Soft.  Bowel sounds are normal.  No distension and no tenderness.   Extremities: No new changes.  Neurological:  stable as yesterday    ----------------------------------------------------------------------------------------------------------------------  ----------------------------------------------------------------------------------------------------------------------              Results from last 7 days   Lab Units 07/05/19  1626   SODIUM mmol/L 141   POTASSIUM mmol/L 4.4   CHLORIDE mmol/L 107   CO2 mmol/L 26.9   BUN mg/dL 7*   CREATININE mg/dL 0.72*   EGFR IF NONAFRICN AM mL/min/1.73 105   CALCIUM mg/dL 8.4*   GLUCOSE mg/dL 123*   Estimated Creatinine Clearance: 57.8 mL/min (A) (by C-G formula based on SCr of 0.72 mg/dL (L)).             I have personally looked at the labs and they are summarized above.    ----------------------------------------------------------------------------------------------------------------------      Assessment and Plan:    -Indeterminate troponin, patient is a symptomatic stable numbers  -Chronic persistent atrial fibrillation  -Chronic anticoagulation for stroke prophylaxis  -Chronic systolic heart failure ejection fraction 45 to 50% compensated  -Dementia with occasional behavioral disturbances  -Coronary artery disease status post stent placement  -Peripheral arterial disease status post bilateral femoral-popliteal bypass  -History of COPD  -Status post left total knee arthroplasty  -Right knee osteoarthritis    --Patient remained the same, we have a placement  patient will be discharged      Mhd Nilam Lopez MD  07/12/19  1:39 PM

## 2019-07-12 NOTE — PLAN OF CARE
Problem: Patient Care Overview  Goal: Plan of Care Review  Outcome: Ongoing (interventions implemented as appropriate)   07/12/19 1306   Coping/Psychosocial   Plan of Care Reviewed With patient   Plan of Care Review   Progress no change       Problem: Fall Risk (Adult)  Goal: Absence of Fall  Outcome: Ongoing (interventions implemented as appropriate)   07/12/19 1306   Fall Risk (Adult)   Absence of Fall making progress toward outcome       Problem: Skin Injury Risk (Adult)  Goal: Identify Related Risk Factors and Signs and Symptoms  Outcome: Ongoing (interventions implemented as appropriate)   07/04/19 2300   Skin Injury Risk (Adult)   Related Risk Factors (Skin Injury Risk) advanced age;cognitive impairment       Problem: Pain, Chronic (Adult)  Goal: Acceptable Pain/Comfort Level and Functional Ability  Outcome: Ongoing (interventions implemented as appropriate)   07/12/19 1306   Pain, Chronic (Adult)   Acceptable Pain/Comfort Level and Functional Ability making progress toward outcome       Problem: Pain, Acute (Adult)  Goal: Acceptable Pain Control/Comfort Level  Outcome: Ongoing (interventions implemented as appropriate)   07/12/19 1306   Pain, Acute (Adult)   Acceptable Pain Control/Comfort Level making progress toward outcome

## 2019-07-12 NOTE — PLAN OF CARE
Problem: Patient Care Overview  Goal: Plan of Care Review  Outcome: Ongoing (interventions implemented as appropriate)   07/11/19 1702 07/11/19 2000   Coping/Psychosocial   Plan of Care Reviewed With --  patient   Plan of Care Review   Progress improving --        Problem: Fall Risk (Adult)  Goal: Identify Related Risk Factors and Signs and Symptoms  Outcome: Ongoing (interventions implemented as appropriate)    Goal: Absence of Fall  Outcome: Ongoing (interventions implemented as appropriate)      Problem: Skin Injury Risk (Adult)  Goal: Identify Related Risk Factors and Signs and Symptoms  Outcome: Ongoing (interventions implemented as appropriate)    Goal: Skin Health and Integrity  Outcome: Ongoing (interventions implemented as appropriate)      Problem: Pain, Chronic (Adult)  Goal: Identify Related Risk Factors and Signs and Symptoms  Outcome: Ongoing (interventions implemented as appropriate)      Problem: Pain, Acute (Adult)  Goal: Acceptable Pain Control/Comfort Level  Outcome: Ongoing (interventions implemented as appropriate)

## 2019-07-13 PROCEDURE — 94799 UNLISTED PULMONARY SVC/PX: CPT

## 2019-07-13 PROCEDURE — 99231 SBSQ HOSP IP/OBS SF/LOW 25: CPT | Performed by: INTERNAL MEDICINE

## 2019-07-13 RX ADMIN — APIXABAN 2.5 MG: 2.5 TABLET, FILM COATED ORAL at 08:31

## 2019-07-13 RX ADMIN — DIVALPROEX SODIUM 125 MG: 125 CAPSULE ORAL at 20:11

## 2019-07-13 RX ADMIN — ATORVASTATIN CALCIUM 40 MG: 40 TABLET, FILM COATED ORAL at 20:11

## 2019-07-13 RX ADMIN — THEOPHYLLINE 600 MG: 400 TABLET, EXTENDED RELEASE ORAL at 08:31

## 2019-07-13 RX ADMIN — ASPIRIN 81 MG: 81 TABLET, CHEWABLE ORAL at 08:32

## 2019-07-13 RX ADMIN — DIVALPROEX SODIUM 125 MG: 125 CAPSULE ORAL at 08:31

## 2019-07-13 RX ADMIN — APIXABAN 2.5 MG: 2.5 TABLET, FILM COATED ORAL at 20:11

## 2019-07-13 RX ADMIN — ISOSORBIDE MONONITRATE 60 MG: 60 TABLET, EXTENDED RELEASE ORAL at 08:31

## 2019-07-13 RX ADMIN — SODIUM CHLORIDE, PRESERVATIVE FREE 3 ML: 5 INJECTION INTRAVENOUS at 08:32

## 2019-07-13 RX ADMIN — METOPROLOL SUCCINATE 50 MG: 50 TABLET, FILM COATED, EXTENDED RELEASE ORAL at 08:32

## 2019-07-13 RX ADMIN — SODIUM CHLORIDE, PRESERVATIVE FREE 3 ML: 5 INJECTION INTRAVENOUS at 20:13

## 2019-07-13 NOTE — NURSING NOTE
Held am lasix for low BP on previous day. Gave BP medications per parameter. Will re evaluate for next dose of lasix.

## 2019-07-13 NOTE — PROGRESS NOTES
[facility]     Patient Identification:  Name:  Felice Aiken  Age:  84 y.o.  Sex:  male  :  1935  MRN:  [id number]  Visit Number:  [id number]  Primary Care Provider:  Rc Ojeda MD    Length of stay:  9    Subjective:   Patient seen and examined reviewed, no new changes laying comfortably in the bed      Chief Complaint: Knee pain  ----------------------------------------------------------------------------------------------------------------------  Current Hospital Meds:    apixaban 2.5 mg Oral Q12H   aspirin 81 mg Oral Daily   atorvastatin 40 mg Oral Nightly   Divalproex Sodium 125 mg Oral BID   furosemide 20 mg Oral BID   isosorbide mononitrate 60 mg Oral Daily   metoprolol succinate XL 50 mg Oral Q24H   sodium chloride 3 mL Intravenous Q12H   theophylline 600 mg Oral Daily        ----------------------------------------------------------------------------------------------------------------------  Vital Signs:  Temp:  [97.5 °F (36.4 °C)-98 °F (36.7 °C)] 97.5 °F (36.4 °C)  Heart Rate:  [54-81] 70  Resp:  [18-19] 18  BP: ()/(50-76) 113/76       Tele: Atrial fibrillation rate of 82 bpm      19  0519 19  0502 19  0600   Weight: 57.4 kg (126 lb 8 oz) 58.4 kg (128 lb 11.2 oz) (recorded at 0502)     Body mass index is 18.47 kg/m².    Intake/Output Summary (Last 24 hours) at 2019 1104  Last data filed at 2019 0900  Gross per 24 hour   Intake 780 ml   Output 1025 ml   Net -245 ml     Diet Regular  ----------------------------------------------------------------------------------------------------------------------  Physical exam:  General: Comfortable,awake, alert, oriented to self, place but not time.     Skin:  Skin is warm and dry. No rash noted. No pallor.    HENT:  Head:  Normocephalic and atraumatic.  Mouth:  Moist mucous membranes.    Eyes:  Conjunctivae and EOM are normal.  Pupils are equal, round, and reactive to light.   No scleral icterus.    Neck:  Neck supple.  No JVD present.    Pulmonary/Chest: No added sounds, normal chest expansion.  Cardiovascular:  Normal rate, irregular regular rhythm I could not hear any murmur or rub  Abdominal:  Soft.  Bowel sounds are normal.  No distension and no tenderness.   Extremities: No new changes.  Neurological:  stable as yesterday, no new changes    ----------------------------------------------------------------------------------------------------------------------  ----------------------------------------------------------------------------------------------------------------------                    Invalid input(s): PROTEstimated Creatinine Clearance: 58.8 mL/min (A) (by C-G formula based on SCr of 0.72 mg/dL (L)).             I have personally looked at the labs and they are summarized above.    ----------------------------------------------------------------------------------------------------------------------      Assessment and Plan:    -Indeterminate troponin, patient is a symptomatic stable numbers  -Chronic persistent atrial fibrillation  -Chronic anticoagulation for stroke prophylaxis  -Chronic systolic heart failure ejection fraction 45 to 50% compensated  -Dementia with occasional behavioral disturbances  -Coronary artery disease status post stent placement  -Peripheral arterial disease status post bilateral femoral-popliteal bypass  -History of COPD  -Status post left total knee arthroplasty  -Right knee osteoarthritis    --Patient remained the same, we awaiting on placement to be discharged to  --Check CBC and BMP in a.m.      christina Lopez MD  07/13/19  11:04 AM

## 2019-07-13 NOTE — PLAN OF CARE
Problem: Fall Risk (Adult)  Goal: Identify Related Risk Factors and Signs and Symptoms  Outcome: Ongoing (interventions implemented as appropriate)    Goal: Absence of Fall  Outcome: Ongoing (interventions implemented as appropriate)      Problem: Skin Injury Risk (Adult)  Goal: Identify Related Risk Factors and Signs and Symptoms  Outcome: Ongoing (interventions implemented as appropriate)    Goal: Skin Health and Integrity  Outcome: Ongoing (interventions implemented as appropriate)      Problem: Pain, Chronic (Adult)  Goal: Identify Related Risk Factors and Signs and Symptoms  Outcome: Ongoing (interventions implemented as appropriate)    Goal: Acceptable Pain/Comfort Level and Functional Ability  Outcome: Ongoing (interventions implemented as appropriate)      Problem: Pain, Acute (Adult)  Goal: Acceptable Pain Control/Comfort Level  Outcome: Ongoing (interventions implemented as appropriate)

## 2019-07-13 NOTE — PLAN OF CARE
Problem: Patient Care Overview  Goal: Plan of Care Review  Outcome: Ongoing (interventions implemented as appropriate)   07/13/19 0954   Coping/Psychosocial   Plan of Care Reviewed With patient   Plan of Care Review   Progress no change       Problem: Fall Risk (Adult)  Goal: Absence of Fall  Outcome: Ongoing (interventions implemented as appropriate)   07/13/19 0954   Fall Risk (Adult)   Absence of Fall making progress toward outcome       Problem: Skin Injury Risk (Adult)  Goal: Skin Health and Integrity  Outcome: Ongoing (interventions implemented as appropriate)   07/13/19 0954   Skin Injury Risk (Adult)   Skin Health and Integrity making progress toward outcome       Problem: Pain, Chronic (Adult)  Goal: Acceptable Pain/Comfort Level and Functional Ability  Outcome: Ongoing (interventions implemented as appropriate)   07/13/19 0954   Pain, Chronic (Adult)   Acceptable Pain/Comfort Level and Functional Ability making progress toward outcome       Problem: Pain, Acute (Adult)  Goal: Acceptable Pain Control/Comfort Level  Outcome: Ongoing (interventions implemented as appropriate)   07/13/19 0954   Pain, Acute (Adult)   Acceptable Pain Control/Comfort Level making progress toward outcome

## 2019-07-14 LAB
ANION GAP SERPL CALCULATED.3IONS-SCNC: 8 MMOL/L (ref 5–15)
BUN BLD-MCNC: 10 MG/DL (ref 8–23)
BUN/CREAT SERPL: 12.7 (ref 7–25)
CALCIUM SPEC-SCNC: 9.3 MG/DL (ref 8.6–10.5)
CHLORIDE SERPL-SCNC: 103 MMOL/L (ref 98–107)
CO2 SERPL-SCNC: 28 MMOL/L (ref 22–29)
CREAT BLD-MCNC: 0.79 MG/DL (ref 0.76–1.27)
DEPRECATED RDW RBC AUTO: 48.7 FL (ref 37–54)
ERYTHROCYTE [DISTWIDTH] IN BLOOD BY AUTOMATED COUNT: 14.1 % (ref 12.3–15.4)
GFR SERPL CREATININE-BSD FRML MDRD: 94 ML/MIN/1.73
GLUCOSE BLD-MCNC: 92 MG/DL (ref 65–99)
HCT VFR BLD AUTO: 40.2 % (ref 37.5–51)
HGB BLD-MCNC: 12.5 G/DL (ref 13–17.7)
MCH RBC QN AUTO: 30.3 PG (ref 26.6–33)
MCHC RBC AUTO-ENTMCNC: 31.1 G/DL (ref 31.5–35.7)
MCV RBC AUTO: 97.6 FL (ref 79–97)
PLATELET # BLD AUTO: 198 10*3/MM3 (ref 140–450)
PMV BLD AUTO: 10 FL (ref 6–12)
POTASSIUM BLD-SCNC: 4.7 MMOL/L (ref 3.5–5.2)
RBC # BLD AUTO: 4.12 10*6/MM3 (ref 4.14–5.8)
SODIUM BLD-SCNC: 139 MMOL/L (ref 136–145)
WBC NRBC COR # BLD: 6.48 10*3/MM3 (ref 3.4–10.8)

## 2019-07-14 PROCEDURE — 93005 ELECTROCARDIOGRAM TRACING: CPT | Performed by: INTERNAL MEDICINE

## 2019-07-14 PROCEDURE — 85027 COMPLETE CBC AUTOMATED: CPT | Performed by: INTERNAL MEDICINE

## 2019-07-14 PROCEDURE — 80048 BASIC METABOLIC PNL TOTAL CA: CPT | Performed by: INTERNAL MEDICINE

## 2019-07-14 PROCEDURE — 99231 SBSQ HOSP IP/OBS SF/LOW 25: CPT | Performed by: INTERNAL MEDICINE

## 2019-07-14 RX ADMIN — ATORVASTATIN CALCIUM 40 MG: 40 TABLET, FILM COATED ORAL at 20:18

## 2019-07-14 RX ADMIN — DIVALPROEX SODIUM 125 MG: 125 CAPSULE ORAL at 20:18

## 2019-07-14 RX ADMIN — APIXABAN 2.5 MG: 2.5 TABLET, FILM COATED ORAL at 08:55

## 2019-07-14 RX ADMIN — THEOPHYLLINE 600 MG: 400 TABLET, EXTENDED RELEASE ORAL at 08:55

## 2019-07-14 RX ADMIN — ASPIRIN 81 MG: 81 TABLET, CHEWABLE ORAL at 08:55

## 2019-07-14 RX ADMIN — APIXABAN 2.5 MG: 2.5 TABLET, FILM COATED ORAL at 20:18

## 2019-07-14 RX ADMIN — SODIUM CHLORIDE, PRESERVATIVE FREE 3 ML: 5 INJECTION INTRAVENOUS at 08:56

## 2019-07-14 RX ADMIN — DIVALPROEX SODIUM 125 MG: 125 CAPSULE ORAL at 08:55

## 2019-07-14 NOTE — PLAN OF CARE
Problem: Patient Care Overview  Goal: Plan of Care Review  Outcome: Ongoing (interventions implemented as appropriate)   07/14/19 0908   Coping/Psychosocial   Plan of Care Reviewed With patient   Plan of Care Review   Progress no change       Problem: Fall Risk (Adult)  Goal: Absence of Fall  Outcome: Ongoing (interventions implemented as appropriate)   07/14/19 0908   Fall Risk (Adult)   Absence of Fall making progress toward outcome       Problem: Skin Injury Risk (Adult)  Goal: Skin Health and Integrity  Outcome: Ongoing (interventions implemented as appropriate)   07/14/19 0908   Skin Injury Risk (Adult)   Skin Health and Integrity making progress toward outcome       Problem: Pain, Chronic (Adult)  Goal: Acceptable Pain/Comfort Level and Functional Ability  Outcome: Ongoing (interventions implemented as appropriate)   07/14/19 0908   Pain, Chronic (Adult)   Acceptable Pain/Comfort Level and Functional Ability making progress toward outcome       Problem: Pain, Acute (Adult)  Goal: Acceptable Pain Control/Comfort Level  Outcome: Ongoing (interventions implemented as appropriate)   07/14/19 0908   Pain, Acute (Adult)   Acceptable Pain Control/Comfort Level making progress toward outcome

## 2019-07-14 NOTE — NURSING NOTE
Notified Dr. Lopez that metoprolol and lasix have been held related to low BP. No other issues noted at this time.

## 2019-07-14 NOTE — PROGRESS NOTES
[facility]     Patient Identification:  Name:  Felice Aiken  Age:  84 y.o.  Sex:  male  :  1935  MRN:  [id number]  Visit Number:  [id number]  Primary Care Provider:  Rc Ojeda MD    Length of stay:  10    Subjective:   Patient seen and examined reviewed, no new changes laying comfortably in the bed, no new events      Chief Complaint: Knee pain  ----------------------------------------------------------------------------------------------------------------------  Current Hospital Meds:    apixaban 2.5 mg Oral Q12H   aspirin 81 mg Oral Daily   atorvastatin 40 mg Oral Nightly   Divalproex Sodium 125 mg Oral BID   furosemide 20 mg Oral BID   isosorbide mononitrate 60 mg Oral Daily   metoprolol succinate XL 50 mg Oral Q24H   sodium chloride 3 mL Intravenous Q12H   theophylline 600 mg Oral Daily        ----------------------------------------------------------------------------------------------------------------------  Vital Signs:  Temp:  [97.4 °F (36.3 °C)-98.3 °F (36.8 °C)] 97.5 °F (36.4 °C)  Heart Rate:  [62-71] 66  Resp:  [16-18] 16  BP: ()/(40-71) 101/60       Tele: Atrial fibrillation rate of 82 bpm      19  0502 19  0600 19  0243   Weight: 58.4 kg (128 lb 11.2 oz) (recorded at 0502) 57.3 kg (126 lb 4.8 oz)     Body mass index is 18.12 kg/m².    Intake/Output Summary (Last 24 hours) at 2019 1327  Last data filed at 2019 1300  Gross per 24 hour   Intake 1140 ml   Output 1025 ml   Net 115 ml     Diet Regular  ----------------------------------------------------------------------------------------------------------------------  Physical exam:  General: Comfortable,awake, alert, oriented to self, place but not time.     Skin:  Skin is warm and dry. No rash noted. No pallor.    HENT:  Head:  Normocephalic and atraumatic.  Mouth:  Moist mucous membranes.    Eyes:  Conjunctivae and EOM are normal.  Pupils are equal, round, and  reactive to light.  No scleral icterus.    Neck:  Neck supple.  No JVD present.    Pulmonary/Chest: No added sounds, normal chest expansion.  Cardiovascular:  Normal rate, irregular regular rhythm I could not hear any murmur or rub  Abdominal:  Soft.  Bowel sounds are normal.  No distension and no tenderness.   Extremities: No new changes.  Neurological:  stable as yesterday, no new changes    ----------------------------------------------------------------------------------------------------------------------  ----------------------------------------------------------------------------------------------------------------------      Results from last 7 days   Lab Units 07/14/19  0532   WBC 10*3/mm3 6.48   HEMOGLOBIN g/dL 12.5*   HEMATOCRIT % 40.2   MCV fL 97.6*   MCHC g/dL 31.1*   PLATELETS 10*3/mm3 198         Results from last 7 days   Lab Units 07/14/19  0532   SODIUM mmol/L 139   POTASSIUM mmol/L 4.7   CHLORIDE mmol/L 103   CO2 mmol/L 28.0   BUN mg/dL 10   CREATININE mg/dL 0.79   EGFR IF NONAFRICN AM mL/min/1.73 94   CALCIUM mg/dL 9.3   GLUCOSE mg/dL 92   Estimated Creatinine Clearance: 57.7 mL/min (by C-G formula based on SCr of 0.79 mg/dL).             I have personally looked at the labs and they are summarized above.    ----------------------------------------------------------------------------------------------------------------------      Assessment and Plan:    -Indeterminate troponin, patient is a symptomatic stable numbers  -Chronic persistent atrial fibrillation  -Chronic anticoagulation for stroke prophylaxis  -Chronic systolic heart failure ejection fraction 45 to 50% compensated  -Dementia with occasional behavioral disturbances  -Coronary artery disease status post stent placement  -Peripheral arterial disease status post bilateral femoral-popliteal bypass  -History of COPD  -Status post left total knee arthroplasty  -Right knee osteoarthritis    --Patient remained the same, we awaiting on  placement to be discharged to        [facility] Nilam Lopez MD  07/14/19  1:27 PM

## 2019-07-15 PROCEDURE — 99231 SBSQ HOSP IP/OBS SF/LOW 25: CPT | Performed by: INTERNAL MEDICINE

## 2019-07-15 RX ADMIN — ASPIRIN 81 MG: 81 TABLET, CHEWABLE ORAL at 08:54

## 2019-07-15 RX ADMIN — FUROSEMIDE 20 MG: 20 TABLET ORAL at 08:53

## 2019-07-15 RX ADMIN — APIXABAN 2.5 MG: 2.5 TABLET, FILM COATED ORAL at 08:53

## 2019-07-15 RX ADMIN — SODIUM CHLORIDE, PRESERVATIVE FREE 3 ML: 5 INJECTION INTRAVENOUS at 08:54

## 2019-07-15 RX ADMIN — DIVALPROEX SODIUM 125 MG: 125 CAPSULE ORAL at 08:53

## 2019-07-15 RX ADMIN — ATORVASTATIN CALCIUM 40 MG: 40 TABLET, FILM COATED ORAL at 20:32

## 2019-07-15 RX ADMIN — SODIUM CHLORIDE, PRESERVATIVE FREE 3 ML: 5 INJECTION INTRAVENOUS at 20:32

## 2019-07-15 RX ADMIN — THEOPHYLLINE 600 MG: 400 TABLET, EXTENDED RELEASE ORAL at 08:53

## 2019-07-15 RX ADMIN — ISOSORBIDE MONONITRATE 60 MG: 60 TABLET, EXTENDED RELEASE ORAL at 08:54

## 2019-07-15 RX ADMIN — APIXABAN 2.5 MG: 2.5 TABLET, FILM COATED ORAL at 20:32

## 2019-07-15 RX ADMIN — DIVALPROEX SODIUM 125 MG: 125 CAPSULE ORAL at 20:32

## 2019-07-15 RX ADMIN — METOPROLOL SUCCINATE 50 MG: 50 TABLET, FILM COATED, EXTENDED RELEASE ORAL at 08:53

## 2019-07-15 NOTE — DISCHARGE PLACEMENT REQUEST
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 cues (demo/gesture);stand by assist  -AD     Assistive Device (Gait)  -- None  -AD     Distance in Feet (Gait)  100  -AD      Pattern (Gait)  step-through  -AD     Row Name 07/08/19 1100          General ROM    GENERAL ROM COMMENTS  WFL  -AD     Row Name 07/08/19 1100          MMT (Manual Muscle Testing)    General MMT Comments  Grossly 4/5 BLE strength  -AD     Row Name 07/08/19 1100          Pain Assessment    Additional Documentation  Pain Scale: Numbers Pre/Post-Treatment (Group)  -AD     Row Name 07/08/19 1100          Pain Scale: Numbers Pre/Post-Treatment    Pain Scale: Numbers, Pretreatment  0/10 - no pain  -AD     Pain Scale: Numbers, Post-Treatment  0/10 - no pain  -AD     Row Name 07/08/19 1100          Plan of Care Review    Plan of Care Reviewed With  patient  -AD     Row Name 07/08/19 1100          Physical Therapy Clinical Impression    Date of Referral to PT  07/05/19  -AD     Prognosis (PT Clinical Impression)  Due to current level of independence, pt is not appropriate for skilled physical therapy at this time.  -AD     Functional Level at Time of Evaluation (PT Clinical Impression)  Supervision/SBA  -AD     Patient/Family Goals Statement (PT Clinical Impression)  Discharge home with family  -AD     Criteria for Skilled Interventions Met (PT Clinical Impression)  no problems identified which require skilled intervention  -AD     Row Name 07/08/19 1100          Patient Education Goal (PT)    Activity (Patient Education Goal, PT)  Pt educated on home safety, verbalized understanding.  -AD     Row Name 07/08/19 1100          Positioning and Restraints    Pre-Treatment Position  in bed  -AD     Post Treatment Position  bed  -AD     In Bed  notified nsg;supine;call light within reach;encouraged to call for assist;side rails up x2  -AD     Row Name 07/08/19 1100          Physical Therapy Discharge Summary    Additional Documentation  Discharge Summary, PT Eval (Group)  -AD     Row Name 07/08/19 1100          Discharge Summary, PT Eval    Reason for Discharge (PT Discharge Summary)  no further needs identified  -AD      Outcomes Achieved Upon Discharge (PT Discharge Summary)  -- Pt not appropriate for skilled physical therapy.  -AD       User Key  (r) = Recorded By, (t) = Taken By, (c) = Cosigned By    Initials Name Provider Type    AD Dalton, Ashley Claudene, PT Physical Therapist          Physical Therapy Education     Title: PT OT SLP Therapies (Done)     Topic: Physical Therapy (Done)     Point: Mobility training (Done)     Learning Progress Summary           Patient Acceptance, E,TB, VU by AD at 7/8/2019 11:45 AM                   Point: Home exercise program (Done)     Learning Progress Summary           Patient Acceptance, E,TB, VU by AD at 7/8/2019 11:45 AM                   Point: Body mechanics (Done)     Learning Progress Summary           Patient Acceptance, E,TB, VU by AD at 7/8/2019 11:45 AM                   Point: Precautions (Done)     Learning Progress Summary           Patient Acceptance, E,TB, VU by AD at 7/8/2019 11:45 AM                               User Key     Initials Effective Dates Name Provider Type Discipline     04/03/18 -  Dalton, Ashley Claudene, PT Physical Therapist PT                PT Recommendation and Plan  Anticipated Discharge Disposition (PT): home with assist  Outcome Summary/Treatment Plan (PT)  Anticipated Discharge Disposition (PT): home with assist  Reason for Discharge (PT Discharge Summary): no further needs identified  Plan of Care Reviewed With: patient  Outcome Summary: Due to current level of function, the patient is not appropriate for skilled physical therapy at this time. He required supervision with bed mobility and SBA with transfers and ambuating 100' without an assistive device.     Outcome Measures     Row Name 07/08/19 1100             How much help from another person do you currently need...    Turning from your back to your side while in flat bed without using bedrails?  4  -AD      Moving from lying on back to sitting on the side of a flat bed without bedrails?  4   -AD      Moving to and from a bed to a chair (including a wheelchair)?  4  -AD      Standing up from a chair using your arms (e.g., wheelchair, bedside chair)?  4  -AD      Climbing 3-5 steps with a railing?  3  -AD      To walk in hospital room?  4  -AD      AM-PAC 6 Clicks Score (PT)  23  -AD         Functional Assessment    Outcome Measure Options  AM-PAC 6 Clicks Basic Mobility (PT)  -AD        User Key  (r) = Recorded By, (t) = Taken By, (c) = Cosigned By    Initials Name Provider Type    AD Dalton, Ashley Claudene, PT Physical Therapist           Time Calculation:   PT Charges     Row Name 07/08/19 1148 07/08/19 1100          Time Calculation    Start Time  -- 30 minutes  -AD  --        Timed Charges    68723 - Gait Training Minutes   --  10  -AD       User Key  (r) = Recorded By, (t) = Taken By, (c) = Cosigned By    Initials Name Provider Type    AD Dalton, Ashley Claudene, PT Physical Therapist        Therapy Charges for Today     Code Description Service Date Service Provider Modifiers Qty    55575725286 HC GAIT TRAINING EA 15 MIN 7/8/2019 Dalton, Ashley Claudene, PT GP 1    50742194340 HC PT EVAL MOD COMPLEXITY 1 7/8/2019 Dalton, Ashley Claudene, PT GP 1          PT G-Codes  Outcome Measure Options: AM-PAC 6 Clicks Basic Mobility (PT)  AM-PAC 6 Clicks Score (PT): 23    PT Discharge Summary  Anticipated Discharge Disposition (PT): home with assist  Reason for Discharge: (Not appropriate for skilling physical therapy due to current level of independence.)    Ashley Claudene Dalton, PT  7/8/2019         Electronically signed by Dalton, Ashley Claudene, PT at 7/8/2019 11:49 AM          Occupational Therapy Notes (most recent note)      Gosia Whatley OT at 7/12/2019  3:53 PM             07/12/19 1553   Rehab Treatment   Discipline occupational therapist   Reason Treatment Not Performed patient/family declined treatment  (fatigue/sleeping)       Electronically signed by Gosia Whatley OT at 7/12/2019  3:53  PM       Speech Language Pathology Notes (most recent note)     No notes of this type exist for this encounter.        Respiratory Therapy Notes (most recent note)     No notes of this type exist for this encounter.        ADL Documentation (most recent)      Most Recent Value   Presence of Pain  denies pain/discomfort   Transferring  2 - assistive person   Toileting  2 - assistive person   Bathing  2 - assistive person   Dressing  0 - independent   Eating  0 - independent   Communication  0 - understands/communicates without difficulty   Swallowing  0 - swallows foods/liquids without difficulty   Equipment Currently Used at Home  walker, rolling, wheelchair, oxygen [Pt states having home oxygen, however the liter flow is unknown, a wheelchair and a rolling walker at home from unknown provider. ]

## 2019-07-15 NOTE — PROGRESS NOTES
Discharge Planning Assessment   Donny     Patient Name: Felice Aiken  MRN: [id number]  Today's Date: 7/15/2019    Admit Date: 7/4/2019      Discharge Plan     Row Name 07/15/19 1122       Plan    Plan SS spoke to Sury at [facility] this morning who states pt can not be admitted due to not being appropriate for a pre-admission evaluation. SS contacted pt's spouse, Kary who continues to request nursing home placement due to being able to provide care for pt at home. Pt's spouse voices being ill herself. SS contacted [facility] per Simin who states being agreeable to review referral. SS sent referral to [facility]. SS to follow.     16:04 SS [facility] SS to follow.     Patient/Family in Agreement with Plan  yes        Destination      Service Provider Request Status Selected Services Address Phone Number Fax Number    [facility] Pending - Request Sent N/A [address] [place] [address] [phone]    [facility] Declined N/A [address] [facility] [address] [phone]     Kelli Larsen

## 2019-07-15 NOTE — PROGRESS NOTES
[facility]     Patient Identification:  Name:  Felice Aiken  Age:  84 y.o.  Sex:  male  :  1935  MRN:  [id number]  Visit Number:  [id number]  Primary Care Provider:  Rc Ojeda MD    Length of stay:  11    Subjective:   Patient seen and examined reviewed, no new events, discussed with the , awaiting placement still      Chief Complaint: Knee pain  ----------------------------------------------------------------------------------------------------------------------  Current Hospital Meds:    apixaban 2.5 mg Oral Q12H   aspirin 81 mg Oral Daily   atorvastatin 40 mg Oral Nightly   Divalproex Sodium 125 mg Oral BID   furosemide 20 mg Oral BID   isosorbide mononitrate 60 mg Oral Daily   metoprolol succinate XL 50 mg Oral Q24H   sodium chloride 3 mL Intravenous Q12H   theophylline 600 mg Oral Daily        ----------------------------------------------------------------------------------------------------------------------  Vital Signs:  Temp:  [97.6 °F (36.4 °C)-98.3 °F (36.8 °C)] 97.6 °F (36.4 °C)  Heart Rate:  [52-85] 52  Resp:  [17-20] 18  BP: (100-120)/(62-80) 111/80       Tele: Atrial fibrillation rate of 82 bpm      19  0600 19  0243 07/15/19  0300   Weight: (recorded at 0502) 57.3 kg (126 lb 4.8 oz) 57.1 kg (125 lb 14.4 oz)     Body mass index is 18.06 kg/m².    Intake/Output Summary (Last 24 hours) at 7/15/2019 1415  Last data filed at 7/15/2019 1336  Gross per 24 hour   Intake 620 ml   Output 1250 ml   Net -630 ml     Diet Regular  ----------------------------------------------------------------------------------------------------------------------    Physical exam:  General: Comfortable,awake, alert, oriented to self, place but not time remain the same.    Skin:  Skin is warm and dry. No rash noted. No pallor.    HENT:  Head:  Normocephalic and atraumatic.  Mouth:  Moist mucous membranes.    Eyes:  Conjunctivae and EOM are  normal.  Pupils are equal, round, and reactive to light.  No scleral icterus.    Neck:  Neck supple.  No JVD present.    Pulmonary/Chest: No added sounds, normal chest expansion.  Cardiovascular:  Normal rate, irregular regular rhythm I could not hear any murmur or rub  Abdominal:  Soft.  Bowel sounds are normal.  No distension and no tenderness.   Extremities: No new changes.  Neurological:  stable as yesterday, no new changes        ----------------------------------------------------------------------------------------------------------------------      Results from last 7 days   Lab Units 07/14/19  0532   WBC 10*3/mm3 6.48   HEMOGLOBIN g/dL 12.5*   HEMATOCRIT % 40.2   MCV fL 97.6*   MCHC g/dL 31.1*   PLATELETS 10*3/mm3 198         Results from last 7 days   Lab Units 07/14/19  0532   SODIUM mmol/L 139   POTASSIUM mmol/L 4.7   CHLORIDE mmol/L 103   CO2 mmol/L 28.0   BUN mg/dL 10   CREATININE mg/dL 0.79   EGFR IF NONAFRICN AM mL/min/1.73 94   CALCIUM mg/dL 9.3   GLUCOSE mg/dL 92   Estimated Creatinine Clearance: 57.5 mL/min (by C-G formula based on SCr of 0.79 mg/dL).             I have personally looked at the labs and they are summarized above.    ----------------------------------------------------------------------------------------------------------------------      Assessment and Plan:    -Indeterminate troponin, patient is a symptomatic stable numbers  -Chronic persistent atrial fibrillation  -Chronic anticoagulation for stroke prophylaxis  -Chronic systolic heart failure ejection fraction 45 to 50% compensated  -Dementia with occasional behavioral disturbances  -Coronary artery disease status post stent placement  -Peripheral arterial disease status post bilateral femoral-popliteal bypass  -History of COPD  -Status post left total knee arthroplasty  -Right knee osteoarthritis    --Patient remained the same, still awaiting on placement to be discharged to home discussed with the wife who is not able to take  care of him        Mhd Nilam Lopez MD  07/15/19  2:15 PM

## 2019-07-15 NOTE — PLAN OF CARE
Problem: Patient Care Overview  Goal: Plan of Care Review  Outcome: Ongoing (interventions implemented as appropriate)   07/11/19 1702 07/14/19 2350 07/15/19 0805   Coping/Psychosocial   Plan of Care Reviewed With --  --  patient   Plan of Care Review   Progress --  improving --    OTHER   Outcome Summary Light BUE AROM bedside. Continue POC. --  --      Goal: Individualization and Mutuality  Outcome: Ongoing (interventions implemented as appropriate)    Goal: Discharge Needs Assessment  Outcome: Ongoing (interventions implemented as appropriate)   07/04/19 1953 07/05/19 1153   Disability   Equipment Currently Used at Home --  walker, rolling;wheelchair;oxygen  (Pt states having home oxygen, however the liter flow is unknown, a wheelchair and a rolling walker at home from unknown provider. )   Discharge Needs Assessment   Patient/Family Anticipates Transition to --  home with family   Patient/Family Anticipated Services at Transition none --    Transportation Anticipated --  family or friend will provide  (Pt states his spouse drives and will provide transportation at discharge.)     Goal: Interprofessional Rounds/Family Conf  Outcome: Ongoing (interventions implemented as appropriate)   07/07/19 2218   Interdisciplinary Rounds/Family Conf   Participants nursing;patient       Problem: Fall Risk (Adult)  Goal: Identify Related Risk Factors and Signs and Symptoms  Outcome: Ongoing (interventions implemented as appropriate)   07/04/19 2300   Fall Risk (Adult)   Related Risk Factors (Fall Risk) age-related changes;bladder function altered;fatigue/slow reaction;gait/mobility problems;environment unfamiliar   Signs and Symptoms (Fall Risk) presence of risk factors     Goal: Absence of Fall  Outcome: Ongoing (interventions implemented as appropriate)   07/14/19 2350   Fall Risk (Adult)   Absence of Fall making progress toward outcome       Problem: Skin Injury Risk (Adult)  Goal: Identify Related Risk Factors and Signs and  Symptoms  Outcome: Ongoing (interventions implemented as appropriate)   07/04/19 2300   Skin Injury Risk (Adult)   Related Risk Factors (Skin Injury Risk) advanced age;cognitive impairment     Goal: Skin Health and Integrity  Outcome: Ongoing (interventions implemented as appropriate)   07/14/19 2350   Skin Injury Risk (Adult)   Skin Health and Integrity making progress toward outcome       Problem: Pain, Chronic (Adult)  Goal: Identify Related Risk Factors and Signs and Symptoms  Outcome: Ongoing (interventions implemented as appropriate)   07/04/19 2300   Pain, Chronic (Adult)   Signs and Symptoms (Chronic Pain) verbalization of pain descriptors     Goal: Acceptable Pain/Comfort Level and Functional Ability  Outcome: Ongoing (interventions implemented as appropriate)   07/14/19 2350   Pain, Chronic (Adult)   Acceptable Pain/Comfort Level and Functional Ability making progress toward outcome       Problem: Pain, Acute (Adult)  Goal: Acceptable Pain Control/Comfort Level  Outcome: Ongoing (interventions implemented as appropriate)   07/14/19 2350   Pain, Acute (Adult)   Acceptable Pain Control/Comfort Level making progress toward outcome

## 2019-07-15 NOTE — PLAN OF CARE
Problem: Patient Care Overview  Goal: Plan of Care Review  Outcome: Ongoing (interventions implemented as appropriate)   07/14/19 2350   Coping/Psychosocial   Plan of Care Reviewed With patient   Plan of Care Review   Progress improving       Problem: Fall Risk (Adult)  Goal: Absence of Fall  Outcome: Ongoing (interventions implemented as appropriate)   07/14/19 2350   Fall Risk (Adult)   Absence of Fall making progress toward outcome       Problem: Skin Injury Risk (Adult)  Goal: Skin Health and Integrity  Outcome: Ongoing (interventions implemented as appropriate)   07/14/19 2350   Skin Injury Risk (Adult)   Skin Health and Integrity making progress toward outcome       Problem: Pain, Chronic (Adult)  Goal: Acceptable Pain/Comfort Level and Functional Ability  Outcome: Ongoing (interventions implemented as appropriate)   07/14/19 2350   Pain, Chronic (Adult)   Acceptable Pain/Comfort Level and Functional Ability making progress toward outcome       Problem: Pain, Acute (Adult)  Goal: Acceptable Pain Control/Comfort Level  Outcome: Ongoing (interventions implemented as appropriate)   07/14/19 2350   Pain, Acute (Adult)   Acceptable Pain Control/Comfort Level making progress toward outcome

## 2019-07-16 VITALS
RESPIRATION RATE: 18 BRPM | BODY MASS INDEX: 18.02 KG/M2 | SYSTOLIC BLOOD PRESSURE: 131 MMHG | OXYGEN SATURATION: 100 % | DIASTOLIC BLOOD PRESSURE: 59 MMHG | HEART RATE: 69 BPM | WEIGHT: 125.9 LBS | TEMPERATURE: 97.5 F | HEIGHT: 70 IN

## 2019-07-16 PROCEDURE — 99239 HOSP IP/OBS DSCHRG MGMT >30: CPT | Performed by: INTERNAL MEDICINE

## 2019-07-16 RX ORDER — METOPROLOL SUCCINATE 50 MG/1
50 TABLET, EXTENDED RELEASE ORAL
Qty: 30 TABLET | Refills: 0 | Status: SHIPPED | OUTPATIENT
Start: 2019-07-17 | End: 2021-06-12

## 2019-07-16 RX ADMIN — SODIUM CHLORIDE, PRESERVATIVE FREE 3 ML: 5 INJECTION INTRAVENOUS at 08:57

## 2019-07-16 RX ADMIN — THEOPHYLLINE 600 MG: 400 TABLET, EXTENDED RELEASE ORAL at 08:47

## 2019-07-16 RX ADMIN — APIXABAN 2.5 MG: 2.5 TABLET, FILM COATED ORAL at 08:47

## 2019-07-16 RX ADMIN — ASPIRIN 81 MG: 81 TABLET, CHEWABLE ORAL at 08:46

## 2019-07-16 RX ADMIN — DIVALPROEX SODIUM 125 MG: 125 CAPSULE ORAL at 08:47

## 2019-07-16 NOTE — DISCHARGE SUMMARY
[facility] HOSPITALISTS DISCHARGE SUMMARY    Patient Identification:  Name:  Felice Aiken  Age:  84 y.o.  Sex:  male  :  1935  MRN:  [id number]  Visit Number:  [id number]    Date of Admission: 2019  Date of Discharge:  2019     PCP: Rc Ojeda MD    DISCHARGE DIAGNOSIS  -Indeterminate troponin, patient is a symptomatic stable numbers  -Chronic persistent atrial fibrillation  -Chronic anticoagulation for stroke prophylaxis  -Chronic systolic heart failure ejection fraction 45 to 50% compensated  -Dementia with occasional behavioral disturbances  -Coronary artery disease status post stent placement  -Peripheral arterial disease status post bilateral femoral-popliteal bypass  -History of COPD  -Status post left total knee arthroplasty  -Right knee osteoarthritis          HOSPITAL COURSE  Patient is a 84 y.o. male presented to  with confusion state.  Please see the admitting history and physical for further details.   In the emergency room he has indeterminate troponin but no EKG changes, persistently is been 0.5, no new changes on EKG or 2D echo.  Cardiology recommended current medication.  Afterwards the patient remained stable during his stay very pleasant,  with the valproic acid the patient mood remained stable and pleasant.  He adamantly refused any nursing home placement, he is conscious oriented alert and Able of making his own decisions therefore he will be discharged home in stable condition will follow up with Adult Protective Services that is been planned by the social service.  Patient denied the need of any home health care services.    VITAL SIGNS:  Temp:  [97.5 °F (36.4 °C)-98.1 °F (36.7 °C)] 97.5 °F (36.4 °C)  Heart Rate:  [58-92] 69  Resp:  [16-18] 18  BP: ()/(46-61) 131/59  SpO2:  [100 %] 100 %  on  Flow (L/min):  [2] 2;   Device (Oxygen Therapy): nasal cannula    Body mass index is 18.06 kg/m².  Wt Readings from Last 3 Encounters:    07/15/19 57.1 kg (125 lb 14.4 oz)   04/14/19 81.6 kg (180 lb)   01/23/19 82.6 kg (182 lb)       PHYSICAL EXAM:  Constitutional:  Well-developed and well-nourished.  No respiratory distress.      HENT:  Head: Normocephalic and atraumatic.  Mouth:  Moist mucous membranes.    Eyes:  Conjunctivae and EOM are normal.  Pupils are equal, round, and reactive to light.  No scleral icterus.  Neck:  Neck supple.  No JVD present.    Cardiovascular:  Normal rate, regular rhythm and normal heart sounds with no murmur.  Pulmonary/Chest:  No respiratory distress, no wheezes, no crackles, with normal breath sounds and good air movement.  Abdominal:  Soft.  Bowel sounds are normal.  No distension and no tenderness.   Musculoskeletal:  No edema, no tenderness, and no deformity.  No red or swollen joints anywhere.    Neurological:  Alert and oriented to person, place, and time.  No cranial nerve deficit.  No tongue deviation.  No facial droop.  No slurred speech.   Skin:  Skin is warm and dry.  No rash noted.  No pallor.   Peripheral vascular:  No edema and strong pulses on all 4 extremities.      DISCHARGE DISPOSITION   Stable    DISCHARGE MEDICATIONS:     Discharge Medications      New Medications      Instructions Start Date   metoprolol succinate XL 50 MG 24 hr tablet  Commonly known as:  TOPROL-XL   50 mg, Oral, Every 24 Hours Scheduled   Start Date:  7/17/2019        Changes to Medications      Instructions Start Date   nitroglycerin 0.4 MG SL tablet  Commonly known as:  NITROSTAT  What changed:    · how much to take  · how to take this  · when to take this  · reasons to take this  · additional instructions   DISSOLVE 1 TAB UNDER THE TONGUE AS NEEDED FOR ANGINA, MAY REPEAT EVERY 5 MINUTES FOR UP THREE DOSES         Continue These Medications      Instructions Start Date   ASPIRIN LOW DOSE 81 MG tablet  Generic drug:  aspirin   81 mg, Oral, Daily      divalproex 125 MG DR tablet  Commonly known as:  DEPAKOTE   125 mg, Oral, 2  Times Daily      ELIQUIS 5 MG tablet tablet  Generic drug:  apixaban   TAKE ONE TABLET BY MOUTH TWICE DAILY      EQL FISH OIL 1000 MG capsule   1,200 mg, Oral, Daily      furosemide 20 MG tablet  Commonly known as:  LASIX   20 mg, Oral, 2 Times Daily      isosorbide mononitrate 60 MG 24 hr tablet  Commonly known as:  IMDUR   60 mg, Oral, Daily      simvastatin 40 MG tablet  Commonly known as:  ZOCOR   40 mg, Oral, Nightly      theophylline 600 MG 24 hr tablet  Commonly known as:  UNIPHYL   600 mg, Oral, Daily         Stop These Medications    glucosamine-chondroitin 500-400 MG capsule capsule     traMADol 50 MG tablet  Commonly known as:  ULTRAM            Diet Instructions     Diet: Cardiac      Discharge Diet:  Cardiac        Activity Instructions     Activity as Tolerated          Future Appointments   Date Time Provider Department Center   7/23/2019  8:20 AM Winnie Key APRN [facility] None     Your Scheduled Appointments    Jul 23, 2019  8:20 AM EDT  Follow Up with CHRYSTAL Branham  [facility] (--) [address] [place] [address] [phone]   Arrive 15 minutes prior to appointment.        Additional Instructions for the Follow-ups that You Need to Schedule     Discharge Follow-up with PCP   As directed       Currently Documented PCP:    Rc Ojeda MD    PCP Phone Number:    [phone]     Follow Up Details:  PCP in a week           Follow-up Information     Rc Ojeda MD .    Specialty:  Family Medicine  Why:  PCP in a week  Contact information:  [place] [id number] [phone]                    TEST  RESULTS PENDING AT DISCHARGE   Order Current Status    Blood Gas, Arterial Collected (07/04/19 1513)    Vitamin B12 In process           CODE STATUS  Code Status and Medical Interventions:   Ordered at: 07/04/19 1835     Level Of Support Discussed With:    Patient     Code Status:    CPR     Medical Interventions (Level of Support  Prior to Arrest):    Full       Mhd Nilam Lopez MD  07/16/19  1:24 PM    Please note that this discharge summary required more than 30 minutes to complete.    Please send a copy of this dictation to the following providers:  Rc Ojeda MD

## 2019-07-16 NOTE — PLAN OF CARE
Problem: Patient Care Overview  Goal: Plan of Care Review  Outcome: Ongoing (interventions implemented as appropriate)   07/11/19 1702 07/14/19 2350 07/15/19 2000   Coping/Psychosocial   Plan of Care Reviewed With --  --  patient   Plan of Care Review   Progress --  improving --    OTHER   Outcome Summary Light BUE AROM bedside. Continue POC. --  --      Goal: Discharge Needs Assessment  Outcome: Ongoing (interventions implemented as appropriate)   07/04/19 1953 07/05/19 1153 07/15/19 2119   Disability   Equipment Currently Used at Home --  --  hospital bed   Discharge Needs Assessment   Patient/Family Anticipates Transition to --  home with family --    Patient/Family Anticipated Services at Transition none --  --    Transportation Anticipated --  family or friend will provide  (Pt states his spouse drives and will provide transportation at discharge.) --      Goal: Interprofessional Rounds/Family Conf  Outcome: Ongoing (interventions implemented as appropriate)   07/07/19 2218   Interdisciplinary Rounds/Family Conf   Participants nursing;patient       Problem: Fall Risk (Adult)  Goal: Identify Related Risk Factors and Signs and Symptoms  Outcome: Ongoing (interventions implemented as appropriate)   07/04/19 2300   Fall Risk (Adult)   Related Risk Factors (Fall Risk) age-related changes;bladder function altered;fatigue/slow reaction;gait/mobility problems;environment unfamiliar   Signs and Symptoms (Fall Risk) presence of risk factors     Goal: Absence of Fall  Outcome: Ongoing (interventions implemented as appropriate)   07/14/19 2350   Fall Risk (Adult)   Absence of Fall making progress toward outcome       Problem: Skin Injury Risk (Adult)  Goal: Identify Related Risk Factors and Signs and Symptoms  Outcome: Ongoing (interventions implemented as appropriate)   07/04/19 2300   Skin Injury Risk (Adult)   Related Risk Factors (Skin Injury Risk) advanced age;cognitive impairment     Goal: Skin Health and  Integrity  Outcome: Ongoing (interventions implemented as appropriate)   07/14/19 2350   Skin Injury Risk (Adult)   Skin Health and Integrity making progress toward outcome       Problem: Pain, Chronic (Adult)  Goal: Identify Related Risk Factors and Signs and Symptoms  Outcome: Ongoing (interventions implemented as appropriate)   07/04/19 2300   Pain, Chronic (Adult)   Signs and Symptoms (Chronic Pain) verbalization of pain descriptors     Goal: Acceptable Pain/Comfort Level and Functional Ability  Outcome: Ongoing (interventions implemented as appropriate)   07/14/19 2350   Pain, Chronic (Adult)   Acceptable Pain/Comfort Level and Functional Ability making progress toward outcome       Problem: Pain, Acute (Adult)  Goal: Acceptable Pain Control/Comfort Level  Outcome: Ongoing (interventions implemented as appropriate)   07/14/19 2350   Pain, Acute (Adult)   Acceptable Pain Control/Comfort Level making progress toward outcome

## 2019-07-16 NOTE — PROGRESS NOTES
Discharge Planning Assessment   [facility]     Patient Name: Felice Aiken  MRN: [id number]  Today's Date: 7/16/2019    Admit Date: 7/4/2019    Discharge Plan     Row Name 07/16/19 0842       Plan    Plan SS [facility] Sim[facility] who states plans to visit pt today. SS to follow.     12:01 [facility] per Simin visited pt and pt informed her that he is returning home at discharge. Pt is refusing nursing home placement. Pt was discussed in [facility] today and Dr. Lopez states pt is alert and oriented. Dr. Lopez states plans to discharge pt home today. SS notified pt's spouse, Kary and she wants nursing home placement. SS educated spouse that pt can not be forced into the nursing home due to being alert and oriented. SS contacted [employer] per Miroslava Michele with report due to pt wanting to return home at discharge. Report was not accepted by [employer] (ID # [id number]). SS to follow.     13:56 Pt's spouse, Kary is present and is agreeable for pt to return home today. Pt's spouse to provide transportation at discharge. Pt is being discharged home today. Pt declines home health services at this time. No other needs identified.         Destination      Service Provider Request Status Selected Services Address Phone Number Fax Number    [facility] - Request Sent N/A [address] [place] [address] [phone]    [facility] Declined N/A [address] [facility] [address] [phone]     Kelli Larsen

## 2019-07-17 ENCOUNTER — READMISSION MANAGEMENT (OUTPATIENT)
Dept: CALL CENTER | Facility: HOSPITAL | Age: 84
End: 2019-07-17

## 2019-07-17 NOTE — OUTREACH NOTE
Prep Survey      Responses   Facility patient discharged from?  [facility]   Is patient eligible?  Yes   Discharge diagnosis  Indeterminate troponin, patient is a symptomatic stable numbers   Does the patient have one of the following disease processes/diagnoses(primary or secondary)?  Other   Does the patient have Home health ordered?  No   Is there a DME ordered?  No   Prep survey completed?  Yes          Dasia Tipton RN

## 2019-07-18 ENCOUNTER — READMISSION MANAGEMENT (OUTPATIENT)
Dept: CALL CENTER | Facility: HOSPITAL | Age: 84
End: 2019-07-18

## 2019-07-18 NOTE — OUTREACH NOTE
Medical Week 1 Survey      Responses   Facility patient discharged from?  [facility]   Does the patient have one of the following disease processes/diagnoses(primary or secondary)?  Other   Is there a successful TCM telephone encounter documented?  No   Week 1 attempt successful?  Yes   Call start time  [phone]   Call end time  1006   General alerts for this patient  [facility]    Discharge diagnosis  Indeterminate troponin, patient is a symptomatic stable numbers   Person spoke with today (if not patient) and relationship  Annmarie   Psychosocial issues?  Yes   Nursing interventions  Other   Notified Case Management  Psychosocial (Non Urgent)   Week 1 call completed?  Yes   Wrap up additional comments  Sister Annmarie was unable to give update on medications and appointments but expressed concern for safety.          Viviana Carbone RN

## 2019-07-25 ENCOUNTER — READMISSION MANAGEMENT (OUTPATIENT)
Dept: CALL CENTER | Facility: HOSPITAL | Age: 84
End: 2019-07-25

## 2019-07-25 NOTE — OUTREACH NOTE
Medical Week 2 Survey      Responses   Facility patient discharged from?  [facility]   Does the patient have one of the following disease processes/diagnoses(primary or secondary)?  Other   Week 2 attempt successful?  Yes   Call start time  0858   Discharge diagnosis  Indeterminate troponin, patient is a symptomatic stable numbers   Rescheduled  Revoked   Revoke  Decline to participate          Nae Kiran RN

## 2019-08-02 ENCOUNTER — OFFICE VISIT (OUTPATIENT)
Dept: CARDIOLOGY | Facility: CLINIC | Age: 84
End: 2019-08-02

## 2019-08-02 VITALS
SYSTOLIC BLOOD PRESSURE: 109 MMHG | HEART RATE: 65 BPM | WEIGHT: 141 LBS | BODY MASS INDEX: 20.19 KG/M2 | DIASTOLIC BLOOD PRESSURE: 66 MMHG | OXYGEN SATURATION: 95 % | HEIGHT: 70 IN | RESPIRATION RATE: 16 BRPM

## 2019-08-02 DIAGNOSIS — I73.9 PAD (PERIPHERAL ARTERY DISEASE) (HCC): ICD-10-CM

## 2019-08-02 DIAGNOSIS — N18.30 CHRONIC KIDNEY DISEASE, STAGE III (MODERATE) (HCC): ICD-10-CM

## 2019-08-02 DIAGNOSIS — I25.10 ARTERIOSCLEROTIC CARDIOVASCULAR DISEASE (ASCVD): Primary | ICD-10-CM

## 2019-08-02 DIAGNOSIS — I48.19 PERSISTENT ATRIAL FIBRILLATION (HCC): ICD-10-CM

## 2019-08-02 DIAGNOSIS — I10 ESSENTIAL HYPERTENSION: ICD-10-CM

## 2019-08-02 PROCEDURE — 99214 OFFICE O/P EST MOD 30 MIN: CPT | Performed by: NURSE PRACTITIONER

## 2019-08-02 NOTE — PROGRESS NOTES
Rc Ojeda MD  Felice Aiken  1935  08/02/2019    Patient Active Problem List   Diagnosis   • Knee pain   • Arteriosclerotic cardiovascular disease (ASCVD) s/p stenting   • Sleep apnea   • Pain and swelling of lower extremity   • Persistent atrial fibrillation (CMS/HCC)   • History of ASCVD (atherosclerotic cardiovascular disease), s/p stenting   • PAD (peripheral artery disease), fem pop bypass,3/08   • Dyslipidemia   • Essential hypertension   • Chronic bronchitis (CMS/HCC)   • Chronic obstructive pulmonary disease (CMS/HCC)   • Status post total left knee replacement   • Neuropathy involving both lower extremities   • Chronic kidney disease, stage III (moderate) (CMS/HCC)   • Hematuria   • Shortness of breath   • Chronic systolic heart failure (CMS/HCC)   • Failure to thrive in adult       Dear Rc Ojeda MD:    Subjective     Chief Complaint   Patient presents with   • Atrial Fibrillation           History of Present Illness:    Felice Aiken is a 84 y.o. male with a history of ASCVD status post remote PCI, peripheral arterial disease status post femoral-popliteal bypass in 2008, and chronic atrial fibrillation.  Patient presents today for routine cardiology follow-up.  He is accompanied by his wife.  Since his last visit here, he was hospitalized due to confusion and failure to thrive.  During that hospitalization there were no significant cardiac issues noted.  He did have a mild elevation in troponin which was chronic and did not change.  EKG did not reveal any acute changes and the patient had absolutely no chest pains or dyspnea.  An echocardiogram revealed an EF of 46 to 50% which was unchanged compared to his prior study in 2018.  He reports he has been doing fairly well since discharge.  He denies chest pain, shortness of breath, palpitations, dizziness, lightheadedness, near-syncope, and syncope.  He denies any bleeding issues with Eliquis. Recent labs and hospital records  reviewed.          No Known Allergies:      Current Outpatient Medications:   •  apixaban (ELIQUIS) 5 MG tablet tablet, Take 1 tablet by mouth 2 (Two) Times a Day., Disp: 56 tablet, Rfl: 0  •  aspirin (ASPIRIN LOW DOSE) 81 MG tablet, Take 81 mg by mouth Daily., Disp: , Rfl:   •  divalproex (DEPAKOTE) 125 MG DR tablet, Take 125 mg by mouth 2 (Two) Times a Day., Disp: , Rfl:   •  furosemide (LASIX) 20 MG tablet, Take 20 mg by mouth 2 (Two) Times a Day., Disp: , Rfl:   •  isosorbide mononitrate (IMDUR) 60 MG 24 hr tablet, Take 1 tablet by mouth Daily., Disp: 30 tablet, Rfl: 6  •  metoprolol succinate XL (TOPROL-XL) 50 MG 24 hr tablet, Take 1 tablet by mouth Daily., Disp: 30 tablet, Rfl: 0  •  nitroglycerin (NITROSTAT) 0.4 MG SL tablet, DISSOLVE 1 TAB UNDER THE TONGUE AS NEEDED FOR ANGINA, MAY REPEAT EVERY 5 MINUTES FOR UP THREE DOSES (Patient taking differently: Place 0.4 mg under the tongue Every 5 (Five) Minutes As Needed for Chest Pain. DISSOLVE 1 TAB UNDER THE TONGUE AS NEEDED FOR ANGINA, MAY REPEAT EVERY 5 MINUTES FOR UP THREE DOSES), Disp: 25 tablet, Rfl: 0  •  Omega-3 Fatty Acids (EQL FISH OIL) 1000 MG capsule, Take 1,200 mg by mouth Daily., Disp: , Rfl:   •  simvastatin (ZOCOR) 40 MG tablet, Take 40 mg by mouth Every Night., Disp: , Rfl:   •  theophylline (UNIPHYL) 600 MG 24 hr tablet, Take 1 tablet by mouth Daily., Disp: 30 tablet, Rfl: 5      The following portions of the patient's history were reviewed and updated as appropriate: allergies, current medications, past family history, past medical history, past social history, past surgical history and problem list.    Social History     Tobacco Use   • Smoking status: Former Smoker     Years: 40.00     Types: Cigarettes   • Smokeless tobacco: Never Used   Substance Use Topics   • Alcohol use: Yes     Comment: occcasionally when younger    • Drug use: No       Review of Systems   Constitution: Negative for weakness and malaise/fatigue.   Cardiovascular:  "Negative for chest pain, dyspnea on exertion, irregular heartbeat, leg swelling, near-syncope, orthopnea, palpitations, paroxysmal nocturnal dyspnea and syncope.   Respiratory: Negative for cough, shortness of breath and wheezing.    Neurological: Negative for dizziness and light-headedness.       Objective   Vitals:    08/02/19 0805   BP: 109/66   BP Location: Right arm   Patient Position: Sitting   Cuff Size: Adult   Pulse: 65   Resp: 16   SpO2: 95%   Weight: 64 kg (141 lb)   Height: 177.8 cm (70\")     Body mass index is 20.23 kg/m².        Physical Exam   Constitutional: He is oriented to person, place, and time. He appears well-developed and well-nourished.   HENT:   Head: Normocephalic and atraumatic.   Cardiovascular: Normal rate, regular rhythm and normal heart sounds. Exam reveals no S3 and no S4.   No murmur heard.  Pulmonary/Chest: Effort normal and breath sounds normal. He has no wheezes. He has no rales.   Abdominal: Soft. Bowel sounds are normal.   Musculoskeletal: He exhibits no edema.   Neurological: He is alert and oriented to person, place, and time.   Skin: Skin is warm and dry.   Psychiatric: He has a normal mood and affect. His behavior is normal.       Lab Results   Component Value Date     07/14/2019    K 4.7 07/14/2019     07/14/2019    CO2 28.0 07/14/2019    BUN 10 07/14/2019    CREATININE 0.79 07/14/2019    GLUCOSE 92 07/14/2019    CALCIUM 9.3 07/14/2019    AST 18 07/04/2019    ALT 13 07/04/2019    ALKPHOS 62 07/04/2019    LABIL2 1.3 (L) 05/07/2016     Lab Results   Component Value Date    CKTOTAL 67 07/05/2019     Lab Results   Component Value Date    WBC 6.48 07/14/2019    HGB 12.5 (L) 07/14/2019    HCT 40.2 07/14/2019     07/14/2019     Lab Results   Component Value Date    INR 2.23 12/17/2014    INR 3.98 12/16/2014    INR 9.26 12/15/2014     Lab Results   Component Value Date    MG 1.9 07/04/2019     Lab Results   Component Value Date    TSH 1.840 07/04/2019      Lab " Results   Component Value Date    BNP 49.0 08/20/2018           Procedures      Assessment/Plan    Diagnosis Plan   1. Arteriosclerotic cardiovascular disease (ASCVD) s/p stenting     2. Persistent atrial fibrillation (CMS/HCC)  apixaban (ELIQUIS) 5 MG tablet tablet   3. PAD (peripheral artery disease), fem pop bypass,3/08     4. Essential hypertension     5. Chronic kidney disease, stage III (moderate) (CMS/HCC)                  Recommendations:    1.  Since the patient is completely asymptomatic, will not pursue stress testing at this time.  However I have explained to him if he should develop any exertional dyspnea or chest pains to please let us know.  We could pursue ischemic evaluation at that time.    2.  Continue low-dose aspirin, Eliquis, Lasix, Imdur, metoprolol, and simvastatin.    3.  Follow-up in 6 months and if needed.          Patient's Body mass index is 20.23 kg/m². BMI is within normal parameters. No follow-up required..         Return in about 6 months (around 2/2/2020) for Recheck.    As always, I appreciate very much the opportunity to participate in the cardiovascular care of your patients.      With Best Regards,    CHRYSTAL Branham

## 2019-09-05 RX ORDER — BUMETANIDE 1 MG/1
TABLET ORAL
Qty: 30 TABLET | Refills: 1 | OUTPATIENT
Start: 2019-09-05

## 2019-09-06 ENCOUNTER — TELEPHONE (OUTPATIENT)
Dept: CARDIOLOGY | Facility: CLINIC | Age: 84
End: 2019-09-06

## 2019-09-06 NOTE — TELEPHONE ENCOUNTER
Pt's wife called and was requesting refills on Felice Bumex and for it to be sent to [facility]. In his chart it doesn't say he is on Bumex that he is on Lasix.   I called Winnie to see which one he needs to be on. She stated that he needs to be Lasix 20 mg BID.   I called Mrs. Aiken back and advised her. She expressed understanding. I have sent in the Lasix to [facility].

## 2019-09-09 RX ORDER — THEOPHYLLINE 600 MG/1
600 TABLET, EXTENDED RELEASE ORAL DAILY
Qty: 30 TABLET | Refills: 5 | Status: SHIPPED | OUTPATIENT
Start: 2019-09-09 | End: 2021-06-12

## 2019-09-11 ENCOUNTER — TRANSCRIBE ORDERS (OUTPATIENT)
Dept: OTHER | Facility: OTHER | Age: 84
End: 2019-09-11

## 2019-09-11 ENCOUNTER — HOSPITAL ENCOUNTER (OUTPATIENT)
Dept: GENERAL RADIOLOGY | Facility: HOSPITAL | Age: 84
Discharge: HOME | End: 2019-09-11
Admitting: NURSE PRACTITIONER

## 2019-09-11 DIAGNOSIS — M79.641 PAIN OF RIGHT HAND: ICD-10-CM

## 2019-09-11 DIAGNOSIS — M79.641 PAIN OF RIGHT HAND: Primary | ICD-10-CM

## 2019-09-11 PROCEDURE — 73110 X-RAY EXAM OF WRIST: CPT

## 2019-09-11 PROCEDURE — 73130 X-RAY EXAM OF HAND: CPT

## 2019-09-11 PROCEDURE — 73110 X-RAY EXAM OF WRIST: CPT | Performed by: RADIOLOGY

## 2019-09-11 PROCEDURE — 73130 X-RAY EXAM OF HAND: CPT | Performed by: RADIOLOGY

## 2020-02-10 ENCOUNTER — OFFICE VISIT (OUTPATIENT)
Dept: CARDIOLOGY | Facility: CLINIC | Age: 85
End: 2020-02-10

## 2020-02-10 VITALS
HEIGHT: 70 IN | BODY MASS INDEX: 21.62 KG/M2 | SYSTOLIC BLOOD PRESSURE: 150 MMHG | WEIGHT: 151 LBS | DIASTOLIC BLOOD PRESSURE: 75 MMHG | OXYGEN SATURATION: 95 % | HEART RATE: 54 BPM | RESPIRATION RATE: 18 BRPM

## 2020-02-10 DIAGNOSIS — I50.22 CHRONIC SYSTOLIC HEART FAILURE (HCC): ICD-10-CM

## 2020-02-10 DIAGNOSIS — N18.30 CHRONIC KIDNEY DISEASE, STAGE III (MODERATE) (HCC): ICD-10-CM

## 2020-02-10 DIAGNOSIS — I10 ESSENTIAL HYPERTENSION: ICD-10-CM

## 2020-02-10 DIAGNOSIS — I73.9 PAD (PERIPHERAL ARTERY DISEASE) (HCC): ICD-10-CM

## 2020-02-10 DIAGNOSIS — I25.10 ARTERIOSCLEROTIC CARDIOVASCULAR DISEASE (ASCVD): Primary | ICD-10-CM

## 2020-02-10 DIAGNOSIS — I48.19 PERSISTENT ATRIAL FIBRILLATION (HCC): ICD-10-CM

## 2020-02-10 DIAGNOSIS — E78.5 DYSLIPIDEMIA: ICD-10-CM

## 2020-02-10 DIAGNOSIS — R06.02 SHORTNESS OF BREATH: ICD-10-CM

## 2020-02-10 PROCEDURE — 99214 OFFICE O/P EST MOD 30 MIN: CPT | Performed by: NURSE PRACTITIONER

## 2020-02-10 RX ORDER — LISINOPRIL 5 MG/1
5 TABLET ORAL DAILY
COMMUNITY
End: 2021-07-06 | Stop reason: HOSPADM

## 2020-02-10 NOTE — PROGRESS NOTES
Rc Ojeda MD  Felice Aiken  1935  02/10/2020    Patient Active Problem List   Diagnosis   • Knee pain   • Arteriosclerotic cardiovascular disease (ASCVD) s/p stenting   • Sleep apnea   • Pain and swelling of lower extremity   • Persistent atrial fibrillation   • History of ASCVD (atherosclerotic cardiovascular disease), s/p stenting   • PAD (peripheral artery disease), fem pop bypass,3/08   • Dyslipidemia   • Essential hypertension   • Chronic bronchitis (CMS/HCC)   • Chronic obstructive pulmonary disease (CMS/HCC)   • Status post total left knee replacement   • Neuropathy involving both lower extremities   • Chronic kidney disease, stage III (moderate) (CMS/HCC)   • Hematuria   • Shortness of breath   • Chronic systolic heart failure (CMS/HCC)   • Failure to thrive in adult       Dear Rc Ojeda MD:    Subjective     Chief Complaint   Patient presents with   • Atrial Fibrillation           History of Present Illness:    Felice Aiken is a 84 y.o. male with a history of ASCVD status post remote PCI, peripheral arterial disease status post femoropopliteal bypass in 2008, and chronic atrial fibrillation.  He presents today for routine cardiology follow-up.  He reports he has not been taking many of his medications including the aspirin and Eliquis.  He states he does not want to take these medications.  His wife reports his PCP has discussed this at length with him including risks such as heart attack or stroke.  However, the patient has decided he does not want to take these medications.  He is taking a furosemide daily which seems to be helping with his lower extremity edema and shortness of breath.  His wife reports he presented to the ED at [facility] in [place], [place] a few weeks ago for shortness of breath.  Apparently work-up at that time was unremarkable although those records are not available for me to review today.  He denies any chest pain, dizziness, or  lightheadedness.        No Known Allergies:      Current Outpatient Medications:   •  furosemide (LASIX) 20 MG tablet, Take 1 tablet by mouth 2 (Two) Times a Day. (Patient taking differently: Take 20 mg by mouth Daily.), Disp: 60 tablet, Rfl: 2  •  lisinopril (PRINIVIL,ZESTRIL) 5 MG tablet, Take 5 mg by mouth Daily., Disp: , Rfl:   •  metoprolol succinate XL (TOPROL-XL) 50 MG 24 hr tablet, Take 1 tablet by mouth Daily. (Patient taking differently: Take 25 mg by mouth Daily.), Disp: 30 tablet, Rfl: 0  •  Misc Natural Products (COSAMIN MARLEY FOR JOINT HEALTH PO), Take  by mouth., Disp: , Rfl:   •  apixaban (ELIQUIS) 5 MG tablet tablet, Take 1 tablet by mouth 2 (Two) Times a Day., Disp: 56 tablet, Rfl: 0  •  aspirin (ASPIRIN LOW DOSE) 81 MG tablet, Take 81 mg by mouth Daily., Disp: , Rfl:   •  divalproex (DEPAKOTE) 125 MG DR tablet, Take 125 mg by mouth 2 (Two) Times a Day., Disp: , Rfl:   •  isosorbide mononitrate (IMDUR) 60 MG 24 hr tablet, Take 1 tablet by mouth Daily., Disp: 30 tablet, Rfl: 6  •  nitroglycerin (NITROSTAT) 0.4 MG SL tablet, DISSOLVE 1 TAB UNDER THE TONGUE AS NEEDED FOR ANGINA, MAY REPEAT EVERY 5 MINUTES FOR UP THREE DOSES (Patient taking differently: Place 0.4 mg under the tongue Every 5 (Five) Minutes As Needed for Chest Pain. DISSOLVE 1 TAB UNDER THE TONGUE AS NEEDED FOR ANGINA, MAY REPEAT EVERY 5 MINUTES FOR UP THREE DOSES), Disp: 25 tablet, Rfl: 0  •  Omega-3 Fatty Acids (EQL FISH OIL) 1000 MG capsule, Take 1,200 mg by mouth Daily., Disp: , Rfl:   •  simvastatin (ZOCOR) 40 MG tablet, Take 40 mg by mouth Every Night., Disp: , Rfl:   •  theophylline (UNIPHYL) 600 MG 24 hr tablet, Take 1 tablet by mouth Daily., Disp: 30 tablet, Rfl: 5      The following portions of the patient's history were reviewed and updated as appropriate: allergies, current medications, past family history, past medical history, past social history, past surgical history and problem list.    Social History     Tobacco Use   •  "Smoking status: Former Smoker     Years: 40.00     Types: Cigarettes   • Smokeless tobacco: Never Used   Substance Use Topics   • Alcohol use: Yes     Comment: occcasionally when younger    • Drug use: No       Review of Systems   Constitution: Negative for malaise/fatigue.   Cardiovascular: Negative for chest pain, dyspnea on exertion, irregular heartbeat, leg swelling, near-syncope, orthopnea, palpitations, paroxysmal nocturnal dyspnea and syncope.   Respiratory: Positive for shortness of breath. Negative for cough and wheezing.    Neurological: Negative for dizziness, light-headedness and weakness.       Objective   Vitals:    02/10/20 1347   BP: 150/75   BP Location: Left arm   Pulse: 54   Resp: 18   SpO2: 95%   Weight: 68.5 kg (151 lb)   Height: 177.8 cm (70\")     Body mass index is 21.67 kg/m².        Physical Exam   Constitutional: He appears well-developed and well-nourished.   HENT:   Head: Normocephalic and atraumatic.   Cardiovascular: Normal rate and normal heart sounds. An irregularly irregular rhythm present. Exam reveals no S3 and no S4.   No murmur heard.  Pulmonary/Chest: Effort normal and breath sounds normal. He has no wheezes. He has no rales.   Abdominal: Soft. Bowel sounds are normal.   Musculoskeletal: He exhibits no edema.   Neurological: He is alert.   Oriented to person, place, and time.   Skin: Skin is warm and dry.   Psychiatric: He has a normal mood and affect. His behavior is normal.       Lab Results   Component Value Date     07/14/2019    K 4.7 07/14/2019     07/14/2019    CO2 28.0 07/14/2019    BUN 10 07/14/2019    CREATININE 0.79 07/14/2019    GLUCOSE 92 07/14/2019    CALCIUM 9.3 07/14/2019    AST 18 07/04/2019    ALT 13 07/04/2019    ALKPHOS 62 07/04/2019    LABIL2 1.3 (L) 05/07/2016     Lab Results   Component Value Date    CKTOTAL 67 07/05/2019     Lab Results   Component Value Date    WBC 6.48 07/14/2019    HGB 12.5 (L) 07/14/2019    HCT 40.2 07/14/2019     " 07/14/2019     Lab Results   Component Value Date    INR 2.23 12/17/2014    INR 3.98 12/16/2014    INR 9.26 12/15/2014     Lab Results   Component Value Date    MG 1.9 07/04/2019     Lab Results   Component Value Date    TSH 1.840 07/04/2019      Lab Results   Component Value Date    BNP 49.0 08/20/2018           Procedures      Assessment/Plan    Diagnosis Plan   1. Arteriosclerotic cardiovascular disease (ASCVD) s/p stenting     2. Chronic systolic heart failure (CMS/HCC)     3. Essential hypertension     4. PAD (peripheral artery disease), fem pop bypass,3/08     5. Persistent atrial fibrillation     6. Shortness of breath     7. Dyslipidemia     8. Chronic kidney disease, stage III (moderate) (CMS/HCC)                  Recommendations:    1.  I have explained the risk of noncompliance with medication to the patient and his wife at length today including very high risk of stroke without anticoagulation as well as MI since the patient is not taking his aspirin.  The patient voices understanding and states he does not wish to take these medications and understands the risk.     2.  I have offered to order home health services to help the patient with medication compliance, but he and his wife have declined.  They report they are following closely with his PCP and are discussing possible nursing home placement.    3. We will request recent ER records from [facility] in [place], [place].    4. Follow up in 4 months or sooner if needed.          Patient's Body mass index is 21.67 kg/m². BMI is within normal parameters. No follow-up required..         Return in about 4 months (around 6/10/2020) for Recheck.    As always, I appreciate very much the opportunity to participate in the cardiovascular care of your patients.      With Best Regards,    CHRYSTAL Branham

## 2020-02-25 RX ORDER — IPRATROPIUM BROMIDE AND ALBUTEROL SULFATE 2.5; .5 MG/3ML; MG/3ML
SOLUTION RESPIRATORY (INHALATION)
Qty: 120 VIAL | Refills: 10 | Status: SHIPPED | OUTPATIENT
Start: 2020-02-25 | End: 2021-02-12

## 2020-06-30 NOTE — TELEPHONE ENCOUNTER
Spoke with pt's wife (pt is hard of hearing and asked me to speak with her).  She v/u and relayed the message to pt, who expressed understanding and was agreeable.    Size Of Lesion In Cm: 0

## 2021-02-11 ENCOUNTER — HOSPITAL ENCOUNTER (OUTPATIENT)
Dept: GENERAL RADIOLOGY | Facility: HOSPITAL | Age: 86
Discharge: HOME | End: 2021-02-11
Admitting: NURSE PRACTITIONER

## 2021-02-11 ENCOUNTER — TRANSCRIBE ORDERS (OUTPATIENT)
Dept: ADMINISTRATIVE | Facility: HOSPITAL | Age: 86
End: 2021-02-11

## 2021-02-11 DIAGNOSIS — R07.9 CHEST PAIN, UNSPECIFIED TYPE: ICD-10-CM

## 2021-02-11 DIAGNOSIS — M54.9 BACK PAIN, UNSPECIFIED BACK LOCATION, UNSPECIFIED BACK PAIN LATERALITY, UNSPECIFIED CHRONICITY: Primary | ICD-10-CM

## 2021-02-11 DIAGNOSIS — M54.9 BACK PAIN, UNSPECIFIED BACK LOCATION, UNSPECIFIED BACK PAIN LATERALITY, UNSPECIFIED CHRONICITY: ICD-10-CM

## 2021-02-11 PROCEDURE — 72072 X-RAY EXAM THORAC SPINE 3VWS: CPT

## 2021-02-11 PROCEDURE — 72072 X-RAY EXAM THORAC SPINE 3VWS: CPT | Performed by: RADIOLOGY

## 2021-02-11 PROCEDURE — 71046 X-RAY EXAM CHEST 2 VIEWS: CPT

## 2021-02-11 PROCEDURE — 71046 X-RAY EXAM CHEST 2 VIEWS: CPT | Performed by: RADIOLOGY

## 2021-02-12 RX ORDER — IPRATROPIUM BROMIDE AND ALBUTEROL SULFATE 2.5; .5 MG/3ML; MG/3ML
SOLUTION RESPIRATORY (INHALATION)
Qty: 360 ML | Refills: 10 | Status: SHIPPED | OUTPATIENT
Start: 2021-02-12 | End: 2021-06-12

## 2021-06-12 ENCOUNTER — HOSPITAL ENCOUNTER (OUTPATIENT)
Facility: HOSPITAL | Age: 86
Setting detail: OBSERVATION
Discharge: HOME | End: 2021-06-13
Attending: FAMILY MEDICINE | Admitting: INTERNAL MEDICINE

## 2021-06-12 ENCOUNTER — APPOINTMENT (OUTPATIENT)
Dept: CT IMAGING | Facility: HOSPITAL | Age: 86
End: 2021-06-12

## 2021-06-12 ENCOUNTER — APPOINTMENT (OUTPATIENT)
Dept: GENERAL RADIOLOGY | Facility: HOSPITAL | Age: 86
End: 2021-06-12

## 2021-06-12 DIAGNOSIS — R07.9 CHEST PAIN, UNSPECIFIED TYPE: ICD-10-CM

## 2021-06-12 DIAGNOSIS — N17.9 ACUTE RENAL INJURY (HCC): Primary | ICD-10-CM

## 2021-06-12 LAB
A-A DO2: 30.5 MMHG (ref 0–300)
ALBUMIN SERPL-MCNC: 4.36 G/DL (ref 3.5–5.2)
ALBUMIN/GLOB SERPL: 1.5 G/DL
ALP SERPL-CCNC: 76 U/L (ref 39–117)
ALT SERPL W P-5'-P-CCNC: 22 U/L (ref 1–41)
ANION GAP SERPL CALCULATED.3IONS-SCNC: 11 MMOL/L (ref 5–15)
ARTERIAL PATENCY WRIST A: ABNORMAL
AST SERPL-CCNC: 30 U/L (ref 1–40)
ATMOSPHERIC PRESS: 724 MMHG
BASE EXCESS BLDA CALC-SCNC: 2.7 MMOL/L (ref 0–2)
BASOPHILS # BLD AUTO: 0.05 10*3/MM3 (ref 0–0.2)
BASOPHILS NFR BLD AUTO: 0.7 % (ref 0–1.5)
BDY SITE: ABNORMAL
BILIRUB SERPL-MCNC: 0.6 MG/DL (ref 0–1.2)
BODY TEMPERATURE: 0 C
BUN SERPL-MCNC: 24 MG/DL (ref 8–23)
BUN/CREAT SERPL: 12.7 (ref 7–25)
CALCIUM SPEC-SCNC: 10 MG/DL (ref 8.6–10.5)
CHLORIDE SERPL-SCNC: 104 MMOL/L (ref 98–107)
CO2 BLDA-SCNC: 29.1 MMOL/L (ref 22–33)
CO2 SERPL-SCNC: 27 MMOL/L (ref 22–29)
COHGB MFR BLD: 1 % (ref 0–5)
CREAT SERPL-MCNC: 1.89 MG/DL (ref 0.76–1.27)
D-LACTATE SERPL-SCNC: 1.3 MMOL/L (ref 0.5–2)
DEPRECATED RDW RBC AUTO: 46.3 FL (ref 37–54)
EOSINOPHIL # BLD AUTO: 0.12 10*3/MM3 (ref 0–0.4)
EOSINOPHIL NFR BLD AUTO: 1.6 % (ref 0.3–6.2)
ERYTHROCYTE [DISTWIDTH] IN BLOOD BY AUTOMATED COUNT: 13.3 % (ref 12.3–15.4)
FLUAV RNA RESP QL NAA+PROBE: NOT DETECTED
FLUBV RNA RESP QL NAA+PROBE: NOT DETECTED
GFR SERPL CREATININE-BSD FRML MDRD: 34 ML/MIN/1.73
GLOBULIN UR ELPH-MCNC: 2.8 GM/DL
GLUCOSE SERPL-MCNC: 124 MG/DL (ref 65–99)
HCO3 BLDA-SCNC: 27.7 MMOL/L (ref 20–26)
HCT VFR BLD AUTO: 36.1 % (ref 37.5–51)
HCT VFR BLD CALC: 35.1 % (ref 38–51)
HGB BLD-MCNC: 11.7 G/DL (ref 13–17.7)
HGB BLDA-MCNC: 11.5 G/DL (ref 14–18)
IMM GRANULOCYTES # BLD AUTO: 0.02 10*3/MM3 (ref 0–0.05)
IMM GRANULOCYTES NFR BLD AUTO: 0.3 % (ref 0–0.5)
INHALED O2 CONCENTRATION: 21 %
LYMPHOCYTES # BLD AUTO: 1.3 10*3/MM3 (ref 0.7–3.1)
LYMPHOCYTES NFR BLD AUTO: 17.2 % (ref 19.6–45.3)
Lab: ABNORMAL
MCH RBC QN AUTO: 30.6 PG (ref 26.6–33)
MCHC RBC AUTO-ENTMCNC: 32.4 G/DL (ref 31.5–35.7)
MCV RBC AUTO: 94.5 FL (ref 79–97)
METHGB BLD QL: 0.3 % (ref 0–3)
MODALITY: ABNORMAL
MONOCYTES # BLD AUTO: 0.45 10*3/MM3 (ref 0.1–0.9)
MONOCYTES NFR BLD AUTO: 6 % (ref 5–12)
NEUTROPHILS NFR BLD AUTO: 5.61 10*3/MM3 (ref 1.7–7)
NEUTROPHILS NFR BLD AUTO: 74.2 % (ref 42.7–76)
NOTE: ABNORMAL
NRBC BLD AUTO-RTO: 0 /100 WBC (ref 0–0.2)
NT-PROBNP SERPL-MCNC: 1313 PG/ML (ref 0–1800)
OXYHGB MFR BLDV: 90.5 % (ref 94–99)
PCO2 BLDA: 43.8 MM HG (ref 35–45)
PCO2 TEMP ADJ BLD: ABNORMAL MM[HG]
PH BLDA: 7.41 PH UNITS (ref 7.35–7.45)
PH, TEMP CORRECTED: ABNORMAL
PLATELET # BLD AUTO: 179 10*3/MM3 (ref 140–450)
PMV BLD AUTO: 10.2 FL (ref 6–12)
PO2 BLDA: 62.2 MM HG (ref 83–108)
PO2 TEMP ADJ BLD: ABNORMAL MM[HG]
POTASSIUM SERPL-SCNC: 4.3 MMOL/L (ref 3.5–5.2)
PROT SERPL-MCNC: 7.2 G/DL (ref 6–8.5)
RBC # BLD AUTO: 3.82 10*6/MM3 (ref 4.14–5.8)
SAO2 % BLDCOA: 91.7 % (ref 94–99)
SARS-COV-2 RNA RESP QL NAA+PROBE: NOT DETECTED
SODIUM SERPL-SCNC: 142 MMOL/L (ref 136–145)
TROPONIN T SERPL-MCNC: 0.02 NG/ML (ref 0–0.03)
TROPONIN T SERPL-MCNC: 0.02 NG/ML (ref 0–0.03)
VENTILATOR MODE: ABNORMAL
WBC # BLD AUTO: 7.55 10*3/MM3 (ref 3.4–10.8)

## 2021-06-12 PROCEDURE — 71045 X-RAY EXAM CHEST 1 VIEW: CPT | Performed by: RADIOLOGY

## 2021-06-12 PROCEDURE — 83050 HGB METHEMOGLOBIN QUAN: CPT

## 2021-06-12 PROCEDURE — 71045 X-RAY EXAM CHEST 1 VIEW: CPT

## 2021-06-12 PROCEDURE — 70450 CT HEAD/BRAIN W/O DYE: CPT

## 2021-06-12 PROCEDURE — 87636 SARSCOV2 & INF A&B AMP PRB: CPT | Performed by: FAMILY MEDICINE

## 2021-06-12 PROCEDURE — 87040 BLOOD CULTURE FOR BACTERIA: CPT | Performed by: PHYSICIAN ASSISTANT

## 2021-06-12 PROCEDURE — 93010 ELECTROCARDIOGRAM REPORT: CPT | Performed by: INTERNAL MEDICINE

## 2021-06-12 PROCEDURE — 73523 X-RAY EXAM HIPS BI 5/> VIEWS: CPT | Performed by: RADIOLOGY

## 2021-06-12 PROCEDURE — G0378 HOSPITAL OBSERVATION PER HR: HCPCS

## 2021-06-12 PROCEDURE — 72131 CT LUMBAR SPINE W/O DYE: CPT

## 2021-06-12 PROCEDURE — 72128 CT CHEST SPINE W/O DYE: CPT | Performed by: RADIOLOGY

## 2021-06-12 PROCEDURE — 87150 DNA/RNA AMPLIFIED PROBE: CPT | Performed by: PHYSICIAN ASSISTANT

## 2021-06-12 PROCEDURE — 36600 WITHDRAWAL OF ARTERIAL BLOOD: CPT

## 2021-06-12 PROCEDURE — 80053 COMPREHEN METABOLIC PANEL: CPT | Performed by: PHYSICIAN ASSISTANT

## 2021-06-12 PROCEDURE — 73090 X-RAY EXAM OF FOREARM: CPT

## 2021-06-12 PROCEDURE — 93005 ELECTROCARDIOGRAM TRACING: CPT | Performed by: PHYSICIAN ASSISTANT

## 2021-06-12 PROCEDURE — 87147 CULTURE TYPE IMMUNOLOGIC: CPT | Performed by: PHYSICIAN ASSISTANT

## 2021-06-12 PROCEDURE — 83880 ASSAY OF NATRIURETIC PEPTIDE: CPT | Performed by: PHYSICIAN ASSISTANT

## 2021-06-12 PROCEDURE — 72131 CT LUMBAR SPINE W/O DYE: CPT | Performed by: RADIOLOGY

## 2021-06-12 PROCEDURE — 72125 CT NECK SPINE W/O DYE: CPT | Performed by: RADIOLOGY

## 2021-06-12 PROCEDURE — 84484 ASSAY OF TROPONIN QUANT: CPT | Performed by: PHYSICIAN ASSISTANT

## 2021-06-12 PROCEDURE — C9803 HOPD COVID-19 SPEC COLLECT: HCPCS

## 2021-06-12 PROCEDURE — 83605 ASSAY OF LACTIC ACID: CPT | Performed by: PHYSICIAN ASSISTANT

## 2021-06-12 PROCEDURE — 72128 CT CHEST SPINE W/O DYE: CPT

## 2021-06-12 PROCEDURE — 70450 CT HEAD/BRAIN W/O DYE: CPT | Performed by: RADIOLOGY

## 2021-06-12 PROCEDURE — 99285 EMERGENCY DEPT VISIT HI MDM: CPT

## 2021-06-12 PROCEDURE — 73523 X-RAY EXAM HIPS BI 5/> VIEWS: CPT

## 2021-06-12 PROCEDURE — 99220 PR INITIAL OBSERVATION CARE/DAY 70 MINUTES: CPT | Performed by: INTERNAL MEDICINE

## 2021-06-12 PROCEDURE — 73090 X-RAY EXAM OF FOREARM: CPT | Performed by: RADIOLOGY

## 2021-06-12 PROCEDURE — 82375 ASSAY CARBOXYHB QUANT: CPT

## 2021-06-12 PROCEDURE — 72125 CT NECK SPINE W/O DYE: CPT

## 2021-06-12 PROCEDURE — 82805 BLOOD GASES W/O2 SATURATION: CPT

## 2021-06-12 PROCEDURE — 85025 COMPLETE CBC W/AUTO DIFF WBC: CPT | Performed by: PHYSICIAN ASSISTANT

## 2021-06-12 RX ORDER — ATORVASTATIN CALCIUM 10 MG/1
10 TABLET, FILM COATED ORAL DAILY
Status: CANCELLED | OUTPATIENT
Start: 2021-06-13

## 2021-06-12 RX ORDER — NITROGLYCERIN 0.4 MG/1
0.4 TABLET SUBLINGUAL
Status: DISCONTINUED | OUTPATIENT
Start: 2021-06-12 | End: 2021-06-13 | Stop reason: HOSPADM

## 2021-06-12 RX ORDER — SODIUM CHLORIDE 0.9 % (FLUSH) 0.9 %
10 SYRINGE (ML) INJECTION EVERY 12 HOURS SCHEDULED
Status: DISCONTINUED | OUTPATIENT
Start: 2021-06-12 | End: 2021-06-13 | Stop reason: HOSPADM

## 2021-06-12 RX ORDER — SODIUM CHLORIDE 0.9 % (FLUSH) 0.9 %
10 SYRINGE (ML) INJECTION AS NEEDED
Status: DISCONTINUED | OUTPATIENT
Start: 2021-06-12 | End: 2021-06-13 | Stop reason: HOSPADM

## 2021-06-12 RX ORDER — IPRATROPIUM BROMIDE AND ALBUTEROL SULFATE 2.5; .5 MG/3ML; MG/3ML
1.5 SOLUTION RESPIRATORY (INHALATION) EVERY 4 HOURS PRN
Status: CANCELLED | OUTPATIENT
Start: 2021-06-12

## 2021-06-12 RX ORDER — CLONIDINE HYDROCHLORIDE 0.2 MG/1
0.2 TABLET ORAL ONCE
Status: DISCONTINUED | OUTPATIENT
Start: 2021-06-12 | End: 2021-06-13 | Stop reason: HOSPADM

## 2021-06-12 RX ORDER — METOPROLOL SUCCINATE 25 MG/1
12.5 TABLET, EXTENDED RELEASE ORAL DAILY
Status: CANCELLED | OUTPATIENT
Start: 2021-06-13

## 2021-06-12 RX ORDER — QUETIAPINE FUMARATE 25 MG/1
25 TABLET, FILM COATED ORAL 2 TIMES DAILY
Status: CANCELLED | OUTPATIENT
Start: 2021-06-12

## 2021-06-12 RX ORDER — ATORVASTATIN CALCIUM 10 MG/1
10 TABLET, FILM COATED ORAL DAILY
Status: ON HOLD | COMMUNITY
End: 2021-07-06 | Stop reason: SDUPTHER

## 2021-06-12 RX ORDER — ISOSORBIDE MONONITRATE 60 MG/1
60 TABLET, EXTENDED RELEASE ORAL DAILY
Status: DISCONTINUED | OUTPATIENT
Start: 2021-06-13 | End: 2021-06-12

## 2021-06-12 RX ORDER — METOPROLOL SUCCINATE 25 MG
12.5 TABLET, EXTENDED RELEASE 24 HR ORAL DAILY
COMMUNITY
End: 2021-06-13 | Stop reason: HOSPADM

## 2021-06-12 RX ORDER — ACETAMINOPHEN 500 MG
500 TABLET ORAL EVERY 6 HOURS PRN
COMMUNITY
End: 2021-06-13 | Stop reason: HOSPADM

## 2021-06-12 RX ORDER — METOPROLOL SUCCINATE 25 MG/1
50 TABLET, EXTENDED RELEASE ORAL
Status: CANCELLED | OUTPATIENT
Start: 2021-06-12

## 2021-06-12 RX ORDER — DIPHENOXYLATE HYDROCHLORIDE AND ATROPINE SULFATE 2.5; .025 MG/1; MG/1
1 TABLET ORAL DAILY
Status: ON HOLD | COMMUNITY
End: 2021-07-06 | Stop reason: SDUPTHER

## 2021-06-12 RX ORDER — CHLORAL HYDRATE 500 MG
1000 CAPSULE ORAL DAILY
COMMUNITY
End: 2021-07-06 | Stop reason: HOSPADM

## 2021-06-12 RX ORDER — ACETAMINOPHEN 500 MG
500 TABLET ORAL EVERY 6 HOURS PRN
Status: CANCELLED | OUTPATIENT
Start: 2021-06-12

## 2021-06-12 RX ORDER — ASPIRIN 81 MG/1
81 TABLET ORAL DAILY
Status: CANCELLED | OUTPATIENT
Start: 2021-06-12

## 2021-06-12 RX ORDER — ALBUTEROL SULFATE 90 UG/1
AEROSOL, METERED RESPIRATORY (INHALATION)
COMMUNITY
Start: 2021-04-06 | End: 2021-06-12

## 2021-06-12 RX ORDER — HYDROCHLOROTHIAZIDE 25 MG/1
25 TABLET ORAL DAILY
COMMUNITY
Start: 2021-03-09 | End: 2021-06-13 | Stop reason: HOSPADM

## 2021-06-12 RX ORDER — ATORVASTATIN CALCIUM 10 MG/1
TABLET, FILM COATED ORAL
COMMUNITY
Start: 2021-03-09 | End: 2021-06-12

## 2021-06-12 RX ORDER — QUETIAPINE FUMARATE 25 MG/1
25 TABLET, FILM COATED ORAL 2 TIMES DAILY
COMMUNITY
End: 2021-07-06 | Stop reason: HOSPADM

## 2021-06-12 RX ORDER — DIPHENOXYLATE HYDROCHLORIDE AND ATROPINE SULFATE 2.5; .025 MG/1; MG/1
1 TABLET ORAL DAILY
Status: CANCELLED | OUTPATIENT
Start: 2021-06-13

## 2021-06-12 RX ORDER — SODIUM CHLORIDE 9 MG/ML
50 INJECTION, SOLUTION INTRAVENOUS CONTINUOUS
Status: DISCONTINUED | OUTPATIENT
Start: 2021-06-12 | End: 2021-06-13

## 2021-06-12 RX ORDER — ATORVASTATIN CALCIUM 20 MG/1
20 TABLET, FILM COATED ORAL DAILY
Status: CANCELLED | OUTPATIENT
Start: 2021-06-12

## 2021-06-12 RX ORDER — TRAMADOL HYDROCHLORIDE 50 MG/1
TABLET ORAL
COMMUNITY
Start: 2021-04-06 | End: 2021-06-12

## 2021-06-12 RX ORDER — METOPROLOL SUCCINATE 50 MG/1
50 TABLET, EXTENDED RELEASE ORAL DAILY
Status: DISCONTINUED | OUTPATIENT
Start: 2021-06-13 | End: 2021-06-13 | Stop reason: HOSPADM

## 2021-06-12 RX ORDER — DIVALPROEX SODIUM 125 MG/1
125 TABLET, DELAYED RELEASE ORAL 2 TIMES DAILY
Status: CANCELLED | OUTPATIENT
Start: 2021-06-12

## 2021-06-12 RX ADMIN — SODIUM CHLORIDE 50 ML/HR: 9 INJECTION, SOLUTION INTRAVENOUS at 18:46

## 2021-06-12 RX ADMIN — SODIUM CHLORIDE, PRESERVATIVE FREE 10 ML: 5 INJECTION INTRAVENOUS at 22:25

## 2021-06-12 RX ADMIN — SODIUM CHLORIDE 50 ML/HR: 9 INJECTION, SOLUTION INTRAVENOUS at 22:43

## 2021-06-12 RX ADMIN — SODIUM CHLORIDE 500 ML: 9 INJECTION, SOLUTION INTRAVENOUS at 15:29
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
## 2021-06-13 ENCOUNTER — APPOINTMENT (OUTPATIENT)
Dept: CARDIOLOGY | Facility: HOSPITAL | Age: 86
End: 2021-06-13

## 2021-06-13 VITALS
RESPIRATION RATE: 13 BRPM | TEMPERATURE: 97.7 F | DIASTOLIC BLOOD PRESSURE: 93 MMHG | OXYGEN SATURATION: 100 % | HEIGHT: 69 IN | SYSTOLIC BLOOD PRESSURE: 131 MMHG | HEART RATE: 73 BPM | BODY MASS INDEX: 22.69 KG/M2 | WEIGHT: 153.22 LBS

## 2021-06-13 LAB
ANION GAP SERPL CALCULATED.3IONS-SCNC: 10.5 MMOL/L (ref 5–15)
BACTERIA BLD CULT: ABNORMAL
BH CV ECHO MEAS - BSA(HAYCOCK): 1.8 M^2
BH CV ECHO MEAS - BSA: 1.8 M^2
BH CV ECHO MEAS - BZI_BMI: 22.6 KILOGRAMS/M^2
BH CV ECHO MEAS - BZI_METRIC_HEIGHT: 175.3 CM
BH CV ECHO MEAS - BZI_METRIC_WEIGHT: 69.4 KG
BH CV ECHO MEAS - EDV(CUBED): 140.6 ML
BH CV ECHO MEAS - EDV(MOD-SP4): 78.4 ML
BH CV ECHO MEAS - EDV(TEICH): 129.5 ML
BH CV ECHO MEAS - EF(CUBED): 54.5 %
BH CV ECHO MEAS - EF(MOD-SP4): 33.3 %
BH CV ECHO MEAS - EF(TEICH): 45.9 %
BH CV ECHO MEAS - ESV(CUBED): 64 ML
BH CV ECHO MEAS - ESV(MOD-SP4): 52.3 ML
BH CV ECHO MEAS - ESV(TEICH): 70 ML
BH CV ECHO MEAS - FS: 23.1 %
BH CV ECHO MEAS - IVS/LVPW: 1
BH CV ECHO MEAS - IVSD: 0.9 CM
BH CV ECHO MEAS - LV DIASTOLIC VOL/BSA (35-75): 42.5 ML/M^2
BH CV ECHO MEAS - LV MASS(C)D: 169 GRAMS
BH CV ECHO MEAS - LV MASS(C)DI: 91.7 GRAMS/M^2
BH CV ECHO MEAS - LV SYSTOLIC VOL/BSA (12-30): 28.4 ML/M^2
BH CV ECHO MEAS - LVIDD: 5.2 CM
BH CV ECHO MEAS - LVIDS: 4 CM
BH CV ECHO MEAS - LVLD AP4: 7.3 CM
BH CV ECHO MEAS - LVLS AP4: 7.6 CM
BH CV ECHO MEAS - LVPWD: 0.9 CM
BH CV ECHO MEAS - MV E MAX VEL: 70 CM/SEC
BH CV ECHO MEAS - RAP SYSTOLE: 10 MMHG
BH CV ECHO MEAS - RVSP: 54.6 MMHG
BH CV ECHO MEAS - SI(CUBED): 41.6 ML/M^2
BH CV ECHO MEAS - SI(MOD-SP4): 14.2 ML/M^2
BH CV ECHO MEAS - SI(TEICH): 32.3 ML/M^2
BH CV ECHO MEAS - SV(CUBED): 76.6 ML
BH CV ECHO MEAS - SV(MOD-SP4): 26.1 ML
BH CV ECHO MEAS - SV(TEICH): 59.5 ML
BH CV ECHO MEAS - TR MAX VEL: 334 CM/SEC
BOTTLE TYPE: ABNORMAL
BUN SERPL-MCNC: 20 MG/DL (ref 8–23)
BUN/CREAT SERPL: 15 (ref 7–25)
CALCIUM SPEC-SCNC: 9.2 MG/DL (ref 8.6–10.5)
CHLORIDE SERPL-SCNC: 107 MMOL/L (ref 98–107)
CO2 SERPL-SCNC: 24.5 MMOL/L (ref 22–29)
CREAT SERPL-MCNC: 1.33 MG/DL (ref 0.76–1.27)
GFR SERPL CREATININE-BSD FRML MDRD: 51 ML/MIN/1.73
GLUCOSE SERPL-MCNC: 91 MG/DL (ref 65–99)
LV EF 2D ECHO EST: 55 %
MAXIMAL PREDICTED HEART RATE: 136 BPM
POTASSIUM SERPL-SCNC: 4.5 MMOL/L (ref 3.5–5.2)
SODIUM SERPL-SCNC: 142 MMOL/L (ref 136–145)
STRESS TARGET HR: 116 BPM
TROPONIN T SERPL-MCNC: 0.05 NG/ML (ref 0–0.03)

## 2021-06-13 PROCEDURE — 84484 ASSAY OF TROPONIN QUANT: CPT | Performed by: INTERNAL MEDICINE

## 2021-06-13 PROCEDURE — 93308 TTE F-UP OR LMTD: CPT | Performed by: INTERNAL MEDICINE

## 2021-06-13 PROCEDURE — 80048 BASIC METABOLIC PNL TOTAL CA: CPT | Performed by: INTERNAL MEDICINE

## 2021-06-13 PROCEDURE — 93321 DOPPLER ECHO F-UP/LMTD STD: CPT

## 2021-06-13 PROCEDURE — 93321 DOPPLER ECHO F-UP/LMTD STD: CPT | Performed by: INTERNAL MEDICINE

## 2021-06-13 PROCEDURE — 93325 DOPPLER ECHO COLOR FLOW MAPG: CPT

## 2021-06-13 PROCEDURE — G0378 HOSPITAL OBSERVATION PER HR: HCPCS

## 2021-06-13 PROCEDURE — 93308 TTE F-UP OR LMTD: CPT

## 2021-06-13 PROCEDURE — 93325 DOPPLER ECHO COLOR FLOW MAPG: CPT | Performed by: INTERNAL MEDICINE

## 2021-06-13 PROCEDURE — 99217 PR OBSERVATION CARE DISCHARGE MANAGEMENT: CPT | Performed by: INTERNAL MEDICINE

## 2021-06-13 RX ORDER — METOPROLOL SUCCINATE 50 MG/1
50 TABLET, EXTENDED RELEASE ORAL DAILY
Qty: 90 TABLET | Refills: 0 | Status: SHIPPED | OUTPATIENT
Start: 2021-06-14 | End: 2021-07-06 | Stop reason: HOSPADM

## 2021-06-13 RX ORDER — ASPIRIN 81 MG/1
81 TABLET ORAL DAILY
Qty: 30 TABLET | Refills: 0 | Status: ON HOLD | OUTPATIENT
Start: 2021-06-13 | End: 2021-07-06 | Stop reason: SDUPTHER

## 2021-06-13 RX ORDER — FUROSEMIDE 20 MG/1
20 TABLET ORAL DAILY
Qty: 30 TABLET | Refills: 0 | Status: SHIPPED | OUTPATIENT
Start: 2021-06-13 | End: 2021-07-06 | Stop reason: HOSPADM

## 2021-06-13 RX ADMIN — METOPROLOL SUCCINATE 50 MG: 50 TABLET, EXTENDED RELEASE ORAL at 08:38

## 2021-06-13 RX ADMIN — SODIUM CHLORIDE, PRESERVATIVE FREE 10 ML: 5 INJECTION INTRAVENOUS at 08:38

## 2021-06-13 NOTE — NURSING NOTE
"Pt ambulated to BR and in room with 2L/NC on O2 did not drop below 98%. Pt reports, \"feeling SOA but once I get going I am alright\". Provider notified. Pt placed back in bed with callbell in reach, siderails x2, bed alarm audible,  socks. Will continue to monitor.  "
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
## 2021-06-13 NOTE — ED NOTES
Contacted [facility] at this time, They were unaware the patient has bed bugs, the  has already been told to pre treat room 222, [facility] called me back and stated they are having to move some patients around in order to make room 306 a private room, the  has been made aware to go up to [facility] to pre treat the new room.     Monico Eller RN  06/12/21 2123

## 2021-06-13 NOTE — NURSING NOTE
POA notified of patient discharge. States she would like pt placed in nursing facility. Explained to her that pt does not qualify at this time for a skilled nursing facility. Provider aware. Will continue to monitor.

## 2021-06-14 ENCOUNTER — READMISSION MANAGEMENT (OUTPATIENT)
Dept: CALL CENTER | Facility: HOSPITAL | Age: 86
End: 2021-06-14

## 2021-06-14 LAB
BACTERIA SPEC AEROBE CULT: ABNORMAL
GRAM STN SPEC: ABNORMAL
GRAM STN SPEC: ABNORMAL
QT INTERVAL: 356 MS
QTC INTERVAL: 442 MS

## 2021-06-14 NOTE — OUTREACH NOTE
Prep Survey      Responses   [facility] patient discharged from?  [facility]   Is LACE score < 7 ?  No   Emergency Room discharge w/ pulse ox?  No   Eligibility  Readm Mgmt   Discharge diagnosis  Musculoskeletal right sided chest pain, reproducible , low suspicion of myocardial event, mild elevated troponin (similar in the last admission), denied any chest pain    Does the patient have one of the following disease processes/diagnoses(primary or secondary)?  Other   Does the patient have Home health ordered?  No   Is there a DME ordered?  No   Prep survey completed?  Yes          Christina Dhillon RN

## 2021-06-15 ENCOUNTER — READMISSION MANAGEMENT (OUTPATIENT)
Dept: CALL CENTER | Facility: HOSPITAL | Age: 86
End: 2021-06-15
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
## 2021-06-18 ENCOUNTER — APPOINTMENT (OUTPATIENT)
Dept: CT IMAGING | Facility: HOSPITAL | Age: 86
End: 2021-06-18

## 2021-06-18 ENCOUNTER — HOSPITAL ENCOUNTER (EMERGENCY)
Facility: HOSPITAL | Age: 86
Discharge: PSYCHIATRIC OR SUBSTANCE USE FACILITY | End: 2021-06-18
Attending: FAMILY MEDICINE | Admitting: FAMILY MEDICINE

## 2021-06-18 ENCOUNTER — APPOINTMENT (OUTPATIENT)
Dept: GENERAL RADIOLOGY | Facility: HOSPITAL | Age: 86
End: 2021-06-18

## 2021-06-18 ENCOUNTER — HOSPITAL ENCOUNTER (INPATIENT)
Facility: HOSPITAL | Age: 86
LOS: 18 days | Discharge: HOME | End: 2021-07-06
Attending: PSYCHIATRY & NEUROLOGY | Admitting: PSYCHIATRY & NEUROLOGY

## 2021-06-18 ENCOUNTER — APPOINTMENT (OUTPATIENT)
Dept: ULTRASOUND IMAGING | Facility: HOSPITAL | Age: 86
End: 2021-06-18

## 2021-06-18 VITALS
TEMPERATURE: 98.3 F | WEIGHT: 150 LBS | SYSTOLIC BLOOD PRESSURE: 141 MMHG | HEART RATE: 93 BPM | RESPIRATION RATE: 22 BRPM | BODY MASS INDEX: 22.22 KG/M2 | DIASTOLIC BLOOD PRESSURE: 95 MMHG | OXYGEN SATURATION: 98 % | HEIGHT: 69 IN

## 2021-06-18 DIAGNOSIS — I48.91 ATRIAL FIBRILLATION, UNSPECIFIED TYPE (HCC): ICD-10-CM

## 2021-06-18 DIAGNOSIS — R46.89 AGGRESSIVE BEHAVIOR: Primary | ICD-10-CM

## 2021-06-18 DIAGNOSIS — I50.9 CHRONIC CONGESTIVE HEART FAILURE, UNSPECIFIED HEART FAILURE TYPE (HCC): ICD-10-CM

## 2021-06-18 DIAGNOSIS — R25.2 LEG CRAMPS: ICD-10-CM

## 2021-06-18 PROBLEM — F29 PSYCHOSIS (HCC): Status: ACTIVE | Noted: 2021-06-18

## 2021-06-18 LAB
6-ACETYL MORPHINE: NEGATIVE
6-ACETYL MORPHINE: NEGATIVE
ALBUMIN SERPL-MCNC: 3.85 G/DL (ref 3.5–5.2)
ALBUMIN/GLOB SERPL: 1.5 G/DL
ALP SERPL-CCNC: 68 U/L (ref 39–117)
ALT SERPL W P-5'-P-CCNC: 20 U/L (ref 1–41)
AMPHET+METHAMPHET UR QL: NEGATIVE
AMPHET+METHAMPHET UR QL: NEGATIVE
ANION GAP SERPL CALCULATED.3IONS-SCNC: 10.8 MMOL/L (ref 5–15)
AST SERPL-CCNC: 33 U/L (ref 1–40)
BARBITURATES UR QL SCN: NEGATIVE
BARBITURATES UR QL SCN: NEGATIVE
BASOPHILS # BLD AUTO: 0.05 10*3/MM3 (ref 0–0.2)
BASOPHILS NFR BLD AUTO: 0.6 % (ref 0–1.5)
BENZODIAZ UR QL SCN: NEGATIVE
BENZODIAZ UR QL SCN: NEGATIVE
BILIRUB SERPL-MCNC: 1.5 MG/DL (ref 0–1.2)
BILIRUB UR QL STRIP: NEGATIVE
BUN SERPL-MCNC: 11 MG/DL (ref 8–23)
BUN/CREAT SERPL: 8.8 (ref 7–25)
BUPRENORPHINE SERPL-MCNC: NEGATIVE NG/ML
BUPRENORPHINE SERPL-MCNC: NEGATIVE NG/ML
CALCIUM SPEC-SCNC: 9.4 MG/DL (ref 8.6–10.5)
CANNABINOIDS SERPL QL: NEGATIVE
CANNABINOIDS SERPL QL: NEGATIVE
CHLORIDE SERPL-SCNC: 104 MMOL/L (ref 98–107)
CLARITY UR: CLEAR
CO2 SERPL-SCNC: 25.2 MMOL/L (ref 22–29)
COCAINE UR QL: NEGATIVE
COCAINE UR QL: NEGATIVE
COLOR UR: YELLOW
CREAT SERPL-MCNC: 1.25 MG/DL (ref 0.76–1.27)
CRP SERPL-MCNC: 0.32 MG/DL (ref 0–0.5)
DEPRECATED RDW RBC AUTO: 47.7 FL (ref 37–54)
EOSINOPHIL # BLD AUTO: 0.15 10*3/MM3 (ref 0–0.4)
EOSINOPHIL NFR BLD AUTO: 1.7 % (ref 0.3–6.2)
ERYTHROCYTE [DISTWIDTH] IN BLOOD BY AUTOMATED COUNT: 13.9 % (ref 12.3–15.4)
FLUAV RNA RESP QL NAA+PROBE: NOT DETECTED
FLUBV RNA RESP QL NAA+PROBE: NOT DETECTED
GFR SERPL CREATININE-BSD FRML MDRD: 55 ML/MIN/1.73
GLOBULIN UR ELPH-MCNC: 2.6 GM/DL
GLUCOSE SERPL-MCNC: 125 MG/DL (ref 65–99)
GLUCOSE UR STRIP-MCNC: NEGATIVE MG/DL
HCT VFR BLD AUTO: 32.7 % (ref 37.5–51)
HGB BLD-MCNC: 10.7 G/DL (ref 13–17.7)
HGB UR QL STRIP.AUTO: NEGATIVE
IMM GRANULOCYTES # BLD AUTO: 0.03 10*3/MM3 (ref 0–0.05)
IMM GRANULOCYTES NFR BLD AUTO: 0.3 % (ref 0–0.5)
KETONES UR QL STRIP: ABNORMAL
LEUKOCYTE ESTERASE UR QL STRIP.AUTO: NEGATIVE
LYMPHOCYTES # BLD AUTO: 2.13 10*3/MM3 (ref 0.7–3.1)
LYMPHOCYTES NFR BLD AUTO: 24.6 % (ref 19.6–45.3)
MAGNESIUM SERPL-MCNC: 2 MG/DL (ref 1.6–2.4)
MCH RBC QN AUTO: 31 PG (ref 26.6–33)
MCHC RBC AUTO-ENTMCNC: 32.7 G/DL (ref 31.5–35.7)
MCV RBC AUTO: 94.8 FL (ref 79–97)
METHADONE UR QL SCN: NEGATIVE
METHADONE UR QL SCN: NEGATIVE
MONOCYTES # BLD AUTO: 0.66 10*3/MM3 (ref 0.1–0.9)
MONOCYTES NFR BLD AUTO: 7.6 % (ref 5–12)
NEUTROPHILS NFR BLD AUTO: 5.63 10*3/MM3 (ref 1.7–7)
NEUTROPHILS NFR BLD AUTO: 65.2 % (ref 42.7–76)
NITRITE UR QL STRIP: NEGATIVE
NRBC BLD AUTO-RTO: 0 /100 WBC (ref 0–0.2)
NT-PROBNP SERPL-MCNC: 5442 PG/ML (ref 0–1800)
OPIATES UR QL: NEGATIVE
OPIATES UR QL: POSITIVE
OXYCODONE UR QL SCN: NEGATIVE
OXYCODONE UR QL SCN: NEGATIVE
PCP UR QL SCN: NEGATIVE
PCP UR QL SCN: NEGATIVE
PH UR STRIP.AUTO: 6.5 [PH] (ref 5–8)
PLATELET # BLD AUTO: 163 10*3/MM3 (ref 140–450)
PMV BLD AUTO: 10.4 FL (ref 6–12)
POTASSIUM SERPL-SCNC: 3.6 MMOL/L (ref 3.5–5.2)
PROT SERPL-MCNC: 6.4 G/DL (ref 6–8.5)
PROT UR QL STRIP: NEGATIVE
RBC # BLD AUTO: 3.45 10*6/MM3 (ref 4.14–5.8)
SARS-COV-2 RNA RESP QL NAA+PROBE: NOT DETECTED
SODIUM SERPL-SCNC: 140 MMOL/L (ref 136–145)
SP GR UR STRIP: 1.01 (ref 1–1.03)
T4 FREE SERPL-MCNC: 1.56 NG/DL (ref 0.93–1.7)
TROPONIN T SERPL-MCNC: 0.03 NG/ML (ref 0–0.03)
TSH SERPL DL<=0.05 MIU/L-ACNC: 3.51 UIU/ML (ref 0.27–4.2)
UROBILINOGEN UR QL STRIP: ABNORMAL
WBC # BLD AUTO: 8.65 10*3/MM3 (ref 3.4–10.8)

## 2021-06-18 PROCEDURE — 85025 COMPLETE CBC W/AUTO DIFF WBC: CPT | Performed by: FAMILY MEDICINE

## 2021-06-18 PROCEDURE — 86140 C-REACTIVE PROTEIN: CPT | Performed by: FAMILY MEDICINE

## 2021-06-18 PROCEDURE — 80307 DRUG TEST PRSMV CHEM ANLYZR: CPT | Performed by: PSYCHIATRY & NEUROLOGY

## 2021-06-18 PROCEDURE — 25010000002 MORPHINE PER 10 MG: Performed by: FAMILY MEDICINE

## 2021-06-18 PROCEDURE — 25010000002 FUROSEMIDE PER 20 MG: Performed by: FAMILY MEDICINE

## 2021-06-18 PROCEDURE — 84484 ASSAY OF TROPONIN QUANT: CPT | Performed by: FAMILY MEDICINE

## 2021-06-18 PROCEDURE — 87636 SARSCOV2 & INF A&B AMP PRB: CPT | Performed by: FAMILY MEDICINE

## 2021-06-18 PROCEDURE — 80053 COMPREHEN METABOLIC PANEL: CPT | Performed by: FAMILY MEDICINE

## 2021-06-18 PROCEDURE — 96375 TX/PRO/DX INJ NEW DRUG ADDON: CPT

## 2021-06-18 PROCEDURE — 83880 ASSAY OF NATRIURETIC PEPTIDE: CPT | Performed by: FAMILY MEDICINE

## 2021-06-18 PROCEDURE — 99283 EMERGENCY DEPT VISIT LOW MDM: CPT | Performed by: INTERNAL MEDICINE

## 2021-06-18 PROCEDURE — 71045 X-RAY EXAM CHEST 1 VIEW: CPT

## 2021-06-18 PROCEDURE — 84443 ASSAY THYROID STIM HORMONE: CPT | Performed by: FAMILY MEDICINE

## 2021-06-18 PROCEDURE — 93971 EXTREMITY STUDY: CPT

## 2021-06-18 PROCEDURE — 25010000002 LORAZEPAM PER 2 MG: Performed by: FAMILY MEDICINE

## 2021-06-18 PROCEDURE — 84439 ASSAY OF FREE THYROXINE: CPT | Performed by: FAMILY MEDICINE

## 2021-06-18 PROCEDURE — 71250 CT THORAX DX C-: CPT

## 2021-06-18 PROCEDURE — 96374 THER/PROPH/DIAG INJ IV PUSH: CPT

## 2021-06-18 PROCEDURE — 81003 URINALYSIS AUTO W/O SCOPE: CPT | Performed by: FAMILY MEDICINE

## 2021-06-18 PROCEDURE — 93005 ELECTROCARDIOGRAM TRACING: CPT | Performed by: FAMILY MEDICINE

## 2021-06-18 PROCEDURE — 93010 ELECTROCARDIOGRAM REPORT: CPT | Performed by: SPECIALIST

## 2021-06-18 PROCEDURE — 83735 ASSAY OF MAGNESIUM: CPT | Performed by: FAMILY MEDICINE

## 2021-06-18 PROCEDURE — 99285 EMERGENCY DEPT VISIT HI MDM: CPT

## 2021-06-18 RX ORDER — DILTIAZEM HYDROCHLORIDE 5 MG/ML
5 INJECTION INTRAVENOUS ONCE
Status: COMPLETED | OUTPATIENT
Start: 2021-06-18 | End: 2021-06-18

## 2021-06-18 RX ORDER — ALUMINA, MAGNESIA, AND SIMETHICONE 2400; 2400; 240 MG/30ML; MG/30ML; MG/30ML
15 SUSPENSION ORAL EVERY 6 HOURS PRN
Status: CANCELLED | OUTPATIENT
Start: 2021-06-18

## 2021-06-18 RX ORDER — ACETAMINOPHEN 325 MG/1
650 TABLET ORAL EVERY 6 HOURS PRN
Status: CANCELLED | OUTPATIENT
Start: 2021-06-18

## 2021-06-18 RX ORDER — LISINOPRIL 2.5 MG/1
5 TABLET ORAL DAILY
Status: DISCONTINUED | OUTPATIENT
Start: 2021-06-18 | End: 2021-06-30

## 2021-06-18 RX ORDER — ACETAMINOPHEN 325 MG/1
650 TABLET ORAL EVERY 6 HOURS PRN
Status: DISCONTINUED | OUTPATIENT
Start: 2021-06-18 | End: 2021-07-06 | Stop reason: HOSPADM

## 2021-06-18 RX ORDER — METOPROLOL SUCCINATE 50 MG/1
50 TABLET, EXTENDED RELEASE ORAL DAILY
Status: DISCONTINUED | OUTPATIENT
Start: 2021-06-18 | End: 2021-07-06 | Stop reason: HOSPADM

## 2021-06-18 RX ORDER — IBUPROFEN 400 MG/1
400 TABLET ORAL EVERY 6 HOURS PRN
Status: DISCONTINUED | OUTPATIENT
Start: 2021-06-18 | End: 2021-06-18

## 2021-06-18 RX ORDER — LORAZEPAM 2 MG/ML
0.5 INJECTION INTRAMUSCULAR ONCE
Status: COMPLETED | OUTPATIENT
Start: 2021-06-18 | End: 2021-06-18

## 2021-06-18 RX ORDER — ONDANSETRON 4 MG/1
4 TABLET, FILM COATED ORAL EVERY 6 HOURS PRN
Status: DISCONTINUED | OUTPATIENT
Start: 2021-06-18 | End: 2021-07-06 | Stop reason: HOSPADM

## 2021-06-18 RX ORDER — LOPERAMIDE HYDROCHLORIDE 2 MG/1
2 CAPSULE ORAL
Status: DISCONTINUED | OUTPATIENT
Start: 2021-06-18 | End: 2021-07-06 | Stop reason: HOSPADM

## 2021-06-18 RX ORDER — IBUPROFEN 400 MG/1
400 TABLET ORAL EVERY 6 HOURS PRN
Status: CANCELLED | OUTPATIENT
Start: 2021-06-18

## 2021-06-18 RX ORDER — ONDANSETRON 4 MG/1
4 TABLET, FILM COATED ORAL EVERY 6 HOURS PRN
Status: CANCELLED | OUTPATIENT
Start: 2021-06-18

## 2021-06-18 RX ORDER — TRAZODONE HYDROCHLORIDE 50 MG/1
12.5 TABLET ORAL NIGHTLY PRN
Status: ACTIVE | OUTPATIENT
Start: 2021-06-18 | End: 2021-06-20

## 2021-06-18 RX ORDER — BENZONATATE 100 MG/1
100 CAPSULE ORAL 3 TIMES DAILY PRN
Status: DISCONTINUED | OUTPATIENT
Start: 2021-06-18 | End: 2021-07-06 | Stop reason: HOSPADM

## 2021-06-18 RX ORDER — FUROSEMIDE 10 MG/ML
40 INJECTION INTRAMUSCULAR; INTRAVENOUS ONCE
Status: COMPLETED | OUTPATIENT
Start: 2021-06-18 | End: 2021-06-18

## 2021-06-18 RX ORDER — BISACODYL 5 MG/1
5 TABLET, DELAYED RELEASE ORAL DAILY PRN
Status: DISCONTINUED | OUTPATIENT
Start: 2021-06-18 | End: 2021-07-06 | Stop reason: HOSPADM

## 2021-06-18 RX ORDER — DIPHENOXYLATE HYDROCHLORIDE AND ATROPINE SULFATE 2.5; .025 MG/1; MG/1
1 TABLET ORAL DAILY
Status: DISCONTINUED | OUTPATIENT
Start: 2021-06-18 | End: 2021-07-06 | Stop reason: HOSPADM

## 2021-06-18 RX ORDER — ECHINACEA PURPUREA EXTRACT 125 MG
2 TABLET ORAL AS NEEDED
Status: DISCONTINUED | OUTPATIENT
Start: 2021-06-18 | End: 2021-07-06 | Stop reason: HOSPADM

## 2021-06-18 RX ORDER — LANOLIN ALCOHOL/MO/W.PET/CERES
3 CREAM (GRAM) TOPICAL NIGHTLY PRN
Status: DISCONTINUED | OUTPATIENT
Start: 2021-06-20 | End: 2021-07-06 | Stop reason: HOSPADM

## 2021-06-18 RX ORDER — FUROSEMIDE 20 MG/1
20 TABLET ORAL DAILY PRN
Status: DISCONTINUED | OUTPATIENT
Start: 2021-06-18 | End: 2021-07-06 | Stop reason: HOSPADM

## 2021-06-18 RX ORDER — BISACODYL 5 MG/1
5 TABLET, DELAYED RELEASE ORAL DAILY PRN
Status: CANCELLED | OUTPATIENT
Start: 2021-06-18

## 2021-06-18 RX ORDER — ALUMINA, MAGNESIA, AND SIMETHICONE 2400; 2400; 240 MG/30ML; MG/30ML; MG/30ML
15 SUSPENSION ORAL EVERY 6 HOURS PRN
Status: DISCONTINUED | OUTPATIENT
Start: 2021-06-18 | End: 2021-07-06 | Stop reason: HOSPADM

## 2021-06-18 RX ORDER — BENZONATATE 100 MG/1
100 CAPSULE ORAL 3 TIMES DAILY PRN
Status: CANCELLED | OUTPATIENT
Start: 2021-06-18

## 2021-06-18 RX ORDER — LANOLIN ALCOHOL/MO/W.PET/CERES
3 CREAM (GRAM) TOPICAL NIGHTLY PRN
Status: CANCELLED | OUTPATIENT
Start: 2021-06-20

## 2021-06-18 RX ORDER — QUETIAPINE FUMARATE 100 MG/1
25 TABLET, FILM COATED ORAL 2 TIMES DAILY
Status: DISCONTINUED | OUTPATIENT
Start: 2021-06-18 | End: 2021-06-19

## 2021-06-18 RX ORDER — ECHINACEA PURPUREA EXTRACT 125 MG
2 TABLET ORAL AS NEEDED
Status: CANCELLED | OUTPATIENT
Start: 2021-06-18

## 2021-06-18 RX ORDER — ASPIRIN 81 MG/1
81 TABLET ORAL DAILY
Status: DISCONTINUED | OUTPATIENT
Start: 2021-06-18 | End: 2021-07-06 | Stop reason: HOSPADM

## 2021-06-18 RX ORDER — ATORVASTATIN CALCIUM 10 MG/1
10 TABLET, FILM COATED ORAL NIGHTLY
Status: DISCONTINUED | OUTPATIENT
Start: 2021-06-18 | End: 2021-07-06 | Stop reason: HOSPADM

## 2021-06-18 RX ORDER — POTASSIUM CHLORIDE 1.5 G/1.77G
40 POWDER, FOR SOLUTION ORAL ONCE
Status: COMPLETED | OUTPATIENT
Start: 2021-06-18 | End: 2021-06-18

## 2021-06-18 RX ORDER — TRAMADOL HYDROCHLORIDE 50 MG/1
50 TABLET ORAL 3 TIMES DAILY PRN
Status: ON HOLD | COMMUNITY
End: 2021-06-19

## 2021-06-18 RX ORDER — LOPERAMIDE HYDROCHLORIDE 2 MG/1
2 CAPSULE ORAL
Status: CANCELLED | OUTPATIENT
Start: 2021-06-18

## 2021-06-18 RX ORDER — TRAZODONE HYDROCHLORIDE 50 MG/1
12.5 TABLET ORAL NIGHTLY PRN
Status: CANCELLED | OUTPATIENT
Start: 2021-06-18 | End: 2021-06-20

## 2021-06-18 RX ORDER — MORPHINE SULFATE 2 MG/ML
2 INJECTION, SOLUTION INTRAMUSCULAR; INTRAVENOUS ONCE
Status: COMPLETED | OUTPATIENT
Start: 2021-06-18 | End: 2021-06-18

## 2021-06-18 RX ADMIN — QUETIAPINE FUMARATE 25 MG: 100 TABLET ORAL at 21:15

## 2021-06-18 RX ADMIN — POTASSIUM CHLORIDE 40 MEQ: 1.5 POWDER, FOR SOLUTION ORAL at 06:36

## 2021-06-18 RX ADMIN — APIXABAN 5 MG: 5 TABLET, FILM COATED ORAL at 21:15

## 2021-06-18 RX ADMIN — Medication 1 TABLET: at 19:04

## 2021-06-18 RX ADMIN — ASPIRIN 81 MG: 81 TABLET, COATED ORAL at 19:04

## 2021-06-18 RX ADMIN — ATORVASTATIN CALCIUM 10 MG: 10 TABLET, FILM COATED ORAL at 21:15

## 2021-06-18 RX ADMIN — FUROSEMIDE 40 MG: 10 INJECTION, SOLUTION INTRAMUSCULAR; INTRAVENOUS at 05:31

## 2021-06-18 RX ADMIN — LISINOPRIL 5 MG: 2.5 TABLET ORAL at 19:04

## 2021-06-18 RX ADMIN — DILTIAZEM HYDROCHLORIDE 5 MG: 5 INJECTION INTRAVENOUS at 06:32

## 2021-06-18 RX ADMIN — METOPROLOL SUCCINATE 50 MG: 50 TABLET, EXTENDED RELEASE ORAL at 19:04

## 2021-06-18 RX ADMIN — METOPROLOL TARTRATE 25 MG: 25 TABLET, FILM COATED ORAL at 08:07

## 2021-06-18 RX ADMIN — LORAZEPAM 0.5 MG: 2 INJECTION INTRAMUSCULAR; INTRAVENOUS at 09:31

## 2021-06-18 RX ADMIN — MORPHINE SULFATE 2 MG: 2 INJECTION, SOLUTION INTRAMUSCULAR; INTRAVENOUS at 08:08

## 2021-06-19 LAB
CHOLEST SERPL-MCNC: 129 MG/DL (ref 0–200)
HBA1C MFR BLD: 5.9 % (ref 4.8–5.6)
HDLC SERPL-MCNC: 49 MG/DL (ref 40–60)
LDLC SERPL CALC-MCNC: 59 MG/DL (ref 0–100)
LDLC/HDLC SERPL: 1.15 {RATIO}
QT INTERVAL: 286 MS
QT INTERVAL: 398 MS
QTC INTERVAL: 444 MS
QTC INTERVAL: 500 MS
TRIGL SERPL-MCNC: 118 MG/DL (ref 0–150)
VLDLC SERPL-MCNC: 21 MG/DL (ref 5–40)

## 2021-06-19 PROCEDURE — 80061 LIPID PANEL: CPT | Performed by: PSYCHIATRY & NEUROLOGY

## 2021-06-19 PROCEDURE — 83036 HEMOGLOBIN GLYCOSYLATED A1C: CPT | Performed by: PSYCHIATRY & NEUROLOGY

## 2021-06-19 PROCEDURE — 99223 1ST HOSP IP/OBS HIGH 75: CPT | Performed by: PSYCHIATRY & NEUROLOGY

## 2021-06-19 RX ORDER — QUETIAPINE FUMARATE 100 MG/1
50 TABLET, FILM COATED ORAL 2 TIMES DAILY
Status: DISCONTINUED | OUTPATIENT
Start: 2021-06-19 | End: 2021-07-06 | Stop reason: HOSPADM

## 2021-06-19 RX ORDER — DONEPEZIL HYDROCHLORIDE 5 MG/1
5 TABLET, FILM COATED ORAL NIGHTLY
Status: DISCONTINUED | OUTPATIENT
Start: 2021-06-19 | End: 2021-07-06 | Stop reason: HOSPADM

## 2021-06-19 RX ADMIN — LISINOPRIL 5 MG: 2.5 TABLET ORAL at 10:36

## 2021-06-19 RX ADMIN — APIXABAN 5 MG: 5 TABLET, FILM COATED ORAL at 10:35

## 2021-06-19 RX ADMIN — APIXABAN 5 MG: 5 TABLET, FILM COATED ORAL at 21:23

## 2021-06-19 RX ADMIN — ATORVASTATIN CALCIUM 10 MG: 10 TABLET, FILM COATED ORAL at 21:23

## 2021-06-19 RX ADMIN — Medication 1 TABLET: at 10:35

## 2021-06-19 RX ADMIN — METOPROLOL SUCCINATE 50 MG: 50 TABLET, EXTENDED RELEASE ORAL at 10:36

## 2021-06-19 RX ADMIN — QUETIAPINE FUMARATE 50 MG: 100 TABLET ORAL at 21:23

## 2021-06-19 RX ADMIN — QUETIAPINE FUMARATE 25 MG: 100 TABLET ORAL at 10:36

## 2021-06-19 RX ADMIN — ASPIRIN 81 MG: 81 TABLET, COATED ORAL at 10:35

## 2021-06-19 RX ADMIN — DONEPEZIL HYDROCHLORIDE 5 MG: 5 TABLET, FILM COATED ORAL at 21:23

## 2021-06-20 PROCEDURE — 99232 SBSQ HOSP IP/OBS MODERATE 35: CPT | Performed by: PSYCHIATRY & NEUROLOGY

## 2021-06-20 RX ADMIN — APIXABAN 5 MG: 5 TABLET, FILM COATED ORAL at 21:22

## 2021-06-20 RX ADMIN — METOPROLOL SUCCINATE 50 MG: 50 TABLET, EXTENDED RELEASE ORAL at 08:38

## 2021-06-20 RX ADMIN — LISINOPRIL 5 MG: 2.5 TABLET ORAL at 08:38

## 2021-06-20 RX ADMIN — Medication 1 TABLET: at 08:38

## 2021-06-20 RX ADMIN — QUETIAPINE FUMARATE 50 MG: 100 TABLET ORAL at 08:38

## 2021-06-20 RX ADMIN — DONEPEZIL HYDROCHLORIDE 5 MG: 5 TABLET, FILM COATED ORAL at 21:22

## 2021-06-20 RX ADMIN — ATORVASTATIN CALCIUM 10 MG: 10 TABLET, FILM COATED ORAL at 21:22

## 2021-06-20 RX ADMIN — APIXABAN 5 MG: 5 TABLET, FILM COATED ORAL at 08:38

## 2021-06-20 RX ADMIN — ASPIRIN 81 MG: 81 TABLET, COATED ORAL at 08:38

## 2021-06-20 RX ADMIN — QUETIAPINE FUMARATE 50 MG: 100 TABLET ORAL at 21:22

## 2021-06-21 PROCEDURE — 99232 SBSQ HOSP IP/OBS MODERATE 35: CPT | Performed by: PSYCHIATRY & NEUROLOGY

## 2021-06-21 PROCEDURE — 94799 UNLISTED PULMONARY SVC/PX: CPT

## 2021-06-21 RX ADMIN — LISINOPRIL 5 MG: 2.5 TABLET ORAL at 10:23

## 2021-06-21 RX ADMIN — DONEPEZIL HYDROCHLORIDE 5 MG: 5 TABLET, FILM COATED ORAL at 21:47

## 2021-06-21 RX ADMIN — METOPROLOL SUCCINATE 50 MG: 50 TABLET, EXTENDED RELEASE ORAL at 10:23

## 2021-06-21 RX ADMIN — Medication 1 TABLET: at 10:23

## 2021-06-21 RX ADMIN — QUETIAPINE FUMARATE 50 MG: 100 TABLET ORAL at 21:47

## 2021-06-21 RX ADMIN — ACETAMINOPHEN 650 MG: 325 TABLET ORAL at 21:47

## 2021-06-21 RX ADMIN — ASPIRIN 81 MG: 81 TABLET, COATED ORAL at 10:23

## 2021-06-21 RX ADMIN — ATORVASTATIN CALCIUM 10 MG: 10 TABLET, FILM COATED ORAL at 21:47

## 2021-06-21 RX ADMIN — APIXABAN 5 MG: 5 TABLET, FILM COATED ORAL at 10:23

## 2021-06-21 RX ADMIN — APIXABAN 5 MG: 5 TABLET, FILM COATED ORAL at 21:47

## 2021-06-21 RX ADMIN — QUETIAPINE FUMARATE 50 MG: 100 TABLET ORAL at 10:24

## 2021-06-22 PROCEDURE — 99232 SBSQ HOSP IP/OBS MODERATE 35: CPT | Performed by: PSYCHIATRY & NEUROLOGY

## 2021-06-22 PROCEDURE — 94799 UNLISTED PULMONARY SVC/PX: CPT

## 2021-06-22 RX ADMIN — DONEPEZIL HYDROCHLORIDE 5 MG: 5 TABLET, FILM COATED ORAL at 20:57

## 2021-06-22 RX ADMIN — QUETIAPINE FUMARATE 50 MG: 100 TABLET ORAL at 20:57

## 2021-06-22 RX ADMIN — LISINOPRIL 5 MG: 2.5 TABLET ORAL at 08:39

## 2021-06-22 RX ADMIN — Medication 1 TABLET: at 08:39

## 2021-06-22 RX ADMIN — APIXABAN 5 MG: 5 TABLET, FILM COATED ORAL at 20:57

## 2021-06-22 RX ADMIN — ATORVASTATIN CALCIUM 10 MG: 10 TABLET, FILM COATED ORAL at 20:57

## 2021-06-22 RX ADMIN — METOPROLOL SUCCINATE 50 MG: 50 TABLET, EXTENDED RELEASE ORAL at 08:39

## 2021-06-22 RX ADMIN — ASPIRIN 81 MG: 81 TABLET, COATED ORAL at 08:39

## 2021-06-22 RX ADMIN — QUETIAPINE FUMARATE 50 MG: 100 TABLET ORAL at 08:40

## 2021-06-22 RX ADMIN — APIXABAN 5 MG: 5 TABLET, FILM COATED ORAL at 08:39

## 2021-06-23 LAB
ANION GAP SERPL CALCULATED.3IONS-SCNC: 4.7 MMOL/L (ref 5–15)
BUN SERPL-MCNC: 21 MG/DL (ref 8–23)
BUN/CREAT SERPL: 17.6 (ref 7–25)
CALCIUM SPEC-SCNC: 9.3 MG/DL (ref 8.6–10.5)
CHLORIDE SERPL-SCNC: 104 MMOL/L (ref 98–107)
CO2 SERPL-SCNC: 29.3 MMOL/L (ref 22–29)
CREAT SERPL-MCNC: 1.19 MG/DL (ref 0.76–1.27)
GFR SERPL CREATININE-BSD FRML MDRD: 58 ML/MIN/1.73
GLUCOSE SERPL-MCNC: 111 MG/DL (ref 65–99)
POTASSIUM SERPL-SCNC: 4.5 MMOL/L (ref 3.5–5.2)
SODIUM SERPL-SCNC: 138 MMOL/L (ref 136–145)

## 2021-06-23 PROCEDURE — 93005 ELECTROCARDIOGRAM TRACING: CPT | Performed by: PSYCHIATRY & NEUROLOGY

## 2021-06-23 PROCEDURE — 93010 ELECTROCARDIOGRAM REPORT: CPT | Performed by: INTERNAL MEDICINE

## 2021-06-23 PROCEDURE — 80048 BASIC METABOLIC PNL TOTAL CA: CPT | Performed by: PSYCHIATRY & NEUROLOGY

## 2021-06-23 PROCEDURE — 99232 SBSQ HOSP IP/OBS MODERATE 35: CPT | Performed by: PSYCHIATRY & NEUROLOGY

## 2021-06-23 PROCEDURE — 97162 PT EVAL MOD COMPLEX 30 MIN: CPT

## 2021-06-23 RX ADMIN — METOPROLOL SUCCINATE 50 MG: 50 TABLET, EXTENDED RELEASE ORAL at 09:29

## 2021-06-23 RX ADMIN — QUETIAPINE FUMARATE 50 MG: 100 TABLET ORAL at 22:12

## 2021-06-23 RX ADMIN — Medication 1 TABLET: at 09:29

## 2021-06-23 RX ADMIN — ATORVASTATIN CALCIUM 10 MG: 10 TABLET, FILM COATED ORAL at 22:11

## 2021-06-23 RX ADMIN — LISINOPRIL 5 MG: 2.5 TABLET ORAL at 09:29

## 2021-06-23 RX ADMIN — ASPIRIN 81 MG: 81 TABLET, COATED ORAL at 09:29

## 2021-06-23 RX ADMIN — QUETIAPINE FUMARATE 50 MG: 100 TABLET ORAL at 09:29

## 2021-06-23 RX ADMIN — APIXABAN 5 MG: 5 TABLET, FILM COATED ORAL at 09:29

## 2021-06-23 RX ADMIN — APIXABAN 5 MG: 5 TABLET, FILM COATED ORAL at 22:11

## 2021-06-23 RX ADMIN — ACETAMINOPHEN 650 MG: 325 TABLET ORAL at 09:31

## 2021-06-23 RX ADMIN — DONEPEZIL HYDROCHLORIDE 5 MG: 5 TABLET, FILM COATED ORAL at 22:11

## 2021-06-24 LAB
QT INTERVAL: 394 MS
QTC INTERVAL: 409 MS

## 2021-06-24 PROCEDURE — 99232 SBSQ HOSP IP/OBS MODERATE 35: CPT | Performed by: PSYCHIATRY & NEUROLOGY

## 2021-06-24 RX ADMIN — APIXABAN 5 MG: 5 TABLET, FILM COATED ORAL at 21:33

## 2021-06-24 RX ADMIN — METOPROLOL SUCCINATE 50 MG: 50 TABLET, EXTENDED RELEASE ORAL at 09:31

## 2021-06-24 RX ADMIN — ASPIRIN 81 MG: 81 TABLET, COATED ORAL at 09:31

## 2021-06-24 RX ADMIN — LISINOPRIL 5 MG: 2.5 TABLET ORAL at 09:31

## 2021-06-24 RX ADMIN — Medication 1 TABLET: at 09:31

## 2021-06-24 RX ADMIN — APIXABAN 5 MG: 5 TABLET, FILM COATED ORAL at 09:31

## 2021-06-24 RX ADMIN — QUETIAPINE FUMARATE 50 MG: 100 TABLET ORAL at 09:31

## 2021-06-24 RX ADMIN — DONEPEZIL HYDROCHLORIDE 5 MG: 5 TABLET, FILM COATED ORAL at 21:33

## 2021-06-24 RX ADMIN — ATORVASTATIN CALCIUM 10 MG: 10 TABLET, FILM COATED ORAL at 21:33

## 2021-06-24 RX ADMIN — QUETIAPINE FUMARATE 50 MG: 100 TABLET ORAL at 21:33

## 2021-06-25 PROCEDURE — 99232 SBSQ HOSP IP/OBS MODERATE 35: CPT | Performed by: PSYCHIATRY & NEUROLOGY

## 2021-06-25 RX ADMIN — Medication 1 TABLET: at 08:20

## 2021-06-25 RX ADMIN — ATORVASTATIN CALCIUM 10 MG: 10 TABLET, FILM COATED ORAL at 21:42

## 2021-06-25 RX ADMIN — APIXABAN 5 MG: 5 TABLET, FILM COATED ORAL at 21:42

## 2021-06-25 RX ADMIN — ASPIRIN 81 MG: 81 TABLET, COATED ORAL at 08:20

## 2021-06-25 RX ADMIN — LISINOPRIL 5 MG: 2.5 TABLET ORAL at 08:20

## 2021-06-25 RX ADMIN — METOPROLOL SUCCINATE 50 MG: 50 TABLET, EXTENDED RELEASE ORAL at 08:20

## 2021-06-25 RX ADMIN — QUETIAPINE FUMARATE 50 MG: 100 TABLET ORAL at 21:42

## 2021-06-25 RX ADMIN — DONEPEZIL HYDROCHLORIDE 5 MG: 5 TABLET, FILM COATED ORAL at 21:42

## 2021-06-25 RX ADMIN — APIXABAN 5 MG: 5 TABLET, FILM COATED ORAL at 08:20

## 2021-06-25 RX ADMIN — QUETIAPINE FUMARATE 50 MG: 100 TABLET ORAL at 08:20

## 2021-06-26 PROCEDURE — 94799 UNLISTED PULMONARY SVC/PX: CPT

## 2021-06-26 PROCEDURE — 99232 SBSQ HOSP IP/OBS MODERATE 35: CPT | Performed by: PSYCHIATRY & NEUROLOGY

## 2021-06-26 RX ADMIN — ATORVASTATIN CALCIUM 10 MG: 10 TABLET, FILM COATED ORAL at 21:14

## 2021-06-26 RX ADMIN — ASPIRIN 81 MG: 81 TABLET, COATED ORAL at 08:24

## 2021-06-26 RX ADMIN — Medication 1 TABLET: at 08:25

## 2021-06-26 RX ADMIN — APIXABAN 5 MG: 5 TABLET, FILM COATED ORAL at 08:24

## 2021-06-26 RX ADMIN — ACETAMINOPHEN 650 MG: 325 TABLET ORAL at 21:14

## 2021-06-26 RX ADMIN — APIXABAN 5 MG: 5 TABLET, FILM COATED ORAL at 21:14

## 2021-06-26 RX ADMIN — QUETIAPINE FUMARATE 50 MG: 100 TABLET ORAL at 08:24

## 2021-06-26 RX ADMIN — QUETIAPINE FUMARATE 50 MG: 100 TABLET ORAL at 21:14

## 2021-06-26 RX ADMIN — LISINOPRIL 5 MG: 2.5 TABLET ORAL at 08:25

## 2021-06-26 RX ADMIN — METOPROLOL SUCCINATE 50 MG: 50 TABLET, EXTENDED RELEASE ORAL at 08:25

## 2021-06-26 RX ADMIN — DONEPEZIL HYDROCHLORIDE 5 MG: 5 TABLET, FILM COATED ORAL at 21:14

## 2021-06-26 RX ADMIN — ACETAMINOPHEN 650 MG: 325 TABLET ORAL at 07:27

## 2021-06-26 RX ADMIN — Medication 3 MG: at 21:14

## 2021-06-27 PROCEDURE — 99232 SBSQ HOSP IP/OBS MODERATE 35: CPT | Performed by: PSYCHIATRY & NEUROLOGY

## 2021-06-27 PROCEDURE — 94799 UNLISTED PULMONARY SVC/PX: CPT

## 2021-06-27 RX ADMIN — APIXABAN 5 MG: 5 TABLET, FILM COATED ORAL at 20:20

## 2021-06-27 RX ADMIN — QUETIAPINE FUMARATE 50 MG: 100 TABLET ORAL at 20:20

## 2021-06-27 RX ADMIN — ASPIRIN 81 MG: 81 TABLET, COATED ORAL at 08:08

## 2021-06-27 RX ADMIN — DONEPEZIL HYDROCHLORIDE 5 MG: 5 TABLET, FILM COATED ORAL at 20:20

## 2021-06-27 RX ADMIN — METOPROLOL SUCCINATE 50 MG: 50 TABLET, EXTENDED RELEASE ORAL at 08:09

## 2021-06-27 RX ADMIN — QUETIAPINE FUMARATE 50 MG: 100 TABLET ORAL at 08:08

## 2021-06-27 RX ADMIN — APIXABAN 5 MG: 5 TABLET, FILM COATED ORAL at 08:08

## 2021-06-27 RX ADMIN — ATORVASTATIN CALCIUM 10 MG: 10 TABLET, FILM COATED ORAL at 20:20

## 2021-06-27 RX ADMIN — Medication 1 TABLET: at 08:08

## 2021-06-27 RX ADMIN — LISINOPRIL 5 MG: 2.5 TABLET ORAL at 08:09

## 2021-06-28 PROCEDURE — 97116 GAIT TRAINING THERAPY: CPT

## 2021-06-28 PROCEDURE — 99232 SBSQ HOSP IP/OBS MODERATE 35: CPT | Performed by: PSYCHIATRY & NEUROLOGY

## 2021-06-28 PROCEDURE — 97110 THERAPEUTIC EXERCISES: CPT

## 2021-06-28 RX ADMIN — APIXABAN 5 MG: 5 TABLET, FILM COATED ORAL at 08:01

## 2021-06-28 RX ADMIN — QUETIAPINE FUMARATE 50 MG: 100 TABLET ORAL at 08:02

## 2021-06-28 RX ADMIN — QUETIAPINE FUMARATE 50 MG: 100 TABLET ORAL at 20:23

## 2021-06-28 RX ADMIN — Medication 1 TABLET: at 08:01

## 2021-06-28 RX ADMIN — ATORVASTATIN CALCIUM 10 MG: 10 TABLET, FILM COATED ORAL at 20:24

## 2021-06-28 RX ADMIN — ACETAMINOPHEN 650 MG: 325 TABLET ORAL at 15:13

## 2021-06-28 RX ADMIN — LISINOPRIL 5 MG: 2.5 TABLET ORAL at 08:02

## 2021-06-28 RX ADMIN — APIXABAN 5 MG: 5 TABLET, FILM COATED ORAL at 20:24

## 2021-06-28 RX ADMIN — METOPROLOL SUCCINATE 50 MG: 50 TABLET, EXTENDED RELEASE ORAL at 08:01

## 2021-06-28 RX ADMIN — ASPIRIN 81 MG: 81 TABLET, COATED ORAL at 08:01

## 2021-06-28 RX ADMIN — DONEPEZIL HYDROCHLORIDE 5 MG: 5 TABLET, FILM COATED ORAL at 20:24

## 2021-06-29 PROCEDURE — 99232 SBSQ HOSP IP/OBS MODERATE 35: CPT | Performed by: PSYCHIATRY & NEUROLOGY

## 2021-06-29 RX ADMIN — DONEPEZIL HYDROCHLORIDE 5 MG: 5 TABLET, FILM COATED ORAL at 21:58

## 2021-06-29 RX ADMIN — APIXABAN 5 MG: 5 TABLET, FILM COATED ORAL at 08:24

## 2021-06-29 RX ADMIN — ASPIRIN 81 MG: 81 TABLET, COATED ORAL at 08:24

## 2021-06-29 RX ADMIN — ATORVASTATIN CALCIUM 10 MG: 10 TABLET, FILM COATED ORAL at 21:58

## 2021-06-29 RX ADMIN — METOPROLOL SUCCINATE 50 MG: 50 TABLET, EXTENDED RELEASE ORAL at 08:24

## 2021-06-29 RX ADMIN — Medication 1 TABLET: at 08:24

## 2021-06-29 RX ADMIN — APIXABAN 5 MG: 5 TABLET, FILM COATED ORAL at 21:58

## 2021-06-29 RX ADMIN — QUETIAPINE FUMARATE 50 MG: 100 TABLET ORAL at 08:24

## 2021-06-29 RX ADMIN — LISINOPRIL 5 MG: 2.5 TABLET ORAL at 08:24

## 2021-06-29 RX ADMIN — QUETIAPINE FUMARATE 50 MG: 100 TABLET ORAL at 21:58

## 2021-06-30 PROCEDURE — 99232 SBSQ HOSP IP/OBS MODERATE 35: CPT | Performed by: PSYCHIATRY & NEUROLOGY

## 2021-06-30 RX ORDER — LISINOPRIL 10 MG/1
10 TABLET ORAL DAILY
Status: DISCONTINUED | OUTPATIENT
Start: 2021-07-01 | End: 2021-07-06 | Stop reason: HOSPADM

## 2021-06-30 RX ADMIN — ACETAMINOPHEN 650 MG: 325 TABLET ORAL at 20:12

## 2021-06-30 RX ADMIN — APIXABAN 5 MG: 5 TABLET, FILM COATED ORAL at 20:12

## 2021-06-30 RX ADMIN — DONEPEZIL HYDROCHLORIDE 5 MG: 5 TABLET, FILM COATED ORAL at 20:12

## 2021-06-30 RX ADMIN — Medication 3 MG: at 20:12

## 2021-06-30 RX ADMIN — QUETIAPINE FUMARATE 50 MG: 100 TABLET ORAL at 20:12

## 2021-06-30 RX ADMIN — ASPIRIN 81 MG: 81 TABLET, COATED ORAL at 08:00

## 2021-06-30 RX ADMIN — QUETIAPINE FUMARATE 50 MG: 100 TABLET ORAL at 08:00

## 2021-06-30 RX ADMIN — Medication 1 TABLET: at 08:00

## 2021-06-30 RX ADMIN — METOPROLOL SUCCINATE 50 MG: 50 TABLET, EXTENDED RELEASE ORAL at 08:00

## 2021-06-30 RX ADMIN — APIXABAN 5 MG: 5 TABLET, FILM COATED ORAL at 08:00

## 2021-06-30 RX ADMIN — ATORVASTATIN CALCIUM 10 MG: 10 TABLET, FILM COATED ORAL at 20:12

## 2021-06-30 RX ADMIN — LISINOPRIL 5 MG: 2.5 TABLET ORAL at 08:00

## 2021-07-01 PROCEDURE — 94799 UNLISTED PULMONARY SVC/PX: CPT

## 2021-07-01 PROCEDURE — 97110 THERAPEUTIC EXERCISES: CPT

## 2021-07-01 PROCEDURE — 97116 GAIT TRAINING THERAPY: CPT

## 2021-07-01 RX ADMIN — ATORVASTATIN CALCIUM 10 MG: 10 TABLET, FILM COATED ORAL at 20:48

## 2021-07-01 RX ADMIN — DONEPEZIL HYDROCHLORIDE 5 MG: 5 TABLET, FILM COATED ORAL at 20:48

## 2021-07-01 RX ADMIN — QUETIAPINE FUMARATE 50 MG: 100 TABLET ORAL at 08:31

## 2021-07-01 RX ADMIN — METOPROLOL SUCCINATE 50 MG: 50 TABLET, EXTENDED RELEASE ORAL at 10:45

## 2021-07-01 RX ADMIN — APIXABAN 5 MG: 5 TABLET, FILM COATED ORAL at 08:29

## 2021-07-01 RX ADMIN — APIXABAN 5 MG: 5 TABLET, FILM COATED ORAL at 20:48

## 2021-07-01 RX ADMIN — Medication 1 TABLET: at 08:30

## 2021-07-01 RX ADMIN — ACETAMINOPHEN 650 MG: 325 TABLET ORAL at 20:47

## 2021-07-01 RX ADMIN — QUETIAPINE FUMARATE 50 MG: 100 TABLET ORAL at 20:48

## 2021-07-01 RX ADMIN — LISINOPRIL 10 MG: 10 TABLET ORAL at 08:29

## 2021-07-01 RX ADMIN — ASPIRIN 81 MG: 81 TABLET, COATED ORAL at 08:30

## 2021-07-01 NOTE — THERAPY TREATMENT NOTE
Acute Care - Physical Therapy Treatment Note   [place]     Patient Name: Felice Aiken  : 1935  MRN: [id number]  Today's Date: 2021   Onset of Illness/Injury or Date of Surgery: 21       PT Assessment (last 12 hours)      PT Evaluation and Treatment     Row Name 21 1123          Physical Therapy Time and Intention    Subjective Information  no complaints  -CW     Document Type  therapy note (daily note)  -CW     Mode of Treatment  physical therapy  -CW     Patient Effort  good  -CW     Symptoms Noted During/After Treatment  fatigue  -CW     Comment  Patient agreeable to physical therapy treatment this date. He reports that he is feeling better today. He continues to express concern for his wife and believes he needs to go home.  -CW     Row Name 21 1123          General Information    Patient Profile Reviewed  yes  -CW     Patient Observations  alert;cooperative;agree to therapy  -CW     Equipment Currently Used at Home  oxygen;cane, straight;walker, rolling  -CW     Row Name 21 1123          Cognition    Affect/Mental Status (Cognitive)  WFL  -CW     Orientation Status (Cognition)  oriented to;person;place  -CW     Follows Commands (Cognition)  follows one-step commands;increased processing time needed;physical/tactile prompts required;verbal cues/prompting required  -CW     Row Name 21 1123          Pain Scale: Word Pre/Post-Treatment    Pre/Posttreatment Pain Comment  Patient continues to report L knee pain with weight bearing and mobilization, but lessened from previous days.  -CW     Pain Intervention(s)  Ambulation/increased activity;Repositioned  -CW     Row Name 21 1123          Bed Mobility    Comment (Bed Mobility)  patient received sitting in chair in common area.  -CW     Row Name 21 1123          Transfers    Transfers  sit-stand transfer;stand-sit transfer  -CW     Sit-Stand [place] (Transfers)  standby assist  -CW     Stand-Sit [place]  (Transfers)  contact guard  -     Row Name 07/01/21 1123          Sit-Stand Transfer    Assistive Device (Sit-Stand Transfers)  walker, front-wheeled  -CW     Row Name 07/01/21 1123          Stand-Sit Transfer    Assistive Device (Stand-Sit Transfers)  walker, front-wheeled  -CW     Row Name 07/01/21 1123          Gait/Stairs (Locomotion)    [place] Level (Gait)  contact guard;verbal cues;nonverbal cues (demo/gesture)  -     Assistive Device (Gait)  walker, front-wheeled  -     Distance in Feet (Gait)  200  -CW     Pattern (Gait)  step-through;step-to  -CW     Deviations/Abnormal Patterns (Gait)  antalgic;lynette decreased;gait speed decreased;stride length decreased;weight shifting decreased  -     Bilateral Gait Deviations  forward flexed posture;heel strike decreased Shortened stance time on L, decreased step length on R  -CW     Row Name 07/01/21 1123          Safety Issues, Functional Mobility    Safety Issues Affecting Function (Mobility)  awareness of need for assistance;insight into deficits/self-awareness;judgment;positioning of assistive device  -     Impairments Affecting Function (Mobility)  balance;endurance/activity tolerance;pain;postural/trunk control;range of motion (ROM);strength;visual/perceptual  -[facility] Name 07/01/21 1123          Balance    Static Standing Balance  supported;WFL  -[facility] Name 07/01/21 1123          Motor Skills    Therapeutic Exercise  other (see comments) Standing LE strengthening interventions  -[facility] Name 07/01/21 1123          Coping    Trust Relationship/Rapport  care explained;choices provided;questions answered;thoughts/feelings acknowledged;empathic listening provided  -[facility] Name 07/01/21 1123          Plan of Care Review    Plan of Care Reviewed With  patient  -[facility] Name 07/01/21 1123          Bed Mobility Goal 1 (PT)    Activity/Assistive Device (Bed Mobility Goal 1, PT)  bed mobility activities, all  -CW     [place]  Level/Cues Needed (Bed Mobility Goal 1, PT)  modified independence  -CW     Time Frame (Bed Mobility Goal 1, PT)  by discharge  -CW     Row Name 07/01/21 1123          Transfer Goal 1 (PT)    Activity/Assistive Device (Transfer Goal 1, PT)  transfers, all  -CW     [place] Level/Cues Needed (Transfer Goal 1, PT)  modified independence  -CW     Time Frame (Transfer Goal 1, PT)  by discharge  -CW     Progress/Outcome (Transfer Goal 1, PT)  good progress toward goal  -CW     Row Name 07/01/21 1123          Gait Training Goal 1 (PT)    Activity/Assistive Device (Gait Training Goal 1, PT)  gait (walking locomotion);assistive device use;decrease asymmetrical patterns;decrease fall risk;diminish gait deviation;improve balance and speed;increase endurance/gait distance;walker, rolling  -CW     [place] Level (Gait Training Goal 1, PT)  standby assist  -CW     Distance (Gait Training Goal 1, PT)  300  -CW     Time Frame (Gait Training Goal 1, PT)  by discharge  -CW     Progress/Outcome (Gait Training Goal 1, PT)  good progress toward goal  -CW     Row Name 07/01/21 1123          Positioning and Restraints    Pre-Treatment Position  sitting in chair/recliner  -CW     Post Treatment Position  chair  -CW     In Chair  sitting;patient within staff view  -CW     Row Name 07/01/21 1123          Therapy Assessment/Plan (PT)    Rehab Potential (PT)  fair, will monitor progress closely  -CW     Criteria for Skilled Interventions Met (PT)  yes;meets criteria  -CW     Row Name 07/01/21 1123          Progress Summary (PT)    Progress Toward Functional Goals (PT)  progress toward functional goals is good  -CW     Daily Progress Summary (PT)  Patient demonstrates improved tolerance and ability for functional mobility this date. This is likely due to less L knee pain and improved demeanor this date. His functional mobility deficits are primarily related to chronic LLE ROM, pain, and strength deficits.  -CW       User Key  (r) =  Recorded By, (t) = Taken By, (c) = Cosigned By    Initials Name Provider Type    Hans Maradiaga PT Physical Therapist          PT Recommendation and Plan  Anticipated Discharge Disposition (PT): other (see comments) (Either SNF or home with assist/home health, pending psych)  Planned Therapy Interventions (PT): balance training, bed mobility training, gait training, home exercise program, manual therapy techniques, patient/family education, postural re-education, ROM (range of motion), strengthening, transfer training  Therapy Frequency (PT): 3 times/wk (3-5 times/week)  Progress Summary (PT)  Progress Toward Functional Goals (PT): progress toward functional goals is good  Daily Progress Summary (PT): Patient demonstrates improved tolerance and ability for functional mobility this date. This is likely due to less L knee pain and improved demeanor this date. His functional mobility deficits are primarily related to chronic LLE ROM, pain, and strength deficits.  Plan of Care Reviewed With: patient       Time Calculation:   PT Charges     Row Name 07/01/21 1133             Time Calculation    PT Received On  07/01/21  -CW      PT Goal Re-Cert Due Date  07/07/21  -CW         Time Calculation- PT    Total Timed Code Minutes- PT  27 minute(s)  -CW        User Key  (r) = Recorded By, (t) = Taken By, (c) = Cosigned By    Initials Name Provider Type    Hans Maradiaga PT Physical Therapist        Therapy Charges for Today     Code Description Service Date Service Provider Modifiers Qty    36482329291 HC GAIT TRAINING EA 15 MIN 7/1/2021 Hans Frederick, PT GP 1    28281408171 HC PT THER PROC EA 15 MIN 7/1/2021 Hans Frederick PT GP 1               Hans Frederick PT  7/1/2021

## 2021-07-01 NOTE — DISCHARGE PLACEMENT REQUEST
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 deficits are primarily related to chronic LLE ROM, pain, and strength deficits.  Plan of Care Reviewed With: patient       Time Calculation:   PT Charges     Row Name 07/01/21 1133             Time Calculation    PT Received On  07/01/21  -      PT Goal Re-Cert Due Date  07/07/21  -         Time Calculation- PT    Total Timed Code Minutes- PT  27 minute(s)  -CW        User Key  (r) = Recorded By, (t) = Taken By, (c) = Cosigned By    Initials Name Provider Type    Hans Maradiaga PT Physical Therapist        Therapy Charges for Today     Code Description Service Date Service Provider Modifiers Qty    [id number]  GAIT TRAINING EA 15 MIN 7/1/2021 Hans Frederick, PT GP 1    [id number]  PT THER PROC EA 15 MIN 7/1/2021 Hans Frederick PT GP 1               Hans Frederick PT  7/1/2021      Electronically signed by Hans Frederick PT at 07/01/21 1133       Occupational Therapy Notes (most recent note)    No notes exist for this encounter.

## 2021-07-01 NOTE — PLAN OF CARE
Goal Outcome Evaluation:  Plan of Care Reviewed With: patient  Patient Agreement with Plan of Care: agrees     Progress: no change  Outcome Summary: Patient calm and cooperative this shift. Denies all symptoms. Spends time in dayroom watching tv.

## 2021-07-01 NOTE — PLAN OF CARE
Goal Outcome Evaluation:  Plan of Care Reviewed With: patient  Patient Agreement with Plan of Care: agrees     Progress: no change  Outcome Summary: Pt reports sleeping and good and a good appetite. Pt rates A/D 3/0. Denies SI/HI or hallucinations. Pt denies feeling helpless,hopeless or worthless.

## 2021-07-01 NOTE — PLAN OF CARE
Goal Outcome Evaluation:  Plan of Care Reviewed With: patient  Patient Agreement with Plan of Care: agrees  Consent Given to Review Plan with: spouseKary  Progress: improving  Outcome Summary: Therapist met with Patient today to discuss treatment progress; Patient agreeable.      Problem: Adult Behavioral Health Plan of Care  Goal: Plan of Care Review  Outcome: Ongoing, Progressing  Flowsheets  Taken 7/1/2021 1527  Progress: improving  Plan of Care Reviewed With: patient  Patient Agreement with Plan of Care: agrees  Outcome Summary:   Therapist met with Patient today to discuss treatment progress   Patient agreeable.  Taken 6/19/2021 1026  Consent Given to Review Plan with: spouseKary  Goal: Optimized Coping Skills in Response to Life Stressors  Outcome: Ongoing, Progressing  Intervention: Promote Effective Coping Strategies  Flowsheets (Taken 7/1/2021 0720 by Hieu Sanchez RN)  Supportive Measures:   active listening utilized   verbalization of feelings encouraged  Goal: Develops/Participates in Therapeutic Troy to Support Successful Transition  Outcome: Ongoing, Progressing  Intervention: Foster Therapeutic [place]  Flowsheets (Taken 7/1/2021 1527)  Trust Relationship/Rapport:   care explained   questions encouraged   choices provided   reassurance provided   emotional support provided   thoughts/feelings acknowledged   empathic listening provided   questions answered     DATA: Therapist met with Patient individually this date. Patient agreeable to discuss current treatment progress and discharge concerns.     This therapist followed up on numerous referrals today. Miladis at [facility] [place] that they will be unable to accept Patient because she ling not feel that they can meet his needs. Raman at [facility] states that he does not have a bed available. Leilani at [facility] declined Patient due to assault charge. Duran Hubbard, and Mil request that a new referral  be sent. Back ground information was given about Patient over the phone.     New referral was sent to [facility]. Background information about the Patient was given to Joceline.     CLINICAL MANUVERING/INTERVENTIONS:  Assisted Patient in processing session content; acknowledged and normalized Patient’s thoughts, feelings, and concerns by utilizing a person-centered approach in efforts to build appropriate rapport and a positive therapeutic relationship with open and honest communication. Allowed Patient to ventilate regarding current stressors and triggers for negative emotions and thoughts in a safe nonjudgmental environment with unconditional positive regard, active listening skills, and empathy.     ASSESSMENT: Patient was seen 1-1 for a follow up today. Patient continues to be calm and cooperative. Patient reports feeling well, and has no complaints. He continues to spend time in the day room watching TV, and calls his wife occasionally.     PLAN:   Patient will continue stabilization. Patient will continue to receive services offered by Treatment Team.

## 2021-07-01 NOTE — DISCHARGE PLACEMENT REQUEST
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 Calculation- PT    Total Timed Code Minutes- PT  27 minute(s)  -CW        User Key  (r) = Recorded By, (t) = Taken By, (c) = Cosigned By    Initials Name Provider Type    CW Hans Frederick PT Physical Therapist        Therapy Charges for Today     Code Description Service Date Service Provider Modifiers Qty    41612653815 HC GAIT TRAINING EA 15 MIN 7/1/2021 Hasn Frederick, PT GP 1    [id number]  PT THER PROC EA 15 MIN 7/1/2021 Hans Frederick PT GP 1               Hans Frederick PT  7/1/2021      Electronically signed by Hans Frederick PT at 07/01/21 1133       Occupational Therapy Notes (most recent note)    No notes exist for this encounter.

## 2021-07-02 PROCEDURE — 94799 UNLISTED PULMONARY SVC/PX: CPT

## 2021-07-02 PROCEDURE — 99232 SBSQ HOSP IP/OBS MODERATE 35: CPT | Performed by: PSYCHIATRY & NEUROLOGY

## 2021-07-02 RX ADMIN — ASPIRIN 81 MG: 81 TABLET, COATED ORAL at 08:07

## 2021-07-02 RX ADMIN — QUETIAPINE FUMARATE 50 MG: 100 TABLET ORAL at 08:08

## 2021-07-02 RX ADMIN — DONEPEZIL HYDROCHLORIDE 5 MG: 5 TABLET, FILM COATED ORAL at 20:20

## 2021-07-02 RX ADMIN — QUETIAPINE FUMARATE 50 MG: 100 TABLET ORAL at 20:20

## 2021-07-02 RX ADMIN — LISINOPRIL 10 MG: 10 TABLET ORAL at 08:07

## 2021-07-02 RX ADMIN — Medication 1 TABLET: at 08:07

## 2021-07-02 RX ADMIN — METOPROLOL SUCCINATE 50 MG: 50 TABLET, EXTENDED RELEASE ORAL at 08:07

## 2021-07-02 RX ADMIN — APIXABAN 5 MG: 5 TABLET, FILM COATED ORAL at 20:20

## 2021-07-02 RX ADMIN — APIXABAN 5 MG: 5 TABLET, FILM COATED ORAL at 08:07

## 2021-07-02 RX ADMIN — ATORVASTATIN CALCIUM 10 MG: 10 TABLET, FILM COATED ORAL at 20:20

## 2021-07-02 NOTE — PROGRESS NOTES
Navigator is helping Primary Therapist with discharge planning for patient. Navigator completed the following referrals on this day:.    [facility] -  [phone]  -Sent 7/1  -Spoke with Makenna. She states she will put the patient on her waiting list and would consider him if patients wife could get the court date/charge dropped. Treatment team to reach back out to Makenna if this is possible. 7/2  -Received call from Makenna requesting patients Facesheet. Faxed at this time 7/2    [facility] - [phone]  -Sent 6/30  -Declined 7/1     [facility] - [phone]  -Sent 6/30  -No beds available 7/1     [facility] - [phone]  -Sent 6/30  -Declined 7/1     [facility] - [phone]  -Sent 6/30  -Admissions did not remember receiving referral. Resent 7/2     [facility] - [phone]  -Sent 6/30  -Resent 7/1  -Florence out of the office today. Staff states fax machine was tore up yesterday and asks that we fax referral to the front office at [phone]. Sent at this time 7/2     [facility] - [phone]  -Sent 6/28  -Received call from Nicole asking about patient and requesting updated nursing notes. Explained to Nicole about patients charges and she states she will have to review that with other staff and call back if they still need notes. 6/30     [facility] - [phone]  -Sent 6/28  -Updated notes faxed 6/29  -[facility] will not be able to accept patient due to pending court date with charges. 6/30     [facility] - [phone]  -Sent 6/28  -Resent 6/29  -Spoke with Rachel. She thinks DON may be reviewing referral. She states to try back in about an hour. 6/30  -Resent 7/1  -Spoke with Rachel. She states she sent an auth to the insurance but will need updated PT/OT and progress notes. Faxed at this time. 7/2      [facility] - [phone]  -Sent 6/25  -Left message for Paloma. 6/28  -Paloma out of the office today. Staff  states to call back tomorrow. 6/30  -Resent 7/1   -Left message for Paloma. 7/2    [facility] - [phone]  -Sent 6/25  -Attempted to reach admissions. Was transferred to a line with no answer and no voicemail. 6/28  -Attempted to reach admissions again. Transferred to line with no answer no voicemail. 6/28  -Spoke with admissions. She did not remember seeing referral but states if he has charges and a court date they would not be able to accept patient. 6/30     [facility] - [phone]  -Sent 6/25  -Marybel states admissions is out of the office this week and she does not see referral on her desk. Will resend today for review. 6/28  -Declined 6/29      [facility] - [phone]  -Sent 6/23  -Left message for David. 6/25  -David declined. 6/25     [facility] - [phone]  -Sent 6/23  -Received call from Carly requesting we fax over patients demographics so they can check his payor source. Faxed at this time. 6/25  -Carly called again requesting nursing notes and any PT/OT evaluations. Faxed at this time ([phone])  6/25  -No answer 6/28  -Spoke with Carly about assault charge. She would like to know when this occurred. Will call her back once we find out. 6/28  -Spoke with Carly again letting her know the charge was from 4 years ago. Carly to staff with [facility] and call back. 6/28  -[facility] states [facility] can not accept patient due to pending charge and court date. 6/30        [facility] - [phone]  -Sent 6/21  -Left message for Tamara. 6/23  -Received return call from Tamara. Patient declined. 6/23     [facility] - [phone]  -Sent 6/21  -Spoke with Charu to let her know referral was being sent. She states no male beds at this time but will look at referral and see if patient would be appropriate if bed becomes available later. 6/21  -Wiley states patients referral went to regional review and was declined. 6/23

## 2021-07-02 NOTE — PLAN OF CARE
Goal Outcome Evaluation:  Plan of Care Reviewed With: patient  Patient Agreement with Plan of Care: agrees     Progress: improving  Outcome Summary: Pt reports sleeping and eating well. Pt rates A/D 3/0. Denies SI/HI or hallucinations.

## 2021-07-02 NOTE — DISCHARGE PLACEMENT REQUEST
"Felice Mckeon (86 y.o. Male)     Date of Birth Social Security Number Address Home Phone MRN    [id number]  [id number]   [facility] [address] [phone] [id number]    Gnosticism Marital Status          None        Admission Date Admission Type Admitting Provider Attending Provider Department, Room/Bed    21 Emergency Darwin Cotter MD Salim, Mazhar, MD [facility], 1040/2S    Discharge Date Discharge Disposition Discharge Destination                       Attending Provider: Darwin Cotter MD    Allergies: No Known Allergies    Isolation: None   Infection: None   Code Status: Prior    Ht: 175.3 cm (69\")   Wt: 68 kg (150 lb)    Admission Cmt: None   Principal Problem: Psychosis (CMS/[facility]) [F29]                 Active Insurance as of 2021     Primary Coverage     Payor Plan Insurance Group Employer/Plan Group    HUMANA MEDICARE REPLACEMENT HUMANA MEDICARE REPLACEMENT [id number]     Payor Plan Address Payor Plan Phone Number Payor Plan Fax Number Effective Dates    [address] [address] [phone]  2015 - None Entered    [facility] [id number]       Subscriber Name Subscriber Birth Date Member ID       FELICE MCKEON 1935 [id number]                 Emergency Contacts      (Rel.) Home Phone Work Phone Mobile Phone    Annmarie Amin (Sister) [phone] -- --    MckeonKary (Power of ) [phone] -- --              Physician Progress Notes (last 48 hours) (Notes from 21 1347 through 21 1347)    No notes of this type exist for this encounter.          Physical Therapy Notes (last 72 hours) (Notes from 21 1347 through 21 1347)      Hans Frederick, PT at 21 1133  Version 1 of 1         Acute Care - Physical Therapy Treatment Note  [facility]     Patient Name: Felice Mckeon  : 1935  MRN: [id number]  Today's Date: 2021   Onset of Illness/Injury or Date of Surgery: 21       PT Assessment (last 12 " hours)      PT Evaluation and Treatment     Row Name 07/01/21 1123          Physical Therapy Time and Intention    Subjective Information  no complaints  -CW     Document Type  therapy note (daily note)  -CW     Mode of Treatment  physical therapy  -CW     Patient Effort  good  -CW     Symptoms Noted During/After Treatment  fatigue  -CW     Comment  Patient agreeable to physical therapy treatment this date. He reports that he is feeling better today. He continues to express concern for his wife and believes he needs to go home.  -     Row Name 07/01/21 1123          General Information    Patient Profile Reviewed  yes  -CW     Patient Observations  alert;cooperative;agree to therapy  -CW     Equipment Currently Used at Home  oxygen;cane, straight;walker, rolling  -     Row Name 07/01/21 1123          Cognition    Affect/Mental Status (Cognitive)  WFL  -     Orientation Status (Cognition)  oriented to;person;place  -CW     Follows Commands (Cognition)  follows one-step commands;increased processing time needed;physical/tactile prompts required;verbal cues/prompting required  -     Row Name 07/01/21 1123          Pain Scale: Word Pre/Post-Treatment    Pre/Posttreatment Pain Comment  Patient continues to report L knee pain with weight bearing and mobilization, but lessened from previous days.  -CW     Pain Intervention(s)  Ambulation/increased activity;Repositioned  -     Row Name 07/01/21 1123          Bed Mobility    Comment (Bed Mobility)  patient received sitting in chair in common area.  -     Row Name 07/01/21 1123          Transfers    Transfers  sit-stand transfer;stand-sit transfer  -     Sit-Stand [place] (Transfers)  standby assist  -     Stand-Sit [place] (Transfers)  contact guard  -     Row Name 07/01/21 1123          Sit-Stand Transfer    Assistive Device (Sit-Stand Transfers)  walker, front-wheeled  -     Row Name 07/01/21 1123          Stand-Sit Transfer    Assistive  Device (Stand-Sit Transfers)  walker, front-wheeled  -CW     Row Name 07/01/21 1123          Gait/Stairs (Locomotion)    [place] Level (Gait)  contact guard;verbal cues;nonverbal cues (demo/gesture)  -     Assistive Device (Gait)  walker, front-wheeled  -     Distance in Feet (Gait)  200  -CW     Pattern (Gait)  step-through;step-to  -CW     Deviations/Abnormal Patterns (Gait)  antalgic;lynette decreased;gait speed decreased;stride length decreased;weight shifting decreased  -     Bilateral Gait Deviations  forward flexed posture;heel strike decreased Shortened stance time on L, decreased step length on R  -CW     Row Name 07/01/21 1123          Safety Issues, Functional Mobility    Safety Issues Affecting Function (Mobility)  awareness of need for assistance;insight into deficits/self-awareness;judgment;positioning of assistive device  -     Impairments Affecting Function (Mobility)  balance;endurance/activity tolerance;pain;postural/trunk control;range of motion (ROM);strength;visual/perceptual  -     Row Name 07/01/21 1123          Balance    Static Standing Balance  supported;WFL  -     Row Name 07/01/21 1123          Motor Skills    Therapeutic Exercise  other (see comments) Standing LE strengthening interventions  -     Row Name 07/01/21 1123          Coping    Trust Relationship/Rapport  care explained;choices provided;questions answered;thoughts/feelings acknowledged;empathic listening provided  -     Row Name 07/01/21 1123          Plan of Care Review    Plan of Care Reviewed With  patient  -     Row Name 07/01/21 1123          Bed Mobility Goal 1 (PT)    Activity/Assistive Device (Bed Mobility Goal 1, PT)  bed mobility activities, all  -CW     [place] Level/Cues Needed (Bed Mobility Goal 1, PT)  modified independence  -     Time Frame (Bed Mobility Goal 1, PT)  by discharge  -     Row Name 07/01/21 1123          Transfer Goal 1 (PT)    Activity/Assistive Device (Transfer  Goal 1, PT)  transfers, all  -CW     [place] Level/Cues Needed (Transfer Goal 1, PT)  modified independence  -CW     Time Frame (Transfer Goal 1, PT)  by discharge  -CW     Progress/Outcome (Transfer Goal 1, PT)  good progress toward goal  -CW     Row Name 07/01/21 1123          Gait Training Goal 1 (PT)    Activity/Assistive Device (Gait Training Goal 1, PT)  gait (walking locomotion);assistive device use;decrease asymmetrical patterns;decrease fall risk;diminish gait deviation;improve balance and speed;increase endurance/gait distance;walker, rolling  -CW     [place] Level (Gait Training Goal 1, PT)  standby assist  -CW     Distance (Gait Training Goal 1, PT)  300  -CW     Time Frame (Gait Training Goal 1, PT)  by discharge  -CW     Progress/Outcome (Gait Training Goal 1, PT)  good progress toward goal  -CW     Row Name 07/01/21 1123          Positioning and Restraints    Pre-Treatment Position  sitting in chair/recliner  -CW     Post Treatment Position  chair  -CW     In Chair  sitting;patient within staff view  -CW     Row Name 07/01/21 1123          Therapy Assessment/Plan (PT)    Rehab Potential (PT)  fair, will monitor progress closely  -CW     Criteria for Skilled Interventions Met (PT)  yes;meets criteria  -CW     Row Name 07/01/21 1123          Progress Summary (PT)    Progress Toward Functional Goals (PT)  progress toward functional goals is good  -CW     Daily Progress Summary (PT)  Patient demonstrates improved tolerance and ability for functional mobility this date. This is likely due to less L knee pain and improved demeanor this date. His functional mobility deficits are primarily related to chronic LLE ROM, pain, and strength deficits.  -CW       User Key  (r) = Recorded By, (t) = Taken By, (c) = Cosigned By    Initials Name Provider Type    CW Hans Frederick, PT Physical Therapist          PT Recommendation and Plan  Anticipated Discharge Disposition (PT): other (see comments) (Either SNF  or home with assist/home health, pending psych)  Planned Therapy Interventions (PT): balance training, bed mobility training, gait training, home exercise program, manual therapy techniques, patient/family education, postural re-education, ROM (range of motion), strengthening, transfer training  Therapy Frequency (PT): 3 times/wk (3-5 times/week)  Progress Summary (PT)  Progress Toward Functional Goals (PT): progress toward functional goals is good  Daily Progress Summary (PT): Patient demonstrates improved tolerance and ability for functional mobility this date. This is likely due to less L knee pain and improved demeanor this date. His functional mobility deficits are primarily related to chronic LLE ROM, pain, and strength deficits.  Plan of Care Reviewed With: patient       Time Calculation:   PT Charges     Row Name 07/01/21 1133             Time Calculation    PT Received On  07/01/21  -CW      PT Goal Re-Cert Due Date  07/07/21  -         Time Calculation- PT    Total Timed Code Minutes- PT  27 minute(s)  -CW        User Key  (r) = Recorded By, (t) = Taken By, (c) = Cosigned By    Initials Name Provider Type    CW Hans Frederick, DAVI Physical Therapist        Therapy Charges for Today     Code Description Service Date Service Provider Modifiers Qty    11936346872 HC GAIT TRAINING EA 15 MIN 7/1/2021 Hans Frederick, PT GP 1    [id number]  PT THER PROC EA 15 MIN 7/1/2021 Hans Frederick PT GP 1               Hans Frederick PT  7/1/2021      Electronically signed by Hans Frederick PT at 07/01/21 1133       Occupational Therapy Notes (last 72 hours) (Notes from 06/29/21 1347 through 07/02/21 1347)    No notes exist for this encounter.            Nursing Assessments (last 72 hours)      Adult  PCS Body System     Row Name 07/02/21 1200 07/02/21 1000 07/02/21 0800 07/02/21 0725 07/01/21 2041       Pain/Comfort/Sleep    Preferred Pain Scale  --  --  --  number (Numeric Rating Pain Scale)  number (Numeric  Rating Pain Scale)    (0-10) Pain Rating: Rest  --  --  --  --  0    (0-10) Pain Rating: Activity  --  --  --  --  0       Sleep/Rest    Sleep/Rest/Relaxation  --  --  --  no problem identified  no problem identified    Sleep Hygiene Promotion  --  --  --  awakenings minimized  awakenings minimized;regular sleep pattern promoted       Coping/Psychosocial    Verbalized Emotional State  --  --  --  acceptance  acceptance    Plan of Care Reviewed With  --  --  --  patient  patient    Patient Agreement with Plan of Care  --  --  --  agrees  agrees    Family/Support System Care  --  --  --  self-care encouraged  --    Trust Relationship/Rapport  --  --  --  care explained  care explained;thoughts/feelings acknowledged       Coping/Psychosocial Interventions    Behavior Management  --  --  --  behavioral plan developed  --    Supportive Measures  --  --  --  active listening utilized  active listening utilized;verbalization of feelings encouraged    Social Functional Ability Promotion  --  --  --  self-expression encouraged  --       HEENT    HEENT WDL  --  --  --  WDL except;vision change;hearing change wears glasses  WDL except;vision change;hearing change wears glasses    Left Vision Change  --  --  --  other (see comments) poor vision with glasses  other (see comments) poor vision with glasses    Right Vision Change  --  --  --  other (see comments)  other (see comments)    Left Hearing Change  --  --  --  decreased  decreased    Right Hearing Change  --  --  --  decreased  decreased       Mouth/Teeth WDL    Mouth/Teeth WDL  --  --  --  WDL except;teeth  WDL except;teeth    Teeth Symptoms  --  --  --  tooth/teeth missing  tooth/teeth missing       Cognitive    Additional Documentation  --  --  --  Cognitive/Neuro/Behavioral WDL (Group)  Cognitive/Neuro/Behavioral WDL (Group)       Cognitive/Neuro/Behavioral WDL    Cognitive/Neuro/Behavioral WDL  --  --  --  all;WDL except  all;WDL except    Arousal Level  --  --  --   opens eyes spontaneously  opens eyes spontaneously    Orientation  --  --  --  place  disoriented to;time    Speech  --  --  --  logical  logical    Mood/Behavior  --  --  --  cooperative;hypoactive (quiet, withdrawn)  cooperative;hypoactive (quiet, withdrawn)       Neuro    Additional Documentation  --  Pro Coma Scale (Group)  --  --  Pro Coma Scale (Group)       [place] Coma Scale    Best Eye Response  --  4-->(E4) spontaneous  --  --  4-->(E4) spontaneous    Best Motor Response  --  6-->(M6) obeys commands  --  --  6-->(M6) obeys commands    Best Verbal Response  --  5-->(V5) oriented  --  --  4-->(V4) confused    Pro Coma Scale Score  --  15  --  --  14       Motor Response    Motor Response  --  --  --  general motor response  --    General Motor Response  --  --  --  purposeful motor response  --       Behavioral    General Appearance WDL  --  --  --  WDL  WDL    General Appearance  --  --  --  unshaven beard per patient choice  unshaven       Behavior WDL    Behavior WDL  --  --  --  WDL except;motor movement  WDL except;motor movement    Interactions  --  --  --  cooperative  cooperative    Motor Movement  --  --  --  gait unsteady using walker  gait unsteady       Emotion Mood WDL    Emotion/Mood/Affect WDL  --  --  --  WDL  WDL    Affect  --  --  --  affect consistent with mood  affect consistent with mood    Emotion/Mood  --  --  --  appropriate to situation  appropriate to situation    Patient rated anxiety level  --  --  --  0  0    Patient rated depression level  --  --  --  0  0       Speech WDL    Speech WDL  --  --  --  WDL except;speech  WDL except;speech    Speech  --  --  --  soft/quiet  soft/quiet       Perceptual State WDL    Perceptual State WDL  --  --  --  WDL  WDL    Hallucinations  --  --  --  denies hallucinations  denies hallucinations    Perceptual State  --  --  --  consistent with reality  consistent with reality       Thought Process WDL    Thought Process WDL  --  --  --   WDL except;judgment and insight  WDL except;judgment and insight    Delusions  --  --  --  no delusions  no delusions    Judgment and Insight  --  --  --  insight not appropriate to situation;judgment not appropriate to situation  insight not appropriate to situation;judgment not appropriate to situation    Thought Content  --  --  --  relevant  relevant    Thought Process  --  --  --  relevant  relevant       Intellectual Performance WDL    Intellectual Performance WDL  --  --  --  WDL except  WDL except    Intellectual Performance  --  --  --  impaired concentration;disoriented to time  impaired concentration;disoriented to time    Level of Consciousness  --  --  --  Alert  Alert       Respiratory    Respiratory WDL  --  --  --  WDL except Uses continious O2  WDL except Uses continious O2    Rhythm/Pattern, Respiratory  --  --  --  shortness of breath noted with activity  shortness of breath noted with activity    Expansion/Accessory Muscles/Retractions  --  --  --  no use of accessory muscles  no use of accessory muscles    Nailbeds  --  --  --  no discoloration  --    Mucous Membranes  --  --  --  pink;intact;moist  --    Cough Frequency  --  --  --  infrequent  --    Cough Type  --  --  --  loose;productive  --       Breath Sounds    Breath Sounds  --  --  --  All Fields  --    All Lung Fields Breath Sounds  --  --  --  diminished  --       Oxygen Therapy    Flow (L/min)  --  --  --  --  2    Device (Oxygen Therapy)  --  --  --  room air  --       Cardiac    Cardiac WDL  --  --  --  WDL Pt denies any complaints at this time  WDL Pt denies any complaints at this time       Cardiac Interventions    Syncope Management  --  --  --  position changed slowly  --       Peripheral Neurovascular    Peripheral Neurovascular WDL  --  --  --  WDL except;edema  WDL except;edema    Capillary Refill, General  --  --  --  less than/equal to 3 secs  --       Pulse Radial    Left Radial Pulse  --  --  --  2+ (normal)  --     Right Radial Pulse  --  --  --  2+ (normal)  --       Pulse Dorsalis Pedis    Left Dorsalis Pedis Pulse  --  --  --  2+ (normal)  --    Right Dorsalis Pedis Pulse  --  --  --  2+ (normal)  --       Edema    Edema  --  --  --  ankle, left  --    Ankle, Left Edema  --  --  --  1+ (Trace)  --       Gastrointestinal    Gastrointestinal WDL  --  --  --  WDL  WDL       Genitourinary    Genitourinary WDL  --  --  --  WDL Pt denies any complaints at this time  WDL Pt denies any complaints at this time       Skin    Skin WDL  --  --  --  WDL  WDL    Skin Temperature  --  --  --  warm  warm    Skin Moisture  --  --  --  dry  dry    Skin Elasticity  --  --  --  slow return to original state  slow return to original state    Skin Integrity  --  --  --  intact  intact       Dami Risk Assessment    Sensory Perception  --  --  --  3-->slightly limited  3-->slightly limited    Moisture  --  --  --  4-->rarely moist  4-->rarely moist    Activity  --  --  --  3-->walks occasionally  3-->walks occasionally    Mobility  --  --  --  3-->slightly limited  3-->slightly limited    Nutrition  --  --  --  3-->adequate  3-->adequate    Friction and Shear  --  --  --  3-->no apparent problem  3-->no apparent problem    Dami Score  --  --  --  19  19       Musculoskeletal    Musculoskeletal WDL  --  --  --  WDL except;mobility  WDL except;mobility    General Mobility  --  --  --  generalized weakness;moderately impaired  generalized weakness;moderately impaired       Functional Screen (every 3 days/change)    Ambulation  --  --  --  1 - assistive equipment  1 - assistive equipment    Transferring  --  --  --  1 - assistive equipment  1 - assistive equipment    Toileting  --  --  --  1 - assistive equipment  1 - assistive equipment    Bathing  --  --  --  3 - assistive equipment and person  3 - assistive equipment and person    Dressing  --  --  --  0 - independent  0 - independent    Eating  --  --  --  0 - independent  0 - independent     Communication  --  --  --  0 - understands/communicates without difficulty  0 - understands/communicates without difficulty    Swallowing  --  --  --  0 - swallows foods/liquids without difficulty  0 - swallows foods/liquids without difficulty       Nutrition    Diet/Nutrition Received  --  --  --  regular  regular    Diet/Feeding Assistance  --  --  --  tray set-up  --    Diet/Feeding Tolerance  --  --  --  good  good       Nutrition Interventions    Glycemic Management  --  --  --  oral hydration promoted  --    Oral Nutrition Promotion  --  --  --  rest periods promoted  --       Sepsis Screening Options    Which Sepsis Screen to be Used?  --  --  --  Adult Sepsis Screen  Adult Sepsis Screen       Adult Sepsis Screening Tool    Previous adult screen positive in last 48 hrs?  --  --  --  no  no    Are 2 or > of the above criteria present?  --  --  --  no: STOP/negative screen  no: STOP/negative screen       Safety    Patient Location  --  --  --  --  --    Observed Behavior  --  --  --  --  --    Observed Behavior (Comment)  --  --  --  sp3/falls  sp3/falls    Safety Factors  ID band on  ID band on  ID band on  --  ID band on    Safety Measures  --  --  --  safety plan reviewed  --    Diversional Activity  --  --  --  television  --       [place] Psychiatric Fall Risk Tool    Age  --  --  --  26-->80 and over  26-->80 and over    Mental Status  --  --  --  13-->Intermittently confused  13-->Intermittently confused    Elimination  --  --  --  8-->Independent with control of bowel/bladder  8-->Independent with control of bowel/bladder    Medications  --  --  --  10-->Cardiac medications;8-->Psychotropic medications  10-->Cardiac medications;8-->Psychotropic medications    Diagnosis  --  --  --  12-->Dementia/delirium  12-->Dementia/delirium    Ambulation/Balance  --  --  --  8-->Unsteady/requires assist and aware of abilities  8-->Unsteady/requires assist and aware of abilities    Nutrition  --  --  --  0-->No  apparent abnormalities with appetite  0-->No apparent abnormalities with appetite    Sleep Disturbance  --  --  --  8-->No sleep disturbance  8-->No sleep disturbance    History of Falls  --  --  --  14-->History of falls in the past 3 months  14-->History of falls in the past 3 months    Fall Risk Score  --  --  --  107  107       C-SSRS (Recent)    Q1 Wished to be Dead (Past Month)  --  --  --  --  --    Q2 Suicidal Thoughts (Past Month)  --  --  --  --  --    Q6 Suicide Behavior (Lifetime)  --  --  --  --  --    Level of Risk per Screen  --  --  --  --  --       C-SSRS (Frequent Screener)    Q2 Suicidal Thoughts (Since Last Contact)  --  --  --  no  no    Q6 Suicide Behavior  --  --  --  no  no       Violence Risk    Feels Like Hurting Others  --  --  --  no  no    Previous Attempt to Harm Others  --  --  --  no  no    Violence Threats in Past 6 Months  --  --  --  denies  denies       Daily Care    Activity (Behavioral Health)  --  --  --  activity adjusted per tolerance  activity adjusted per tolerance       Positioning    Head of Bed (HOB)  --  --  --  HOB at 30 degrees  --       Hygiene Care Assistance    Hygiene Assistance  --  --  --  per care provider x 1  --       Daily Care Interventions    Self-Care Promotion  --  --  --  independence encouraged  --    Row Name 07/01/21 0823 07/01/21 0815 07/01/21 0720 06/30/21 1910 06/30/21 1700       Pain/Comfort/Sleep    Preferred Pain Scale  number (Numeric Rating Pain Scale)  --  number (Numeric Rating Pain Scale)  number (Numeric Rating Pain Scale)  --    Pain Goal/Acceptable Level  0  --  --  --  --    (0-10) Pain Rating: Rest  0  --  --  --  --    (0-10) Pain Rating: Activity  0  --  --  --  --       Sleep/Rest    Sleep/Rest/Relaxation  --  --  no problem identified  no problem identified  --    Sleep Hygiene Promotion  --  --  --  awakenings minimized;regular sleep pattern promoted  --       Coping/Psychosocial    Verbalized Emotional State  --  --  acceptance   acceptance  --    Plan of Care Reviewed With  --  --  patient  patient  --    Patient Agreement with Plan of Care  --  --  agrees  agrees  --    Trust Relationship/Rapport  --  --  care explained;choices provided;emotional support provided;empathic listening provided;questions answered;questions encouraged;reassurance provided;thoughts/feelings acknowledged  care explained;thoughts/feelings acknowledged  --       Coping/Psychosocial Interventions    Supportive Measures  --  --  active listening utilized;verbalization of feelings encouraged  active listening utilized;verbalization of feelings encouraged  --       HEENT    HEENT WDL  --  --  WDL except;vision change;hearing change wears glasses  WDL except;vision change;hearing change wears glasses  --    Left Vision Change  --  --  other (see comments) poor vision with glasses  other (see comments) poor vision with glasses  --    Right Vision Change  --  --  other (see comments)  other (see comments)  --    Left Hearing Change  --  --  decreased  decreased  --    Right Hearing Change  --  --  decreased  decreased  --       Mouth/Teeth WDL    Mouth/Teeth WDL  --  --  WDL except;teeth  WDL except;teeth  --    Teeth Symptoms  --  --  tooth/teeth missing  tooth/teeth missing  --       Cognitive    Additional Documentation  --  --  --  Cognitive/Neuro/Behavioral WDL (Group)  --       Cognitive/Neuro/Behavioral WDL    Cognitive/Neuro/Behavioral WDL  --  --  all;WDL except  WDL except;orientation;mood/behavior  --    Arousal Level  --  --  opens eyes spontaneously  opens eyes spontaneously  --    Orientation  --  --  disoriented to;time  disoriented to;time;situation  --    Speech  --  --  logical  clear  --    Mood/Behavior  --  --  cooperative;hypoactive (quiet, withdrawn)  cooperative;hypoactive (quiet, withdrawn)  --       Neuro    Additional Documentation  --  --  [place] Coma Scale (Group)  --  --       [place] Coma Scale    Best Eye Response  --  --  4-->(E4)  spontaneous  4-->(E4) spontaneous  --    Best Motor Response  --  --  6-->(M6) obeys commands  6-->(M6) obeys commands  --    Best Verbal Response  --  --  4-->(V4) confused  4-->(V4) confused  --    [place] Coma Scale Score  --  --  14  14  --       Motor Response    Motor Response  --  --  --  general motor response  --    General Motor Response  --  --  --  purposeful motor response  --       Behavioral    General Appearance WDL  --  --  WDL  WDL  --    General Appearance  --  --  unshaven  --  --       Behavior WDL    Behavior WDL  --  --  WDL except;motor movement  WDL except;motor movement  --    Interactions  --  --  cooperative  cooperative  --    Motor Movement  --  --  gait unsteady  gait unsteady  --       Emotion Mood WDL    Emotion/Mood/Affect WDL  --  --  WDL  WDL  --    Affect  --  --  affect consistent with mood  affect consistent with mood  --    Emotion/Mood  --  --  appropriate to situation  appropriate to situation  --    Patient rated anxiety level  --  --  0  3  --    Patient rated depression level  --  --  0  0  --       Speech WDL    Speech WDL  --  --  WDL except;speech  WDL except;speech  --    Speech  --  --  soft/quiet  soft/quiet  --       Perceptual State WDL    Perceptual State WDL  --  --  WDL  WDL  --    Hallucinations  --  --  denies hallucinations  denies hallucinations  --    Perceptual State  --  --  consistent with reality  consistent with reality  --       Thought Process WDL    Thought Process WDL  --  --  WDL except;judgment and insight  WDL except;judgment and insight  --    Delusions  --  --  no delusions  no delusions  --    Judgment and Insight  --  --  insight not appropriate to situation;judgment not appropriate to situation  insight not appropriate to situation;judgment not appropriate to situation  --    Thought Content  --  --  relevant  relevant  --    Thought Process  --  --  relevant  relevant  --       Intellectual Performance WDL    Intellectual Performance WDL   --  --  WDL except  WDL except  --    Intellectual Performance  --  --  impaired concentration;disoriented to time  impaired concentration;disoriented to time  --    Level of Consciousness  --  --  Alert  Alert  --       Respiratory    Respiratory WDL  --  --  WDL except Uses continious O2  WDL except uses oxygen  --    Rhythm/Pattern, Respiratory  --  --  shortness of breath noted with activity  shortness of breath noted with activity  --    Expansion/Accessory Muscles/Retractions  --  --  no use of accessory muscles  no use of accessory muscles  --       Breath Sounds    Breath Sounds  --  --  --  All Fields  --       Oxygen Therapy    Flow (L/min)  --  --  2  --  --    Device (Oxygen Therapy)  --  --  nasal cannula  --  --       Cardiac    Cardiac WDL  --  --  WDL Pt denies any complaints at this time  WDL Patient denies any issues at this time  --       Peripheral Neurovascular    Peripheral Neurovascular WDL  --  --  WDL except;edema  WDL except;edema  --    Capillary Refill, General  --  --  --  less than/equal to 3 secs  --       Edema    Edema  --  --  ankle, left  ankle, left  --    Ankle, Left Edema  --  --  1+ (Trace)  1+ (Trace)  --       Gastrointestinal    Gastrointestinal WDL  --  --  WDL  WDL  --       Genitourinary    Genitourinary WDL  --  --  WDL Pt denies any complaints at this time  WDL Patient denies any issues at this time  --       Skin    Skin WDL  --  --  WDL  WDL  --    Skin Temperature  --  --  warm  --  --    Skin Moisture  --  --  dry  --  --    Skin Elasticity  --  --  slow return to original state  --  --    Skin Integrity  --  --  intact  --  --       Dami Risk Assessment    Sensory Perception  --  --  3-->slightly limited  4-->no impairment  --    Moisture  --  --  4-->rarely moist  4-->rarely moist  --    Activity  --  --  3-->walks occasionally  3-->walks occasionally  --    Mobility  --  --  3-->slightly limited  3-->slightly limited  --    Nutrition  --  --  3-->adequate   3-->adequate  --    Friction and Shear  --  --  3-->no apparent problem  3-->no apparent problem  --    Dami Score  --  --  19  20  --       Musculoskeletal    Musculoskeletal WDL  --  --  WDL except;mobility  WDL except;mobility  --    General Mobility  --  --  generalized weakness;moderately impaired  generalized weakness;moderately impaired  --       Functional Screen (every 3 days/change)    Ambulation  --  --  1 - assistive equipment  1 - assistive equipment  --    Transferring  --  --  1 - assistive equipment  1 - assistive equipment  --    Toileting  --  --  1 - assistive equipment  1 - assistive equipment  --    Bathing  --  --  3 - assistive equipment and person  1 - assistive equipment  --    Dressing  --  --  0 - independent  0 - independent  --    Eating  --  --  0 - independent  0 - independent  --    Communication  --  --  0 - understands/communicates without difficulty  0 - understands/communicates without difficulty  --    Swallowing  --  --  0 - swallows foods/liquids without difficulty  0 - swallows foods/liquids without difficulty  --       Nutrition    Diet/Nutrition Received  --  regular  regular  regular  regular    Diet/Feeding Assistance  --  tray set-up  --  --  --    Diet/Feeding Tolerance  --  good  good  good  --    Intake (%)  --  100%  --  --  75%;Dinner    Supplement (mL)  --  --  --  --  -- REFUSED    Nutrition Interventions  --  food preferences provided  --  --  food preferences provided       Sepsis Screening Options    Which Sepsis Screen to be Used?  --  --  Adult Sepsis Screen  Adult Sepsis Screen  --       Adult Sepsis Screening Tool    Previous adult screen positive in last 48 hrs?  --  --  no  no  --    Are 2 or > of the above criteria present?  --  --  no: STOP/negative screen  no: STOP/negative screen  --       Safety    Observed Behavior  --  --  --  calm  --    Observed Behavior (Comment)  --  --  Sp3/Falls  sp3/falls  --    Safety Factors  --  --  --  ID band on  --     Safety Measures  --  --  environmental rounds completed;safety rounds completed  --  --       [place] Psychiatric Fall Risk Tool    Age  --  --  26-->80 and over  26-->80 and over  --    Mental Status  --  --  13-->Intermittently confused  13-->Intermittently confused  --    Elimination  --  --  8-->Independent with control of bowel/bladder  8-->Independent with control of bowel/bladder  --    Medications  --  --  10-->Cardiac medications;8-->Psychotropic medications  10-->Cardiac medications;8-->Psychotropic medications  --    Diagnosis  --  --  12-->Dementia/delirium  12-->Dementia/delirium  --    Ambulation/Balance  --  --  8-->Unsteady/requires assist and aware of abilities  8-->Unsteady/requires assist and aware of abilities  --    Nutrition  --  --  0-->No apparent abnormalities with appetite  0-->No apparent abnormalities with appetite  --    Sleep Disturbance  --  --  8-->No sleep disturbance  8-->No sleep disturbance  --    History of Falls  --  --  14-->History of falls in the past 3 months  14-->History of falls in the past 3 months  --    Fall Risk Score  --  --  107  107  --       C-SSRS (Frequent Screener)    Q2 Suicidal Thoughts (Since Last Contact)  --  --  no  no  --    Q6 Suicide Behavior  --  --  no  no  --       Violence Risk    Feels Like Hurting Others  --  --  no  no  --    Previous Attempt to Harm Others  --  --  no  no  --    Violence Threats in Past 6 Months  --  --  --  denies  --       Daily Care    Activity (Behavioral Health)  --  --  activity adjusted per tolerance  up ad mumtaz;activity adjusted per tolerance  --    Row Name 06/30/21 1200 06/30/21 0803 06/29/21 2746             Pain/Comfort/Sleep    Preferred Pain Scale  --  number (Numeric Rating Pain Scale)  number (Numeric Rating Pain Scale)      Pain Goal/Acceptable Level  --  --  0      (0-10) Pain Rating: Rest  --  --  0      (0-10) Pain Rating: Activity  --  --  0         Sleep/Rest    Sleep/Rest/Relaxation  --  no problem  identified  no problem identified      Sleep Hygiene Promotion  --  noise level reduced  awakenings minimized         Coping/Psychosocial    Verbalized Emotional State  --  acceptance  acceptance      Plan of Care Reviewed With  --  --  patient      Patient Agreement with Plan of Care  --  --  agrees      Trust Relationship/Rapport  --  thoughts/feelings acknowledged;reassurance provided;questions encouraged;questions answered;empathic listening provided;emotional support provided;choices provided;care explained  care explained         Coping/Psychosocial Interventions    Supportive Measures  --  positive reinforcement provided;self-care encouraged;verbalization of feelings encouraged;active listening utilized  active listening utilized         HEENT    HEENT WDL  --  WDL except;vision change;hearing change wears glasses  WDL except;vision change;hearing change wears glasses      Left Vision Change  --  other (see comments) poor vision with glasses  other (see comments) poor vision with glasses      Right Vision Change  --  other (see comments)  other (see comments)      Left Hearing Change  --  decreased  decreased      Right Hearing Change  --  decreased  decreased         Mouth/Teeth WDL    Mouth/Teeth WDL  --  WDL except;teeth  WDL except;teeth      Teeth Symptoms  --  tooth/teeth missing  tooth/teeth missing         Cognitive    Additional Documentation  --  --  Cognitive/Neuro/Behavioral WDL (Group)         Cognitive/Neuro/Behavioral WDL    Cognitive/Neuro/Behavioral WDL  --  WDL except;orientation;mood/behavior  WDL except;orientation;mood/behavior      Arousal Level  --  opens eyes spontaneously  opens eyes spontaneously      Orientation  --  disoriented to;time;situation  disoriented to;time;situation      Speech  --  clear  clear      Mood/Behavior  --  cooperative;hypoactive (quiet, withdrawn)  cooperative;hypoactive (quiet, withdrawn)         Pro Coma Scale    Best Eye Response  --  4-->(E4)  spontaneous  4-->(E4) spontaneous      Best Motor Response  --  6-->(M6) obeys commands  6-->(M6) obeys commands      Best Verbal Response  --  4-->(V4) confused  4-->(V4) confused      Pro Coma Scale Score  --  14  14         Motor Response    Motor Response  --  general motor response  general motor response      General Motor Response  --  purposeful motor response  purposeful motor response         Behavioral    General Appearance WDL  --  WDL  WDL      General Appearance  --  --  unshaven         Behavior WDL    Behavior WDL  --  WDL except;motor movement  WDL except;motor movement      Interactions  --  cooperative  cooperative      Motor Movement  --  gait unsteady  gait unsteady         Emotion Mood WDL    Emotion/Mood/Affect WDL  --  WDL  WDL      Affect  --  affect consistent with mood  affect consistent with mood      Emotion/Mood  --  appropriate to situation  appropriate to situation      Patient rated anxiety level  --  2  0      Patient rated depression level  --  0  0         Speech WDL    Speech WDL  --  WDL except;speech  WDL except;speech      Speech  --  soft/quiet  soft/quiet         Perceptual State WDL    Perceptual State WDL  --  WDL  WDL      Hallucinations  --  denies hallucinations  denies hallucinations      Perceptual State  --  consistent with reality  consistent with reality         Thought Process WDL    Thought Process WDL  --  WDL except;judgment and insight  WDL except;judgment and insight      Delusions  --  no delusions  no delusions      Judgment and Insight  --  insight not appropriate to situation;judgment not appropriate to situation  insight not appropriate to situation;judgment not appropriate to situation      Thought Content  --  relevant  relevant      Thought Process  --  relevant  relevant         Intellectual Performance WDL    Intellectual Performance WDL  --  WDL except  WDL except      Intellectual Performance  --  impaired concentration;disoriented to time   impaired concentration;disoriented to time      Level of Consciousness  --  Alert  Alert         Respiratory    Respiratory WDL  --  WDL except uses oxygen  WDL except uses oxygen      Rhythm/Pattern, Respiratory  --  shortness of breath noted with activity  shortness of breath noted with activity      Expansion/Accessory Muscles/Retractions  --  no use of accessory muscles  no use of accessory muscles         Oxygen Therapy    Device (Oxygen Therapy)  --  --  room air         Cardiac    Cardiac WDL  --  WDL Patient denies any issues at this time  WDL Patient denies any issues at this time         Peripheral Neurovascular    Peripheral Neurovascular WDL  --  WDL except;edema  WDL except;edema      Capillary Refill, General  --  less than/equal to 3 secs  --         Edema    Edema  --  ankle, left  ankle, left      Ankle, Left Edema  --  1+ (Trace)  1+ (Trace)         Gastrointestinal    Gastrointestinal WDL  --  WDL  WDL      Last Bowel Movement  --  06/29/21  --         Genitourinary    Genitourinary WDL  --  WDL Patient denies any issues at this time  WDL Patient denies any issues at this time         Skin    Skin WDL  --  WDL  WDL         Dami Risk Assessment    Sensory Perception  --  --  4-->no impairment      Moisture  --  --  4-->rarely moist      Activity  --  --  3-->walks occasionally      Mobility  --  --  3-->slightly limited      Nutrition  --  --  3-->adequate      Friction and Shear  --  --  3-->no apparent problem      Dami Score  --  --  20         Musculoskeletal    Musculoskeletal WDL  --  WDL except;mobility  WDL except;mobility      General Mobility  --  generalized weakness;moderately impaired  generalized weakness;moderately impaired         Functional Screen (every 3 days/change)    Ambulation  --  1 - assistive equipment  --      Transferring  --  1 - assistive equipment  --      Toileting  --  1 - assistive equipment  --      Bathing  --  1 - assistive equipment  --      Dressing  --  0  - independent  --      Eating  --  0 - independent  --      Communication  --  0 - understands/communicates without difficulty  --      Swallowing  --  0 - swallows foods/liquids without difficulty  --         Nutrition    Diet/Nutrition Received  --  regular  regular      Diet/Feeding Tolerance  --  good  --      Supplement (mL)  -- refused  --  --         Sepsis Screening Options    Which Sepsis Screen to be Used?  --  Adult Sepsis Screen  Adult Sepsis Screen         Adult Sepsis Screening Tool    Previous adult screen positive in last 48 hrs?  --  no  no      Are 2 or > of the above criteria present?  --  no: STOP/negative screen  no: STOP/negative screen         Safety    Observed Behavior  --  calm  calm      Observed Behavior (Comment)  --  --  SP3/Falls      Safety Factors  --  --  ID band on Safety Checks completed      Safety Measures  --  --  environmental rounds completed;safety rounds completed         [place] Psychiatric Fall Risk Tool    Age  --  26-->80 and over  26-->80 and over      Mental Status  --  13-->Intermittently confused  13-->Intermittently confused      Elimination  --  8-->Independent with control of bowel/bladder  8-->Independent with control of bowel/bladder      Medications  --  10-->Cardiac medications;8-->Psychotropic medications  10-->Cardiac medications;8-->Psychotropic medications      Diagnosis  --  12-->Dementia/delirium  12-->Dementia/delirium      Ambulation/Balance  --  8-->Unsteady/requires assist and aware of abilities  8-->Unsteady/requires assist and aware of abilities      Nutrition  --  0-->No apparent abnormalities with appetite  0-->No apparent abnormalities with appetite      Sleep Disturbance  --  8-->No sleep disturbance  8-->No sleep disturbance      History of Falls  --  14-->History of falls in the past 3 months  14-->History of falls in the past 3 months      Fall Risk Score  --  107  107         C-SSRS (Frequent Screener)    Q2 Suicidal Thoughts (Since Last  Contact)  --  no  no      Q6 Suicide Behavior  --  no  no         Violence Risk    Feels Like Hurting Others  --  no  no      Previous Attempt to Harm Others  --  --  no      Violence Threats in Past 6 Months  --  --  Denies         Daily Care    Activity (Behavioral Health)  --  up ad mumtaz;activity adjusted per tolerance  activity adjusted per tolerance         Daily Care Interventions    Self-Care Promotion  --  independence encouraged;safe use of adaptive equipment encouraged  --

## 2021-07-02 NOTE — PLAN OF CARE
"Goal Outcome Evaluation:  Plan of Care Reviewed With: patient  Patient Agreement with Plan of Care: agrees     Progress: improving  Outcome Summary: patient has been up in dining area for meals using walker.  patient upset today because talked with wife on phone and he told her \"I'm not going to no Nursing Home\", staff could hear her yelling at him that he was going to nursing home.  "

## 2021-07-02 NOTE — PROGRESS NOTES
"      Inpatient Psych Progress Note     Clinician: Darren Hough MD  Admission Date: 6/18/2021  08:45 EDT 07/02/21    Behavioral Health Treatment Plan and Problem List: I have reviewed and approved the Behavioral Health Treatment Plan and Problem list.    Allergies  No Known Allergies    Hospital Day: 14 days      Assessment completed within view of staff    History  CC/clinical focus: psychosis / dementia    Interval HPI: Patient seen and evaluated by me.  Chart reviewed. Patient reports he is doing okay today other than a little stiffness in his knees. Nursing staff is working on NH placement. Patient continues to ask about going home to his wife and denies there were problems when he was there. He rates his anxiety as a 3/10 and denies feelings of depression. He denies having any side effects to his medications.  Med Compliant.  ROS otherwise as below.      Interval Review of Systems:   General ROS: negative for - fever or malaise  Endocrine ROS: negative for - palpitations  Respiratory ROS: no cough, shortness of breath, or wheezing  Cardiovascular ROS: no chest pain or dyspnea on exertion  Gastrointestinal ROS: no abdominal pain,no black or bloody stools    /78 (BP Location: Right arm, Patient Position: Sitting)   Pulse 69   Temp 97.3 °F (36.3 °C) (Temporal)   Resp 18   Ht 175.3 cm (69\")   Wt 68 kg (150 lb)   SpO2 96%   BMI 22.15 kg/m²     Mental Status Exam  Mood: dysthymic  Affect: mood-congruent   Thought Processes: goal directed  Thought Content: negativistic  Hallucinations: no  Suicidal Thoughts: denies  Suicidal Plan/Intent: denies  Hopelesness:Mild  Homicidal Thoughts:  denies      Medical Decision Making:   Labs:     Lab Results (last 24 hours)     ** No results found for the last 24 hours. **            Radiology:     Imaging Results (Last 24 Hours)     ** No results found for the last 24 hours. **            EKG:     ECG/EMG Results (most recent)     Procedure Component Value Units " Date/Time    ECG 12 Lead [[id number]] Collected: 06/23/21 1023     Updated: 06/24/21 1633     QT Interval 394 ms      QTC Interval 409 ms     Narrative:      Test Reason : repeat abnormal  Blood Pressure :   */*   mmHG  Vent. Rate :  65 BPM     Atrial Rate : 300 BPM     P-R Int :   * ms          QRS Dur : 100 ms      QT Int : 394 ms       P-R-T Axes :   *  51 265 degrees     QTc Int : 409 ms    Atrial fibrillation  ST & T wave abnormality, consider inferior ischemia  ST & T wave abnormality, consider anterolateral ischemia  Abnormal ECG  When compared with ECG of 18-JUN-2021 06:24,  Vent. rate has decreased BY  80 BPM  Non-specific change in ST segment in Anterior leads  T wave inversion now evident in Inferior leads  T wave inversion now evident in Anterolateral leads  Confirmed by Rian Parada ([id number]) on 6/24/2021 4:33:41 PM    Referred By:            Confirmed By: Rian Parada           Medications:  apixaban, 5 mg, Oral, Q12H  aspirin, 81 mg, Oral, Daily  atorvastatin, 10 mg, Oral, Nightly  donepezil, 5 mg, Oral, Nightly  lisinopril, 10 mg, Oral, Daily  metoprolol succinate XL, 50 mg, Oral, Daily  multivitamin, 1 tablet, Oral, Daily  QUEtiapine, 50 mg, Oral, BID           All medications reviewed.      Assessment and Plan:   Psychosis (CMS/HCC)  - Continue Seroquel 50 mg twice daily started on 6/19/21.      Dementia (CMS/HCC)  -The patient has a history of previous diagnosis of dementia with behavioral disturbances.  This could be playing into his current psychosis.  We will treat symptomatically.  -Continue Aricept 5 mg at bedtime started on 6/19/2021  -Per initial family report, patient unable to attend to ADLs, with persistent confusion.  Unsafe for patient to return home, so we are making referrals to nursing homes.      Persistent atrial fibrillation (CMS/HCC)  -Eliquis 5 mg twice a day.       Dyslipidemia  -Lipitor       Essential hypertension  -Recently increased lisinopril to 10 mg   - Recommend  checking BMP around 7/4  , due to the increase in lisinopril and ckd3.  -Metoprolol XL 50 mg daily       Chronic obstructive pulmonary disease (CMS/HCC)  -Oxygen therapy       Chronic systolic heart failure (CMS/HCC)  -Lasix as needed        Continue hospitalization for safety and stabilization.  Continue current level of Special Precautions (q15 minute checks).      This note was generated by a scribe, Maisha Perrin.  The work documented in this note was completed, reviewed, and approved by the attending psychiatrist as designated Dr. Darren Hough electronic signature.     I, Darren Hough MD, personally performed the services described in this documentation as scribed by the above named individual and is both accurate and complete.

## 2021-07-02 NOTE — PLAN OF CARE
Goal Outcome Evaluation:  Plan of Care Reviewed With: patient  Patient Agreement with Plan of Care: agrees  Consent Given to Review Plan with: spouseKary  Progress: improving  Outcome Summary: Therapist met with Patient to discuss treatment progress; Patient agreeable.      Problem: Adult Behavioral Health Plan of Care  Goal: Plan of Care Review  Outcome: Ongoing, Progressing  Flowsheets  Taken 7/2/2021 1553  Progress: improving  Plan of Care Reviewed With: patient  Patient Agreement with Plan of Care: agrees  Outcome Summary:  • Therapist met with Patient to discuss treatment progress  • Patient agreeable.  Taken 6/19/2021 1026  Consent Given to Review Plan with: spouseKary  Goal: Optimized Coping Skills in Response to Life Stressors  Outcome: Ongoing, Progressing  Intervention: Promote Effective Coping Strategies  Flowsheets (Taken 7/2/2021 1553)  Supportive Measures:  • active listening utilized  • verbalization of feelings encouraged  • counseling provided  • goal setting facilitated  Goal: Develops/Participates in Therapeutic [place] to Support Successful Transition  Outcome: Ongoing, Progressing  Intervention: Foster Therapeutic [place]  Flowsheets (Taken 7/2/2021 1553)  Trust Relationship/Rapport:  • care explained  • questions encouraged  • choices provided  • reassurance provided  • thoughts/feelings acknowledged  • emotional support provided  • empathic listening provided  • questions answered     DATA: Therapist met with Patient individually this date. Patient agreeable to discuss current treatment progress and discharge concerns.     Unable to reach Patient's spouse today.     [email or web address] is submitting Patient's information to insurance.     [facility] is agreeable to consider if assault charge is dropped.     Several other pending referrals.     CLINICAL MANUVERING/INTERVENTIONS:  Assisted Patient in processing session content; acknowledged and normalized Patient’s thoughts, feelings, and  concerns by utilizing a person-centered approach in efforts to build appropriate rapport and a positive therapeutic relationship with open and honest communication. Allowed Patient to ventilate regarding current stressors and triggers for negative emotions and thoughts in a safe nonjudgmental environment with unconditional positive regard, active listening skills, and empathy.     ASSESSMENT: Patient was seen 1-1 for a follow up today. Patient continues to report feeling well. Denies anxiety, depression, and AVH. Patient states that he is content with the care that he is receiving here.     Therapist Rosanna Espinoza reported that Patient made statements about not wanting to go to the nursing home today. Patient became upset and somewhat agitated after learning that his wife was requesting nursing home placement. He was observed yelling at his wife over the phone about this. This therapist will consult with Patient's primary psychiatrist on Monday regarding whether or not he feels that Patient has competency to make his own decisions.     PLAN:   Patient will continue stabilization. Patient will continue to receive services offered by Treatment Team.

## 2021-07-02 NOTE — DISCHARGE PLACEMENT REQUEST
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 WDL    Speech WDL  --  --  --  WDL except;speech  WDL except;speech    Speech  --  --  --  soft/quiet  soft/quiet       Perceptual State WDL    Perceptual State WDL  --  --  --  WDL  WDL    Hallucinations  --  --  --  denies hallucinations  denies hallucinations    Perceptual State  --  --  --  consistent with reality  consistent with reality       Thought Process WDL    Thought Process WDL  --  --  --  WDL except;judgment and insight  WDL except;judgment and insight    Delusions  --  --  --  no delusions  no delusions    Judgment and Insight  --  --  --  insight not appropriate to situation;judgment not appropriate to situation  insight not appropriate to situation;judgment not appropriate to situation    Thought Content  --  --  --  relevant  relevant    Thought Process  --  --  --  relevant  relevant       Intellectual Performance WDL    Intellectual Performance WDL  --  --  --  WDL except  WDL except    Intellectual Performance  --  --  --  impaired concentration;disoriented to time  impaired  concentration;disoriented to time    Level of Consciousness  --  --  --  Alert  Alert       Respiratory    Respiratory WDL  --  --  --  WDL except Uses continious O2  WDL except Uses continious O2    Rhythm/Pattern, Respiratory  --  --  --  shortness of breath noted with activity  shortness of breath noted with activity    Expansion/Accessory Muscles/Retractions  --  --  --  no use of accessory muscles  no use of accessory muscles    Nailbeds  --  --  --  no discoloration  --    Mucous Membranes  --  --  --  pink;intact;moist  --    Cough Frequency  --  --  --  infrequent  --    Cough Type  --  --  --  loose;productive  --       Breath Sounds    Breath Sounds  --  --  --  All Fields  --    All Lung Fields Breath Sounds  --  --  --  diminished  --       Oxygen Therapy    Flow (L/min)  --  --  --  --  2    Device (Oxygen Therapy)  --  --  --  room air  --       Cardiac    Cardiac WDL  --  --  --  WDL Pt denies any complaints at this time  WDL Pt denies any complaints at this time       Cardiac Interventions    Syncope Management  --  --  --  position changed slowly  --       Peripheral Neurovascular    Peripheral Neurovascular WDL  --  --  --  WDL except;edema  WDL except;edema    Capillary Refill, General  --  --  --  less than/equal to 3 secs  --       Pulse Radial    Left Radial Pulse  --  --  --  2+ (normal)  --    Right Radial Pulse  --  --  --  2+ (normal)  --       Pulse Dorsalis Pedis    Left Dorsalis Pedis Pulse  --  --  --  2+ (normal)  --    Right Dorsalis Pedis Pulse  --  --  --  2+ (normal)  --       Edema    Edema  --  --  --  ankle, left  --    Ankle, Left Edema  --  --  --  1+ (Trace)  --       Gastrointestinal    Gastrointestinal WDL  --  --  --  WDL  WDL       Genitourinary    Genitourinary WDL  --  --  --  WDL Pt denies any complaints at this time  WDL Pt denies any complaints at this time       Skin    Skin WDL  --  --  --  WDL  WDL    Skin Temperature  --  --  --  warm  warm    Skin Moisture  --   --  --  dry  dry    Skin Elasticity  --  --  --  slow return to original state  slow return to original state    Skin Integrity  --  --  --  intact  intact       Dami Risk Assessment    Sensory Perception  --  --  --  3-->slightly limited  3-->slightly limited    Moisture  --  --  --  4-->rarely moist  4-->rarely moist    Activity  --  --  --  3-->walks occasionally  3-->walks occasionally    Mobility  --  --  --  3-->slightly limited  3-->slightly limited    Nutrition  --  --  --  3-->adequate  3-->adequate    Friction and Shear  --  --  --  3-->no apparent problem  3-->no apparent problem    Dami Score  --  --  --  19  19       Musculoskeletal    Musculoskeletal WDL  --  --  --  WDL except;mobility  WDL except;mobility    General Mobility  --  --  --  generalized weakness;moderately impaired  generalized weakness;moderately impaired       Functional Screen (every 3 days/change)    Ambulation  --  --  --  1 - assistive equipment  1 - assistive equipment    Transferring  --  --  --  1 - assistive equipment  1 - assistive equipment    Toileting  --  --  --  1 - assistive equipment  1 - assistive equipment    Bathing  --  --  --  3 - assistive equipment and person  3 - assistive equipment and person    Dressing  --  --  --  0 - independent  0 - independent    Eating  --  --  --  0 - independent  0 - independent    Communication  --  --  --  0 - understands/communicates without difficulty  0 - understands/communicates without difficulty    Swallowing  --  --  --  0 - swallows foods/liquids without difficulty  0 - swallows foods/liquids without difficulty       Nutrition    Diet/Nutrition Received  --  --  --  regular  regular    Diet/Feeding Assistance  --  --  --  tray set-up  --    Diet/Feeding Tolerance  --  --  --  good  good       Nutrition Interventions    Glycemic Management  --  --  --  oral hydration promoted  --    Oral Nutrition Promotion  --  --  --  rest periods promoted  --       Sepsis Screening  Options    Which Sepsis Screen to be Used?  --  --  --  Adult Sepsis Screen  Adult Sepsis Screen       Adult Sepsis Screening Tool    Previous adult screen positive in last 48 hrs?  --  --  --  no  no    Are 2 or > of the above criteria present?  --  --  --  no: STOP/negative screen  no: STOP/negative screen       Safety    Patient Location  --  --  --  --  --    Observed Behavior  --  --  --  --  --    Observed Behavior (Comment)  --  --  --  sp3/falls  sp3/falls    Safety Factors  ID band on  ID band on  ID band on  --  ID band on    Safety Measures  --  --  --  safety plan reviewed  --    Diversional Activity  --  --  --  television  --       [facility] Psychiatric Fall Risk Tool    Age  --  --  --  26-->80 and over  26-->80 and over    Mental Status  --  --  --  13-->Intermittently confused  13-->Intermittently confused    Elimination  --  --  --  8-->Independent with control of bowel/bladder  8-->Independent with control of bowel/bladder    Medications  --  --  --  10-->Cardiac medications;8-->Psychotropic medications  10-->Cardiac medications;8-->Psychotropic medications    Diagnosis  --  --  --  12-->Dementia/delirium  12-->Dementia/delirium    Ambulation/Balance  --  --  --  8-->Unsteady/requires assist and aware of abilities  8-->Unsteady/requires assist and aware of abilities    Nutrition  --  --  --  0-->No apparent abnormalities with appetite  0-->No apparent abnormalities with appetite    Sleep Disturbance  --  --  --  8-->No sleep disturbance  8-->No sleep disturbance    History of Falls  --  --  --  14-->History of falls in the past 3 months  14-->History of falls in the past 3 months    Fall Risk Score  --  --  --  107  107       C-SSRS (Recent)    Q1 Wished to be Dead (Past Month)  --  --  --  --  --    Q2 Suicidal Thoughts (Past Month)  --  --  --  --  --    Q6 Suicide Behavior (Lifetime)  --  --  --  --  --    Level of Risk per Screen  --  --  --  --  --       C-SSRS (Frequent Screener)    Q2  Suicidal Thoughts (Since Last Contact)  --  --  --  no  no    Q6 Suicide Behavior  --  --  --  no  no       Violence Risk    Feels Like Hurting Others  --  --  --  no  no    Previous Attempt to Harm Others  --  --  --  no  no    Violence Threats in Past 6 Months  --  --  --  denies  denies       Daily Care    Activity (Behavioral Health)  --  --  --  activity adjusted per tolerance  activity adjusted per tolerance       Positioning    Head of Bed (HOB)  --  --  --  HOB at 30 degrees  --       Hygiene Care Assistance    Hygiene Assistance  --  --  --  per care provider x 1  --       Daily Care Interventions    Self-Care Promotion  --  --  --  independence encouraged  --    Row Name 07/01/21 0823 07/01/21 0815 07/01/21 0720 06/30/21 1910 06/30/21 1700       Pain/Comfort/Sleep    Preferred Pain Scale  number (Numeric Rating Pain Scale)  --  number (Numeric Rating Pain Scale)  number (Numeric Rating Pain Scale)  --    Pain Goal/Acceptable Level  0  --  --  --  --    (0-10) Pain Rating: Rest  0  --  --  --  --    (0-10) Pain Rating: Activity  0  --  --  --  --       Sleep/Rest    Sleep/Rest/Relaxation  --  --  no problem identified  no problem identified  --    Sleep Hygiene Promotion  --  --  --  awakenings minimized;regular sleep pattern promoted  --       Coping/Psychosocial    Verbalized Emotional State  --  --  acceptance  acceptance  --    Plan of Care Reviewed With  --  --  patient  patient  --    Patient Agreement with Plan of Care  --  --  agrees  agrees  --    Trust Relationship/Rapport  --  --  care explained;choices provided;emotional support provided;empathic listening provided;questions answered;questions encouraged;reassurance provided;thoughts/feelings acknowledged  care explained;thoughts/feelings acknowledged  --       Coping/Psychosocial Interventions    Supportive Measures  --  --  active listening utilized;verbalization of feelings encouraged  active listening utilized;verbalization of feelings  encouraged  --       HEENT    HEENT WDL  --  --  WDL except;vision change;hearing change wears glasses  WDL except;vision change;hearing change wears glasses  --    Left Vision Change  --  --  other (see comments) poor vision with glasses  other (see comments) poor vision with glasses  --    Right Vision Change  --  --  other (see comments)  other (see comments)  --    Left Hearing Change  --  --  decreased  decreased  --    Right Hearing Change  --  --  decreased  decreased  --       Mouth/Teeth WDL    Mouth/Teeth WDL  --  --  WDL except;teeth  WDL except;teeth  --    Teeth Symptoms  --  --  tooth/teeth missing  tooth/teeth missing  --       Cognitive    Additional Documentation  --  --  --  Cognitive/Neuro/Behavioral WDL (Group)  --       Cognitive/Neuro/Behavioral WDL    Cognitive/Neuro/Behavioral WDL  --  --  all;WDL except  WDL except;orientation;mood/behavior  --    Arousal Level  --  --  opens eyes spontaneously  opens eyes spontaneously  --    Orientation  --  --  disoriented to;time  disoriented to;time;situation  --    Speech  --  --  logical  clear  --    Mood/Behavior  --  --  cooperative;hypoactive (quiet, withdrawn)  cooperative;hypoactive (quiet, withdrawn)  --       Neuro    Additional Documentation  --  --  Lockwood Coma Scale (Group)  --  --       Pro Coma Scale    Best Eye Response  --  --  4-->(E4) spontaneous  4-->(E4) spontaneous  --    Best Motor Response  --  --  6-->(M6) obeys commands  6-->(M6) obeys commands  --    Best Verbal Response  --  --  4-->(V4) confused  4-->(V4) confused  --    Lockwood Coma Scale Score  --  --  14  14  --       Motor Response    Motor Response  --  --  --  general motor response  --    General Motor Response  --  --  --  purposeful motor response  --       Behavioral    General Appearance WDL  --  --  WDL  WDL  --    General Appearance  --  --  unshaven  --  --       Behavior WDL    Behavior WDL  --  --  WDL except;motor movement  WDL except;motor movement  --     Interactions  --  --  cooperative  cooperative  --    Motor Movement  --  --  gait unsteady  gait unsteady  --       Emotion Mood WDL    Emotion/Mood/Affect WDL  --  --  WDL  WDL  --    Affect  --  --  affect consistent with mood  affect consistent with mood  --    Emotion/Mood  --  --  appropriate to situation  appropriate to situation  --    Patient rated anxiety level  --  --  0  3  --    Patient rated depression level  --  --  0  0  --       Speech WDL    Speech WDL  --  --  WDL except;speech  WDL except;speech  --    Speech  --  --  soft/quiet  soft/quiet  --       Perceptual State WDL    Perceptual State WDL  --  --  WDL  WDL  --    Hallucinations  --  --  denies hallucinations  denies hallucinations  --    Perceptual State  --  --  consistent with reality  consistent with reality  --       Thought Process WDL    Thought Process WDL  --  --  WDL except;judgment and insight  WDL except;judgment and insight  --    Delusions  --  --  no delusions  no delusions  --    Judgment and Insight  --  --  insight not appropriate to situation;judgment not appropriate to situation  insight not appropriate to situation;judgment not appropriate to situation  --    Thought Content  --  --  relevant  relevant  --    Thought Process  --  --  relevant  relevant  --       Intellectual Performance WDL    Intellectual Performance WDL  --  --  WDL except  WDL except  --    Intellectual Performance  --  --  impaired concentration;disoriented to time  impaired concentration;disoriented to time  --    Level of Consciousness  --  --  Alert  Alert  --       Respiratory    Respiratory WDL  --  --  WDL except Uses continious O2  WDL except uses oxygen  --    Rhythm/Pattern, Respiratory  --  --  shortness of breath noted with activity  shortness of breath noted with activity  --    Expansion/Accessory Muscles/Retractions  --  --  no use of accessory muscles  no use of accessory muscles  --       Breath Sounds    Breath Sounds  --  --  --   All Fields  --       Oxygen Therapy    Flow (L/min)  --  --  2  --  --    Device (Oxygen Therapy)  --  --  nasal cannula  --  --       Cardiac    Cardiac WDL  --  --  WDL Pt denies any complaints at this time  WDL Patient denies any issues at this time  --       Peripheral Neurovascular    Peripheral Neurovascular WDL  --  --  WDL except;edema  WDL except;edema  --    Capillary Refill, General  --  --  --  less than/equal to 3 secs  --       Edema    Edema  --  --  ankle, left  ankle, left  --    Ankle, Left Edema  --  --  1+ (Trace)  1+ (Trace)  --       Gastrointestinal    Gastrointestinal WDL  --  --  WDL  WDL  --       Genitourinary    Genitourinary WDL  --  --  WDL Pt denies any complaints at this time  WDL Patient denies any issues at this time  --       Skin    Skin WDL  --  --  WDL  WDL  --    Skin Temperature  --  --  warm  --  --    Skin Moisture  --  --  dry  --  --    Skin Elasticity  --  --  slow return to original state  --  --    Skin Integrity  --  --  intact  --  --       Admi Risk Assessment    Sensory Perception  --  --  3-->slightly limited  4-->no impairment  --    Moisture  --  --  4-->rarely moist  4-->rarely moist  --    Activity  --  --  3-->walks occasionally  3-->walks occasionally  --    Mobility  --  --  3-->slightly limited  3-->slightly limited  --    Nutrition  --  --  3-->adequate  3-->adequate  --    Friction and Shear  --  --  3-->no apparent problem  3-->no apparent problem  --    Dami Score  --  --  19  20  --       Musculoskeletal    Musculoskeletal WDL  --  --  WDL except;mobility  WDL except;mobility  --    General Mobility  --  --  generalized weakness;moderately impaired  generalized weakness;moderately impaired  --       Functional Screen (every 3 days/change)    Ambulation  --  --  1 - assistive equipment  1 - assistive equipment  --    Transferring  --  --  1 - assistive equipment  1 - assistive equipment  --    Toileting  --  --  1 - assistive equipment  1 -  assistive equipment  --    Bathing  --  --  3 - assistive equipment and person  1 - assistive equipment  --    Dressing  --  --  0 - independent  0 - independent  --    Eating  --  --  0 - independent  0 - independent  --    Communication  --  --  0 - understands/communicates without difficulty  0 - understands/communicates without difficulty  --    Swallowing  --  --  0 - swallows foods/liquids without difficulty  0 - swallows foods/liquids without difficulty  --       Nutrition    Diet/Nutrition Received  --  regular  regular  regular  regular    Diet/Feeding Assistance  --  tray set-up  --  --  --    Diet/Feeding Tolerance  --  good  good  good  --    Intake (%)  --  100%  --  --  75%;Dinner    Supplement (mL)  --  --  --  --  -- REFUSED    Nutrition Interventions  --  food preferences provided  --  --  food preferences provided       Sepsis Screening Options    Which Sepsis Screen to be Used?  --  --  Adult Sepsis Screen  Adult Sepsis Screen  --       Adult Sepsis Screening Tool    Previous adult screen positive in last 48 hrs?  --  --  no  no  --    Are 2 or > of the above criteria present?  --  --  no: STOP/negative screen  no: STOP/negative screen  --       Safety    Observed Behavior  --  --  --  calm  --    Observed Behavior (Comment)  --  --  Sp3/Falls  sp3/falls  --    Safety Factors  --  --  --  ID band on  --    Safety Measures  --  --  environmental rounds completed;safety rounds completed  --  --       [facility] Psychiatric Fall Risk Tool    Age  --  --  26-->80 and over  26-->80 and over  --    Mental Status  --  --  13-->Intermittently confused  13-->Intermittently confused  --    Elimination  --  --  8-->Independent with control of bowel/bladder  8-->Independent with control of bowel/bladder  --    Medications  --  --  10-->Cardiac medications;8-->Psychotropic medications  10-->Cardiac medications;8-->Psychotropic medications  --    Diagnosis  --  --  12-->Dementia/delirium  12-->Dementia/delirium   --    Ambulation/Balance  --  --  8-->Unsteady/requires assist and aware of abilities  8-->Unsteady/requires assist and aware of abilities  --    Nutrition  --  --  0-->No apparent abnormalities with appetite  0-->No apparent abnormalities with appetite  --    Sleep Disturbance  --  --  8-->No sleep disturbance  8-->No sleep disturbance  --    History of Falls  --  --  14-->History of falls in the past 3 months  14-->History of falls in the past 3 months  --    Fall Risk Score  --  --  107  107  --       C-SSRS (Frequent Screener)    Q2 Suicidal Thoughts (Since Last Contact)  --  --  no  no  --    Q6 Suicide Behavior  --  --  no  no  --       Violence Risk    Feels Like Hurting Others  --  --  no  no  --    Previous Attempt to Harm Others  --  --  no  no  --    Violence Threats in Past 6 Months  --  --  --  denies  --       Daily Care    Activity (Behavioral Health)  --  --  activity adjusted per tolerance  up ad mumtaz;activity adjusted per tolerance  --

## 2021-07-03 PROCEDURE — 99232 SBSQ HOSP IP/OBS MODERATE 35: CPT | Performed by: PSYCHIATRY & NEUROLOGY

## 2021-07-03 RX ADMIN — LISINOPRIL 10 MG: 10 TABLET ORAL at 08:11

## 2021-07-03 RX ADMIN — ACETAMINOPHEN 650 MG: 325 TABLET ORAL at 08:11

## 2021-07-03 RX ADMIN — QUETIAPINE FUMARATE 50 MG: 100 TABLET ORAL at 20:36

## 2021-07-03 RX ADMIN — QUETIAPINE FUMARATE 50 MG: 100 TABLET ORAL at 08:11

## 2021-07-03 RX ADMIN — ATORVASTATIN CALCIUM 10 MG: 10 TABLET, FILM COATED ORAL at 20:36

## 2021-07-03 RX ADMIN — APIXABAN 5 MG: 5 TABLET, FILM COATED ORAL at 20:36

## 2021-07-03 RX ADMIN — ASPIRIN 81 MG: 81 TABLET, COATED ORAL at 08:11

## 2021-07-03 RX ADMIN — Medication 1 TABLET: at 08:11

## 2021-07-03 RX ADMIN — APIXABAN 5 MG: 5 TABLET, FILM COATED ORAL at 08:11

## 2021-07-03 RX ADMIN — DONEPEZIL HYDROCHLORIDE 5 MG: 5 TABLET, FILM COATED ORAL at 20:36

## 2021-07-03 RX ADMIN — METOPROLOL SUCCINATE 50 MG: 50 TABLET, EXTENDED RELEASE ORAL at 08:11

## 2021-07-03 NOTE — PLAN OF CARE
Goal Outcome Evaluation:  Plan of Care Reviewed With: patient  Patient Agreement with Plan of Care: agrees     Progress: improving  Outcome Summary: Patient is doing well this shift and interacting with staff and other patients. Patient reports good appetite and sleep with no issues. Patient denies anxiety or depression or thoughts to harm self. No acute distress noted, will continue to monitor.   Patient to GI lab

## 2021-07-03 NOTE — PLAN OF CARE
Goal Outcome Evaluation:  Plan of Care Reviewed With: patient  Patient Agreement with Plan of Care: agrees        Outcome Summary: Deneis suicidal thoughts, deneis anxiety and depression, reports that he is just worried about his wife because she is at home alone. no falls, hypertension is managed on unit during hospital stay.

## 2021-07-03 NOTE — NURSING NOTE
Patient educated to practice social distancing when out of his room and also encouraged to wear a mask when around other patients or staff. Patient needs reinforced several times daily.

## 2021-07-03 NOTE — PROGRESS NOTES
"INPATIENT PSYCHIATRIC PROGRESS NOTE    Name:  Felice Aiken  :  1935  MRN:  [id number]  Visit Number:  [id number]  Length of stay:  15    SUBJECTIVE  CC/Focus of Exam: psychosis    INTERVAL HISTORY:  The patient reports he is experiencing headaches, and he has had these for a long time. He is confused and has no insight into his actions and consequences. Otherwise pleasant and redirectable. Waiting on placement.  Depression rating 0/10  Anxiety rating 3/10  Sleep: fair  Withdrawal sx:denies  Cravin/10    Review of Systems   Constitutional: Positive for fatigue.   Cardiovascular: Negative.    Gastrointestinal: Negative.    Neurological: Positive for weakness.       OBJECTIVE    Temp:  [97.6 °F (36.4 °C)-97.9 °F (36.6 °C)] 97.6 °F (36.4 °C)  Heart Rate:  [60-75] 62  Resp:  [14] 14  BP: ()/(52-85) 100/55    MENTAL STATUS EXAM:  Appearance:Casually dressed, good hygeine.   Cooperation:Cooperative  Psychomotor: Psychomotor retardation, No EPS, No motor tics  Speech-minimal.  Mood \"okay\"   Affect-congruent, appropriate, stable  Thought Content-difficult to assess  Thought process-disorganized.  Suicidality: No SI  Homicidality: No HI  Perception: No AH/VH  Insight-poor   Judgement-poor    Lab Results (last 24 hours)     ** No results found for the last 24 hours. **             Imaging Results (Last 24 Hours)     ** No results found for the last 24 hours. **             ECG/EMG Results (most recent)     Procedure Component Value Units Date/Time    ECG 12 Lead [[id number]] Collected: 21 1023     Updated: 21 1633     QT Interval 394 ms      QTC Interval 409 ms     Narrative:      Test Reason : repeat abnormal  Blood Pressure :   */*   mmHG  Vent. Rate :  65 BPM     Atrial Rate : 300 BPM     P-R Int :   * ms          QRS Dur : 100 ms      QT Int : 394 ms       P-R-T Axes :   *  51 265 degrees     QTc Int : 409 ms    Atrial fibrillation  ST & T wave abnormality, consider inferior ischemia  ST & T " wave abnormality, consider anterolateral ischemia  Abnormal ECG  When compared with ECG of 18-JUN-2021 06:24,  Vent. rate has decreased BY  80 BPM  Non-specific change in ST segment in Anterior leads  T wave inversion now evident in Inferior leads  T wave inversion now evident in Anterolateral leads  Confirmed by Rian Parada (2019) on 6/24/2021 4:33:41 PM    Referred By:            Confirmed By: Rian Parada           ALLERGIES: Patient has no known allergies.      Current Facility-Administered Medications:   •  acetaminophen (TYLENOL) tablet 650 mg, 650 mg, Oral, Q6H PRN, Darwin Cotter MD, 650 mg at 07/03/21 0811  •  aluminum-magnesium hydroxide-simethicone (MAALOX MAX) 400-400-40 MG/5ML suspension 15 mL, 15 mL, Oral, Q6H PRN, Darwin Cotter MD  •  apixaban (ELIQUIS) tablet 5 mg, 5 mg, Oral, Q12H, Darwin Cotter MD, 5 mg at 07/03/21 0811  •  aspirin EC tablet 81 mg, 81 mg, Oral, Daily, Darwin Cotter MD, 81 mg at 07/03/21 0811  •  atorvastatin (LIPITOR) tablet 10 mg, 10 mg, Oral, Nightly, Darwin Cotter MD, 10 mg at 07/02/21 2020  •  benzonatate (TESSALON) capsule 100 mg, 100 mg, Oral, TID PRN, Darwin Cotter MD  •  bisacodyl (DULCOLAX) EC tablet 5 mg, 5 mg, Oral, Daily PRN, Darwin Cotter MD  •  donepezil (ARICEPT) tablet 5 mg, 5 mg, Oral, Nightly, Darwin Cotter MD, 5 mg at 07/02/21 2020  •  furosemide (LASIX) tablet 20 mg, 20 mg, Oral, Daily PRN, Darwin Cotter MD  •  lisinopril (PRINIVIL,ZESTRIL) tablet 10 mg, 10 mg, Oral, Daily, Darren Hough MD, 10 mg at 07/03/21 0811  •  loperamide (IMODIUM) capsule 2 mg, 2 mg, Oral, Q2H PRN, Darwin Cotter MD  •  magnesium hydroxide (MILK OF MAGNESIA) suspension 2400 mg/10mL 10 mL, 10 mL, Oral, Daily PRN, Darwin Cotter MD  •  melatonin tablet 3 mg, 3 mg, Oral, Nightly PRN, Darwin Cotter MD, 3 mg at 06/30/21 2012  •  metoprolol succinate XL (TOPROL-XL) 24 hr tablet 50 mg, 50 mg, Oral, Daily, Darwin Cotter MD, 50 mg at 07/03/21 0811  •  multivitamin (THERAGRAN)  tablet 1 tablet, 1 tablet, Oral, Daily, Darwin Cotter MD, 1 tablet at 07/03/21 0811  •  ondansetron (ZOFRAN) tablet 4 mg, 4 mg, Oral, Q6H PRN, Darwin Cotter MD  •  QUEtiapine (SEROquel) tablet 50 mg, 50 mg, Oral, BID, Darwin Cotter MD, 50 mg at 07/03/21 0811  •  sodium chloride nasal spray 2 spray, 2 spray, Each Nare, PRN, Darwin Cotter MD    ASSESSMENT & PLAN:      Psychosis (CMS/HCC)  -Continue Seroquel to 50 mg twice daily started on 6/19/21.       Dementia (CMS/[facility])  -The patient has a history of previous diagnosis of dementia with behavioral disturbances.  This could be playing into his current psychosis.  We will treat symptomatically.  -Conitnue Aricept 5 mg at bedtime.  -Referrals made for NH placement.       Persistent atrial fibrillation (CMS/HCC)  -Eliquis 5 mg twice a day.       Dyslipidemia  -Lipitor       Essential hypertension  -Lisinopril 10 mg daily  -Metoprolol XL 50 mg daily       Chronic obstructive pulmonary disease (CMS/HCC)  -Oxygen therapy       Chronic systolic heart failure (CMS/HCC)  -Lasix as needed      Chronic headaches  - Ibuprofen as needed    Special precautions: Special Precautions Level 3 (q15 min checks) .    Behavioral Health Treatment Plan and Problem List: I have reviewed and approved the Behavioral Health Treatment Plan and Problem list.  The patient has had a chance to review and agrees with the treatment plan.     Clinician:  Darwin Cotter MD  07/03/21  13:17 EDT

## 2021-07-04 PROCEDURE — 99232 SBSQ HOSP IP/OBS MODERATE 35: CPT | Performed by: PSYCHIATRY & NEUROLOGY

## 2021-07-04 RX ADMIN — Medication 1 TABLET: at 08:12

## 2021-07-04 RX ADMIN — DONEPEZIL HYDROCHLORIDE 5 MG: 5 TABLET, FILM COATED ORAL at 20:33

## 2021-07-04 RX ADMIN — APIXABAN 5 MG: 5 TABLET, FILM COATED ORAL at 20:33

## 2021-07-04 RX ADMIN — QUETIAPINE FUMARATE 50 MG: 100 TABLET ORAL at 08:12

## 2021-07-04 RX ADMIN — ASPIRIN 81 MG: 81 TABLET, COATED ORAL at 08:12

## 2021-07-04 RX ADMIN — APIXABAN 5 MG: 5 TABLET, FILM COATED ORAL at 08:13

## 2021-07-04 RX ADMIN — ATORVASTATIN CALCIUM 10 MG: 10 TABLET, FILM COATED ORAL at 20:33

## 2021-07-04 RX ADMIN — QUETIAPINE FUMARATE 50 MG: 100 TABLET ORAL at 20:33

## 2021-07-04 RX ADMIN — METOPROLOL SUCCINATE 50 MG: 50 TABLET, EXTENDED RELEASE ORAL at 08:12

## 2021-07-04 RX ADMIN — LISINOPRIL 10 MG: 10 TABLET ORAL at 08:12

## 2021-07-04 NOTE — PROGRESS NOTES
"INPATIENT PSYCHIATRIC PROGRESS NOTE    Name:  Felice Aiken  :  1935  MRN:  [id number]  Visit Number:  [id number]  Length of stay:  16    SUBJECTIVE  CC/Focus of Exam: psychosis    INTERVAL HISTORY:  The patient states he is feeling good today and wants to know when he can go home. Denies any thoughts of harm to self or others.  Depression rating 0/10  Anxiety rating 2/10  Sleep: fair  Withdrawal sx:denies  Cravin/10    Review of Systems   Constitutional: Positive for fatigue.   Cardiovascular: Negative.    Gastrointestinal: Negative.    Neurological: Positive for weakness.       OBJECTIVE    Temp:  [97.5 °F (36.4 °C)-98.1 °F (36.7 °C)] 98.1 °F (36.7 °C)  Heart Rate:  [59-94] 94  Resp:  [18-20] 20  BP: (122-151)/(52-82) 141/70    MENTAL STATUS EXAM:  Appearance:Casually dressed, good hygeine.   Cooperation:Cooperative  Psychomotor: Psychomotor retardation, No EPS, No motor tics  Speech-minimal.  Mood \"okay\"   Affect-congruent, appropriate, stable  Thought Content-difficult to assess  Thought process-disorganized.  Suicidality: No SI  Homicidality: No HI  Perception: No AH/VH  Insight-poor   Judgement-poor    Lab Results (last 24 hours)     ** No results found for the last 24 hours. **             Imaging Results (Last 24 Hours)     ** No results found for the last 24 hours. **             ECG/EMG Results (most recent)     Procedure Component Value Units Date/Time    ECG 12 Lead [[id number]] Collected: 21 1023     Updated: 21 1633     QT Interval 394 ms      QTC Interval 409 ms     Narrative:      Test Reason : repeat abnormal  Blood Pressure :   */*   mmHG  Vent. Rate :  65 BPM     Atrial Rate : 300 BPM     P-R Int :   * ms          QRS Dur : 100 ms      QT Int : 394 ms       P-R-T Axes :   *  51 265 degrees     QTc Int : 409 ms    Atrial fibrillation  ST & T wave abnormality, consider inferior ischemia  ST & T wave abnormality, consider anterolateral ischemia  Abnormal ECG  When compared " with ECG of 18-JUN-2021 06:24,  Vent. rate has decreased BY  80 BPM  Non-specific change in ST segment in Anterior leads  T wave inversion now evident in Inferior leads  T wave inversion now evident in Anterolateral leads  Confirmed by Rian Parada (2019) on 6/24/2021 4:33:41 PM    Referred By:            Confirmed By: Rian Parada           ALLERGIES: Patient has no known allergies.      Current Facility-Administered Medications:   •  acetaminophen (TYLENOL) tablet 650 mg, 650 mg, Oral, Q6H PRN, Darwin Cotter MD, 650 mg at 07/03/21 0811  •  aluminum-magnesium hydroxide-simethicone (MAALOX MAX) 400-400-40 MG/5ML suspension 15 mL, 15 mL, Oral, Q6H PRN, Darwin Cotter MD  •  apixaban (ELIQUIS) tablet 5 mg, 5 mg, Oral, Q12H, Darwin Cotter MD, 5 mg at 07/04/21 0813  •  aspirin EC tablet 81 mg, 81 mg, Oral, Daily, Darwin Cotter MD, 81 mg at 07/04/21 0812  •  atorvastatin (LIPITOR) tablet 10 mg, 10 mg, Oral, Nightly, Darwin Cotter MD, 10 mg at 07/03/21 2036  •  benzonatate (TESSALON) capsule 100 mg, 100 mg, Oral, TID PRN, Darwin Cotter MD  •  bisacodyl (DULCOLAX) EC tablet 5 mg, 5 mg, Oral, Daily PRN, Darwin Cotter MD  •  donepezil (ARICEPT) tablet 5 mg, 5 mg, Oral, Nightly, Darwin Cotter MD, 5 mg at 07/03/21 2036  •  furosemide (LASIX) tablet 20 mg, 20 mg, Oral, Daily PRN, Darwin Cotter MD  •  lisinopril (PRINIVIL,ZESTRIL) tablet 10 mg, 10 mg, Oral, Daily, Darren Hough MD, 10 mg at 07/04/21 0812  •  loperamide (IMODIUM) capsule 2 mg, 2 mg, Oral, Q2H PRN, Darwin Cotter MD  •  magnesium hydroxide (MILK OF MAGNESIA) suspension 2400 mg/10mL 10 mL, 10 mL, Oral, Daily PRN, Darwin Cotter MD  •  melatonin tablet 3 mg, 3 mg, Oral, Nightly PRN, Darwin Cotter MD, 3 mg at 06/30/21 2012  •  metoprolol succinate XL (TOPROL-XL) 24 hr tablet 50 mg, 50 mg, Oral, Daily, Darwin Cotter MD, 50 mg at 07/04/21 0812  •  multivitamin (THERAGRAN) tablet 1 tablet, 1 tablet, Oral, Daily, Darwin Cotter MD, 1 tablet at 07/04/21  0812  •  ondansetron (ZOFRAN) tablet 4 mg, 4 mg, Oral, Q6H PRN, Darwin Cotter MD  •  QUEtiapine (SEROquel) tablet 50 mg, 50 mg, Oral, BID, Darwin Cotter MD, 50 mg at 07/04/21 0812  •  sodium chloride nasal spray 2 spray, 2 spray, Each Nare, PRN, Darwin Cotter MD    ASSESSMENT & PLAN:      Psychosis (CMS/HCC)  -Continue Seroquel to 50 mg twice daily started on 6/19/21.       Dementia (CMS/HCC)  -The patient has a history of previous diagnosis of dementia with behavioral disturbances.  This could be playing into his current psychosis.  We will treat symptomatically.  -Continue Aricept 5 mg at bedtime.  -Referrals made for NH placement.       Persistent atrial fibrillation (CMS/HCC)  -Eliquis 5 mg twice a day.       Dyslipidemia  -Lipitor       Essential hypertension  -Lisinopril 10 mg daily  -Metoprolol XL 50 mg daily       Chronic obstructive pulmonary disease (CMS/HCC)  -Oxygen therapy       Chronic systolic heart failure (CMS/HCC)  -Lasix as needed      Chronic headaches  - Ibuprofen as needed    Special precautions: Special Precautions Level 3 (q15 min checks) .    Behavioral Health Treatment Plan and Problem List: I have reviewed and approved the Behavioral Health Treatment Plan and Problem list.  The patient has had a chance to review and agrees with the treatment plan.     Clinician:  Darwin Cotter MD  07/04/21  15:15 EDT

## 2021-07-04 NOTE — PLAN OF CARE
Goal Outcome Evaluation:  Plan of Care Reviewed With: patient           Outcome Summary: Patient is calm and cooperative. Appetite is good. Denies anxiety, depression, SI, HI or AVH.

## 2021-07-05 LAB
ANION GAP SERPL CALCULATED.3IONS-SCNC: 4.8 MMOL/L (ref 5–15)
BUN SERPL-MCNC: 20 MG/DL (ref 8–23)
BUN/CREAT SERPL: 17.9 (ref 7–25)
CALCIUM SPEC-SCNC: 9.1 MG/DL (ref 8.6–10.5)
CHLORIDE SERPL-SCNC: 107 MMOL/L (ref 98–107)
CO2 SERPL-SCNC: 29.2 MMOL/L (ref 22–29)
CREAT SERPL-MCNC: 1.12 MG/DL (ref 0.76–1.27)
GFR SERPL CREATININE-BSD FRML MDRD: 62 ML/MIN/1.73
GLUCOSE SERPL-MCNC: 95 MG/DL (ref 65–99)
POTASSIUM SERPL-SCNC: 4.6 MMOL/L (ref 3.5–5.2)
SODIUM SERPL-SCNC: 141 MMOL/L (ref 136–145)

## 2021-07-05 PROCEDURE — 99231 SBSQ HOSP IP/OBS SF/LOW 25: CPT | Performed by: PSYCHIATRY & NEUROLOGY

## 2021-07-05 PROCEDURE — 97110 THERAPEUTIC EXERCISES: CPT

## 2021-07-05 PROCEDURE — 97116 GAIT TRAINING THERAPY: CPT

## 2021-07-05 PROCEDURE — 80048 BASIC METABOLIC PNL TOTAL CA: CPT | Performed by: PSYCHIATRY & NEUROLOGY

## 2021-07-05 RX ADMIN — Medication 1 TABLET: at 08:11

## 2021-07-05 RX ADMIN — METOPROLOL SUCCINATE 50 MG: 50 TABLET, EXTENDED RELEASE ORAL at 08:11

## 2021-07-05 RX ADMIN — ATORVASTATIN CALCIUM 10 MG: 10 TABLET, FILM COATED ORAL at 20:15

## 2021-07-05 RX ADMIN — ASPIRIN 81 MG: 81 TABLET, COATED ORAL at 08:11

## 2021-07-05 RX ADMIN — QUETIAPINE FUMARATE 50 MG: 100 TABLET ORAL at 20:15

## 2021-07-05 RX ADMIN — APIXABAN 5 MG: 5 TABLET, FILM COATED ORAL at 20:15

## 2021-07-05 RX ADMIN — APIXABAN 5 MG: 5 TABLET, FILM COATED ORAL at 08:11

## 2021-07-05 RX ADMIN — LISINOPRIL 10 MG: 10 TABLET ORAL at 08:11

## 2021-07-05 RX ADMIN — QUETIAPINE FUMARATE 50 MG: 100 TABLET ORAL at 08:11

## 2021-07-05 RX ADMIN — DONEPEZIL HYDROCHLORIDE 5 MG: 5 TABLET, FILM COATED ORAL at 20:15

## 2021-07-05 NOTE — PLAN OF CARE
Goal Outcome Evaluation:  Plan of Care Reviewed With: patient  Patient Agreement with Plan of Care: agrees     Progress: improving  Outcome Summary: Patient continues to be calm and cooperative. Spending more time in the dayroom watching TV this shift. Denies all symptoms. Oriented x 3.

## 2021-07-05 NOTE — NURSING NOTE
"Spoke with XIOMARA Nicholson at [facility]. She states that they require a \"MD face to face\" documentation outlining what services are required and ordered. Malika states they also require patient demographics, H&P, treatment plan and discharge summary faxed to [phone]. She states once they receive the documents they will notify of acceptance, etc.   "
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
## 2021-07-05 NOTE — THERAPY TREATMENT NOTE
Acute Care - Physical Therapy Treatment Note   [place]     Patient Name: Felice Aiken  : 1935  MRN: [id number]  Today's Date: 2021   Onset of Illness/Injury or Date of Surgery: 21       PT Assessment (last 12 hours)      PT Evaluation and Treatment     Row Name 21 1604          Physical Therapy Time and Intention    Subjective Information  no complaints  -CW     Document Type  therapy note (daily note)  -CW     Mode of Treatment  physical therapy  -CW     Patient Effort  good  -CW     Symptoms Noted During/After Treatment  none  -CW     Comment  Patient agreeable to physical therapy treatment this date. He continues to report desire to go home and concern about the safety of his wife.  -CW     Row Name 21 1604          General Information    Patient Profile Reviewed  yes  -CW     Equipment Currently Used at Home  oxygen;cane, straight;walker, rolling  -CW     Row Name 21 1604          Cognition    Affect/Mental Status (Cognitive)  WFL  -CW     Orientation Status (Cognition)  oriented to;person;place;situation  -CW     Follows Commands (Cognition)  follows one-step commands;increased processing time needed;physical/tactile prompts required;verbal cues/prompting required  -CW     Row Name 21 1604          Pain Scale: Word Pre/Post-Treatment    Pre/Posttreatment Pain Comment  Patient continues to report L knee pain/stiffness present with mobility and weight bearing.  -CW     Pain Intervention(s)  Ambulation/increased activity;Repositioned  -CW     Row Name 21 1604          Bed Mobility    Bed Mobility  bed mobility (all) activities  -CW     All Activities, [place] (Bed Mobility)  modified independence  -CW     Bed Mobility, Safety Issues  decreased use of legs for bridging/pushing  -CW     Assistive Device (Bed Mobility)  bed rails  -CW     Row Name 21 1604          Transfers    Transfers  sit-stand transfer;stand-sit transfer  -CW     Sit-Stand [place]  (Transfers)  standby assist  -     Stand-Sit [place] (Transfers)  contact guard  -     Row Name 07/05/21 1604          Sit-Stand Transfer    Assistive Device (Sit-Stand Transfers)  walker, front-wheeled  -CW     Row Name 07/05/21 1604          Stand-Sit Transfer    Assistive Device (Stand-Sit Transfers)  walker, front-wheeled  -CW     Row Name 07/05/21 1604          Gait/Stairs (Locomotion)    [place] Level (Gait)  standby assist;verbal cues;nonverbal cues (demo/gesture)  -     Assistive Device (Gait)  walker, front-wheeled  -CW     Distance in Feet (Gait)  250  -CW     Pattern (Gait)  step-through  -CW     Deviations/Abnormal Patterns (Gait)  antalgic;lynette decreased;gait speed decreased;stride length decreased;weight shifting decreased  -     Bilateral Gait Deviations  forward flexed posture;heel strike decreased Shortened stance time on L, decreased step length on R  -CW     Comment (Gait/Stairs)  Gait deviations improve with duration of ambulation.  -[facility] Name 07/05/21 1604          Safety Issues, Functional Mobility    Safety Issues Affecting Function (Mobility)  awareness of need for assistance;insight into deficits/self-awareness;positioning of assistive device  -     Impairments Affecting Function (Mobility)  balance;endurance/activity tolerance;pain;postural/trunk control;range of motion (ROM);strength;visual/perceptual  -[facility] Name 07/05/21 1604          Balance    Static Standing Balance  supported;WFL  -[facility] Name 07/05/21 1604          Motor Skills    Therapeutic Exercise  other (see comments) Supine and standing LE strengthening/AROM interventions  -[facility] Name 07/05/21 1604          Coping    Trust Relationship/Rapport  care explained;choices provided;questions answered;thoughts/feelings acknowledged  -     Row Name 07/05/21 1604          Plan of Care Review    Plan of Care Reviewed With  patient  -[facility] Name 07/05/21 1604          Bed Mobility Goal 1  (PT)    Activity/Assistive Device (Bed Mobility Goal 1, PT)  bed mobility activities, all  -CW     [place] Level/Cues Needed (Bed Mobility Goal 1, PT)  modified independence  -CW     Time Frame (Bed Mobility Goal 1, PT)  by discharge  -CW     Progress/Outcomes (Bed Mobility Goal 1, PT)  goal met  -CW     Row Name 07/05/21 1604          Transfer Goal 1 (PT)    Activity/Assistive Device (Transfer Goal 1, PT)  transfers, all  -CW     [place] Level/Cues Needed (Transfer Goal 1, PT)  modified independence  -CW     Time Frame (Transfer Goal 1, PT)  by discharge  -CW     Progress/Outcome (Transfer Goal 1, PT)  good progress toward goal  -CW     Row Name 07/05/21 1604          Gait Training Goal 1 (PT)    Activity/Assistive Device (Gait Training Goal 1, PT)  gait (walking locomotion);assistive device use;decrease asymmetrical patterns;decrease fall risk;diminish gait deviation;improve balance and speed;increase endurance/gait distance;walker, rolling  -CW     [place] Level (Gait Training Goal 1, PT)  standby assist  -CW     Distance (Gait Training Goal 1, PT)  300  -CW     Time Frame (Gait Training Goal 1, PT)  by discharge  -CW     Progress/Outcome (Gait Training Goal 1, PT)  good progress toward goal  -CW     Row Name 07/05/21 1604          Positioning and Restraints    Pre-Treatment Position  in bed  -CW     In Bed  sitting EOB;encouraged to call for assist  -CW     Row Name 07/05/21 1604          Therapy Assessment/Plan (PT)    Rehab Potential (PT)  fair, will monitor progress closely  -CW     Criteria for Skilled Interventions Met (PT)  yes;meets criteria  -CW     Row Name 07/05/21 1604          Progress Summary (PT)    Progress Toward Functional Goals (PT)  progress toward functional goals is good  -CW     Daily Progress Summary (PT)  Patient demonstrates improved tolerance of ambulation this date, with improved performance the longer he ambulates. He is receptive to HEP for hip strength to reduce knee  pain. Patient is appropriate for discharge home from a functional mobility standpoint.  -GEN       User Key  (r) = Recorded By, (t) = Taken By, (c) = Cosigned By    Initials Name Provider Type    Hans Maradiaga PT Physical Therapist          PT Recommendation and Plan  Anticipated Discharge Disposition (PT): home, home with home health  Planned Therapy Interventions (PT): balance training, bed mobility training, gait training, home exercise program, manual therapy techniques, patient/family education, postural re-education, ROM (range of motion), strengthening, transfer training  Therapy Frequency (PT): 3 times/wk (3-5 times/week)  Progress Summary (PT)  Progress Toward Functional Goals (PT): progress toward functional goals is good  Daily Progress Summary (PT): Patient demonstrates improved tolerance of ambulation this date, with improved performance the longer he ambulates. He is receptive to HEP for hip strength to reduce knee pain. Patient is appropriate for discharge home from a functional mobility standpoint.  Plan of Care Reviewed With: patient       Time Calculation:   PT Charges     Row Name 07/05/21 1613             Time Calculation    PT Received On  07/05/21  -CW         Time Calculation- PT    Total Timed Code Minutes- PT  31 minute(s)  -CW        User Key  (r) = Recorded By, (t) = Taken By, (c) = Cosigned By    Initials Name Provider Type    Hans Maradiaga PT Physical Therapist        Therapy Charges for Today     Code Description Service Date Service Provider Modifiers Qty    [id number]  GAIT TRAINING EA 15 MIN 7/5/2021 Hans Frederick, PT GP 1    [id number]  PT THER PROC EA 15 MIN 7/5/2021 Hans Frederick PT GP 1               Hans Frederick PT  7/5/2021

## 2021-07-05 NOTE — PLAN OF CARE
Goal Outcome Evaluation:  Plan of Care Reviewed With: patient  Patient Agreement with Plan of Care: agrees        Outcome Summary: Patient rates anxiety a 2, denies depression, denies suicidal thoughts, HI or AVH.
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
## 2021-07-06 VITALS
BODY MASS INDEX: 22.22 KG/M2 | RESPIRATION RATE: 18 BRPM | HEART RATE: 86 BPM | DIASTOLIC BLOOD PRESSURE: 71 MMHG | HEIGHT: 69 IN | OXYGEN SATURATION: 94 % | WEIGHT: 150 LBS | TEMPERATURE: 97.5 F | SYSTOLIC BLOOD PRESSURE: 143 MMHG

## 2021-07-06 PROBLEM — F29 PSYCHOSIS (HCC): Status: RESOLVED | Noted: 2021-06-18 | Resolved: 2021-07-06

## 2021-07-06 PROCEDURE — 99239 HOSP IP/OBS DSCHRG MGMT >30: CPT | Performed by: PSYCHIATRY & NEUROLOGY

## 2021-07-06 RX ORDER — DIPHENOXYLATE HYDROCHLORIDE AND ATROPINE SULFATE 2.5; .025 MG/1; MG/1
1 TABLET ORAL DAILY
Qty: 30 TABLET | Refills: 0 | Status: ON HOLD | OUTPATIENT
Start: 2021-07-06 | End: 2022-01-03 | Stop reason: SDUPTHER

## 2021-07-06 RX ORDER — METOPROLOL SUCCINATE 50 MG/1
50 TABLET, EXTENDED RELEASE ORAL DAILY
Qty: 30 TABLET | Refills: 0 | Status: ON HOLD | OUTPATIENT
Start: 2021-07-07 | End: 2021-12-13 | Stop reason: DRUGHIGH

## 2021-07-06 RX ORDER — QUETIAPINE FUMARATE 50 MG/1
50 TABLET, FILM COATED ORAL 2 TIMES DAILY
Qty: 60 TABLET | Refills: 0 | Status: SHIPPED | OUTPATIENT
Start: 2021-07-06 | End: 2022-01-03 | Stop reason: HOSPADM

## 2021-07-06 RX ORDER — DONEPEZIL HYDROCHLORIDE 5 MG/1
5 TABLET, FILM COATED ORAL NIGHTLY
Qty: 30 TABLET | Refills: 0 | Status: ON HOLD | OUTPATIENT
Start: 2021-07-06 | End: 2022-01-03 | Stop reason: SDUPTHER

## 2021-07-06 RX ORDER — ASPIRIN 81 MG/1
81 TABLET ORAL DAILY
Qty: 30 TABLET | Refills: 0 | Status: SHIPPED | OUTPATIENT
Start: 2021-07-06 | End: 2021-08-05

## 2021-07-06 RX ORDER — ATORVASTATIN CALCIUM 10 MG/1
10 TABLET, FILM COATED ORAL DAILY
Qty: 30 TABLET | Refills: 0 | Status: ON HOLD | OUTPATIENT
Start: 2021-07-06 | End: 2022-01-03 | Stop reason: SDUPTHER

## 2021-07-06 RX ORDER — LISINOPRIL 10 MG/1
10 TABLET ORAL DAILY
Qty: 30 TABLET | Refills: 0 | Status: ON HOLD | OUTPATIENT
Start: 2021-07-07 | End: 2022-01-03 | Stop reason: SDUPTHER

## 2021-07-06 RX ADMIN — Medication 1 TABLET: at 08:06

## 2021-07-06 RX ADMIN — METOPROLOL SUCCINATE 50 MG: 50 TABLET, EXTENDED RELEASE ORAL at 08:06

## 2021-07-06 RX ADMIN — QUETIAPINE FUMARATE 50 MG: 100 TABLET ORAL at 08:06

## 2021-07-06 RX ADMIN — LISINOPRIL 10 MG: 10 TABLET ORAL at 08:05

## 2021-07-06 RX ADMIN — ASPIRIN 81 MG: 81 TABLET, COATED ORAL at 08:05

## 2021-07-06 RX ADMIN — APIXABAN 5 MG: 5 TABLET, FILM COATED ORAL at 08:05

## 2021-07-06 NOTE — PLAN OF CARE
Goal Outcome Evaluation:  Plan of Care Reviewed With: patient  Patient Agreement with Plan of Care: agrees        Outcome Summary: pt is calm and cooperative, oriented x3, and uses a walker.  Pt denied anxiety, depression, SI, HI, and hallucinations.

## 2021-07-06 NOTE — DISCHARGE INSTR - APPOINTMENTS
Your Appointments are as follows:     [facility]  [address], [place], [place] [address] [phone]    Tuesday July 13th at 9:45 with Dr. Ojeda

## 2021-07-06 NOTE — DISCHARGE PLACEMENT REQUEST
"Felice Mckeon (86 y.o. Male)     Date of Birth Social Security Number Address Home Phone MRN    1935  [id number]   [facility] [address] [phone] [id number]    Yarsani Marital Status          None        Admission Date Admission Type Admitting Provider Attending Provider Department, Room/Bed    6/18/21 Emergency Darwin Cotter MD Salim, Mazhar, MD [facility], 1040/2S    Discharge Date Discharge Disposition Discharge Destination         Home or Self Care              Attending Provider: Darwin Cotter MD    Allergies: No Known Allergies    Isolation: None   Infection: None   Code Status: Prior    Ht: 175.3 cm (69\")   Wt: 68 kg (150 lb)    Admission Cmt: None   Principal Problem: Dementia (CMS/[facility]) [F03.90]                 Active Insurance as of 6/18/2021     Primary Coverage     Payor Plan Insurance Group Employer/Plan Group    HUMANA MEDICARE REPLACEMENT HU[employer]NA MEDICARE REPLACEMENT [id number]     Payor Plan Address Payor Plan Phone Number Payor Plan Fax Number Effective Dates    [address] [address] [phone]  11/16/2015 - None Entered    [facility] [id number]       Subscriber Name Subscriber Birth Date Member ID       FELICE MCKEON 1935 [id number]                 Emergency Contacts      (Rel.) Home Phone Work Phone Mobile Phone    Annmarie Amin (Sister) [phone] -- --    Kary Mckeon (Power of ) [phone] -- --            Discharge Order (From admission, onward)     Start     Ordered    07/06/21 1223  Discharge patient  Once     Expected Discharge Date: 07/06/21    Discharge Disposition: Home or Self Care    Physician of Record for Attribution - Please select from Treatment Team: ZOYA DORAN [[id number]]    Review needed by CMO to determine Physician of Record: No       Question Answer Comment   Physician of Record for Attribution - Please select from Treatment Team ZOYA DORAN    Review needed by CMO to determine Physician " of Record No        07/06/21 1223

## 2021-07-06 NOTE — PROGRESS NOTES
"INPATIENT PSYCHIATRIC PROGRESS NOTE    Name:  Felice Aiken  :  1935  MRN:  [id number]  Visit Number:  [id number]  Length of stay:  18    SUBJECTIVE  CC/Focus of Exam: psychosis    INTERVAL HISTORY:  The patient continues to do well. PT progressing, improved ability with own ADLs.Oriented, appropriate. Rare, mild moments of confusion but overall pleasant. May need help with medication and physical assessment at home, so will consult home health.    Denies any thoughts of harm to self or others.    Depression rating 0/10  Anxiety rating 1/10  Sleep: fair  Withdrawal sx:denies  Cravin/10    Review of Systems   Constitutional: Negative for activity change and appetite change.   Cardiovascular: Negative.    Gastrointestinal: Negative.    Musculoskeletal: Positive for back pain.   Neurological: Positive for weakness.   Psychiatric/Behavioral: The patient is nervous/anxious.        OBJECTIVE    Temp:  [97.5 °F (36.4 °C)-98.1 °F (36.7 °C)] 97.5 °F (36.4 °C)  Heart Rate:  [68-86] 86  Resp:  [18] 18  BP: (143-151)/(71-82) 143/71    MENTAL STATUS EXAM:  Appearance:Casually dressed, good hygeine.   Cooperation:Cooperative  Psychomotor: improving psychomotor retardation, No EPS, No motor tics  Speech-minimal.  Mood \"good, ready to go\"   Affect-congruent, appropriate, stable  Thought Content-difficult to assess  Thought process-disorganized.  Suicidality: No SI  Homicidality: No HI  Perception: No AH/VH  Insight-fair  Judgment-fair    Lab Results (last 24 hours)     ** No results found for the last 24 hours. **             Imaging Results (Last 24 Hours)     ** No results found for the last 24 hours. **             ECG/EMG Results (most recent)     Procedure Component Value Units Date/Time    ECG 12 Lead [[id number]] Collected: 21 1023     Updated: 21 1633     QT Interval 394 ms      QTC Interval 409 ms     Narrative:      Test Reason : repeat abnormal  Blood Pressure :   */*   mmHG  Vent. Rate :  65 BPM "     Atrial Rate : 300 BPM     P-R Int :   * ms          QRS Dur : 100 ms      QT Int : 394 ms       P-R-T Axes :   *  51 265 degrees     QTc Int : 409 ms    Atrial fibrillation  ST & T wave abnormality, consider inferior ischemia  ST & T wave abnormality, consider anterolateral ischemia  Abnormal ECG  When compared with ECG of 18-JUN-2021 06:24,  Vent. rate has decreased BY  80 BPM  Non-specific change in ST segment in Anterior leads  T wave inversion now evident in Inferior leads  T wave inversion now evident in Anterolateral leads  Confirmed by Rian Parada (2019) on 6/24/2021 4:33:41 PM    Referred By:            Confirmed By: Rian Parada           ALLERGIES: Patient has no known allergies.      Current Facility-Administered Medications:   •  acetaminophen (TYLENOL) tablet 650 mg, 650 mg, Oral, Q6H PRN, Darwin Cotter MD, 650 mg at 07/03/21 0811  •  aluminum-magnesium hydroxide-simethicone (MAALOX MAX) 400-400-40 MG/5ML suspension 15 mL, 15 mL, Oral, Q6H PRN, Darwin Cotter MD  •  apixaban (ELIQUIS) tablet 5 mg, 5 mg, Oral, Q12H, Darwin Cotter MD, 5 mg at 07/05/21 2015  •  aspirin EC tablet 81 mg, 81 mg, Oral, Daily, Darwin Cotter MD, 81 mg at 07/05/21 0811  •  atorvastatin (LIPITOR) tablet 10 mg, 10 mg, Oral, Nightly, Darwin Cotter MD, 10 mg at 07/05/21 2015  •  benzonatate (TESSALON) capsule 100 mg, 100 mg, Oral, TID PRN, Darwin Cotter MD  •  bisacodyl (DULCOLAX) EC tablet 5 mg, 5 mg, Oral, Daily PRN, Darwin Cotter MD  •  donepezil (ARICEPT) tablet 5 mg, 5 mg, Oral, Nightly, Darwin Cotter MD, 5 mg at 07/05/21 2015  •  furosemide (LASIX) tablet 20 mg, 20 mg, Oral, Daily PRN, Darwin Cotter MD  •  lisinopril (PRINIVIL,ZESTRIL) tablet 10 mg, 10 mg, Oral, Daily, Darren Hough MD, 10 mg at 07/05/21 0811  •  loperamide (IMODIUM) capsule 2 mg, 2 mg, Oral, Q2H PRN, Darwin Cotter MD  •  magnesium hydroxide (MILK OF MAGNESIA) suspension 2400 mg/10mL 10 mL, 10 mL, Oral, Daily PRN, Darwin Ctoter MD  •   melatonin tablet 3 mg, 3 mg, Oral, Nightly PRN, Darwin Cotter MD, 3 mg at 06/30/21 2012  •  metoprolol succinate XL (TOPROL-XL) 24 hr tablet 50 mg, 50 mg, Oral, Daily, Darwin Cotter MD, 50 mg at 07/05/21 0811  •  multivitamin (THERAGRAN) tablet 1 tablet, 1 tablet, Oral, Daily, Darwin Cotter MD, 1 tablet at 07/05/21 0811  •  ondansetron (ZOFRAN) tablet 4 mg, 4 mg, Oral, Q6H PRN, Darwin Cotter MD  •  QUEtiapine (SEROquel) tablet 50 mg, 50 mg, Oral, BID, Darwin Cotter MD, 50 mg at 07/05/21 2015  •  sodium chloride nasal spray 2 spray, 2 spray, Each Nare, PRN, Darwin Cotter MD    ASSESSMENT & PLAN:      Dementia (CMS/HCC)  -Continue Aricept 5 mg at bedtime.  -Referrals made for NH placement, although patient mental status has improved and he insists he can care for himself at home. He reports siblings and neighbors will also help him if he needs further assistance  -Will likely need help with medication and assessment of functional status at home, so will consult home health.   -Continue Seroquel to 50 mg twice daily started on 6/19/21.       Persistent atrial fibrillation (CMS/HCC)  -Eliquis 5 mg twice a day.       Dyslipidemia  -Lipitor       Essential hypertension  -Lisinopril 10 mg daily  -Metoprolol XL 50 mg daily       Chronic obstructive pulmonary disease (CMS/HCC)  -Oxygen therapy       Chronic systolic heart failure (CMS/HCC)  -Lasix as needed      Chronic headaches  - Ibuprofen as needed    Special precautions: Special Precautions Level 3 (q15 min checks) .    Behavioral Health Treatment Plan and Problem List: I have reviewed and approved the Behavioral Health Treatment Plan and Problem list.  The patient has had a chance to review and agrees with the treatment plan.     Clinician:  Luciano Hatch MD  07/06/21  07:38 EDT
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
## 2021-07-06 NOTE — DISCHARGE SUMMARY
"      PSYCHIATRIC DISCHARGE SUMMARY     Patient Identification:  Name:  Felice Aiken  Age:  86 y.o.  Sex:  male  :  1935  MRN:  [id number]  Visit Number:  [id number]    Date of Admission:2021   Date of Discharge:  2021    Discharge Diagnosis:  Principal Problem:    Dementia (CMS/HCC)  Active Problems:    Persistent atrial fibrillation (CMS/HCC)    Dyslipidemia    Essential hypertension    Chronic obstructive pulmonary disease (CMS/HCC)    Chronic systolic heart failure (CMS/HCC)      Admission Diagnosis:  Psychosis     Hospital Course  Patient is a 86 y.o. male presented with agitation, confusion & possible psychosis.  Admitted for crisis stabilization. Hx of dementia, afib, HTN, CHF. Hx of interpersonal issues with family and neighbors. UDS +opi. BNP elevated, thought to be due to chronic afib & CHF. EKG with stable afib. Patient denied cardiac complaints during admission. Home meds continued. Started on Aricept 5mg nightly and quetiapine increased to 50mg BID. Mental status improved over the course of his stay. PT consulted and worked with patient regularly; functional status improved significantly. He was initially referred to nursing homes due to family's requests. However, with his mental and physical improvement over the course of his stay, he was considered appropriate to return home. He did not appear to lack capacity at this time and preferred to return to his home. Home health was requested and approved; they are familiar with the patient & will follow-up with patient at his home following discharge. Family was contacted for safe discharge planning. Outpatient care ascertained.     On the day of discharge, patient denied SI, HI or AVH.  Patient showed improvement of presenting symptoms and was deemed appropriate for discharge today.    Mental Status Exam upon discharge:   Mood \"better\"   Affect-congruent, appropriate, stable  Thought Content-goal directed, no delusional material " present  Thought process-linear, organized  Suicidality: No SI  Homicidality: No HI  Perception: No AH/VH    Procedures Performed         Consults:   Consults     No orders found from 5/20/2021 to 6/19/2021.          Pertinent Test Results:   Lab Results (last 7 days)     Procedure Component Value Units Date/Time    Basic Metabolic Panel [[id number]]  (Abnormal) Collected: 07/05/21 0437    Specimen: Blood Updated: 07/05/21 0511     Glucose 95 mg/dL      BUN 20 mg/dL      Creatinine 1.12 mg/dL      Sodium 141 mmol/L      Potassium 4.6 mmol/L      Chloride 107 mmol/L      CO2 29.2 mmol/L      Calcium 9.1 mg/dL      eGFR Non African Amer 62 mL/min/1.73      BUN/Creatinine Ratio 17.9     Anion Gap 4.8 mmol/L     Narrative:      GFR Normal >60  Chronic Kidney Disease <60  Kidney Failure <15            Condition on Discharge:  improved    Vital Signs  Temp:  [97.5 °F (36.4 °C)-98.1 °F (36.7 °C)] 97.5 °F (36.4 °C)  Heart Rate:  [68-86] 86  Resp:  [18] 18  BP: (143-151)/(71-82) 143/71    Discharge Disposition:  Home or Self Care    Discharge Medications:     Discharge Medications      New Medications      Instructions Start Date   apixaban 5 MG tablet tablet  Commonly known as: ELIQUIS   5 mg, Oral, Every 12 Hours Scheduled      donepezil 5 MG tablet  Commonly known as: ARICEPT   5 mg, Oral, Nightly         Changes to Medications      Instructions Start Date   lisinopril 10 MG tablet  Commonly known as: PRINIVIL,ZESTRIL  What changed:   · medication strength  · how much to take   10 mg, Oral, Daily   Start Date: July 7, 2021     QUEtiapine 50 MG tablet  Commonly known as: SEROquel  What changed:   · medication strength  · how much to take   50 mg, Oral, 2 Times Daily         Continue These Medications      Instructions Start Date   aspirin 81 MG EC tablet   81 mg, Oral, Daily      atorvastatin 10 MG tablet  Commonly known as: LIPITOR   10 mg, Oral, Daily      metoprolol succinate XL 50 MG 24 hr tablet  Commonly known as:  TOPROL-XL   50 mg, Oral, Daily   Start Date: July 7, 2021     multivitamin tablet tablet   1 tablet, Oral, Daily         Stop These Medications    fish oil 1000 MG capsule capsule     furosemide 20 MG tablet  Commonly known as: Lasix            Discharge Diet: Normal    Activity at Discharge: Normal    Follow-up Appointments  No future appointments.      Test Results Pending at Discharge  None     Time: I spent  Greater than 30  minutes on this discharge activity which included: face-to-face encounter with the patient, reviewing the data in the system, coordination of the care with the nursing staff as well as consultants, documentation, and entering orders.      Clinician:   Luciano Hatch MD  07/06/21  12:27 EDT

## 2021-07-21 ENCOUNTER — APPOINTMENT (OUTPATIENT)
Dept: GENERAL RADIOLOGY | Facility: HOSPITAL | Age: 86
End: 2021-07-21

## 2021-07-21 ENCOUNTER — HOSPITAL ENCOUNTER (EMERGENCY)
Facility: HOSPITAL | Age: 86
Discharge: HOME | End: 2021-07-21
Attending: STUDENT IN AN ORGANIZED HEALTH CARE EDUCATION/TRAINING PROGRAM | Admitting: STUDENT IN AN ORGANIZED HEALTH CARE EDUCATION/TRAINING PROGRAM

## 2021-07-21 VITALS
SYSTOLIC BLOOD PRESSURE: 164 MMHG | DIASTOLIC BLOOD PRESSURE: 83 MMHG | BODY MASS INDEX: 22.22 KG/M2 | RESPIRATION RATE: 18 BRPM | HEART RATE: 55 BPM | HEIGHT: 69 IN | TEMPERATURE: 97.3 F | OXYGEN SATURATION: 100 % | WEIGHT: 150 LBS

## 2021-07-21 DIAGNOSIS — I27.20 PULMONARY HTN (HCC): Primary | ICD-10-CM

## 2021-07-21 DIAGNOSIS — J96.11 CHRONIC RESPIRATORY FAILURE WITH HYPOXIA (HCC): ICD-10-CM

## 2021-07-21 LAB
ALBUMIN SERPL-MCNC: 4.08 G/DL (ref 3.5–5.2)
ALBUMIN/GLOB SERPL: 1.5 G/DL
ALP SERPL-CCNC: 90 U/L (ref 39–117)
ALT SERPL W P-5'-P-CCNC: 17 U/L (ref 1–41)
ANION GAP SERPL CALCULATED.3IONS-SCNC: 8.6 MMOL/L (ref 5–15)
AST SERPL-CCNC: 19 U/L (ref 1–40)
BASOPHILS # BLD AUTO: 0.06 10*3/MM3 (ref 0–0.2)
BASOPHILS NFR BLD AUTO: 0.9 % (ref 0–1.5)
BILIRUB SERPL-MCNC: 0.5 MG/DL (ref 0–1.2)
BUN SERPL-MCNC: 12 MG/DL (ref 8–23)
BUN/CREAT SERPL: 10.4 (ref 7–25)
CALCIUM SPEC-SCNC: 9.6 MG/DL (ref 8.6–10.5)
CHLORIDE SERPL-SCNC: 104 MMOL/L (ref 98–107)
CO2 SERPL-SCNC: 28.4 MMOL/L (ref 22–29)
CREAT SERPL-MCNC: 1.15 MG/DL (ref 0.76–1.27)
DEPRECATED RDW RBC AUTO: 52.2 FL (ref 37–54)
EOSINOPHIL # BLD AUTO: 0.33 10*3/MM3 (ref 0–0.4)
EOSINOPHIL NFR BLD AUTO: 5.2 % (ref 0.3–6.2)
ERYTHROCYTE [DISTWIDTH] IN BLOOD BY AUTOMATED COUNT: 14.4 % (ref 12.3–15.4)
FLUAV RNA RESP QL NAA+PROBE: NOT DETECTED
FLUBV RNA RESP QL NAA+PROBE: NOT DETECTED
GFR SERPL CREATININE-BSD FRML MDRD: 61 ML/MIN/1.73
GLOBULIN UR ELPH-MCNC: 2.7 GM/DL
GLUCOSE SERPL-MCNC: 112 MG/DL (ref 65–99)
HCT VFR BLD AUTO: 37.5 % (ref 37.5–51)
HGB BLD-MCNC: 11.8 G/DL (ref 13–17.7)
HOLD SPECIMEN: NORMAL
HOLD SPECIMEN: NORMAL
IMM GRANULOCYTES # BLD AUTO: 0.02 10*3/MM3 (ref 0–0.05)
IMM GRANULOCYTES NFR BLD AUTO: 0.3 % (ref 0–0.5)
INR PPP: 1.28 (ref 0.9–1.1)
LIPASE SERPL-CCNC: 20 U/L (ref 13–60)
LYMPHOCYTES # BLD AUTO: 2.28 10*3/MM3 (ref 0.7–3.1)
LYMPHOCYTES NFR BLD AUTO: 35.6 % (ref 19.6–45.3)
MCH RBC QN AUTO: 31.4 PG (ref 26.6–33)
MCHC RBC AUTO-ENTMCNC: 31.5 G/DL (ref 31.5–35.7)
MCV RBC AUTO: 99.7 FL (ref 79–97)
MONOCYTES # BLD AUTO: 0.47 10*3/MM3 (ref 0.1–0.9)
MONOCYTES NFR BLD AUTO: 7.3 % (ref 5–12)
NEUTROPHILS NFR BLD AUTO: 3.24 10*3/MM3 (ref 1.7–7)
NEUTROPHILS NFR BLD AUTO: 50.7 % (ref 42.7–76)
NRBC BLD AUTO-RTO: 0 /100 WBC (ref 0–0.2)
NT-PROBNP SERPL-MCNC: 2210 PG/ML (ref 0–1800)
PLATELET # BLD AUTO: 166 10*3/MM3 (ref 140–450)
PMV BLD AUTO: 10.3 FL (ref 6–12)
POTASSIUM SERPL-SCNC: 4.2 MMOL/L (ref 3.5–5.2)
PROT SERPL-MCNC: 6.8 G/DL (ref 6–8.5)
PROTHROMBIN TIME: 16.4 SECONDS (ref 12.8–14.5)
RBC # BLD AUTO: 3.76 10*6/MM3 (ref 4.14–5.8)
SARS-COV-2 RNA RESP QL NAA+PROBE: NOT DETECTED
SODIUM SERPL-SCNC: 141 MMOL/L (ref 136–145)
TROPONIN T SERPL-MCNC: 0.01 NG/ML (ref 0–0.03)
TROPONIN T SERPL-MCNC: <0.01 NG/ML (ref 0–0.03)
WBC # BLD AUTO: 6.4 10*3/MM3 (ref 3.4–10.8)
WHOLE BLOOD HOLD SPECIMEN: NORMAL

## 2021-07-21 PROCEDURE — 85025 COMPLETE CBC W/AUTO DIFF WBC: CPT | Performed by: STUDENT IN AN ORGANIZED HEALTH CARE EDUCATION/TRAINING PROGRAM

## 2021-07-21 PROCEDURE — 83880 ASSAY OF NATRIURETIC PEPTIDE: CPT | Performed by: STUDENT IN AN ORGANIZED HEALTH CARE EDUCATION/TRAINING PROGRAM

## 2021-07-21 PROCEDURE — 93010 ELECTROCARDIOGRAM REPORT: CPT | Performed by: INTERNAL MEDICINE

## 2021-07-21 PROCEDURE — 71045 X-RAY EXAM CHEST 1 VIEW: CPT

## 2021-07-21 PROCEDURE — 87636 SARSCOV2 & INF A&B AMP PRB: CPT | Performed by: STUDENT IN AN ORGANIZED HEALTH CARE EDUCATION/TRAINING PROGRAM

## 2021-07-21 PROCEDURE — 71045 X-RAY EXAM CHEST 1 VIEW: CPT | Performed by: RADIOLOGY

## 2021-07-21 PROCEDURE — 84484 ASSAY OF TROPONIN QUANT: CPT | Performed by: STUDENT IN AN ORGANIZED HEALTH CARE EDUCATION/TRAINING PROGRAM

## 2021-07-21 PROCEDURE — 85610 PROTHROMBIN TIME: CPT | Performed by: STUDENT IN AN ORGANIZED HEALTH CARE EDUCATION/TRAINING PROGRAM

## 2021-07-21 PROCEDURE — 83690 ASSAY OF LIPASE: CPT | Performed by: STUDENT IN AN ORGANIZED HEALTH CARE EDUCATION/TRAINING PROGRAM

## 2021-07-21 PROCEDURE — 99284 EMERGENCY DEPT VISIT MOD MDM: CPT

## 2021-07-21 PROCEDURE — 93005 ELECTROCARDIOGRAM TRACING: CPT | Performed by: STUDENT IN AN ORGANIZED HEALTH CARE EDUCATION/TRAINING PROGRAM

## 2021-07-21 PROCEDURE — 93005 ELECTROCARDIOGRAM TRACING: CPT | Performed by: FAMILY MEDICINE

## 2021-07-21 PROCEDURE — 80053 COMPREHEN METABOLIC PANEL: CPT | Performed by: STUDENT IN AN ORGANIZED HEALTH CARE EDUCATION/TRAINING PROGRAM

## 2021-07-21 RX ORDER — SODIUM CHLORIDE 0.9 % (FLUSH) 0.9 %
10 SYRINGE (ML) INJECTION AS NEEDED
Status: DISCONTINUED | OUTPATIENT
Start: 2021-07-21 | End: 2021-07-22 | Stop reason: HOSPADM

## 2021-07-21 RX ORDER — LISINOPRIL 10 MG/1
5 TABLET ORAL
Status: DISCONTINUED | OUTPATIENT
Start: 2021-07-22 | End: 2021-07-21

## 2021-07-21 RX ORDER — LISINOPRIL 10 MG/1
5 TABLET ORAL ONCE
Status: COMPLETED | OUTPATIENT
Start: 2021-07-21 | End: 2021-07-21

## 2021-07-21 RX ADMIN — LISINOPRIL 5 MG: 10 TABLET ORAL at 22:25

## 2021-07-21 NOTE — ED PROVIDER NOTES
Subjective   History of Present Illness  This 86-year-old man with a past medical history significant for atrial fibrillation on Eliquis and metoprolol, COPD on home oxygen 2 L all the time, Alzheimer's dementia presented to the emergency department today for 3 days of worsening shortness of breath.  He denies any chest pain or radiation of symptoms.  He denies any palpitations..  Been eating and drinking well he has no other complaints at this time he denies nausea vomiting fever chills.  He is not having syncope did not recently fall or hit his head.  He has no other complaints.    Chart review shows the patient has a history of severe tricuspid regurgitation with elevated right ventricular pressures and pulmonary hypertension diagnosed in the past.       Review of Systems   Constitutional: Negative.    HENT: Negative.    Eyes: Negative.    Respiratory: Positive for shortness of breath.    Cardiovascular: Positive for chest pain. Negative for palpitations and leg swelling.   Gastrointestinal: Negative.    Endocrine: Negative.    Genitourinary: Negative.    Musculoskeletal: Negative.    Skin: Negative.    Allergic/Immunologic: Negative.    Neurological: Negative.    Hematological: Negative.    Psychiatric/Behavioral: Negative.        Past Medical History:   Diagnosis Date   • Arthritis    • Atrial fibrillation (CMS/HCC)    • COPD (chronic obstructive pulmonary disease) (CMS/HCC)    • Dementia (CMS/HCC)    • Elevated cholesterol    • HTN (hypertension)        No Known Allergies    Past Surgical History:   Procedure Laterality Date   • HAND SURGERY     • KNEE SURGERY         Family History   Problem Relation Age of Onset   • Hypertension Mother    • Arthritis Mother    • Hypertension Father    • Arthritis Father    • Diabetes Sister    • Cancer Sister    • Diabetes Brother        Social History     Socioeconomic History   • Marital status:      Spouse name: Not on file   • Number of children: Not on file   •  Years of education: Not on file   • Highest education level: Not on file   Tobacco Use   • Smoking status: Former Smoker     Years: 40.00     Types: Cigarettes   • Smokeless tobacco: Never Used   • Tobacco comment: quit smoking 35-40 years ago   Vaping Use   • Vaping Use: Never used   Substance and Sexual Activity   • Alcohol use: Not Currently     Comment: occcasionally when younger    • Drug use: No   • Sexual activity: Defer     Partners: Female           Objective   Physical Exam  Vitals and nursing note reviewed.   Constitutional:       Appearance: Normal appearance. He is well-developed.      Comments: Thin, frail appearing no acute distress.  Nontoxic-appearing no increased respiratory effort.   HENT:      Head: Normocephalic and atraumatic.      Right Ear: External ear normal.      Left Ear: External ear normal.      Nose: Nose normal.      Mouth/Throat:      Mouth: Mucous membranes are moist.      Pharynx: Oropharynx is clear.   Eyes:      Extraocular Movements: Extraocular movements intact.      Pupils: Pupils are equal, round, and reactive to light.   Cardiovascular:      Rate and Rhythm: Normal rate and regular rhythm.      Heart sounds: Normal heart sounds.   Pulmonary:      Effort: Pulmonary effort is normal.      Breath sounds: Normal breath sounds.   Abdominal:      General: Bowel sounds are normal.      Palpations: Abdomen is soft.   Musculoskeletal:         General: Normal range of motion.      Cervical back: Normal range of motion and neck supple.   Skin:     General: Skin is warm and dry.      Capillary Refill: Capillary refill takes less than 2 seconds.   Neurological:      General: No focal deficit present.      Mental Status: He is alert and oriented to person, place, and time.   Psychiatric:         Behavior: Behavior normal.         Thought Content: Thought content normal.         Judgment: Judgment normal.         Procedures           ED Course  ED Course as of Jul 22 2032 Wed Jul 21,  2021 1708 EKG at 1705 shows atrial fibrillation, rate 65.  QRS duration 100, QTc 418 ms.  Nonspecific ST-T changes.  No evidence for STEMI.    [CM]      ED Course User Index  [CM] Myron Holman MD                                           MDM  Number of Diagnoses or Management Options     Amount and/or Complexity of Data Reviewed  Clinical lab tests: ordered and reviewed  Tests in the radiology section of CPT®: ordered and reviewed  Tests in the medicine section of CPT®: ordered and reviewed        Final diagnoses:   Pulmonary HTN (CMS/HCC)   Chronic respiratory failure with hypoxia (CMS/HCC)       ED Disposition  ED Disposition     ED Disposition Condition Comment    Discharge Stable           Rc Ojeda MD  [address] [place] [address]   [facility] [id number]  [phone]      Please evaluate patient's oxygen concentrator.    [facility] ED  [address] [facility] [id number] [phone]    Please return to the emergency department if you are having any worsening symptoms.         Medication List      No changes were made to your prescriptions during this visit.          Ayush Macario,   07/22/21 2032

## 2021-07-22 LAB
QT INTERVAL: 402 MS
QTC INTERVAL: 418 MS

## 2021-09-03 ENCOUNTER — APPOINTMENT (OUTPATIENT)
Dept: CT IMAGING | Facility: HOSPITAL | Age: 86
End: 2021-09-03

## 2021-09-03 ENCOUNTER — HOSPITAL ENCOUNTER (EMERGENCY)
Facility: HOSPITAL | Age: 86
Discharge: HOME | End: 2021-09-03
Attending: FAMILY MEDICINE | Admitting: FAMILY MEDICINE

## 2021-09-03 ENCOUNTER — APPOINTMENT (OUTPATIENT)
Dept: GENERAL RADIOLOGY | Facility: HOSPITAL | Age: 86
End: 2021-09-03

## 2021-09-03 VITALS
TEMPERATURE: 98.1 F | HEART RATE: 108 BPM | WEIGHT: 150 LBS | SYSTOLIC BLOOD PRESSURE: 165 MMHG | RESPIRATION RATE: 16 BRPM | DIASTOLIC BLOOD PRESSURE: 93 MMHG | BODY MASS INDEX: 21 KG/M2 | OXYGEN SATURATION: 97 % | HEIGHT: 71 IN

## 2021-09-03 DIAGNOSIS — F03.90 DEMENTIA WITHOUT BEHAVIORAL DISTURBANCE, UNSPECIFIED DEMENTIA TYPE: Primary | ICD-10-CM

## 2021-09-03 DIAGNOSIS — R10.9 ABDOMINAL PAIN, UNSPECIFIED ABDOMINAL LOCATION: ICD-10-CM

## 2021-09-03 LAB
ALBUMIN SERPL-MCNC: 4.15 G/DL (ref 3.5–5.2)
ALBUMIN/GLOB SERPL: 1.5 G/DL
ALP SERPL-CCNC: 106 U/L (ref 39–117)
ALT SERPL W P-5'-P-CCNC: 15 U/L (ref 1–41)
ANION GAP SERPL CALCULATED.3IONS-SCNC: 10.3 MMOL/L (ref 5–15)
AST SERPL-CCNC: 20 U/L (ref 1–40)
BASOPHILS # BLD AUTO: 0.07 10*3/MM3 (ref 0–0.2)
BASOPHILS NFR BLD AUTO: 0.9 % (ref 0–1.5)
BILIRUB SERPL-MCNC: 0.8 MG/DL (ref 0–1.2)
BUN SERPL-MCNC: 20 MG/DL (ref 8–23)
BUN/CREAT SERPL: 14.6 (ref 7–25)
CALCIUM SPEC-SCNC: 9.8 MG/DL (ref 8.6–10.5)
CHLORIDE SERPL-SCNC: 99 MMOL/L (ref 98–107)
CO2 SERPL-SCNC: 27.7 MMOL/L (ref 22–29)
CREAT SERPL-MCNC: 1.37 MG/DL (ref 0.76–1.27)
D-LACTATE SERPL-SCNC: 1.9 MMOL/L (ref 0.5–2)
DEPRECATED RDW RBC AUTO: 42.9 FL (ref 37–54)
EOSINOPHIL # BLD AUTO: 0.11 10*3/MM3 (ref 0–0.4)
EOSINOPHIL NFR BLD AUTO: 1.4 % (ref 0.3–6.2)
ERYTHROCYTE [DISTWIDTH] IN BLOOD BY AUTOMATED COUNT: 12.4 % (ref 12.3–15.4)
GFR SERPL CREATININE-BSD FRML MDRD: 50 ML/MIN/1.73
GLOBULIN UR ELPH-MCNC: 2.9 GM/DL
GLUCOSE SERPL-MCNC: 105 MG/DL (ref 65–99)
HCT VFR BLD AUTO: 39.9 % (ref 37.5–51)
HGB BLD-MCNC: 12.8 G/DL (ref 13–17.7)
HOLD SPECIMEN: NORMAL
HOLD SPECIMEN: NORMAL
IMM GRANULOCYTES # BLD AUTO: 0.03 10*3/MM3 (ref 0–0.05)
IMM GRANULOCYTES NFR BLD AUTO: 0.4 % (ref 0–0.5)
INR PPP: 1.11 (ref 0.9–1.1)
LIPASE SERPL-CCNC: 25 U/L (ref 13–60)
LYMPHOCYTES # BLD AUTO: 1.62 10*3/MM3 (ref 0.7–3.1)
LYMPHOCYTES NFR BLD AUTO: 20.2 % (ref 19.6–45.3)
MCH RBC QN AUTO: 30 PG (ref 26.6–33)
MCHC RBC AUTO-ENTMCNC: 32.1 G/DL (ref 31.5–35.7)
MCV RBC AUTO: 93.4 FL (ref 79–97)
MONOCYTES # BLD AUTO: 0.52 10*3/MM3 (ref 0.1–0.9)
MONOCYTES NFR BLD AUTO: 6.5 % (ref 5–12)
NEUTROPHILS NFR BLD AUTO: 5.66 10*3/MM3 (ref 1.7–7)
NEUTROPHILS NFR BLD AUTO: 70.6 % (ref 42.7–76)
NRBC BLD AUTO-RTO: 0 /100 WBC (ref 0–0.2)
NT-PROBNP SERPL-MCNC: 866.1 PG/ML (ref 0–1800)
PLATELET # BLD AUTO: 156 10*3/MM3 (ref 140–450)
PMV BLD AUTO: 9.9 FL (ref 6–12)
POTASSIUM SERPL-SCNC: 4.4 MMOL/L (ref 3.5–5.2)
PROT SERPL-MCNC: 7 G/DL (ref 6–8.5)
PROTHROMBIN TIME: 14.8 SECONDS (ref 12.8–14.5)
QT INTERVAL: 342 MS
QTC INTERVAL: 422 MS
RBC # BLD AUTO: 4.27 10*6/MM3 (ref 4.14–5.8)
SODIUM SERPL-SCNC: 137 MMOL/L (ref 136–145)
TROPONIN T SERPL-MCNC: 0.01 NG/ML (ref 0–0.03)
TROPONIN T SERPL-MCNC: 0.01 NG/ML (ref 0–0.03)
WBC # BLD AUTO: 8.01 10*3/MM3 (ref 3.4–10.8)
WHOLE BLOOD HOLD SPECIMEN: NORMAL
WHOLE BLOOD HOLD SPECIMEN: NORMAL

## 2021-09-03 PROCEDURE — 74176 CT ABD & PELVIS W/O CONTRAST: CPT | Performed by: RADIOLOGY

## 2021-09-03 PROCEDURE — 85610 PROTHROMBIN TIME: CPT | Performed by: FAMILY MEDICINE

## 2021-09-03 PROCEDURE — 83690 ASSAY OF LIPASE: CPT | Performed by: FAMILY MEDICINE

## 2021-09-03 PROCEDURE — 83880 ASSAY OF NATRIURETIC PEPTIDE: CPT | Performed by: FAMILY MEDICINE

## 2021-09-03 PROCEDURE — 85025 COMPLETE CBC W/AUTO DIFF WBC: CPT | Performed by: FAMILY MEDICINE

## 2021-09-03 PROCEDURE — 87040 BLOOD CULTURE FOR BACTERIA: CPT | Performed by: FAMILY MEDICINE

## 2021-09-03 PROCEDURE — 80053 COMPREHEN METABOLIC PANEL: CPT | Performed by: FAMILY MEDICINE

## 2021-09-03 PROCEDURE — 93010 ELECTROCARDIOGRAM REPORT: CPT | Performed by: INTERNAL MEDICINE

## 2021-09-03 PROCEDURE — 71045 X-RAY EXAM CHEST 1 VIEW: CPT

## 2021-09-03 PROCEDURE — 83605 ASSAY OF LACTIC ACID: CPT | Performed by: FAMILY MEDICINE

## 2021-09-03 PROCEDURE — 84484 ASSAY OF TROPONIN QUANT: CPT | Performed by: FAMILY MEDICINE

## 2021-09-03 PROCEDURE — 71045 X-RAY EXAM CHEST 1 VIEW: CPT | Performed by: RADIOLOGY

## 2021-09-03 PROCEDURE — 93005 ELECTROCARDIOGRAM TRACING: CPT | Performed by: FAMILY MEDICINE

## 2021-09-03 PROCEDURE — 99285 EMERGENCY DEPT VISIT HI MDM: CPT

## 2021-09-03 PROCEDURE — 74176 CT ABD & PELVIS W/O CONTRAST: CPT

## 2021-09-03 RX ORDER — SODIUM CHLORIDE 0.9 % (FLUSH) 0.9 %
10 SYRINGE (ML) INJECTION AS NEEDED
Status: DISCONTINUED | OUTPATIENT
Start: 2021-09-03 | End: 2021-09-03 | Stop reason: HOSPADM

## 2021-09-03 NOTE — ED PROVIDER NOTES
Subjective   86-year-old male with history of COPD dementia hypertension atrial fibrillation presents the emergency room by EMS for complaints of not Janes take his medications.  EMS reports that family reported that patient was not wanting to take his medications.  Patient states that he is having some abdominal discomfort.  He denies nausea vomiting.  Denies difficulty with urination.  He denies fever chills.  Patient states he does wear oxygen continuously at home.  He states he does feel mildly short of breath due to not being on his home oxygen requirement.      Abdominal Pain  Pain location:  Generalized  Pain quality: aching    Chronicity:  New  Relieved by:  Nothing  Worsened by:  Nothing  Ineffective treatments:  None tried  Associated symptoms: shortness of breath    Associated symptoms: no chest pain, no constipation, no cough, no diarrhea, no fatigue, no fever, no nausea, no sore throat and no vomiting        Review of Systems   Constitutional: Negative for fatigue and fever.   HENT: Negative for sore throat.    Respiratory: Positive for shortness of breath. Negative for cough and wheezing.    Cardiovascular: Negative for chest pain and palpitations.   Gastrointestinal: Positive for abdominal pain. Negative for constipation, diarrhea, nausea and vomiting.   Musculoskeletal: Negative for myalgias.   All other systems reviewed and are negative.      Past Medical History:   Diagnosis Date   • Arthritis    • Atrial fibrillation (CMS/HCC)    • COPD (chronic obstructive pulmonary disease) (CMS/HCC)    • Dementia (CMS/HCC)    • Elevated cholesterol    • HTN (hypertension)        No Known Allergies    Past Surgical History:   Procedure Laterality Date   • HAND SURGERY     • KNEE SURGERY         Family History   Problem Relation Age of Onset   • Hypertension Mother    • Arthritis Mother    • Hypertension Father    • Arthritis Father    • Diabetes Sister    • Cancer Sister    • Diabetes Brother        Social  History     Socioeconomic History   • Marital status:      Spouse name: Not on file   • Number of children: Not on file   • Years of education: Not on file   • Highest education level: Not on file   Tobacco Use   • Smoking status: Former Smoker     Years: 40.00     Types: Cigarettes   • Smokeless tobacco: Never Used   • Tobacco comment: quit smoking 35-40 years ago   Vaping Use   • Vaping Use: Never used   Substance and Sexual Activity   • Alcohol use: Not Currently     Comment: occcasionally when younger    • Drug use: No   • Sexual activity: Defer     Partners: Female           Objective   Physical Exam  Vitals and nursing note reviewed.   Constitutional:       Comments: Chronically ill-appearing.  Elderly.   HENT:      Head: Normocephalic and atraumatic.      Comments: Dentures in place.  Moist mucous membranes     Mouth/Throat:      Mouth: Mucous membranes are moist.   Eyes:      Conjunctiva/sclera: Conjunctivae normal.      Pupils: Pupils are equal, round, and reactive to light.   Neck:      Comments: No JVD  Cardiovascular:      Rate and Rhythm: Rhythm irregular.      Heart sounds: No murmur heard.        Comments: 2+ radial pulses.  Pulmonary:      Effort: Pulmonary effort is normal.      Breath sounds: Normal breath sounds. No wheezing, rhonchi or rales.      Comments: No accessory muscle use.  Abdominal:      General: Bowel sounds are normal.      Palpations: Abdomen is soft.      Tenderness: There is abdominal tenderness. There is no guarding.      Hernia: No hernia is present.   Musculoskeletal:      Cervical back: Neck supple.      Right lower leg: No edema.      Left lower leg: No edema.   Skin:     General: Skin is warm and dry.   Neurological:      General: No focal deficit present.      Mental Status: He is alert.      Comments: Disoriented to time and situation   Psychiatric:         Mood and Affect: Mood normal.         Procedures           ED Course  ED Course as of Sep 04 1342   Fri Sep  03, 2021   1552 Chest x-ray shows what appears to be pulmonary nodules no acute process appreciated.    [BB]   1604 CBC unremarkable    [BB]   1619 Lactic acid unremarkable troponin unremarkable lipase unremarkable BMP unremarkable creatinine 1.37    [BB]   1634 KG interpretation, ventricular rate 92, , QTc 422, junctional rhythm versus fibrillation.    [JM]   1649 Patient evaluated by psychiatry who does not feel that patient meets inpatient criteria    [BB]   1741 CT Abdomen Pelvis Without Contrast    Result Date: 9/3/2021    Some barium streak artifact throughout the colon limits evaluation.  This report was finalized on 9/3/2021 5:32 PM by Dr. Naldo Dixon MD.      XR Chest 1 View    Result Date: 9/3/2021    No acute cardiopulmonary findings identified.  This report was finalized on 9/3/2021 4:02 PM by Dr. Chad Evans MD.          [BB]   1825 Patient is tolerated.  Patient presents cardiac enzymes returned negative.  Patient has not given urine sample.  Patient desires to leave is not beating patient criteria at this time.  Patient discharged    [BB]      ED Course User Index  [BB] Alexis Lemons MD  [JM] Ayush Macario, DO                                           [facility]    Final diagnoses:   Dementia without behavioral disturbance, unspecified dementia type (CMS/HCC)   Abdominal pain, unspecified abdominal location       ED Disposition  ED Disposition     ED Disposition Condition Comment    Discharge Stable           Rc Ojeda MD  [address] [place] [address] DR Donny WATTS [id number] [phone]    In 2 days           Medication List      No changes were made to your prescriptions during this visit.          Alexis Lemons MD  09/04/21 [id number]

## 2021-09-03 NOTE — NURSING NOTE
results.Stephenie presented to ED via EMS.Patient has been diagnosed with neurocognitive  Disorder (Dementia) I spoke to his wife and  she stated she could not care for him any longer she stated  the patient tries to  shower fully clothed.Patient wife  reported to me  that he is not eating or taking his medications . Patient is not sleeping and being combative to all family members. Patient denies SI,HI,alcohol,drugs and AVH.Patient rated anxiety and depression 2/10 Patient has a history of leg cramps.Patient has a history of Afib

## 2021-09-03 NOTE — NURSING NOTE
Patient arrived in intake.Patient arrived in intake.Patient provided a mask and encouraged to social distance

## 2021-09-03 NOTE — NURSING NOTE
Called and provided intake information including all abnormal  labs to Dr Hatch .discharge orders received.RBVOx2. Patient and ED provider aware.

## 2021-09-08 LAB
BACTERIA SPEC AEROBE CULT: NORMAL
BACTERIA SPEC AEROBE CULT: NORMAL

## 2021-12-13 ENCOUNTER — APPOINTMENT (OUTPATIENT)
Dept: GENERAL RADIOLOGY | Facility: HOSPITAL | Age: 86
End: 2021-12-13

## 2021-12-13 ENCOUNTER — HOSPITAL ENCOUNTER (INPATIENT)
Facility: HOSPITAL | Age: 86
LOS: 1 days | End: 2021-12-14
Attending: PSYCHIATRY & NEUROLOGY | Admitting: PSYCHIATRY & NEUROLOGY

## 2021-12-13 ENCOUNTER — APPOINTMENT (OUTPATIENT)
Dept: CT IMAGING | Facility: HOSPITAL | Age: 86
End: 2021-12-13

## 2021-12-13 ENCOUNTER — HOSPITAL ENCOUNTER (EMERGENCY)
Facility: HOSPITAL | Age: 86
Discharge: PSYCHIATRIC OR SUBSTANCE USE FACILITY | End: 2021-12-13
Attending: EMERGENCY MEDICINE | Admitting: EMERGENCY MEDICINE

## 2021-12-13 VITALS
BODY MASS INDEX: 22.22 KG/M2 | TEMPERATURE: 98.8 F | HEART RATE: 87 BPM | WEIGHT: 150 LBS | DIASTOLIC BLOOD PRESSURE: 87 MMHG | OXYGEN SATURATION: 97 % | HEIGHT: 69 IN | SYSTOLIC BLOOD PRESSURE: 131 MMHG | RESPIRATION RATE: 20 BRPM

## 2021-12-13 DIAGNOSIS — F03.918 AGGRESSIVE BEHAVIOR DUE TO DEMENTIA (HCC): Primary | ICD-10-CM

## 2021-12-13 DIAGNOSIS — F03.91 DEMENTIA WITH BEHAVIORAL DISTURBANCE, UNSPECIFIED DEMENTIA TYPE: ICD-10-CM

## 2021-12-13 DIAGNOSIS — M25.559 HIP PAIN: ICD-10-CM

## 2021-12-13 PROBLEM — R45.86 MOOD DISTURBANCE: Status: ACTIVE | Noted: 2021-12-13

## 2021-12-13 LAB
ALBUMIN SERPL-MCNC: 4.13 G/DL (ref 3.5–5.2)
ALBUMIN/GLOB SERPL: 1.5 G/DL
ALP SERPL-CCNC: 99 U/L (ref 39–117)
ALT SERPL W P-5'-P-CCNC: 19 U/L (ref 1–41)
AMPHET+METHAMPHET UR QL: NEGATIVE
AMPHETAMINES UR QL: NEGATIVE
ANION GAP SERPL CALCULATED.3IONS-SCNC: 18.1 MMOL/L (ref 5–15)
AST SERPL-CCNC: 39 U/L (ref 1–40)
BACTERIA UR QL AUTO: ABNORMAL /HPF
BARBITURATES UR QL SCN: NEGATIVE
BASOPHILS # BLD AUTO: 0.05 10*3/MM3 (ref 0–0.2)
BASOPHILS NFR BLD AUTO: 0.6 % (ref 0–1.5)
BENZODIAZ UR QL SCN: NEGATIVE
BILIRUB SERPL-MCNC: 1.1 MG/DL (ref 0–1.2)
BILIRUB UR QL STRIP: NEGATIVE
BUN SERPL-MCNC: 18 MG/DL (ref 8–23)
BUN/CREAT SERPL: 14.3 (ref 7–25)
BUPRENORPHINE SERPL-MCNC: NEGATIVE NG/ML
CALCIUM SPEC-SCNC: 9.4 MG/DL (ref 8.6–10.5)
CANNABINOIDS SERPL QL: NEGATIVE
CHLORIDE SERPL-SCNC: 105 MMOL/L (ref 98–107)
CLARITY UR: ABNORMAL
CO2 SERPL-SCNC: 18.9 MMOL/L (ref 22–29)
COCAINE UR QL: NEGATIVE
COLOR UR: YELLOW
CREAT SERPL-MCNC: 1.26 MG/DL (ref 0.76–1.27)
DEPRECATED RDW RBC AUTO: 50.4 FL (ref 37–54)
EOSINOPHIL # BLD AUTO: 0.3 10*3/MM3 (ref 0–0.4)
EOSINOPHIL NFR BLD AUTO: 3.3 % (ref 0.3–6.2)
ERYTHROCYTE [DISTWIDTH] IN BLOOD BY AUTOMATED COUNT: 13.6 % (ref 12.3–15.4)
ETHANOL BLD-MCNC: <10 MG/DL (ref 0–10)
ETHANOL UR QL: <0.01 %
FLUAV RNA RESP QL NAA+PROBE: NOT DETECTED
FLUBV RNA RESP QL NAA+PROBE: NOT DETECTED
GFR SERPL CREATININE-BSD FRML MDRD: 55 ML/MIN/1.73
GLOBULIN UR ELPH-MCNC: 2.7 GM/DL
GLUCOSE SERPL-MCNC: 81 MG/DL (ref 65–99)
GLUCOSE UR STRIP-MCNC: NEGATIVE MG/DL
HCT VFR BLD AUTO: 36 % (ref 37.5–51)
HGB BLD-MCNC: 11.2 G/DL (ref 13–17.7)
HGB UR QL STRIP.AUTO: ABNORMAL
HYALINE CASTS UR QL AUTO: ABNORMAL /LPF
IMM GRANULOCYTES # BLD AUTO: 0.04 10*3/MM3 (ref 0–0.05)
IMM GRANULOCYTES NFR BLD AUTO: 0.4 % (ref 0–0.5)
KETONES UR QL STRIP: ABNORMAL
LEUKOCYTE ESTERASE UR QL STRIP.AUTO: NEGATIVE
LYMPHOCYTES # BLD AUTO: 1.2 10*3/MM3 (ref 0.7–3.1)
LYMPHOCYTES NFR BLD AUTO: 13.2 % (ref 19.6–45.3)
MAGNESIUM SERPL-MCNC: 2 MG/DL (ref 1.6–2.4)
MCH RBC QN AUTO: 31.4 PG (ref 26.6–33)
MCHC RBC AUTO-ENTMCNC: 31.1 G/DL (ref 31.5–35.7)
MCV RBC AUTO: 100.8 FL (ref 79–97)
METHADONE UR QL SCN: NEGATIVE
MONOCYTES # BLD AUTO: 0.67 10*3/MM3 (ref 0.1–0.9)
MONOCYTES NFR BLD AUTO: 7.4 % (ref 5–12)
NEUTROPHILS NFR BLD AUTO: 6.82 10*3/MM3 (ref 1.7–7)
NEUTROPHILS NFR BLD AUTO: 75.1 % (ref 42.7–76)
NITRITE UR QL STRIP: NEGATIVE
NRBC BLD AUTO-RTO: 0 /100 WBC (ref 0–0.2)
OPIATES UR QL: NEGATIVE
OXYCODONE UR QL SCN: NEGATIVE
PCP UR QL SCN: NEGATIVE
PH UR STRIP.AUTO: 5.5 [PH] (ref 5–8)
PLATELET # BLD AUTO: 188 10*3/MM3 (ref 140–450)
PMV BLD AUTO: 9.5 FL (ref 6–12)
POTASSIUM SERPL-SCNC: 4.9 MMOL/L (ref 3.5–5.2)
PROPOXYPH UR QL: NEGATIVE
PROT SERPL-MCNC: 6.8 G/DL (ref 6–8.5)
PROT UR QL STRIP: ABNORMAL
QT INTERVAL: 336 MS
QTC INTERVAL: 448 MS
RBC # BLD AUTO: 3.57 10*6/MM3 (ref 4.14–5.8)
RBC # UR STRIP: ABNORMAL /HPF
REF LAB TEST METHOD: ABNORMAL
SARS-COV-2 RNA RESP QL NAA+PROBE: NOT DETECTED
SODIUM SERPL-SCNC: 142 MMOL/L (ref 136–145)
SP GR UR STRIP: 1.02 (ref 1–1.03)
SQUAMOUS #/AREA URNS HPF: ABNORMAL /HPF
TRICYCLICS UR QL SCN: NEGATIVE
TROPONIN T SERPL-MCNC: 0.01 NG/ML (ref 0–0.03)
UROBILINOGEN UR QL STRIP: ABNORMAL
WBC # UR STRIP: ABNORMAL /HPF
WBC NRBC COR # BLD: 9.08 10*3/MM3 (ref 3.4–10.8)

## 2021-12-13 PROCEDURE — 25010000002 MORPHINE PER 10 MG: Performed by: EMERGENCY MEDICINE

## 2021-12-13 PROCEDURE — 99284 EMERGENCY DEPT VISIT MOD MDM: CPT

## 2021-12-13 PROCEDURE — 80053 COMPREHEN METABOLIC PANEL: CPT | Performed by: EMERGENCY MEDICINE

## 2021-12-13 PROCEDURE — 93010 ELECTROCARDIOGRAM REPORT: CPT | Performed by: INTERNAL MEDICINE

## 2021-12-13 PROCEDURE — 83735 ASSAY OF MAGNESIUM: CPT | Performed by: EMERGENCY MEDICINE

## 2021-12-13 PROCEDURE — 72131 CT LUMBAR SPINE W/O DYE: CPT

## 2021-12-13 PROCEDURE — 84484 ASSAY OF TROPONIN QUANT: CPT | Performed by: EMERGENCY MEDICINE

## 2021-12-13 PROCEDURE — 72192 CT PELVIS W/O DYE: CPT | Performed by: RADIOLOGY

## 2021-12-13 PROCEDURE — 81001 URINALYSIS AUTO W/SCOPE: CPT | Performed by: EMERGENCY MEDICINE

## 2021-12-13 PROCEDURE — 87636 SARSCOV2 & INF A&B AMP PRB: CPT | Performed by: EMERGENCY MEDICINE

## 2021-12-13 PROCEDURE — 25010000002 ONDANSETRON PER 1 MG: Performed by: EMERGENCY MEDICINE

## 2021-12-13 PROCEDURE — 82077 ASSAY SPEC XCP UR&BREATH IA: CPT | Performed by: EMERGENCY MEDICINE

## 2021-12-13 PROCEDURE — 72131 CT LUMBAR SPINE W/O DYE: CPT | Performed by: RADIOLOGY

## 2021-12-13 PROCEDURE — 96375 TX/PRO/DX INJ NEW DRUG ADDON: CPT

## 2021-12-13 PROCEDURE — 93005 ELECTROCARDIOGRAM TRACING: CPT | Performed by: PSYCHIATRY & NEUROLOGY

## 2021-12-13 PROCEDURE — 73562 X-RAY EXAM OF KNEE 3: CPT | Performed by: RADIOLOGY

## 2021-12-13 PROCEDURE — 73502 X-RAY EXAM HIP UNI 2-3 VIEWS: CPT | Performed by: RADIOLOGY

## 2021-12-13 PROCEDURE — 93005 ELECTROCARDIOGRAM TRACING: CPT | Performed by: EMERGENCY MEDICINE

## 2021-12-13 PROCEDURE — 94640 AIRWAY INHALATION TREATMENT: CPT

## 2021-12-13 PROCEDURE — 36415 COLL VENOUS BLD VENIPUNCTURE: CPT

## 2021-12-13 PROCEDURE — 73502 X-RAY EXAM HIP UNI 2-3 VIEWS: CPT

## 2021-12-13 PROCEDURE — 73562 X-RAY EXAM OF KNEE 3: CPT

## 2021-12-13 PROCEDURE — 96374 THER/PROPH/DIAG INJ IV PUSH: CPT

## 2021-12-13 PROCEDURE — 72192 CT PELVIS W/O DYE: CPT

## 2021-12-13 PROCEDURE — 85025 COMPLETE CBC W/AUTO DIFF WBC: CPT | Performed by: EMERGENCY MEDICINE

## 2021-12-13 PROCEDURE — 80306 DRUG TEST PRSMV INSTRMNT: CPT | Performed by: EMERGENCY MEDICINE

## 2021-12-13 PROCEDURE — 94799 UNLISTED PULMONARY SVC/PX: CPT

## 2021-12-13 RX ORDER — ACETAMINOPHEN 325 MG/1
650 TABLET ORAL EVERY 6 HOURS PRN
Status: DISCONTINUED | OUTPATIENT
Start: 2021-12-13 | End: 2021-12-14 | Stop reason: HOSPADM

## 2021-12-13 RX ORDER — DIPHENOXYLATE HYDROCHLORIDE AND ATROPINE SULFATE 2.5; .025 MG/1; MG/1
1 TABLET ORAL DAILY
Status: DISCONTINUED | OUTPATIENT
Start: 2021-12-13 | End: 2021-12-14 | Stop reason: HOSPADM

## 2021-12-13 RX ORDER — SODIUM CHLORIDE 0.9 % (FLUSH) 0.9 %
10 SYRINGE (ML) INJECTION AS NEEDED
Status: DISCONTINUED | OUTPATIENT
Start: 2021-12-13 | End: 2021-12-13 | Stop reason: HOSPADM

## 2021-12-13 RX ORDER — QUETIAPINE FUMARATE 100 MG/1
50 TABLET, FILM COATED ORAL 2 TIMES DAILY
Status: DISCONTINUED | OUTPATIENT
Start: 2021-12-13 | End: 2021-12-14 | Stop reason: HOSPADM

## 2021-12-13 RX ORDER — ECHINACEA PURPUREA EXTRACT 125 MG
2 TABLET ORAL AS NEEDED
Status: DISCONTINUED | OUTPATIENT
Start: 2021-12-13 | End: 2021-12-14 | Stop reason: HOSPADM

## 2021-12-13 RX ORDER — METOPROLOL SUCCINATE 25 MG/1
25 TABLET, EXTENDED RELEASE ORAL DAILY
Status: ON HOLD | COMMUNITY
End: 2022-01-03 | Stop reason: SDUPTHER

## 2021-12-13 RX ORDER — ALUMINA, MAGNESIA, AND SIMETHICONE 2400; 2400; 240 MG/30ML; MG/30ML; MG/30ML
15 SUSPENSION ORAL EVERY 6 HOURS PRN
Status: DISCONTINUED | OUTPATIENT
Start: 2021-12-13 | End: 2021-12-14 | Stop reason: HOSPADM

## 2021-12-13 RX ORDER — IBUPROFEN 400 MG/1
400 TABLET ORAL EVERY 6 HOURS PRN
Status: DISCONTINUED | OUTPATIENT
Start: 2021-12-13 | End: 2021-12-14 | Stop reason: HOSPADM

## 2021-12-13 RX ORDER — IPRATROPIUM BROMIDE AND ALBUTEROL SULFATE 2.5; .5 MG/3ML; MG/3ML
3 SOLUTION RESPIRATORY (INHALATION)
Status: DISCONTINUED | OUTPATIENT
Start: 2021-12-13 | End: 2021-12-14 | Stop reason: HOSPADM

## 2021-12-13 RX ORDER — ASPIRIN 81 MG/1
81 TABLET ORAL DAILY
Status: DISCONTINUED | OUTPATIENT
Start: 2021-12-13 | End: 2021-12-14 | Stop reason: HOSPADM

## 2021-12-13 RX ORDER — ATORVASTATIN CALCIUM 10 MG/1
10 TABLET, FILM COATED ORAL DAILY
Status: DISCONTINUED | OUTPATIENT
Start: 2021-12-13 | End: 2021-12-14 | Stop reason: HOSPADM

## 2021-12-13 RX ORDER — BISACODYL 5 MG/1
5 TABLET, DELAYED RELEASE ORAL DAILY PRN
Status: DISCONTINUED | OUTPATIENT
Start: 2021-12-13 | End: 2021-12-14 | Stop reason: HOSPADM

## 2021-12-13 RX ORDER — LOPERAMIDE HYDROCHLORIDE 2 MG/1
2 CAPSULE ORAL
Status: DISCONTINUED | OUTPATIENT
Start: 2021-12-13 | End: 2021-12-14 | Stop reason: HOSPADM

## 2021-12-13 RX ORDER — IPRATROPIUM BROMIDE AND ALBUTEROL SULFATE 2.5; .5 MG/3ML; MG/3ML
3 SOLUTION RESPIRATORY (INHALATION)
Status: ON HOLD | COMMUNITY
End: 2022-01-03 | Stop reason: SDUPTHER

## 2021-12-13 RX ORDER — MORPHINE SULFATE 2 MG/ML
2 INJECTION, SOLUTION INTRAMUSCULAR; INTRAVENOUS ONCE
Status: COMPLETED | OUTPATIENT
Start: 2021-12-13 | End: 2021-12-13

## 2021-12-13 RX ORDER — DONEPEZIL HYDROCHLORIDE 5 MG/1
5 TABLET, FILM COATED ORAL NIGHTLY
Status: DISCONTINUED | OUTPATIENT
Start: 2021-12-13 | End: 2021-12-14 | Stop reason: HOSPADM

## 2021-12-13 RX ORDER — ASPIRIN 81 MG/1
81 TABLET ORAL DAILY
Status: ON HOLD | COMMUNITY
End: 2022-01-03 | Stop reason: SDUPTHER

## 2021-12-13 RX ORDER — ONDANSETRON 4 MG/1
4 TABLET, FILM COATED ORAL EVERY 6 HOURS PRN
Status: DISCONTINUED | OUTPATIENT
Start: 2021-12-13 | End: 2021-12-14 | Stop reason: HOSPADM

## 2021-12-13 RX ORDER — TRAZODONE HYDROCHLORIDE 50 MG/1
12.5 TABLET ORAL NIGHTLY PRN
Status: DISCONTINUED | OUTPATIENT
Start: 2021-12-13 | End: 2021-12-14 | Stop reason: HOSPADM

## 2021-12-13 RX ORDER — LANOLIN ALCOHOL/MO/W.PET/CERES
3 CREAM (GRAM) TOPICAL NIGHTLY PRN
Status: DISCONTINUED | OUTPATIENT
Start: 2021-12-15 | End: 2021-12-14 | Stop reason: HOSPADM

## 2021-12-13 RX ORDER — LISINOPRIL 10 MG/1
10 TABLET ORAL DAILY
Status: DISCONTINUED | OUTPATIENT
Start: 2021-12-13 | End: 2021-12-14

## 2021-12-13 RX ORDER — BENZONATATE 100 MG/1
100 CAPSULE ORAL 3 TIMES DAILY PRN
Status: DISCONTINUED | OUTPATIENT
Start: 2021-12-13 | End: 2021-12-14 | Stop reason: HOSPADM

## 2021-12-13 RX ORDER — METOPROLOL SUCCINATE 25 MG/1
25 TABLET, EXTENDED RELEASE ORAL DAILY
Status: DISCONTINUED | OUTPATIENT
Start: 2021-12-13 | End: 2021-12-14

## 2021-12-13 RX ORDER — ONDANSETRON 2 MG/ML
4 INJECTION INTRAMUSCULAR; INTRAVENOUS ONCE
Status: COMPLETED | OUTPATIENT
Start: 2021-12-13 | End: 2021-12-13

## 2021-12-13 RX ADMIN — Medication 1 TABLET: at 21:19

## 2021-12-13 RX ADMIN — LISINOPRIL 10 MG: 10 TABLET ORAL at 21:19

## 2021-12-13 RX ADMIN — ATORVASTATIN CALCIUM 10 MG: 10 TABLET, FILM COATED ORAL at 21:19

## 2021-12-13 RX ADMIN — METOPROLOL SUCCINATE 25 MG: 25 TABLET, EXTENDED RELEASE ORAL at 21:19

## 2021-12-13 RX ADMIN — QUETIAPINE FUMARATE 50 MG: 100 TABLET ORAL at 21:19

## 2021-12-13 RX ADMIN — DONEPEZIL HYDROCHLORIDE 5 MG: 5 TABLET, FILM COATED ORAL at 21:19

## 2021-12-13 RX ADMIN — APIXABAN 5 MG: 5 TABLET, FILM COATED ORAL at 21:19

## 2021-12-13 RX ADMIN — IPRATROPIUM BROMIDE AND ALBUTEROL SULFATE 3 ML: .5; 3 SOLUTION RESPIRATORY (INHALATION) at 21:05

## 2021-12-13 RX ADMIN — ONDANSETRON 4 MG: 2 INJECTION INTRAMUSCULAR; INTRAVENOUS at 13:26

## 2021-12-13 RX ADMIN — MORPHINE SULFATE 2 MG: 2 INJECTION, SOLUTION INTRAMUSCULAR; INTRAVENOUS at 13:27

## 2021-12-13 RX ADMIN — ASPIRIN 81 MG: 81 TABLET, COATED ORAL at 21:19

## 2021-12-13 NOTE — NURSING NOTE
Called and provided intake information including all abnormal  labs to Dr Hough admit orders received.RBVOx2. Patient and ED provider aware.
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
## 2021-12-14 ENCOUNTER — APPOINTMENT (OUTPATIENT)
Dept: GENERAL RADIOLOGY | Facility: HOSPITAL | Age: 86
End: 2021-12-14

## 2021-12-14 ENCOUNTER — HOSPITAL ENCOUNTER (OUTPATIENT)
Facility: HOSPITAL | Age: 86
Setting detail: OBSERVATION
LOS: 2 days | End: 2021-12-18
Attending: INTERNAL MEDICINE | Admitting: INTERNAL MEDICINE

## 2021-12-14 VITALS
HEART RATE: 87 BPM | OXYGEN SATURATION: 97 % | TEMPERATURE: 97.1 F | SYSTOLIC BLOOD PRESSURE: 121 MMHG | DIASTOLIC BLOOD PRESSURE: 72 MMHG | RESPIRATION RATE: 20 BRPM | WEIGHT: 150 LBS | HEIGHT: 69 IN | BODY MASS INDEX: 22.22 KG/M2

## 2021-12-14 PROBLEM — I95.9 HYPOTENSION: Status: ACTIVE | Noted: 2021-12-14

## 2021-12-14 PROBLEM — R06.02 SHORTNESS OF BREATH: Status: RESOLVED | Noted: 2018-08-20 | Resolved: 2021-12-14

## 2021-12-14 PROBLEM — R31.9 HEMATURIA: Status: RESOLVED | Noted: 2018-08-20 | Resolved: 2021-12-14

## 2021-12-14 PROBLEM — N17.9 ACUTE RENAL INJURY (HCC): Status: RESOLVED | Noted: 2021-06-12 | Resolved: 2021-12-14

## 2021-12-14 LAB
ALBUMIN SERPL-MCNC: 3.74 G/DL (ref 3.5–5.2)
ALBUMIN/GLOB SERPL: 1.5 G/DL
ALP SERPL-CCNC: 89 U/L (ref 39–117)
ALT SERPL W P-5'-P-CCNC: 15 U/L (ref 1–41)
AMMONIA BLD-SCNC: 26 UMOL/L (ref 16–60)
ANION GAP SERPL CALCULATED.3IONS-SCNC: 10.5 MMOL/L (ref 5–15)
AST SERPL-CCNC: 26 U/L (ref 1–40)
ATMOSPHERIC PRESS: 735 MMHG
BASE EXCESS BLDV CALC-SCNC: 0.2 MMOL/L (ref 0–2)
BASOPHILS # BLD AUTO: 0.05 10*3/MM3 (ref 0–0.2)
BASOPHILS NFR BLD AUTO: 0.7 % (ref 0–1.5)
BDY SITE: ABNORMAL
BILIRUB SERPL-MCNC: 0.7 MG/DL (ref 0–1.2)
BODY TEMPERATURE: 37 C
BUN SERPL-MCNC: 29 MG/DL (ref 8–23)
BUN/CREAT SERPL: 15.4 (ref 7–25)
CALCIUM SPEC-SCNC: 9.5 MG/DL (ref 8.6–10.5)
CHLORIDE SERPL-SCNC: 107 MMOL/L (ref 98–107)
CHOLEST SERPL-MCNC: 142 MG/DL (ref 0–200)
CO2 BLDA-SCNC: 28.7 MMOL/L (ref 22–33)
CO2 SERPL-SCNC: 22.5 MMOL/L (ref 22–29)
COHGB MFR BLD: 1.3 % (ref 0–5)
CREAT SERPL-MCNC: 1.88 MG/DL (ref 0.76–1.27)
CRP SERPL-MCNC: 1.3 MG/DL (ref 0–0.5)
D-LACTATE SERPL-SCNC: 1.1 MMOL/L (ref 0.5–2)
DEPRECATED RDW RBC AUTO: 51 FL (ref 37–54)
EOSINOPHIL # BLD AUTO: 0.17 10*3/MM3 (ref 0–0.4)
EOSINOPHIL NFR BLD AUTO: 2.3 % (ref 0.3–6.2)
ERYTHROCYTE [DISTWIDTH] IN BLOOD BY AUTOMATED COUNT: 13.9 % (ref 12.3–15.4)
GFR SERPL CREATININE-BSD FRML MDRD: 34 ML/MIN/1.73
GLOBULIN UR ELPH-MCNC: 2.5 GM/DL
GLUCOSE SERPL-MCNC: 134 MG/DL (ref 65–99)
HBA1C MFR BLD: 5.2 % (ref 4.8–5.6)
HCO3 BLDV-SCNC: 27 MMOL/L (ref 22–28)
HCT VFR BLD AUTO: 35 % (ref 37.5–51)
HDLC SERPL-MCNC: 50 MG/DL (ref 40–60)
HGB BLD-MCNC: 10.8 G/DL (ref 13–17.7)
HGB BLDA-MCNC: 11.5 G/DL (ref 14–18)
IMM GRANULOCYTES # BLD AUTO: 0.02 10*3/MM3 (ref 0–0.05)
IMM GRANULOCYTES NFR BLD AUTO: 0.3 % (ref 0–0.5)
INHALED O2 CONCENTRATION: 28 %
LDLC SERPL CALC-MCNC: 73 MG/DL (ref 0–100)
LDLC/HDLC SERPL: 1.43 {RATIO}
LYMPHOCYTES # BLD AUTO: 2.54 10*3/MM3 (ref 0.7–3.1)
LYMPHOCYTES NFR BLD AUTO: 34.5 % (ref 19.6–45.3)
Lab: ABNORMAL
MAGNESIUM SERPL-MCNC: 2.3 MG/DL (ref 1.6–2.4)
MCH RBC QN AUTO: 30.6 PG (ref 26.6–33)
MCHC RBC AUTO-ENTMCNC: 30.9 G/DL (ref 31.5–35.7)
MCV RBC AUTO: 99.2 FL (ref 79–97)
METHGB BLD QL: 0.2 % (ref 0–3)
MODALITY: ABNORMAL
MONOCYTES # BLD AUTO: 0.57 10*3/MM3 (ref 0.1–0.9)
MONOCYTES NFR BLD AUTO: 7.7 % (ref 5–12)
NEUTROPHILS NFR BLD AUTO: 4.02 10*3/MM3 (ref 1.7–7)
NEUTROPHILS NFR BLD AUTO: 54.5 % (ref 42.7–76)
NRBC BLD AUTO-RTO: 0 /100 WBC (ref 0–0.2)
NT-PROBNP SERPL-MCNC: 1398 PG/ML (ref 0–1800)
OXYHGB MFR BLDV: 64.6 % (ref 45–75)
PCO2 BLDV: 52.9 MM HG (ref 41–51)
PH BLDV: 7.32 PH UNITS (ref 7.32–7.42)
PHOSPHATE SERPL-MCNC: 2.7 MG/DL (ref 2.5–4.5)
PLATELET # BLD AUTO: 158 10*3/MM3 (ref 140–450)
PMV BLD AUTO: 9.8 FL (ref 6–12)
PO2 BLDV: 35 MM HG (ref 27–53)
POTASSIUM SERPL-SCNC: 3.9 MMOL/L (ref 3.5–5.2)
PROT SERPL-MCNC: 6.2 G/DL (ref 6–8.5)
QT INTERVAL: 386 MS
QTC INTERVAL: 419 MS
RBC # BLD AUTO: 3.53 10*6/MM3 (ref 4.14–5.8)
SAO2 % BLDCOV: 65.6 % (ref 45–75)
SODIUM SERPL-SCNC: 140 MMOL/L (ref 136–145)
TRIGL SERPL-MCNC: 103 MG/DL (ref 0–150)
TROPONIN T SERPL-MCNC: 0.03 NG/ML (ref 0–0.03)
VENTILATOR MODE: ABNORMAL
VLDLC SERPL-MCNC: 19 MG/DL (ref 5–40)
WBC NRBC COR # BLD: 7.37 10*3/MM3 (ref 3.4–10.8)

## 2021-12-14 PROCEDURE — 99220 PR INITIAL OBSERVATION CARE/DAY 70 MINUTES: CPT | Performed by: PHYSICIAN ASSISTANT

## 2021-12-14 PROCEDURE — 80061 LIPID PANEL: CPT | Performed by: PSYCHIATRY & NEUROLOGY

## 2021-12-14 PROCEDURE — 83880 ASSAY OF NATRIURETIC PEPTIDE: CPT | Performed by: INTERNAL MEDICINE

## 2021-12-14 PROCEDURE — 82805 BLOOD GASES W/O2 SATURATION: CPT

## 2021-12-14 PROCEDURE — 83036 HEMOGLOBIN GLYCOSYLATED A1C: CPT | Performed by: PSYCHIATRY & NEUROLOGY

## 2021-12-14 PROCEDURE — 80053 COMPREHEN METABOLIC PANEL: CPT | Performed by: INTERNAL MEDICINE

## 2021-12-14 PROCEDURE — 85025 COMPLETE CBC W/AUTO DIFF WBC: CPT | Performed by: INTERNAL MEDICINE

## 2021-12-14 PROCEDURE — G0378 HOSPITAL OBSERVATION PER HR: HCPCS

## 2021-12-14 PROCEDURE — 93010 ELECTROCARDIOGRAM REPORT: CPT | Performed by: INTERNAL MEDICINE

## 2021-12-14 PROCEDURE — 93005 ELECTROCARDIOGRAM TRACING: CPT | Performed by: INTERNAL MEDICINE

## 2021-12-14 PROCEDURE — 84484 ASSAY OF TROPONIN QUANT: CPT | Performed by: INTERNAL MEDICINE

## 2021-12-14 PROCEDURE — 99223 1ST HOSP IP/OBS HIGH 75: CPT | Performed by: PSYCHIATRY & NEUROLOGY

## 2021-12-14 PROCEDURE — 84100 ASSAY OF PHOSPHORUS: CPT | Performed by: INTERNAL MEDICINE

## 2021-12-14 PROCEDURE — 94799 UNLISTED PULMONARY SVC/PX: CPT

## 2021-12-14 PROCEDURE — 87040 BLOOD CULTURE FOR BACTERIA: CPT | Performed by: INTERNAL MEDICINE

## 2021-12-14 PROCEDURE — 82820 HEMOGLOBIN-OXYGEN AFFINITY: CPT

## 2021-12-14 PROCEDURE — 71045 X-RAY EXAM CHEST 1 VIEW: CPT

## 2021-12-14 PROCEDURE — 86140 C-REACTIVE PROTEIN: CPT | Performed by: INTERNAL MEDICINE

## 2021-12-14 PROCEDURE — 82140 ASSAY OF AMMONIA: CPT | Performed by: INTERNAL MEDICINE

## 2021-12-14 PROCEDURE — 83605 ASSAY OF LACTIC ACID: CPT | Performed by: INTERNAL MEDICINE

## 2021-12-14 PROCEDURE — 83735 ASSAY OF MAGNESIUM: CPT | Performed by: INTERNAL MEDICINE

## 2021-12-14 RX ORDER — ATORVASTATIN CALCIUM 10 MG/1
10 TABLET, FILM COATED ORAL DAILY
Status: CANCELLED | OUTPATIENT
Start: 2021-12-15

## 2021-12-14 RX ORDER — DONEPEZIL HYDROCHLORIDE 5 MG/1
5 TABLET, FILM COATED ORAL NIGHTLY
Status: DISCONTINUED | OUTPATIENT
Start: 2021-12-15 | End: 2021-12-18 | Stop reason: HOSPADM

## 2021-12-14 RX ORDER — DIPHENOXYLATE HYDROCHLORIDE AND ATROPINE SULFATE 2.5; .025 MG/1; MG/1
1 TABLET ORAL DAILY
Status: DISCONTINUED | OUTPATIENT
Start: 2021-12-15 | End: 2021-12-18 | Stop reason: HOSPADM

## 2021-12-14 RX ORDER — METOPROLOL SUCCINATE 25 MG/1
25 TABLET, EXTENDED RELEASE ORAL DAILY
Status: CANCELLED | OUTPATIENT
Start: 2021-12-15

## 2021-12-14 RX ORDER — ASPIRIN 81 MG/1
81 TABLET ORAL DAILY
Status: CANCELLED | OUTPATIENT
Start: 2021-12-15

## 2021-12-14 RX ORDER — IPRATROPIUM BROMIDE AND ALBUTEROL SULFATE 2.5; .5 MG/3ML; MG/3ML
3 SOLUTION RESPIRATORY (INHALATION)
Status: CANCELLED | OUTPATIENT
Start: 2021-12-15

## 2021-12-14 RX ORDER — IPRATROPIUM BROMIDE AND ALBUTEROL SULFATE 2.5; .5 MG/3ML; MG/3ML
3 SOLUTION RESPIRATORY (INHALATION)
Status: DISCONTINUED | OUTPATIENT
Start: 2021-12-15 | End: 2021-12-18 | Stop reason: HOSPADM

## 2021-12-14 RX ORDER — QUETIAPINE FUMARATE 100 MG/1
50 TABLET, FILM COATED ORAL 2 TIMES DAILY
Status: CANCELLED | OUTPATIENT
Start: 2021-12-15

## 2021-12-14 RX ORDER — DONEPEZIL HYDROCHLORIDE 5 MG/1
5 TABLET, FILM COATED ORAL NIGHTLY
Status: CANCELLED | OUTPATIENT
Start: 2021-12-15

## 2021-12-14 RX ORDER — DIPHENOXYLATE HYDROCHLORIDE AND ATROPINE SULFATE 2.5; .025 MG/1; MG/1
1 TABLET ORAL DAILY
Status: CANCELLED | OUTPATIENT
Start: 2021-12-15

## 2021-12-14 RX ORDER — ALUMINA, MAGNESIA, AND SIMETHICONE 2400; 2400; 240 MG/30ML; MG/30ML; MG/30ML
15 SUSPENSION ORAL EVERY 6 HOURS PRN
Status: DISCONTINUED | OUTPATIENT
Start: 2021-12-14 | End: 2021-12-18 | Stop reason: HOSPADM

## 2021-12-14 RX ORDER — ATORVASTATIN CALCIUM 40 MG/1
40 TABLET, FILM COATED ORAL DAILY
Status: DISCONTINUED | OUTPATIENT
Start: 2021-12-15 | End: 2021-12-18 | Stop reason: HOSPADM

## 2021-12-14 RX ORDER — ATORVASTATIN CALCIUM 10 MG/1
10 TABLET, FILM COATED ORAL DAILY
Status: DISCONTINUED | OUTPATIENT
Start: 2021-12-15 | End: 2021-12-14

## 2021-12-14 RX ORDER — ASPIRIN 81 MG/1
81 TABLET ORAL DAILY
Status: DISCONTINUED | OUTPATIENT
Start: 2021-12-15 | End: 2021-12-18 | Stop reason: HOSPADM

## 2021-12-14 RX ORDER — QUETIAPINE FUMARATE 25 MG/1
50 TABLET, FILM COATED ORAL 2 TIMES DAILY
Status: DISCONTINUED | OUTPATIENT
Start: 2021-12-15 | End: 2021-12-18 | Stop reason: HOSPADM

## 2021-12-14 RX ORDER — NITROGLYCERIN 0.4 MG/1
0.4 TABLET SUBLINGUAL
Status: DISCONTINUED | OUTPATIENT
Start: 2021-12-14 | End: 2021-12-18 | Stop reason: HOSPADM

## 2021-12-14 RX ORDER — ACETAMINOPHEN 325 MG/1
650 TABLET ORAL EVERY 6 HOURS PRN
Status: DISCONTINUED | OUTPATIENT
Start: 2021-12-14 | End: 2021-12-18 | Stop reason: HOSPADM

## 2021-12-14 RX ORDER — LISINOPRIL 10 MG/1
10 TABLET ORAL DAILY
Status: CANCELLED | OUTPATIENT
Start: 2021-12-15

## 2021-12-14 RX ORDER — IPRATROPIUM BROMIDE AND ALBUTEROL SULFATE 2.5; .5 MG/3ML; MG/3ML
3 SOLUTION RESPIRATORY (INHALATION)
Status: CANCELLED | OUTPATIENT
Start: 2021-12-14

## 2021-12-14 RX ORDER — BISACODYL 5 MG/1
5 TABLET, DELAYED RELEASE ORAL DAILY PRN
Status: DISCONTINUED | OUTPATIENT
Start: 2021-12-14 | End: 2021-12-18 | Stop reason: HOSPADM

## 2021-12-14 RX ORDER — ASPIRIN 81 MG/1
324 TABLET, CHEWABLE ORAL ONCE
Status: COMPLETED | OUTPATIENT
Start: 2021-12-15 | End: 2021-12-15

## 2021-12-14 RX ORDER — QUETIAPINE FUMARATE 25 MG/1
50 TABLET, FILM COATED ORAL 2 TIMES DAILY
Status: CANCELLED | OUTPATIENT
Start: 2021-12-15

## 2021-12-14 RX ADMIN — DONEPEZIL HYDROCHLORIDE 5 MG: 5 TABLET, FILM COATED ORAL at 21:23

## 2021-12-14 RX ADMIN — IPRATROPIUM BROMIDE AND ALBUTEROL SULFATE 3 ML: .5; 3 SOLUTION RESPIRATORY (INHALATION) at 14:53

## 2021-12-14 RX ADMIN — ASPIRIN 81 MG: 81 TABLET, COATED ORAL at 08:13

## 2021-12-14 RX ADMIN — APIXABAN 5 MG: 5 TABLET, FILM COATED ORAL at 21:23

## 2021-12-14 RX ADMIN — IPRATROPIUM BROMIDE AND ALBUTEROL SULFATE 3 ML: .5; 3 SOLUTION RESPIRATORY (INHALATION) at 07:59

## 2021-12-14 RX ADMIN — ACETAMINOPHEN 650 MG: 325 TABLET ORAL at 21:23

## 2021-12-14 RX ADMIN — QUETIAPINE FUMARATE 50 MG: 100 TABLET ORAL at 08:14

## 2021-12-14 RX ADMIN — APIXABAN 5 MG: 5 TABLET, FILM COATED ORAL at 08:13

## 2021-12-14 RX ADMIN — ATORVASTATIN CALCIUM 10 MG: 10 TABLET, FILM COATED ORAL at 08:13

## 2021-12-14 RX ADMIN — TRAZODONE HYDROCHLORIDE 12.5 MG: 50 TABLET ORAL at 21:23

## 2021-12-14 RX ADMIN — QUETIAPINE FUMARATE 50 MG: 100 TABLET ORAL at 21:23

## 2021-12-14 RX ADMIN — IPRATROPIUM BROMIDE AND ALBUTEROL SULFATE 3 ML: .5; 3 SOLUTION RESPIRATORY (INHALATION) at 19:52

## 2021-12-14 RX ADMIN — IBUPROFEN 400 MG: 400 TABLET, FILM COATED ORAL at 08:14

## 2021-12-14 RX ADMIN — Medication 1 TABLET: at 08:13
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
## 2021-12-14 NOTE — PHARMACY PATIENT ASSISTANCE
Pharmacy checked on the price of Eliquis and according to the patients home pharmacy he previously filled a 30 day supply on 10/26/2021 and its co-pay was $47.  Patient is ok with this price.      Thank you,  Stevie Haynes, Pharmacy Intern  12/14/21  10:19 EST

## 2021-12-14 NOTE — PLAN OF CARE
Problem: Adult Behavioral Health Plan of Care  Goal: Plan of Care Review  Recent Flowsheet Documentation  Taken 12/13/2021 2101 by Viviana Winslow, XIOMARA  Patient Agreement with Plan of Care: unable to participate   Goal Outcome Evaluation:     Patient Agreement with Plan of Care: unable to participate         New admission.  Care plan initiated.
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
## 2021-12-15 ENCOUNTER — APPOINTMENT (OUTPATIENT)
Dept: CARDIOLOGY | Facility: HOSPITAL | Age: 86
End: 2021-12-15

## 2021-12-15 ENCOUNTER — APPOINTMENT (OUTPATIENT)
Dept: ULTRASOUND IMAGING | Facility: HOSPITAL | Age: 86
End: 2021-12-15

## 2021-12-15 PROBLEM — N18.30 CHRONIC KIDNEY DISEASE, STAGE III (MODERATE) (HCC): Chronic | Status: ACTIVE | Noted: 2017-08-31

## 2021-12-15 PROBLEM — D53.9 MACROCYTIC ANEMIA: Status: ACTIVE | Noted: 2021-12-15

## 2021-12-15 PROBLEM — D53.9 MACROCYTIC ANEMIA: Chronic | Status: ACTIVE | Noted: 2021-12-15

## 2021-12-15 PROBLEM — G30.9 ALZHEIMER'S DEMENTIA WITH BEHAVIORAL DISTURBANCE (HCC): Status: ACTIVE | Noted: 2021-12-15

## 2021-12-15 PROBLEM — R65.10 SIRS (SYSTEMIC INFLAMMATORY RESPONSE SYNDROME) (HCC): Status: ACTIVE | Noted: 2021-12-15

## 2021-12-15 PROBLEM — R94.31 PROLONGED Q-T INTERVAL ON ECG: Status: ACTIVE | Noted: 2021-12-15

## 2021-12-15 PROBLEM — R33.8 ACUTE URINARY RETENTION: Status: ACTIVE | Noted: 2021-12-15

## 2021-12-15 PROBLEM — I50.22 CHRONIC SYSTOLIC HEART FAILURE (HCC): Chronic | Status: ACTIVE | Noted: 2019-01-23

## 2021-12-15 PROBLEM — N18.31 ACUTE RENAL FAILURE SUPERIMPOSED ON STAGE 3A CHRONIC KIDNEY DISEASE: Status: ACTIVE | Noted: 2021-06-12

## 2021-12-15 PROBLEM — F02.818 ALZHEIMER'S DEMENTIA WITH BEHAVIORAL DISTURBANCE (HCC): Status: ACTIVE | Noted: 2021-12-15

## 2021-12-15 PROBLEM — J96.11 CHRONIC RESPIRATORY FAILURE WITH HYPOXIA (HCC): Chronic | Status: ACTIVE | Noted: 2021-12-15

## 2021-12-15 PROBLEM — R77.8 ELEVATED TROPONIN: Status: ACTIVE | Noted: 2021-12-15

## 2021-12-15 LAB
ALBUMIN SERPL-MCNC: 3.6 G/DL (ref 3.5–5.2)
ALBUMIN/GLOB SERPL: 1.6 G/DL
ALP SERPL-CCNC: 81 U/L (ref 39–117)
ALT SERPL W P-5'-P-CCNC: 14 U/L (ref 1–41)
ANION GAP SERPL CALCULATED.3IONS-SCNC: 7.8 MMOL/L (ref 5–15)
AST SERPL-CCNC: 23 U/L (ref 1–40)
BACTERIA UR QL AUTO: ABNORMAL /HPF
BASOPHILS # BLD AUTO: 0.06 10*3/MM3 (ref 0–0.2)
BASOPHILS NFR BLD AUTO: 0.9 % (ref 0–1.5)
BH CV ECHO MEAS - AO ROOT AREA (BSA CORRECTED): 2.2
BH CV ECHO MEAS - AO ROOT AREA: 11.9 CM^2
BH CV ECHO MEAS - AO ROOT DIAM: 3.9 CM
BH CV ECHO MEAS - BSA(HAYCOCK): 1.8 M^2
BH CV ECHO MEAS - BSA: 1.8 M^2
BH CV ECHO MEAS - BZI_BMI: 20.8 KILOGRAMS/M^2
BH CV ECHO MEAS - BZI_METRIC_HEIGHT: 175.3 CM
BH CV ECHO MEAS - BZI_METRIC_WEIGHT: 64 KG
BH CV ECHO MEAS - EDV(CUBED): 64 ML
BH CV ECHO MEAS - EDV(MOD-SP4): 63.3 ML
BH CV ECHO MEAS - EDV(TEICH): 70 ML
BH CV ECHO MEAS - EF(CUBED): 7.3 %
BH CV ECHO MEAS - EF(MOD-SP4): 61.9 %
BH CV ECHO MEAS - EF(TEICH): 5.8 %
BH CV ECHO MEAS - ESV(CUBED): 59.3 ML
BH CV ECHO MEAS - ESV(MOD-SP4): 24.1 ML
BH CV ECHO MEAS - ESV(TEICH): 65.9 ML
BH CV ECHO MEAS - FS: 2.5 %
BH CV ECHO MEAS - IVS/LVPW: 0.88
BH CV ECHO MEAS - IVSD: 1.4 CM
BH CV ECHO MEAS - LA DIMENSION: 3.7 CM
BH CV ECHO MEAS - LA/AO: 0.95
BH CV ECHO MEAS - LV DIASTOLIC VOL/BSA (35-75): 35.6 ML/M^2
BH CV ECHO MEAS - LV MASS(C)D: 232.7 GRAMS
BH CV ECHO MEAS - LV MASS(C)DI: 130.7 GRAMS/M^2
BH CV ECHO MEAS - LV SYSTOLIC VOL/BSA (12-30): 13.5 ML/M^2
BH CV ECHO MEAS - LVIDD: 4 CM
BH CV ECHO MEAS - LVIDS: 3.9 CM
BH CV ECHO MEAS - LVLD AP4: 6.9 CM
BH CV ECHO MEAS - LVLS AP4: 6.1 CM
BH CV ECHO MEAS - LVOT AREA (M): 4.5 CM^2
BH CV ECHO MEAS - LVOT AREA: 4.5 CM^2
BH CV ECHO MEAS - LVOT DIAM: 2.4 CM
BH CV ECHO MEAS - LVPWD: 1.6 CM
BH CV ECHO MEAS - MV A MAX VEL: 27 CM/SEC
BH CV ECHO MEAS - MV E MAX VEL: 60.4 CM/SEC
BH CV ECHO MEAS - MV E/A: 2.2
BH CV ECHO MEAS - PA ACC TIME: 0.09 SEC
BH CV ECHO MEAS - PA PR(ACCEL): 37.6 MMHG
BH CV ECHO MEAS - RAP SYSTOLE: 10 MMHG
BH CV ECHO MEAS - RVSP: 44.3 MMHG
BH CV ECHO MEAS - SI(CUBED): 2.6 ML/M^2
BH CV ECHO MEAS - SI(MOD-SP4): 22 ML/M^2
BH CV ECHO MEAS - SI(TEICH): 2.3 ML/M^2
BH CV ECHO MEAS - SV(CUBED): 4.7 ML
BH CV ECHO MEAS - SV(MOD-SP4): 39.2 ML
BH CV ECHO MEAS - SV(TEICH): 4.1 ML
BH CV ECHO MEAS - TR MAX VEL: 293 CM/SEC
BILIRUB SERPL-MCNC: 0.7 MG/DL (ref 0–1.2)
BILIRUB UR QL STRIP: ABNORMAL
BUN SERPL-MCNC: 30 MG/DL (ref 8–23)
BUN/CREAT SERPL: 18.9 (ref 7–25)
CALCIUM SPEC-SCNC: 9.2 MG/DL (ref 8.6–10.5)
CHLORIDE SERPL-SCNC: 110 MMOL/L (ref 98–107)
CLARITY UR: ABNORMAL
CO2 SERPL-SCNC: 23.2 MMOL/L (ref 22–29)
COLOR UR: ABNORMAL
CREAT SERPL-MCNC: 1.59 MG/DL (ref 0.76–1.27)
DEPRECATED RDW RBC AUTO: 51.2 FL (ref 37–54)
EOSINOPHIL # BLD AUTO: 0.24 10*3/MM3 (ref 0–0.4)
EOSINOPHIL NFR BLD AUTO: 3.5 % (ref 0.3–6.2)
ERYTHROCYTE [DISTWIDTH] IN BLOOD BY AUTOMATED COUNT: 14 % (ref 12.3–15.4)
FOLATE SERPL-MCNC: >20 NG/ML (ref 4.78–24.2)
GFR SERPL CREATININE-BSD FRML MDRD: 42 ML/MIN/1.73
GLOBULIN UR ELPH-MCNC: 2.3 GM/DL
GLUCOSE BLDC GLUCOMTR-MCNC: 117 MG/DL (ref 70–130)
GLUCOSE BLDC GLUCOMTR-MCNC: 119 MG/DL (ref 70–130)
GLUCOSE BLDC GLUCOMTR-MCNC: 126 MG/DL (ref 70–130)
GLUCOSE BLDC GLUCOMTR-MCNC: 151 MG/DL (ref 70–130)
GLUCOSE BLDC GLUCOMTR-MCNC: 190 MG/DL (ref 70–130)
GLUCOSE SERPL-MCNC: 111 MG/DL (ref 65–99)
GLUCOSE UR STRIP-MCNC: NEGATIVE MG/DL
HCT VFR BLD AUTO: 32.6 % (ref 37.5–51)
HGB BLD-MCNC: 10.2 G/DL (ref 13–17.7)
HGB UR QL STRIP.AUTO: ABNORMAL
HYALINE CASTS UR QL AUTO: ABNORMAL /LPF
IMM GRANULOCYTES # BLD AUTO: 0.02 10*3/MM3 (ref 0–0.05)
IMM GRANULOCYTES NFR BLD AUTO: 0.3 % (ref 0–0.5)
KETONES UR QL STRIP: ABNORMAL
LEUKOCYTE ESTERASE UR QL STRIP.AUTO: ABNORMAL
LYMPHOCYTES # BLD AUTO: 2.1 10*3/MM3 (ref 0.7–3.1)
LYMPHOCYTES NFR BLD AUTO: 30.5 % (ref 19.6–45.3)
MAGNESIUM SERPL-MCNC: 2.4 MG/DL (ref 1.6–2.4)
MAXIMAL PREDICTED HEART RATE: 136 BPM
MCH RBC QN AUTO: 31.3 PG (ref 26.6–33)
MCHC RBC AUTO-ENTMCNC: 31.3 G/DL (ref 31.5–35.7)
MCV RBC AUTO: 100 FL (ref 79–97)
MONOCYTES # BLD AUTO: 0.54 10*3/MM3 (ref 0.1–0.9)
MONOCYTES NFR BLD AUTO: 7.8 % (ref 5–12)
NEUTROPHILS NFR BLD AUTO: 3.93 10*3/MM3 (ref 1.7–7)
NEUTROPHILS NFR BLD AUTO: 57 % (ref 42.7–76)
NITRITE UR QL STRIP: NEGATIVE
NRBC BLD AUTO-RTO: 0 /100 WBC (ref 0–0.2)
PH UR STRIP.AUTO: 5.5 [PH] (ref 5–8)
PLATELET # BLD AUTO: 147 10*3/MM3 (ref 140–450)
PMV BLD AUTO: 9.5 FL (ref 6–12)
POTASSIUM SERPL-SCNC: 4.1 MMOL/L (ref 3.5–5.2)
PROT SERPL-MCNC: 5.9 G/DL (ref 6–8.5)
PROT UR QL STRIP: ABNORMAL
QT INTERVAL: 366 MS
QT INTERVAL: 374 MS
QTC INTERVAL: 385 MS
QTC INTERVAL: 474 MS
RBC # BLD AUTO: 3.26 10*6/MM3 (ref 4.14–5.8)
RBC # UR STRIP: ABNORMAL /HPF
REF LAB TEST METHOD: ABNORMAL
SODIUM SERPL-SCNC: 141 MMOL/L (ref 136–145)
SP GR UR STRIP: 1.02 (ref 1–1.03)
SQUAMOUS #/AREA URNS HPF: ABNORMAL /HPF
STRESS TARGET HR: 116 BPM
TROPONIN T SERPL-MCNC: 0.01 NG/ML (ref 0–0.03)
TROPONIN T SERPL-MCNC: 0.02 NG/ML (ref 0–0.03)
TROPONIN T SERPL-MCNC: 0.03 NG/ML (ref 0–0.03)
UROBILINOGEN UR QL STRIP: ABNORMAL
VIT B12 BLD-MCNC: 1215 PG/ML (ref 211–946)
WBC # UR STRIP: ABNORMAL /HPF
WBC NRBC COR # BLD: 6.89 10*3/MM3 (ref 3.4–10.8)

## 2021-12-15 PROCEDURE — 81001 URINALYSIS AUTO W/SCOPE: CPT | Performed by: INTERNAL MEDICINE

## 2021-12-15 PROCEDURE — 93010 ELECTROCARDIOGRAM REPORT: CPT | Performed by: INTERNAL MEDICINE

## 2021-12-15 PROCEDURE — 97162 PT EVAL MOD COMPLEX 30 MIN: CPT

## 2021-12-15 PROCEDURE — 93306 TTE W/DOPPLER COMPLETE: CPT | Performed by: INTERNAL MEDICINE

## 2021-12-15 PROCEDURE — 85025 COMPLETE CBC W/AUTO DIFF WBC: CPT | Performed by: PHYSICIAN ASSISTANT

## 2021-12-15 PROCEDURE — 99232 SBSQ HOSP IP/OBS MODERATE 35: CPT | Performed by: PSYCHIATRY & NEUROLOGY

## 2021-12-15 PROCEDURE — 83735 ASSAY OF MAGNESIUM: CPT | Performed by: PHYSICIAN ASSISTANT

## 2021-12-15 PROCEDURE — 84484 ASSAY OF TROPONIN QUANT: CPT | Performed by: PHYSICIAN ASSISTANT

## 2021-12-15 PROCEDURE — 93306 TTE W/DOPPLER COMPLETE: CPT

## 2021-12-15 PROCEDURE — 76775 US EXAM ABDO BACK WALL LIM: CPT

## 2021-12-15 PROCEDURE — 99225 PR SBSQ OBSERVATION CARE/DAY 25 MINUTES: CPT | Performed by: INTERNAL MEDICINE

## 2021-12-15 PROCEDURE — 76775 US EXAM ABDO BACK WALL LIM: CPT | Performed by: RADIOLOGY

## 2021-12-15 PROCEDURE — 84484 ASSAY OF TROPONIN QUANT: CPT | Performed by: INTERNAL MEDICINE

## 2021-12-15 PROCEDURE — 82607 VITAMIN B-12: CPT | Performed by: PHYSICIAN ASSISTANT

## 2021-12-15 PROCEDURE — 82962 GLUCOSE BLOOD TEST: CPT

## 2021-12-15 PROCEDURE — 97166 OT EVAL MOD COMPLEX 45 MIN: CPT

## 2021-12-15 PROCEDURE — 94640 AIRWAY INHALATION TREATMENT: CPT

## 2021-12-15 PROCEDURE — 80053 COMPREHEN METABOLIC PANEL: CPT | Performed by: PHYSICIAN ASSISTANT

## 2021-12-15 PROCEDURE — 93005 ELECTROCARDIOGRAM TRACING: CPT | Performed by: INTERNAL MEDICINE

## 2021-12-15 PROCEDURE — 82746 ASSAY OF FOLIC ACID SERUM: CPT | Performed by: PHYSICIAN ASSISTANT

## 2021-12-15 PROCEDURE — 94799 UNLISTED PULMONARY SVC/PX: CPT

## 2021-12-15 RX ORDER — TRAMADOL HYDROCHLORIDE 50 MG/1
50 TABLET ORAL EVERY 8 HOURS PRN
Status: ON HOLD | COMMUNITY
End: 2021-12-18

## 2021-12-15 RX ORDER — SODIUM CHLORIDE 9 MG/ML
75 INJECTION, SOLUTION INTRAVENOUS CONTINUOUS
Status: DISCONTINUED | OUTPATIENT
Start: 2021-12-15 | End: 2021-12-17

## 2021-12-15 RX ORDER — TRAMADOL HYDROCHLORIDE 50 MG/1
50 TABLET ORAL EVERY 8 HOURS PRN
Status: DISCONTINUED | OUTPATIENT
Start: 2021-12-15 | End: 2021-12-18 | Stop reason: HOSPADM

## 2021-12-15 RX ORDER — NICOTINE POLACRILEX 4 MG
15 LOZENGE BUCCAL
Status: DISCONTINUED | OUTPATIENT
Start: 2021-12-15 | End: 2021-12-18 | Stop reason: HOSPADM

## 2021-12-15 RX ORDER — DEXTROSE MONOHYDRATE 25 G/50ML
25 INJECTION, SOLUTION INTRAVENOUS
Status: DISCONTINUED | OUTPATIENT
Start: 2021-12-15 | End: 2021-12-18 | Stop reason: HOSPADM

## 2021-12-15 RX ADMIN — QUETIAPINE FUMARATE 50 MG: 25 TABLET, FILM COATED ORAL at 08:32

## 2021-12-15 RX ADMIN — IPRATROPIUM BROMIDE AND ALBUTEROL SULFATE 3 ML: .5; 2.5 SOLUTION RESPIRATORY (INHALATION) at 22:32

## 2021-12-15 RX ADMIN — Medication 1 TABLET: at 08:32

## 2021-12-15 RX ADMIN — APIXABAN 5 MG: 5 TABLET, FILM COATED ORAL at 08:32

## 2021-12-15 RX ADMIN — ASPIRIN 81 MG: 81 TABLET, COATED ORAL at 08:32

## 2021-12-15 RX ADMIN — APIXABAN 5 MG: 5 TABLET, FILM COATED ORAL at 20:28

## 2021-12-15 RX ADMIN — IPRATROPIUM BROMIDE AND ALBUTEROL SULFATE 3 ML: .5; 2.5 SOLUTION RESPIRATORY (INHALATION) at 07:42

## 2021-12-15 RX ADMIN — ASPIRIN 324 MG: 81 TABLET, CHEWABLE ORAL at 00:10

## 2021-12-15 RX ADMIN — DONEPEZIL HYDROCHLORIDE 5 MG: 5 TABLET, FILM COATED ORAL at 20:28

## 2021-12-15 RX ADMIN — TRAMADOL HYDROCHLORIDE 50 MG: 50 TABLET ORAL at 20:29

## 2021-12-15 RX ADMIN — SODIUM CHLORIDE 75 ML/HR: 9 INJECTION, SOLUTION INTRAVENOUS at 00:10

## 2021-12-15 RX ADMIN — QUETIAPINE FUMARATE 50 MG: 25 TABLET, FILM COATED ORAL at 20:29

## 2021-12-15 RX ADMIN — IPRATROPIUM BROMIDE AND ALBUTEROL SULFATE 3 ML: .5; 2.5 SOLUTION RESPIRATORY (INHALATION) at 14:44

## 2021-12-15 RX ADMIN — ATORVASTATIN CALCIUM 40 MG: 40 TABLET, FILM COATED ORAL at 00:10
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
## 2021-12-15 NOTE — PLAN OF CARE
Goal Outcome Evaluation:   Patient transferred from geriatric psych due to hypotension and urinary retention. Sue placed before transfer with good urine output noted. Blood pressure monitored. Sitter remains at bedside. No distress noted will continue to follow plan of care.

## 2021-12-15 NOTE — PLAN OF CARE
Goal Outcome Evaluation:   Pt is resting in bed with sitter at bedside. Pt hollers when being turned in bed. Disoriented to time and situation. Denies any needs. UA obtained.

## 2021-12-15 NOTE — NURSING NOTE
Attempted to straight cath x 2 attempts with no success Hospitalist Meera Burks notified. House supervisor to come and attempt.

## 2021-12-15 NOTE — THERAPY EVALUATION
Acute Care - Physical Therapy Initial Evaluation   Donny     Patient Name: Felice Aiken  : 1935  MRN: [id number]  Today's Date: 12/15/2021   Onset of Illness/Injury or Date of Surgery: 21  Visit Dx:   No diagnosis found.  Patient Active Problem List   Diagnosis   • Knee pain   • Sleep apnea   • Persistent atrial fibrillation (HCC)   • History of ASCVD (atherosclerotic cardiovascular disease), s/p stenting   • PAD (peripheral artery disease), fem pop bypass,3/08   • Dyslipidemia   • Essential hypertension   • Chronic bronchitis (HCC)   • Chronic obstructive pulmonary disease (HCC)   • Status post total left knee replacement   • Neuropathy involving both lower extremities   • Chronic kidney disease, stage III (moderate) ([facility])   • Chronic systolic heart failure (HCC)   • Failure to thrive in adult   • Acute renal failure superimposed on stage 3a chronic kidney disease (HCC)   • Dementia ([facility])   • Mood disturbance   • Hypotension   • Acute urinary retention   • Elevated troponin   • SIRS (systemic inflammatory response syndrome) ([facility])   • Prolonged Q-T interval on ECG   • Chronic respiratory failure with hypoxia ([facility])   • Macrocytic anemia   • Alzheimer's dementia with behavioral disturbance ([facility])     Past Medical History:   Diagnosis Date   • Anxiety    • Arthritis    • ASCVD (arteriosclerotic cardiovascular disease)     s/p stenting   • Atrial fibrillation (HCC)    • Chronic kidney disease, stage III (moderate) (HCC) 2017   • Chronic systolic heart failure ([facility]) 2019   • COPD (chronic obstructive pulmonary disease) ([facility])    • Dementia ([facility])    • Depression    • Elevated cholesterol    • HTN (hypertension)    • Neuropathy involving both lower extremities 2016   • PAD (peripheral artery disease), fem pop bypass,3/08 2016   • Sleep apnea 2016   • Status post total left knee replacement 2016     Past Surgical History:   Procedure Laterality Date   • HAND SURGERY     •  REPLACEMENT TOTAL KNEE Left      PT Assessment (last 12 hours)     PT Evaluation and Treatment     Row Name 12/15/21 1342          Physical Therapy Time and Intention    Document Type evaluation  -     Mode of Treatment physical therapy  -     Patient Effort adequate  -     Symptoms Noted During/After Treatment increased pain  -     Comment Pt evaluated this date, reports he was able to perform the yard work at home. Pt is confused this date. Unable to obtain full history from pt.  -     Row Name 12/15/21 1342          General Information    Patient Profile Reviewed yes  -     Onset of Illness/Injury or Date of Surgery 12/14/21  -     Referring Physician Serena  -     Patient Observations alert; cooperative  -     Patient/Family/Caregiver Comments/Observations Pt seems anxious and sensitive to mobility as pt cries out with certain movements  -     General Observations of Patient Pt seen supine in bed with sitter available  -     Pertinent History of Current Functional Problem PMHx significant for HTN, HLD, DMII, COPD, PVD, Afib, dementia; pt s/p fall  -     Existing Precautions/Restrictions fall  -     Limitations/Impairments safety/cognitive  -     Equipment Issued to Patient gait belt  -     Barriers to Rehab cognitive status  -     Row Name 12/15/21 1342          Previous Level of Function/Home Environm    Household Ambulation, Premorbid Functional Level other (see comments)  unsure, per pt he was ambulating tending to outside yard work  -     Row Name 12/15/21 1342          Living Environment    Current Living Arrangements home/apartment/condo  -     Lives With spouse  -     Row Name 12/15/21 1342          Cognition    Orientation Status (Cognition) disoriented to; time  -     Follows Commands (Cognition) follows one-step commands; 75-90% accuracy  -     Row Name 12/15/21 1342          Range of Motion Comprehensive    Comment, General Range of Motion BLE grossly WFL  with AROM, limited 2/2 pain, PROM limited d/t pt intolerance to movement in hip flexion, knee ext/flex.  -     Row Name 12/15/21 1342          Strength Comprehensive (MMT)    Comment, General Manual Muscle Testing (MMT) Assessment Grossly 3/5 MMT observed w/ pt ability to stand  -     Row Name 12/15/21 1342          Bed Mobility    Bed Mobility supine-sit  -     Supine-Sit [place] (Bed Mobility) maximum assist (25% patient effort); 2 person assist  -     Row Name 12/15/21 1342          Transfers    Transfers bed-chair transfer; sit-stand transfer; stand-sit transfer  -     Comment (Transfers) Pt performed STSx2 this date, initially requiring maxAx2. On second trial pt required less assistance with verbal cues to assist with transfer, Gamaliel  -     Bed-Chair [place] (Transfers) minimum assist (75% patient effort); moderate assist (50% patient effort); set up  -     Assistive Device (Bed-Chair Transfers) other (see comments)  This PT  -     Sit-Stand [place] (Transfers) minimum assist (75% patient effort); maximum assist (25% patient effort); verbal cues  -     Stand-Sit [place] (Transfers) verbal cues; contact guard; minimum assist (75% patient effort)  -     Row Name 12/15/21 1342          Sit-Stand Transfer    Assistive Device (Sit-Stand Transfers) walker, front-wheeled  -     Row Name 12/15/21 1342          Stand-Sit Transfer    Assistive Device (Stand-Sit Transfers) walker, front-wheeled  -     Row Name 12/15/21 1342          Gait/Stairs (Locomotion)    Gait/Stairs Locomotion gait/ambulation assistive device  -     [place] Level (Gait) contact guard; minimum assist (75% patient effort)  -     Assistive Device (Gait) walker, front-wheeled; other (see comments)  this PT  -     Distance in Feet (Gait) 5+ steps  -     Deviations/Abnormal Patterns (Gait) gait speed decreased  -     Bilateral Gait Deviations forward flexed posture  -     Comment (Gait/Stairs)  Pt demonstrates moderate balance impairment  -[facility] Name 12/15/21 1342          Balance    Balance Assessment sit to stand dynamic balance; standing static balance; standing dynamic balance  -     Sit to Stand Dynamic Balance mild impairment; moderate impairment  -     Static Standing Balance mild impairment  -     Dynamic Standing Balance moderate impairment  -[facility] Name 12/15/21 1342          Plan of Care Review    Outcome Summary Pt evaluated this date, expressing debility as pt requires maxA with bed mobility, averaging modA with transfers, and CGA with stepping near bedside using RW. Anticipated D/C for SNF or extended care facility to assist with caring for pt considering spouse's restraining order.  -[facility] Name 12/15/21 1342          Physical Therapy Goals    Bed Mobility Goal Selection (PT) bed mobility, PT goal 1  -     Transfer Goal Selection (PT) transfer, PT goal 1  -     Gait Training Goal Selection (PT) gait training, PT goal 1  -[facility] Name 12/15/21 1342          Bed Mobility Goal 1 (PT)    Activity/Assistive Device (Bed Mobility Goal 1, PT) bed mobility activities, all  -KH     [place] Level/Cues Needed (Bed Mobility Goal 1, PT) moderate assist (50-74% patient effort)  -     Time Frame (Bed Mobility Goal 1, PT) long term goal (LTG)  -[facility] Name 12/15/21 1342          Transfer Goal 1 (PT)    Activity/Assistive Device (Transfer Goal 1, PT) transfers, all  -KH     [place] Level/Cues Needed (Transfer Goal 1, PT) contact guard assist  -     Time Frame (Transfer Goal 1, PT) by discharge  -[facility] Name 12/15/21 1342          Gait Training Goal 1 (PT)    Activity/Assistive Device (Gait Training Goal 1, PT) gait (walking locomotion); assistive device use  -     [place] Level (Gait Training Goal 1, PT) contact guard assist  -     Distance (Gait Training Goal 1, PT) 25  -KH     Time Frame (Gait Training Goal 1, PT) 3 weeks; short term goal (STG)  -      Row Name 12/15/21 1342          Positioning and Restraints    Pre-Treatment Position in bed  -     Post Treatment Position wheelchair  -     In Wheelchair sitting; with other staff  -     Row Name 12/15/21 1342          Therapy Assessment/Plan (PT)    Functional Level at Time of Evaluation (PT) Mod to MaxA  -     PT Diagnosis (PT) Debility  -     Rehab Potential (PT) fair, will monitor progress closely  -     Criteria for Skilled Interventions Met (PT) yes; meets criteria  -     Predicted Duration of Therapy Intervention (PT) LOS  -     Problem List (PT) problems related to; balance; cognition; mobility; strength; pain; range of motion (ROM)  -           User Key  (r) = Recorded By, (t) = Taken By, (c) = Cosigned By    Initials Name Provider Type    Miryam Garcia PT Physical Therapist                  PT Recommendation and Plan  Anticipated Discharge Disposition (PT): extended care facility, skilled nursing facility  Planned Therapy Interventions (PT): balance training, bed mobility training, gait training, strengthening, transfer training  Therapy Frequency (PT):  (3-5x/wk)  Outcome Summary: Pt evaluated this date, expressing debility as pt requires maxA with bed mobility, averaging modA with transfers, and CGA with stepping near bedside using RW. Anticipated D/C for SNF or extended care facility to assist with caring for pt considering spouse's restraining order.       Time Calculation:    PT Charges     Row Name 12/15/21 8680             Time Calculation    PT Received On 12/15/21  -      PT Goal Re-Cert Due Date 12/29/21  -            User Key  (r) = Recorded By, (t) = Taken By, (c) = Cosigned By    Initials Name Provider Type    Miryam Garcia PT Physical Therapist              Therapy Charges for Today     Code Description Service Date Service Provider Modifiers Qty    38088650539 HC PT EVAL MOD COMPLEXITY 4 12/15/2021 Miryam Sears, DAVI GP 1               Miryam  Orion, PT  12/15/2021

## 2021-12-15 NOTE — PAYOR COMM NOTE
"Felice Mckeon (86 y.o. Male)             Date of Birth Social Security Number Address Home Phone MRN    1935  [id number]   [facility] [address] [phone] [id number]    [facility] Status             None        Admission Date Admission Type Admitting Provider Attending Provider Department, Room/Bed    21 Emergency Darren Hough MD  [facility], 1039/1S    Discharge Date Discharge Disposition Discharge Destination          [facility] ([facility])              Attending Provider: (none)   Allergies: No Known Allergies    Isolation: None   Infection: None   Code Status: CPR   Advance Care Planning Activity    Ht: 175.3 cm (69.02\")   Wt: 68 kg (150 lb)    Admission Cmt: None   Principal Problem: Mood disturbance [R45.86]                 Active Insurance as of 2021     Primary Coverage     Payor Plan Insurance Group Employer/Plan Group    HUMANA MEDICARE REPLACEMENT HU[employer]NA MEDICARE REPLACEMENT [id number]     Payor Plan Address Payor Plan Phone Number Payor Plan Fax Number Effective Dates    [address] [address] [phone]  2015 - None Entered    [facility] [id number]       Subscriber Name Subscriber Birth Date Member ID       FELICE MCKEON 1935 [id number]                 Emergency Contacts      (Rel.) Home Phone Work Phone Mobile Phone    Kary Mckeon (Power of ) [phone] -- --    EloisaAnnmarie (Sister) [phone] -- --      RE: Felice Mckeon  : 1935  Authorization number: [id number]  Admission date: 21  Discharge date: 21  Discharge summary is pending. Primary diagnosis from H&P: Dementia with behavioral disturbance (F03.91)  Discharged from psychiatry to the medical unit.    If you have any questions call [phone]        "

## 2021-12-15 NOTE — THERAPY EVALUATION
Patient Name: Felice Aiken  : 1935    MRN: [id number]                              Today's Date: 12/15/2021       Admit Date: 2021    Visit Dx: No diagnosis found.  Patient Active Problem List   Diagnosis   • Knee pain   • Sleep apnea   • Persistent atrial fibrillation (HCC)   • History of ASCVD (atherosclerotic cardiovascular disease), s/p stenting   • PAD (peripheral artery disease), fem pop bypass,3/08   • Dyslipidemia   • Essential hypertension   • Chronic bronchitis (HCC)   • Chronic obstructive pulmonary disease (HCC)   • Status post total left knee replacement   • Neuropathy involving both lower extremities   • Chronic kidney disease, stage III (moderate) ([facility])   • Chronic systolic heart failure ([facility])   • Failure to thrive in adult   • Acute renal failure superimposed on stage 3a chronic kidney disease ([facility])   • Dementia ([facility])   • Mood disturbance   • Hypotension   • Acute urinary retention   • Elevated troponin   • SIRS (systemic inflammatory response syndrome) ([facility])   • Prolonged Q-T interval on ECG   • Chronic respiratory failure with hypoxia ([facility])   • Macrocytic anemia   • Alzheimer's dementia with behavioral disturbance ([facility])     Past Medical History:   Diagnosis Date   • Anxiety    • Arthritis    • ASCVD (arteriosclerotic cardiovascular disease)     s/p stenting   • Atrial fibrillation (HCC)    • Chronic kidney disease, stage III (moderate) ([facility]) 2017   • Chronic systolic heart failure ([facility]) 2019   • COPD (chronic obstructive pulmonary disease) ([facility])    • Dementia ([facility])    • Depression    • Elevated cholesterol    • HTN (hypertension)    • Neuropathy involving both lower extremities 2016   • PAD (peripheral artery disease), fem pop bypass,3/08 2016   • Sleep apnea 2016   • Status post total left knee replacement 2016     Past Surgical History:   Procedure Laterality Date   • HAND SURGERY     • REPLACEMENT TOTAL KNEE Left       General Information     Row Name  12/15/21 1110          OT Time and Intention    Document Type evaluation  -LM     Mode of Treatment occupational therapy  -LM     Row Name 12/15/21 1110          General Information    Patient Profile Reviewed yes  -LM     Prior Level of Function ADL's; independent:; all household mobility  per patient report.  Patient is poor historian.  -LM     Existing Precautions/Restrictions fall  -LM     Barriers to Rehab previous functional deficit; cognitive status; medically complex  -LM     Row Name 12/15/21 1110          Living Environment    Lives With spouse; child(jamar), adult  -LM     Row Name 12/15/21 1110          Cognition    Orientation Status (Cognition) disoriented to; place; situation; time; verbal cues/prompts needed for orientation  -LM     Row Name 12/15/21 1110          Safety Issues, Functional Mobility    Safety Issues Affecting Function (Mobility) problem-solving; judgment; insight into deficits/self-awareness  -LM     Impairments Affecting Function (Mobility) balance; cognition; coordination; endurance/activity tolerance; strength; range of motion (ROM)  -LM     Cognitive Impairments, Mobility Safety/Performance judgment; problem-solving/reasoning; insight into deficits/self-awareness  -LM           User Key  (r) = Recorded By, (t) = Taken By, (c) = Cosigned By    Initials Name Provider Type    LM Gosia Whatley, OT Occupational Therapist                 Mobility/ADL's     Row Name 12/15/21 1111          Activities of Daily Living    BADL Assessment/Intervention bathing; upper body dressing; lower body dressing; grooming; feeding; toileting  -     Row Name 12/15/21 1111          Bathing Assessment/Intervention    [place] Level (Bathing) dependent (less than 25% patient effort)  -     Row Name 12/15/21 1111          Upper Body Dressing Assessment/Training    [place] Level (Upper Body Dressing) dependent (less than 25% patient effort); maximum assist (25% patient effort)  -     Row Name  12/15/21 1111          Lower Body Dressing Assessment/Training    [place] Level (Lower Body Dressing) dependent (less than 25% patient effort)  -LM     Row Name 12/15/21 1111          Grooming Assessment/Training    [place] Level (Grooming) maximum assist (25% patient effort)  -LM     Row Name 12/15/21 1111          Self-Feeding Assessment/Training    [place] Level (Feeding) set up  -LM     Row Name 12/15/21 1111          Toileting Assessment/Training    [place] Level (Toileting) dependent (less than 25% patient effort)  -LM           User Key  (r) = Recorded By, (t) = Taken By, (c) = Cosigned By    Initials Name Provider Type    LM Gosia Whatley, OT Occupational Therapist               Obj/Interventions     Row Name 12/15/21 1112          Sensory Assessment (Somatosensory)    Sensory Assessment (Somatosensory) sensation intact  -LM     Row Name 12/15/21 1112          Vision Assessment/Intervention    Visual Impairment/Limitations WFL  -LM     Row Name 12/15/21 1112          Range of Motion Comprehensive    General Range of Motion upper extremity range of motion deficits identified  -LM     Comment, General Range of Motion bue shoulders 1/4, otherwise wfl  -LM     Row Name 12/15/21 1112          Strength Comprehensive (MMT)    General Manual Muscle Testing (MMT) Assessment upper extremity strength deficits identified  -LM     Comment, General Manual Muscle Testing (MMT) Assessment bue shoulders 2+/5, otherwise 3/5  -LM           User Key  (r) = Recorded By, (t) = Taken By, (c) = Cosigned By    Initials Name Provider Type    LM Gosia Whatley, OT Occupational Therapist               Goals/Plan     Row Name 12/15/21 1114          Transfer Goal 1 (OT)    Activity/Assistive Device (Transfer Goal 1, OT) transfers, all; commode, 3-in-1; walker, rolling  -     [place] Level/Cues Needed (Transfer Goal 1, OT) minimum assist (75% or more patient effort); moderate assist (50-74% patient effort)  -LM      Time Frame (Transfer Goal 1, OT) by discharge  -LM     Row Name 12/15/21 1114          Dressing Goal 1 (OT)    Activity/Device (Dressing Goal 1, OT) upper body dressing  -LM     [place]/Cues Needed (Dressing Goal 1, OT) minimum assist (75% or more patient effort)  -LM     Time Frame (Dressing Goal 1, OT) by discharge  -LM     Row Name 12/15/21 1114          Therapy Assessment/Plan (OT)    Planned Therapy Interventions (OT) activity tolerance training; adaptive equipment training; BADL retraining; patient/caregiver education/training; transfer/mobility retraining; strengthening exercise; ROM/therapeutic exercise  -LM           User Key  (r) = Recorded By, (t) = Taken By, (c) = Cosigned By    Initials Name Provider Type    LM Gosia Whatley, OT Occupational Therapist               Clinical Impression     Row Name 12/15/21 1113          Plan of Care Review    Plan of Care Reviewed With patient  -LM     Row Name 12/15/21 1113          Therapy Assessment/Plan (OT)    Patient/Family Therapy Goal Statement (OT) return home to plof  -     Rehab Potential (OT) fair, will monitor progress closely  -LM     Criteria for Skilled Therapeutic Interventions Met (OT) yes; meets criteria; skilled treatment is necessary  -LM     Therapy Frequency (OT) other (see comments)  prn and/or to monitor fxl progress  -LM     Row Name 12/15/21 1113          Therapy Plan Review/Discharge Plan (OT)    Anticipated Discharge Disposition (OT) home with 24/7 care; long-term acute care facility; skilled nursing facility  -     Row Name 12/15/21 1113          Positioning and Restraints    Post Treatment Position bed  -LM     In Bed call light within reach; encouraged to call for assist; exit alarm on  -LM           User Key  (r) = Recorded By, (t) = Taken By, (c) = Cosigned By    Initials Name Provider Type    Gosia Ellis, OT Occupational Therapist               Outcome Measures    No documentation.                   OT  Recommendation and Plan  Planned Therapy Interventions (OT): activity tolerance training, adaptive equipment training, BADL retraining, patient/caregiver education/training, transfer/mobility retraining, strengthening exercise, ROM/therapeutic exercise  Therapy Frequency (OT): other (see comments) (prn and/or to monitor fxl progress)  Plan of Care Review  Plan of Care Reviewed With: patient     Time Calculation:     Therapy Charges for Today     Code Description Service Date Service Provider Modifiers Qty    [id number] HC OT EVAL MOD COMPLEXITY 4 12/15/2021 Gosia Whatley, OT GO 1               Gosia Whatley OT  12/15/2021
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
## 2021-12-15 NOTE — CASE MANAGEMENT/SOCIAL WORK
Discharge Planning Assessment  [facility]     Patient Name: Felice Aiken  MRN: [id number]  Today's Date: 12/15/2021    Admit Date: 12/14/2021     Discharge Needs Assessment     Row Name 12/15/21 1229       Living Environment    Lives With spouse    Name(s) of Who Lives With Patient Kary Aiken (spouse)    Current Living Arrangements home/apartment/condo    Primary Care Provided by self    Provides Primary Care For no one    Family Caregiver if Needed none    Quality of Family Relationships non-existent  POA has E.P.O and Pt will not be able to return home unless POA is notified first.       Discharge Needs Assessment    Equipment Currently Used at Home oxygen; cane, straight; wheelchair; walker, standard; nebulizer    Concerns to be Addressed discharge planning               Discharge Plan     Row Name 12/15/21 9837       Plan    Plan Pt was admitted on 12/14/21 from [facility] psych. SS spoke with Pt on this date. Pt lives with his spouse, Kary. Pt does not utilize home health services. Pt has home oxygen at 2 liters, straight cane, wheelchair, walker, and nebulizer via [employer]. Pt's PCP is Rc Ojeda. Pt uses [facility]. Pt has a POA/living will, both are in medical record. Pt's POA is spouse, Kary. SS spoke with POA on this date. Per POA she has an emergency protective order on Pt, and if he chooses to return home, she will need to be notified to leave the premises. SS spoke with Pt about considering NHP. Pt refused NHP, states he would like to return home at discharge. SS to follow and assist with discharge planning.                Tina Tesfaye

## 2021-12-15 NOTE — NURSING NOTE
Pt has room on medical floor. Pt is assigned to [address] on [address]. Reports called to Wolfgang KIRKPATRICK RN.
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
## 2021-12-15 NOTE — NURSING NOTE
Pt leaving unit critical troponin called. Pt connected to monitor. Pt en route to floor Dr. Meera Burks made aware on floor.

## 2021-12-15 NOTE — NURSING NOTE
Pt with no urine output all day. Pt was bladder scanned and showed 620 mls in bladder. Pt has BP that is trending low currently 87/51. Pt does not feel the urge to urinate. Dr. Cotter called and notified new orders for hospitalist consult. Urinary retention protocol followed.
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
## 2021-12-16 PROBLEM — N17.9 AKI (ACUTE KIDNEY INJURY) (HCC): Status: ACTIVE | Noted: 2021-12-16

## 2021-12-16 LAB
ANION GAP SERPL CALCULATED.3IONS-SCNC: 8.7 MMOL/L (ref 5–15)
BUN SERPL-MCNC: 20 MG/DL (ref 8–23)
BUN/CREAT SERPL: 18.9 (ref 7–25)
CALCIUM SPEC-SCNC: 8.7 MG/DL (ref 8.6–10.5)
CHLORIDE SERPL-SCNC: 111 MMOL/L (ref 98–107)
CO2 SERPL-SCNC: 24.3 MMOL/L (ref 22–29)
CREAT SERPL-MCNC: 1.06 MG/DL (ref 0.76–1.27)
GFR SERPL CREATININE-BSD FRML MDRD: 67 ML/MIN/1.73
GLUCOSE BLDC GLUCOMTR-MCNC: 111 MG/DL (ref 70–130)
GLUCOSE BLDC GLUCOMTR-MCNC: 127 MG/DL (ref 70–130)
GLUCOSE BLDC GLUCOMTR-MCNC: 131 MG/DL (ref 70–130)
GLUCOSE BLDC GLUCOMTR-MCNC: 154 MG/DL (ref 70–130)
GLUCOSE SERPL-MCNC: 165 MG/DL (ref 65–99)
POTASSIUM SERPL-SCNC: 3.8 MMOL/L (ref 3.5–5.2)
SODIUM SERPL-SCNC: 144 MMOL/L (ref 136–145)
TROPONIN T SERPL-MCNC: 0.01 NG/ML (ref 0–0.03)
TROPONIN T SERPL-MCNC: 0.02 NG/ML (ref 0–0.03)

## 2021-12-16 PROCEDURE — G0378 HOSPITAL OBSERVATION PER HR: HCPCS

## 2021-12-16 PROCEDURE — 94799 UNLISTED PULMONARY SVC/PX: CPT

## 2021-12-16 PROCEDURE — 84484 ASSAY OF TROPONIN QUANT: CPT | Performed by: INTERNAL MEDICINE

## 2021-12-16 PROCEDURE — 99225 PR SBSQ OBSERVATION CARE/DAY 25 MINUTES: CPT | Performed by: INTERNAL MEDICINE

## 2021-12-16 PROCEDURE — 82962 GLUCOSE BLOOD TEST: CPT

## 2021-12-16 PROCEDURE — 80048 BASIC METABOLIC PNL TOTAL CA: CPT | Performed by: INTERNAL MEDICINE

## 2021-12-16 RX ADMIN — ASPIRIN 81 MG: 81 TABLET, COATED ORAL at 08:27

## 2021-12-16 RX ADMIN — DONEPEZIL HYDROCHLORIDE 5 MG: 5 TABLET, FILM COATED ORAL at 20:12

## 2021-12-16 RX ADMIN — ATORVASTATIN CALCIUM 40 MG: 40 TABLET, FILM COATED ORAL at 08:27

## 2021-12-16 RX ADMIN — QUETIAPINE FUMARATE 50 MG: 25 TABLET, FILM COATED ORAL at 08:28

## 2021-12-16 RX ADMIN — Medication 1 TABLET: at 08:28

## 2021-12-16 RX ADMIN — APIXABAN 5 MG: 5 TABLET, FILM COATED ORAL at 08:27

## 2021-12-16 RX ADMIN — TRAMADOL HYDROCHLORIDE 50 MG: 50 TABLET ORAL at 20:12

## 2021-12-16 RX ADMIN — IPRATROPIUM BROMIDE AND ALBUTEROL SULFATE 3 ML: .5; 2.5 SOLUTION RESPIRATORY (INHALATION) at 14:28

## 2021-12-16 RX ADMIN — APIXABAN 5 MG: 5 TABLET, FILM COATED ORAL at 20:12

## 2021-12-16 RX ADMIN — SODIUM CHLORIDE 75 ML/HR: 9 INJECTION, SOLUTION INTRAVENOUS at 02:48

## 2021-12-16 RX ADMIN — QUETIAPINE FUMARATE 50 MG: 25 TABLET, FILM COATED ORAL at 20:12

## 2021-12-16 RX ADMIN — IPRATROPIUM BROMIDE AND ALBUTEROL SULFATE 3 ML: .5; 2.5 SOLUTION RESPIRATORY (INHALATION) at 23:53

## 2021-12-16 RX ADMIN — IPRATROPIUM BROMIDE AND ALBUTEROL SULFATE 3 ML: .5; 2.5 SOLUTION RESPIRATORY (INHALATION) at 07:36

## 2021-12-16 NOTE — CASE MANAGEMENT/SOCIAL WORK
Discharge Planning Assessment  JD Hines     Patient Name: Felice Aiken  MRN: [id number]  Today's Date: 12/16/2021    Admit Date: 12/14/2021     Discharge Needs Assessment    No documentation.                Discharge Plan     Row Name 12/16/21 1538       Plan    [facility]. Psych following, for possible return to [facility] when stable. SS to follow.              Tina Tesfaye

## 2021-12-16 NOTE — PLAN OF CARE
Goal Outcome Evaluation:  Plan of Care Reviewed With: patient   Pt resting in bed with no complaints. Pt shows no s/s of distress. Will continue with plan of care.

## 2021-12-16 NOTE — PAYOR COMM NOTE
"[facility]  NPI:[id number]    Utilization Review  Contact: Cheri Hayes RN  Phone: [phone]  Fax:[phone]    CLINICAL UPDATE     REF#[id number]      Felice Mckeon (86 y.o. Male)             Date of Birth Social Security Number Address Home Phone MRN    1935  [id number]   [facility] [phone] [id number]    [place] Marital Status             None        Admission Date Admission Type Admitting Provider Attending Provider Department, Room/Bed    12/14/21 Urgent Rubén Burks MD Mullins, Thomas Anthony, DO [address], [address]/    Discharge Date Discharge Disposition Discharge Destination                         Attending Provider: Sage Lyons DO    Allergies: No Known Allergies    Isolation: None   Infection: None   Code Status: CPR   Advance Care Planning Activity    Ht: 175.3 cm (69\")   Wt: 65.3 kg (144 lb)    Admission Cmt: None   Principal Problem: Hypotension [I95.9]                 Active Insurance as of 12/14/2021     Primary Coverage     Payor Plan Insurance Group Employer/Plan Group    HUMANA MEDICARE REPLACEMENT HUMANA MEDICARE REPLACEMENT [id number]     Payor Plan Address Payor Plan Phone Number Payor Plan Fax Number Effective Dates    [address] [address] [phone]  11/16/2015 - None Entered    [facility] [id number]       Subscriber Name Subscriber Birth Date Member ID       FELICE MCKEON 1935 [id number]                 Emergency Contacts      (Rel.) Home Phone Work Phone Mobile Phone    MckeonKary tirado (Power of ) [phone] -- --    Annmarie Amin (Sister) [phone] -- --          From admission, onward)        Start   Ordered   12/16/21 1335  Initiate Observation Status  Once        Transfer Service: Medicine       Question Answer Comment   Level of Care Med/Surg    Diagnosis TOMAS (acute kidney injury) ([facility])        12/16/21 1335          "

## 2021-12-16 NOTE — PROGRESS NOTES
[facility]     Patient Identification:  Name:  Felice Aiken  Age:  86 y.o.  Sex:  male  :  1935  MRN:  [id number]  Visit Number:  [id number]  Primary Care Provider:  Rc Ojeda MD    Length of stay:  2    Chief complaint: None    Subjective:    Patient doing well today with no specific complaints. He denies any fevers, chills, sweats or rigors. He appears to be in good spirits. No new events overnight.  ----------------------------------------------------------------------------------------------------------------------  Current Hospital Meds:  apixaban, 5 mg, Oral, Q12H  aspirin, 81 mg, Oral, Daily  atorvastatin, 40 mg, Oral, Daily  donepezil, 5 mg, Oral, Nightly  insulin aspart, 0-7 Units, Subcutaneous, TID AC  ipratropium-albuterol, 3 mL, Nebulization, TID - RT  multivitamin, 1 tablet, Oral, Daily  QUEtiapine, 50 mg, Oral, BID      sodium chloride, 75 mL/hr, Last Rate: 75 mL/hr (21 0248)      ----------------------------------------------------------------------------------------------------------------------  Vital Signs:  Temp:  [97.8 °F (36.6 °C)-98.7 °F (37.1 °C)] 98.5 °F (36.9 °C)  Heart Rate:  [75-96] 88  Resp:  [18-20] 19  BP: (107-164)/(62-72) 164/67      21  2311 12/15/21  0500 21  0500   Weight: 64.3 kg (141 lb 12.8 oz) 64.3 kg (141 lb 12.8 oz) (admit weight less than 24 hours) 65.3 kg (144 lb)     Body mass index is 21.27 kg/m².    Intake/Output Summary (Last 24 hours) at 2021 1713  Last data filed at 2021 1100  Gross per 24 hour   Intake 150 ml   Output 350 ml   Net -200 ml     Diet Regular; Thin; Cardiac  ----------------------------------------------------------------------------------------------------------------------  Physical exam:  Constitutional: Elderly appearing  male in no apparent distress.     HENT:  Head:  Normocephalic and atraumatic.  Mouth:  Moist mucous membranes.    Eyes:   Conjunctivae and EOM are normal.  Pupils are equal, round, and reactive to light.  No scleral icterus.    Neck:  Neck supple. No thyromegaly.  No JVD present.    Cardiovascular:  Irregular rhythm with  rate and rhythm with no murmurs, rubs, clicks or gallops appreciated.  Pulmonary/Chest:  Clear to auscultation bilaterally with no crackles, wheezes or rhonchi appreciated.  Abdominal:  Soft. Nontender. Nondistended  Bowel sounds are normal in all four quadrants. No organomegally appreciated.   Musculoskeletal:  No edema, no tenderness, and no deformity.  No red or swollen joints anywhere.    Neurological:  Alert and oriented to person, place, and time. Cranial nerves II-XII intact with no focal deficits.  No facial droop.  No slurred speech.   Skin:  Warm and dry to palpation with no rashes or lesions appreciated.  Peripheral vascular:  2+ radial and pedal pulses in bilateral upper and lower extremities.    ------------------------------------------------------------------------------  ----------------------------------------------------------------------------------------------------------------------  Results from last 7 days   Lab Units 12/16/21  0008 12/15/21  1734 12/15/21  1146   TROPONIN T ng/mL 0.021 0.015 0.017     Results from last 7 days   Lab Units 12/15/21  0525 12/14/21  2159 12/13/21  1046   CRP mg/dL  --  1.30*  --    LACTATE mmol/L  --  1.1  --    WBC 10*3/mm3 6.89 7.37 9.08   HEMOGLOBIN g/dL 10.2* 10.8* 11.2*   HEMATOCRIT % 32.6* 35.0* 36.0*   MCV fL 100.0* 99.2* 100.8*   MCHC g/dL 31.3* 30.9* 31.1*   PLATELETS 10*3/mm3 147 158 188         Results from last 7 days   Lab Units 12/16/21  1112 12/15/21  0525 12/14/21  2159 12/13/21  1047 12/13/21  1047   SODIUM mmol/L 144 141 140   < > 142   POTASSIUM mmol/L 3.8 4.1 3.9   < > 4.9   MAGNESIUM mg/dL  --  2.4 2.3  --  2.0   CHLORIDE mmol/L 111* 110* 107   < > 105   CO2 mmol/L 24.3 23.2 22.5   < > 18.9*   BUN mg/dL 20 30* 29*   < > 18   CREATININE mg/dL  1.06 1.59* 1.88*   < > 1.26   EGFR IF NONAFRICN AM mL/min/1.73 67 42* 34*   < > 55*   CALCIUM mg/dL 8.7 9.2 9.5   < > 9.4   GLUCOSE mg/dL 165* 111* 134*   < > 81   ALBUMIN g/dL  --  3.60 3.74  --  4.13   BILIRUBIN mg/dL  --  0.7 0.7  --  1.1   ALK PHOS U/L  --  81 89  --  99   AST (SGOT) U/L  --  23 26  --  39   ALT (SGPT) U/L  --  14 15  --  19    < > = values in this interval not displayed.   Estimated Creatinine Clearance: 47.9 mL/min (by C-G formula based on SCr of 1.06 mg/dL).    Ammonia   Date Value Ref Range Status   12/14/2021 26 16 - 60 umol/L Final     Results from last 7 days   Lab Units 12/14/21  0510   CHOLESTEROL mg/dL 142   TRIGLYCERIDES mg/dL 103   HDL CHOL mg/dL 50   LDL CHOL mg/dL 73     No results found for: BLOODCX  No results found for: URINECX  No results found for: WOUNDCX  No results found for: STOOLCX    I have personally looked at the labs and they are summarized above.  ----------------------------------------------------------------------------------------------------------------------  Imaging Results (Last 24 Hours)     Procedure Component Value Units Date/Time    US Renal Bilateral [[id number]] Collected: 12/16/21 0824     Updated: 12/16/21 0826    Narrative:      EXAM:    US Retroperitoneal Limited, Renal     EXAM DATE:    12/15/2021 7:42 PM     CLINICAL HISTORY:    TOMAS; hypotension     TECHNIQUE:    Real-time limited ultrasound of the retroperitoneum with image  documentation.     COMPARISON:    No relevant prior studies available.     FINDINGS:    Right kidney:  Unremarkable.  No stones.  No solid mass.  No  hydronephrosis.    Left kidney:  Unremarkable.  No stones.  No solid mass.  No  hydronephrosis.       Impression:        No acute findings in the retroperitoneum.     This report was finalized on 12/16/2021 8:24 AM by Dr. Naldo Dixon MD.           ----------------------------------------------------------------------------------------------------------------------  Assessment  and Plan:    1.  Hypotension -no episodes of hypotension in the past 24 hours.    2.  Acute kidney injury -resolved with gentle IV fluid hydration. Serum creatinine improved to 1. Will discuss with psychiatry services returning the patient to inpatient geriatric psych.     3.  Elevated troponin -patient without active chest pain.  Suspect likely secondary to acute kidney injury.  We will continue to trend troponin.    4.  Urinary retention -continue Sue catheter    5.  Dementia with behavioral disturbance -will defer further treatment plan to psychiatry services.    6.  Chronic A. Fib -rate controlled, continue Eliquis and metoprolol.    Disposition possible discharge back to geriatric psychiatry in the next 24 hours          Sage Lyons, DO  12/16/21  17:13 EST

## 2021-12-16 NOTE — PLAN OF CARE
Goal Outcome Evaluation:      Patient has been pleasant tonight. No complaints of chest pain or shortness of breath. Vital signs remain stable. IV fluids infusing. Will continue to monitor and follow plan of care.

## 2021-12-17 LAB
ANION GAP SERPL CALCULATED.3IONS-SCNC: 6.8 MMOL/L (ref 5–15)
BUN SERPL-MCNC: 18 MG/DL (ref 8–23)
BUN/CREAT SERPL: 19.1 (ref 7–25)
CALCIUM SPEC-SCNC: 9 MG/DL (ref 8.6–10.5)
CHLORIDE SERPL-SCNC: 109 MMOL/L (ref 98–107)
CO2 SERPL-SCNC: 22.2 MMOL/L (ref 22–29)
CREAT SERPL-MCNC: 0.94 MG/DL (ref 0.76–1.27)
DEPRECATED RDW RBC AUTO: 51.8 FL (ref 37–54)
ERYTHROCYTE [DISTWIDTH] IN BLOOD BY AUTOMATED COUNT: 13.8 % (ref 12.3–15.4)
GFR SERPL CREATININE-BSD FRML MDRD: 76 ML/MIN/1.73
GLUCOSE BLDC GLUCOMTR-MCNC: 148 MG/DL (ref 70–130)
GLUCOSE BLDC GLUCOMTR-MCNC: 150 MG/DL (ref 70–130)
GLUCOSE BLDC GLUCOMTR-MCNC: 98 MG/DL (ref 70–130)
GLUCOSE SERPL-MCNC: 96 MG/DL (ref 65–99)
HCT VFR BLD AUTO: 31.6 % (ref 37.5–51)
HGB BLD-MCNC: 9.7 G/DL (ref 13–17.7)
MCH RBC QN AUTO: 31.3 PG (ref 26.6–33)
MCHC RBC AUTO-ENTMCNC: 30.7 G/DL (ref 31.5–35.7)
MCV RBC AUTO: 101.9 FL (ref 79–97)
PLATELET # BLD AUTO: 144 10*3/MM3 (ref 140–450)
PMV BLD AUTO: 9.9 FL (ref 6–12)
POTASSIUM SERPL-SCNC: 3.9 MMOL/L (ref 3.5–5.2)
RBC # BLD AUTO: 3.1 10*6/MM3 (ref 4.14–5.8)
SODIUM SERPL-SCNC: 138 MMOL/L (ref 136–145)
TROPONIN T SERPL-MCNC: 0.01 NG/ML (ref 0–0.03)
WBC NRBC COR # BLD: 6.53 10*3/MM3 (ref 3.4–10.8)

## 2021-12-17 PROCEDURE — G0378 HOSPITAL OBSERVATION PER HR: HCPCS

## 2021-12-17 PROCEDURE — 63710000001 INSULIN ASPART PER 5 UNITS: Performed by: PHYSICIAN ASSISTANT

## 2021-12-17 PROCEDURE — 85027 COMPLETE CBC AUTOMATED: CPT | Performed by: INTERNAL MEDICINE

## 2021-12-17 PROCEDURE — 84484 ASSAY OF TROPONIN QUANT: CPT | Performed by: INTERNAL MEDICINE

## 2021-12-17 PROCEDURE — 99225 PR SBSQ OBSERVATION CARE/DAY 25 MINUTES: CPT | Performed by: INTERNAL MEDICINE

## 2021-12-17 PROCEDURE — 94799 UNLISTED PULMONARY SVC/PX: CPT

## 2021-12-17 PROCEDURE — 80048 BASIC METABOLIC PNL TOTAL CA: CPT | Performed by: INTERNAL MEDICINE

## 2021-12-17 PROCEDURE — 82962 GLUCOSE BLOOD TEST: CPT

## 2021-12-17 RX ORDER — LISINOPRIL 10 MG/1
10 TABLET ORAL
Status: DISCONTINUED | OUTPATIENT
Start: 2021-12-17 | End: 2021-12-18 | Stop reason: HOSPADM

## 2021-12-17 RX ADMIN — Medication 1 TABLET: at 09:10

## 2021-12-17 RX ADMIN — INSULIN ASPART 2 UNITS: 100 INJECTION, SOLUTION INTRAVENOUS; SUBCUTANEOUS at 11:07

## 2021-12-17 RX ADMIN — ASPIRIN 81 MG: 81 TABLET, COATED ORAL at 09:10

## 2021-12-17 RX ADMIN — TRAMADOL HYDROCHLORIDE 50 MG: 50 TABLET ORAL at 21:37

## 2021-12-17 RX ADMIN — APIXABAN 5 MG: 5 TABLET, FILM COATED ORAL at 09:10

## 2021-12-17 RX ADMIN — APIXABAN 5 MG: 5 TABLET, FILM COATED ORAL at 21:37

## 2021-12-17 RX ADMIN — IPRATROPIUM BROMIDE AND ALBUTEROL SULFATE 3 ML: .5; 2.5 SOLUTION RESPIRATORY (INHALATION) at 07:02

## 2021-12-17 RX ADMIN — DONEPEZIL HYDROCHLORIDE 5 MG: 5 TABLET, FILM COATED ORAL at 21:37

## 2021-12-17 RX ADMIN — QUETIAPINE FUMARATE 50 MG: 25 TABLET, FILM COATED ORAL at 09:10

## 2021-12-17 RX ADMIN — LISINOPRIL 10 MG: 10 TABLET ORAL at 21:37

## 2021-12-17 RX ADMIN — QUETIAPINE FUMARATE 50 MG: 25 TABLET, FILM COATED ORAL at 21:37

## 2021-12-17 RX ADMIN — ATORVASTATIN CALCIUM 40 MG: 40 TABLET, FILM COATED ORAL at 09:10

## 2021-12-17 RX ADMIN — IPRATROPIUM BROMIDE AND ALBUTEROL SULFATE 3 ML: .5; 2.5 SOLUTION RESPIRATORY (INHALATION) at 15:41

## 2021-12-17 NOTE — CASE MANAGEMENT/SOCIAL WORK
Discharge Planning Assessment   Donny     Patient Name: Felice Aiken  MRN: [id number]  Today's Date: 12/17/2021    Admit Date: 12/14/2021       Discharge Plan     Row Name 12/17/21 1404       Plan    Plan Pt was transferred to [address] [place] [address] from [address] [place] [address]. Psych following for possible return to [facility] when stable. SS to follow.              YORDAN Souza

## 2021-12-17 NOTE — PLAN OF CARE
Goal Outcome Evaluation:           Progress: improving  Outcome Summary: Pt resting in bed during assessment. No complaints at this time. Vital signs stable, no acute changes at this time. Will continue to monitor.

## 2021-12-17 NOTE — PROGRESS NOTES
[facility]     Patient Identification:  Name:  Felice Aiken  Age:  86 y.o.  Sex:  male  :  1935  MRN:  [id number]  Visit Number:  [id number]  Primary Care Provider:  Rc Ojeda MD    Length of stay:  2    Chief complaint: None    Subjective:    Patient doing well today, no significant changes compared to yesterday.  Psychiatry to reevaluate patient in the morning for consideration of readmission to geriatric psychiatry.  ----------------------------------------------------------------------------------------------------------------------  Current Hospital Meds:  apixaban, 5 mg, Oral, Q12H  aspirin, 81 mg, Oral, Daily  atorvastatin, 40 mg, Oral, Daily  donepezil, 5 mg, Oral, Nightly  insulin aspart, 0-7 Units, Subcutaneous, TID AC  ipratropium-albuterol, 3 mL, Nebulization, TID - RT  multivitamin, 1 tablet, Oral, Daily  QUEtiapine, 50 mg, Oral, BID      sodium chloride, 75 mL/hr, Last Rate: 75 mL/hr (21 0248)      ----------------------------------------------------------------------------------------------------------------------  Vital Signs:  Temp:  [97.8 °F (36.6 °C)-98.3 °F (36.8 °C)] 98 °F (36.7 °C)  Heart Rate:  [69-93] 78  Resp:  [15-20] 18  BP: (133-180)/(72-81) 180/79      21  2311 12/15/21  0500 21  0500   Weight: 64.3 kg (141 lb 12.8 oz) 64.3 kg (141 lb 12.8 oz) (admit weight less than 24 hours) 65.3 kg (144 lb)     Body mass index is 21.27 kg/m².    Intake/Output Summary (Last 24 hours) at 2021 1858  Last data filed at 2021 1631  Gross per 24 hour   Intake 690 ml   Output 2800 ml   Net -2110 ml     Diet Regular; Thin; Cardiac  ----------------------------------------------------------------------------------------------------------------------  Physical exam:  Constitutional: Elderly appearing  male in no apparent distress.     HENT:  Head:  Normocephalic and atraumatic.  Mouth:  Moist mucous membranes.     Eyes:  Conjunctivae and EOM are normal.  Pupils are equal, round, and reactive to light.  No scleral icterus.    Neck:  Neck supple. No thyromegaly.  No JVD present.    Cardiovascular:  Irregular rhythm with  rate and rhythm with no murmurs, rubs, clicks or gallops appreciated.  Pulmonary/Chest:  Clear to auscultation bilaterally with no crackles, wheezes or rhonchi appreciated.  Abdominal:  Soft. Nontender. Nondistended  Bowel sounds are normal in all four quadrants. No organomegally appreciated.   Musculoskeletal:  No edema, no tenderness, and no deformity.  No red or swollen joints anywhere.    Neurological:  Alert and oriented to person, place, and time. Cranial nerves II-XII intact with no focal deficits.  No facial droop.  No slurred speech.   Skin:  Warm and dry to palpation with no rashes or lesions appreciated.  Peripheral vascular:  2+ radial and pedal pulses in bilateral upper and lower extremities.    Essentially no change in physical exam in comparison to 12/16/2021  ------------------------------------------------------------------------------  ----------------------------------------------------------------------------------------------------------------------  Results from last 7 days   Lab Units 12/17/21  0634 12/16/21  1747 12/16/21  0008   TROPONIN T ng/mL 0.011 0.014 0.021     Results from last 7 days   Lab Units 12/17/21  0634 12/15/21  0525 12/14/21  2159   CRP mg/dL  --   --  1.30*   LACTATE mmol/L  --   --  1.1   WBC 10*3/mm3 6.53 6.89 7.37   HEMOGLOBIN g/dL 9.7* 10.2* 10.8*   HEMATOCRIT % 31.6* 32.6* 35.0*   MCV fL 101.9* 100.0* 99.2*   MCHC g/dL 30.7* 31.3* 30.9*   PLATELETS 10*3/mm3 144 147 158         Results from last 7 days   Lab Units 12/17/21  0634 12/16/21  1112 12/15/21  0525 12/14/21  2159 12/14/21  2159 12/13/21  1047 12/13/21  1047   SODIUM mmol/L 138 144 141   < > 140   < > 142   POTASSIUM mmol/L 3.9 3.8 4.1   < > 3.9   < > 4.9   MAGNESIUM mg/dL  --   --  2.4  --  2.3  --  2.0    CHLORIDE mmol/L 109* 111* 110*   < > 107   < > 105   CO2 mmol/L 22.2 24.3 23.2   < > 22.5   < > 18.9*   BUN mg/dL 18 20 30*   < > 29*   < > 18   CREATININE mg/dL 0.94 1.06 1.59*   < > 1.88*   < > 1.26   EGFR IF NONAFRICN AM mL/min/1.73 76 67 42*   < > 34*   < > 55*   CALCIUM mg/dL 9.0 8.7 9.2   < > 9.5   < > 9.4   GLUCOSE mg/dL 96 165* 111*   < > 134*   < > 81   ALBUMIN g/dL  --   --  3.60  --  3.74  --  4.13   BILIRUBIN mg/dL  --   --  0.7  --  0.7  --  1.1   ALK PHOS U/L  --   --  81  --  89  --  99   AST (SGOT) U/L  --   --  23  --  26  --  39   ALT (SGPT) U/L  --   --  14  --  15  --  19    < > = values in this interval not displayed.   Estimated Creatinine Clearance: 54 mL/min (by C-G formula based on SCr of 0.94 mg/dL).    Ammonia   Date Value Ref Range Status   12/14/2021 26 16 - 60 umol/L Final     Results from last 7 days   Lab Units 12/14/21  0510   CHOLESTEROL mg/dL 142   TRIGLYCERIDES mg/dL 103   HDL CHOL mg/dL 50   LDL CHOL mg/dL 73     No results found for: BLOODCX  No results found for: URINECX  No results found for: WOUNDCX  No results found for: STOOLCX    I have personally looked at the labs and they are summarized above.  ----------------------------------------------------------------------------------------------------------------------  Imaging Results (Last 24 Hours)     ** No results found for the last 24 hours. **        ----------------------------------------------------------------------------------------------------------------------  Assessment and Plan:    1.  Hypotension -no episodes of hypotension in the past 24 hours.  In fact, patient with elevated blood pressure today.  We will restart patient's home dose of lisinopril 10 mg p.o. daily.    2.  Acute kidney injury -resolved with gentle IV fluid hydration. Serum creatinine improved to 1.  Psychiatry to evaluate patient for readmission to geriatric psych tomorrow.    3.  Elevated troponin -patient without active chest pain.   Suspect likely secondary to acute kidney injury.  We will continue to trend troponin.    4.  Urinary retention -continue Sue catheter    5.  Dementia with behavioral disturbance -will defer further treatment plan to psychiatry services.    6.  Chronic A. Fib -rate controlled, continue Eliquis and metoprolol.    Disposition possible discharge back to geriatric psychiatry in the next 24 hours          Sage Lyons, DO  12/17/21  18:58 EST

## 2021-12-17 NOTE — PLAN OF CARE
Goal Outcome Evaluation:           Progress: no change  Outcome Summary: Patient resting in bed.  No acute changes.  No complaints at this time.

## 2021-12-18 ENCOUNTER — HOSPITAL ENCOUNTER (INPATIENT)
Facility: HOSPITAL | Age: 86
LOS: 16 days | Discharge: HOME | End: 2022-01-03
Attending: PSYCHIATRY & NEUROLOGY | Admitting: PSYCHIATRY & NEUROLOGY

## 2021-12-18 VITALS
TEMPERATURE: 98.4 F | WEIGHT: 146.2 LBS | RESPIRATION RATE: 18 BRPM | HEART RATE: 88 BPM | OXYGEN SATURATION: 97 % | SYSTOLIC BLOOD PRESSURE: 156 MMHG | HEIGHT: 69 IN | BODY MASS INDEX: 21.66 KG/M2 | DIASTOLIC BLOOD PRESSURE: 74 MMHG

## 2021-12-18 LAB
ANION GAP SERPL CALCULATED.3IONS-SCNC: 8.7 MMOL/L (ref 5–15)
BUN SERPL-MCNC: 13 MG/DL (ref 8–23)
BUN/CREAT SERPL: 16 (ref 7–25)
CALCIUM SPEC-SCNC: 8.8 MG/DL (ref 8.6–10.5)
CHLORIDE SERPL-SCNC: 108 MMOL/L (ref 98–107)
CO2 SERPL-SCNC: 26.3 MMOL/L (ref 22–29)
CREAT SERPL-MCNC: 0.81 MG/DL (ref 0.76–1.27)
GFR SERPL CREATININE-BSD FRML MDRD: 91 ML/MIN/1.73
GLUCOSE BLDC GLUCOMTR-MCNC: 117 MG/DL (ref 70–130)
GLUCOSE BLDC GLUCOMTR-MCNC: 162 MG/DL (ref 70–130)
GLUCOSE BLDC GLUCOMTR-MCNC: 94 MG/DL (ref 70–130)
GLUCOSE SERPL-MCNC: 112 MG/DL (ref 65–99)
POTASSIUM SERPL-SCNC: 3.8 MMOL/L (ref 3.5–5.2)
SODIUM SERPL-SCNC: 143 MMOL/L (ref 136–145)

## 2021-12-18 PROCEDURE — G0378 HOSPITAL OBSERVATION PER HR: HCPCS

## 2021-12-18 PROCEDURE — 94799 UNLISTED PULMONARY SVC/PX: CPT

## 2021-12-18 PROCEDURE — 99217 PR OBSERVATION CARE DISCHARGE MANAGEMENT: CPT | Performed by: INTERNAL MEDICINE

## 2021-12-18 PROCEDURE — 63710000001 INSULIN ASPART PER 5 UNITS: Performed by: PHYSICIAN ASSISTANT

## 2021-12-18 PROCEDURE — 99232 SBSQ HOSP IP/OBS MODERATE 35: CPT | Performed by: PSYCHIATRY & NEUROLOGY

## 2021-12-18 PROCEDURE — 82962 GLUCOSE BLOOD TEST: CPT

## 2021-12-18 PROCEDURE — 80048 BASIC METABOLIC PNL TOTAL CA: CPT | Performed by: INTERNAL MEDICINE

## 2021-12-18 RX ORDER — ATORVASTATIN CALCIUM 10 MG/1
10 TABLET, FILM COATED ORAL DAILY
Status: DISCONTINUED | OUTPATIENT
Start: 2021-12-19 | End: 2021-12-18 | Stop reason: ALTCHOICE

## 2021-12-18 RX ORDER — LOPERAMIDE HYDROCHLORIDE 2 MG/1
2 CAPSULE ORAL
Status: DISCONTINUED | OUTPATIENT
Start: 2021-12-18 | End: 2022-01-03 | Stop reason: HOSPADM

## 2021-12-18 RX ORDER — LISINOPRIL 10 MG/1
10 TABLET ORAL
Status: DISCONTINUED | OUTPATIENT
Start: 2021-12-19 | End: 2022-01-03 | Stop reason: HOSPADM

## 2021-12-18 RX ORDER — TRAZODONE HYDROCHLORIDE 50 MG/1
50 TABLET ORAL NIGHTLY PRN
Status: DISCONTINUED | OUTPATIENT
Start: 2021-12-18 | End: 2022-01-03 | Stop reason: HOSPADM

## 2021-12-18 RX ORDER — QUETIAPINE FUMARATE 100 MG/1
50 TABLET, FILM COATED ORAL 2 TIMES DAILY
Status: DISCONTINUED | OUTPATIENT
Start: 2021-12-18 | End: 2021-12-21

## 2021-12-18 RX ORDER — ATORVASTATIN CALCIUM 10 MG/1
10 TABLET, FILM COATED ORAL NIGHTLY
Status: DISCONTINUED | OUTPATIENT
Start: 2021-12-18 | End: 2022-01-03 | Stop reason: HOSPADM

## 2021-12-18 RX ORDER — DIPHENOXYLATE HYDROCHLORIDE AND ATROPINE SULFATE 2.5; .025 MG/1; MG/1
1 TABLET ORAL DAILY
Status: DISCONTINUED | OUTPATIENT
Start: 2021-12-19 | End: 2022-01-03 | Stop reason: HOSPADM

## 2021-12-18 RX ORDER — ECHINACEA PURPUREA EXTRACT 125 MG
2 TABLET ORAL AS NEEDED
Status: DISCONTINUED | OUTPATIENT
Start: 2021-12-18 | End: 2022-01-03 | Stop reason: HOSPADM

## 2021-12-18 RX ORDER — TAMSULOSIN HYDROCHLORIDE 0.4 MG/1
0.4 CAPSULE ORAL DAILY
Status: DISCONTINUED | OUTPATIENT
Start: 2021-12-19 | End: 2022-01-03 | Stop reason: HOSPADM

## 2021-12-18 RX ORDER — DONEPEZIL HYDROCHLORIDE 5 MG/1
5 TABLET, FILM COATED ORAL NIGHTLY
Status: DISCONTINUED | OUTPATIENT
Start: 2021-12-18 | End: 2021-12-18 | Stop reason: ALTCHOICE

## 2021-12-18 RX ORDER — FAMOTIDINE 20 MG/1
20 TABLET, FILM COATED ORAL 2 TIMES DAILY PRN
Status: DISCONTINUED | OUTPATIENT
Start: 2021-12-18 | End: 2022-01-03 | Stop reason: HOSPADM

## 2021-12-18 RX ORDER — ACETAMINOPHEN 325 MG/1
650 TABLET ORAL EVERY 6 HOURS PRN
Status: DISCONTINUED | OUTPATIENT
Start: 2021-12-18 | End: 2022-01-03 | Stop reason: HOSPADM

## 2021-12-18 RX ORDER — LISINOPRIL 10 MG/1
10 TABLET ORAL DAILY
Status: DISCONTINUED | OUTPATIENT
Start: 2021-12-19 | End: 2021-12-18 | Stop reason: ALTCHOICE

## 2021-12-18 RX ORDER — BENZONATATE 100 MG/1
100 CAPSULE ORAL 3 TIMES DAILY PRN
Status: DISCONTINUED | OUTPATIENT
Start: 2021-12-18 | End: 2022-01-03 | Stop reason: HOSPADM

## 2021-12-18 RX ORDER — METOPROLOL SUCCINATE 25 MG/1
25 TABLET, EXTENDED RELEASE ORAL
Status: DISCONTINUED | OUTPATIENT
Start: 2021-12-19 | End: 2021-12-18 | Stop reason: ALTCHOICE

## 2021-12-18 RX ORDER — TAMSULOSIN HYDROCHLORIDE 0.4 MG/1
1 CAPSULE ORAL DAILY
Qty: 30 CAPSULE | Refills: 1 | Status: ON HOLD | OUTPATIENT
Start: 2021-12-18 | End: 2021-12-18

## 2021-12-18 RX ORDER — TRAMADOL HYDROCHLORIDE 50 MG/1
50 TABLET ORAL EVERY 8 HOURS PRN
Status: DISPENSED | OUTPATIENT
Start: 2021-12-18 | End: 2021-12-25

## 2021-12-18 RX ORDER — MULTIPLE VITAMINS W/ MINERALS TAB 9MG-400MCG
1 TAB ORAL DAILY
Status: DISCONTINUED | OUTPATIENT
Start: 2021-12-19 | End: 2021-12-18 | Stop reason: ALTCHOICE

## 2021-12-18 RX ORDER — METOPROLOL SUCCINATE 25 MG/1
25 TABLET, EXTENDED RELEASE ORAL DAILY
Status: DISCONTINUED | OUTPATIENT
Start: 2021-12-19 | End: 2022-01-03 | Stop reason: HOSPADM

## 2021-12-18 RX ORDER — IPRATROPIUM BROMIDE AND ALBUTEROL SULFATE 2.5; .5 MG/3ML; MG/3ML
3 SOLUTION RESPIRATORY (INHALATION)
Status: DISCONTINUED | OUTPATIENT
Start: 2021-12-18 | End: 2021-12-18 | Stop reason: ALTCHOICE

## 2021-12-18 RX ORDER — QUETIAPINE FUMARATE 100 MG/1
50 TABLET, FILM COATED ORAL EVERY 12 HOURS SCHEDULED
Status: DISCONTINUED | OUTPATIENT
Start: 2021-12-18 | End: 2021-12-18 | Stop reason: ALTCHOICE

## 2021-12-18 RX ORDER — ASPIRIN 81 MG/1
81 TABLET, CHEWABLE ORAL DAILY
Status: DISCONTINUED | OUTPATIENT
Start: 2021-12-19 | End: 2021-12-18 | Stop reason: ALTCHOICE

## 2021-12-18 RX ORDER — HYDROXYZINE 50 MG/1
50 TABLET, FILM COATED ORAL EVERY 6 HOURS PRN
Status: DISCONTINUED | OUTPATIENT
Start: 2021-12-18 | End: 2022-01-03 | Stop reason: HOSPADM

## 2021-12-18 RX ORDER — IPRATROPIUM BROMIDE AND ALBUTEROL SULFATE 2.5; .5 MG/3ML; MG/3ML
3 SOLUTION RESPIRATORY (INHALATION)
Status: DISCONTINUED | OUTPATIENT
Start: 2021-12-18 | End: 2021-12-29

## 2021-12-18 RX ORDER — ALUMINA, MAGNESIA, AND SIMETHICONE 2400; 2400; 240 MG/30ML; MG/30ML; MG/30ML
15 SUSPENSION ORAL EVERY 6 HOURS PRN
Status: DISCONTINUED | OUTPATIENT
Start: 2021-12-18 | End: 2022-01-03 | Stop reason: HOSPADM

## 2021-12-18 RX ORDER — DONEPEZIL HYDROCHLORIDE 5 MG/1
5 TABLET, FILM COATED ORAL NIGHTLY
Status: DISCONTINUED | OUTPATIENT
Start: 2021-12-18 | End: 2022-01-03 | Stop reason: HOSPADM

## 2021-12-18 RX ORDER — ASPIRIN 81 MG/1
81 TABLET ORAL DAILY
Status: DISCONTINUED | OUTPATIENT
Start: 2021-12-19 | End: 2022-01-03 | Stop reason: HOSPADM

## 2021-12-18 RX ORDER — ONDANSETRON 4 MG/1
4 TABLET, FILM COATED ORAL EVERY 6 HOURS PRN
Status: DISCONTINUED | OUTPATIENT
Start: 2021-12-18 | End: 2022-01-03 | Stop reason: HOSPADM

## 2021-12-18 RX ADMIN — IPRATROPIUM BROMIDE AND ALBUTEROL SULFATE 3 ML: .5; 2.5 SOLUTION RESPIRATORY (INHALATION) at 07:03

## 2021-12-18 RX ADMIN — ASPIRIN 81 MG: 81 TABLET, COATED ORAL at 08:24

## 2021-12-18 RX ADMIN — INSULIN ASPART 2 UNITS: 100 INJECTION, SOLUTION INTRAVENOUS; SUBCUTANEOUS at 11:01

## 2021-12-18 RX ADMIN — ATORVASTATIN CALCIUM 10 MG: 10 TABLET, FILM COATED ORAL at 21:21

## 2021-12-18 RX ADMIN — QUETIAPINE FUMARATE 50 MG: 100 TABLET ORAL at 21:21

## 2021-12-18 RX ADMIN — IPRATROPIUM BROMIDE AND ALBUTEROL SULFATE 3 ML: .5; 2.5 SOLUTION RESPIRATORY (INHALATION) at 14:09

## 2021-12-18 RX ADMIN — ATORVASTATIN CALCIUM 40 MG: 40 TABLET, FILM COATED ORAL at 08:24

## 2021-12-18 RX ADMIN — APIXABAN 5 MG: 5 TABLET, FILM COATED ORAL at 21:21

## 2021-12-18 RX ADMIN — DONEPEZIL HYDROCHLORIDE 5 MG: 5 TABLET, FILM COATED ORAL at 21:21

## 2021-12-18 RX ADMIN — Medication 1 TABLET: at 08:24

## 2021-12-18 RX ADMIN — IPRATROPIUM BROMIDE AND ALBUTEROL SULFATE 3 ML: .5; 2.5 SOLUTION RESPIRATORY (INHALATION) at 00:14

## 2021-12-18 RX ADMIN — QUETIAPINE FUMARATE 50 MG: 25 TABLET, FILM COATED ORAL at 08:24

## 2021-12-18 RX ADMIN — LISINOPRIL 10 MG: 10 TABLET ORAL at 08:23

## 2021-12-18 RX ADMIN — APIXABAN 5 MG: 5 TABLET, FILM COATED ORAL at 08:24
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
## 2021-12-18 NOTE — PLAN OF CARE
Goal Outcome Evaluation:  Plan of Care Reviewed With: patient        Progress: no change  Outcome Summary: Pt resting in bed during assessment. No complaints at this time. Vitals signs stable, no acute changes at this time. Will continue to monitor.

## 2021-12-18 NOTE — PLAN OF CARE
Goal Outcome Evaluation:            Pt resting with no complaints at this time. Awaiting Psych to see pt at bedside.

## 2021-12-18 NOTE — NURSING NOTE
Spoke with senior psych lead and they were unable to accept pt at this time due to pt having catheter and they needed a sitter in which they did not have at this time.

## 2021-12-18 NOTE — NURSING NOTE
Senior psych called back and stated they could take him with catheter she would just have to call in sitter. Dr. Lyons aware of change.
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
## 2021-12-19 LAB
BACTERIA SPEC AEROBE CULT: NORMAL
BACTERIA SPEC AEROBE CULT: NORMAL

## 2021-12-19 PROCEDURE — 94799 UNLISTED PULMONARY SVC/PX: CPT

## 2021-12-19 PROCEDURE — 99223 1ST HOSP IP/OBS HIGH 75: CPT | Performed by: PSYCHIATRY & NEUROLOGY

## 2021-12-19 RX ADMIN — Medication 1 TABLET: at 09:28

## 2021-12-19 RX ADMIN — APIXABAN 5 MG: 5 TABLET, FILM COATED ORAL at 09:28

## 2021-12-19 RX ADMIN — IPRATROPIUM BROMIDE AND ALBUTEROL SULFATE 3 ML: .5; 3 SOLUTION RESPIRATORY (INHALATION) at 13:45

## 2021-12-19 RX ADMIN — QUETIAPINE FUMARATE 50 MG: 100 TABLET ORAL at 09:29

## 2021-12-19 RX ADMIN — DONEPEZIL HYDROCHLORIDE 5 MG: 5 TABLET, FILM COATED ORAL at 20:55

## 2021-12-19 RX ADMIN — TAMSULOSIN HYDROCHLORIDE 0.4 MG: 0.4 CAPSULE ORAL at 09:28

## 2021-12-19 RX ADMIN — IPRATROPIUM BROMIDE AND ALBUTEROL SULFATE 3 ML: .5; 3 SOLUTION RESPIRATORY (INHALATION) at 19:54

## 2021-12-19 RX ADMIN — METOPROLOL SUCCINATE 25 MG: 25 TABLET, EXTENDED RELEASE ORAL at 09:30

## 2021-12-19 RX ADMIN — APIXABAN 5 MG: 5 TABLET, FILM COATED ORAL at 20:55

## 2021-12-19 RX ADMIN — ATORVASTATIN CALCIUM 10 MG: 10 TABLET, FILM COATED ORAL at 20:55

## 2021-12-19 RX ADMIN — LISINOPRIL 10 MG: 10 TABLET ORAL at 09:30

## 2021-12-19 RX ADMIN — IPRATROPIUM BROMIDE AND ALBUTEROL SULFATE 3 ML: .5; 3 SOLUTION RESPIRATORY (INHALATION) at 07:40

## 2021-12-19 RX ADMIN — ASPIRIN 81 MG: 81 TABLET, COATED ORAL at 09:28

## 2021-12-19 RX ADMIN — QUETIAPINE FUMARATE 50 MG: 100 TABLET ORAL at 20:56

## 2021-12-20 LAB
CHOLEST SERPL-MCNC: 124 MG/DL (ref 0–200)
HDLC SERPL-MCNC: 45 MG/DL (ref 40–60)
LDLC SERPL CALC-MCNC: 53 MG/DL (ref 0–100)
LDLC/HDLC SERPL: 1.09 {RATIO}
TRIGL SERPL-MCNC: 150 MG/DL (ref 0–150)
VLDLC SERPL-MCNC: 26 MG/DL (ref 5–40)

## 2021-12-20 PROCEDURE — 80061 LIPID PANEL: CPT | Performed by: PSYCHIATRY & NEUROLOGY

## 2021-12-20 PROCEDURE — 94799 UNLISTED PULMONARY SVC/PX: CPT

## 2021-12-20 PROCEDURE — 99232 SBSQ HOSP IP/OBS MODERATE 35: CPT | Performed by: PSYCHIATRY & NEUROLOGY

## 2021-12-20 PROCEDURE — 97162 PT EVAL MOD COMPLEX 30 MIN: CPT

## 2021-12-20 RX ORDER — POLYETHYLENE GLYCOL 3350 17 G/17G
17 POWDER, FOR SOLUTION ORAL DAILY
Status: DISCONTINUED | OUTPATIENT
Start: 2021-12-20 | End: 2022-01-03 | Stop reason: HOSPADM

## 2021-12-20 RX ADMIN — Medication 1 TABLET: at 08:24

## 2021-12-20 RX ADMIN — LISINOPRIL 10 MG: 10 TABLET ORAL at 08:24

## 2021-12-20 RX ADMIN — QUETIAPINE FUMARATE 50 MG: 100 TABLET ORAL at 20:35

## 2021-12-20 RX ADMIN — IPRATROPIUM BROMIDE AND ALBUTEROL SULFATE 3 ML: .5; 3 SOLUTION RESPIRATORY (INHALATION) at 13:36

## 2021-12-20 RX ADMIN — IPRATROPIUM BROMIDE AND ALBUTEROL SULFATE 3 ML: .5; 3 SOLUTION RESPIRATORY (INHALATION) at 19:08

## 2021-12-20 RX ADMIN — APIXABAN 5 MG: 5 TABLET, FILM COATED ORAL at 20:35

## 2021-12-20 RX ADMIN — ATORVASTATIN CALCIUM 10 MG: 10 TABLET, FILM COATED ORAL at 20:35

## 2021-12-20 RX ADMIN — METOPROLOL SUCCINATE 25 MG: 25 TABLET, EXTENDED RELEASE ORAL at 08:24

## 2021-12-20 RX ADMIN — TAMSULOSIN HYDROCHLORIDE 0.4 MG: 0.4 CAPSULE ORAL at 08:24

## 2021-12-20 RX ADMIN — POLYETHYLENE GLYCOL (3350) 17 G: 17 POWDER, FOR SOLUTION ORAL at 17:50

## 2021-12-20 RX ADMIN — APIXABAN 5 MG: 5 TABLET, FILM COATED ORAL at 08:24

## 2021-12-20 RX ADMIN — IPRATROPIUM BROMIDE AND ALBUTEROL SULFATE 3 ML: .5; 3 SOLUTION RESPIRATORY (INHALATION) at 07:40

## 2021-12-20 RX ADMIN — QUETIAPINE FUMARATE 50 MG: 100 TABLET ORAL at 08:24

## 2021-12-20 RX ADMIN — ASPIRIN 81 MG: 81 TABLET, COATED ORAL at 08:24

## 2021-12-20 RX ADMIN — DONEPEZIL HYDROCHLORIDE 5 MG: 5 TABLET, FILM COATED ORAL at 20:35

## 2021-12-20 RX ADMIN — MAGNESIUM HYDROXIDE 10 ML: 2400 SUSPENSION ORAL at 11:33

## 2021-12-20 NOTE — CASE MANAGEMENT/SOCIAL WORK
Case Management Discharge Note      Final Note: Patient discharged and transferred to [facility] on 12/18/21.         Selected Continued Care - Discharged on 12/18/2021 Admission date: 12/14/2021 - Discharge disposition: Psychiatric Hospital or Unit (OR - [facility] [place])    Destination Coordination complete.    Service Provider Selected Services Address Phone Fax Patient Preferred    [facility] [address] [facility] [id number] [phone] --           Final Discharge Disposition Code: 65 - psychiatric hospital or unit

## 2021-12-21 PROCEDURE — 99232 SBSQ HOSP IP/OBS MODERATE 35: CPT | Performed by: PSYCHIATRY & NEUROLOGY

## 2021-12-21 PROCEDURE — 94799 UNLISTED PULMONARY SVC/PX: CPT

## 2021-12-21 RX ORDER — QUETIAPINE FUMARATE 100 MG/1
25 TABLET, FILM COATED ORAL 2 TIMES DAILY
Status: DISCONTINUED | OUTPATIENT
Start: 2021-12-21 | End: 2021-12-22

## 2021-12-21 RX ORDER — CHOLECALCIFEROL (VITAMIN D3) 125 MCG
5 CAPSULE ORAL NIGHTLY
Status: DISCONTINUED | OUTPATIENT
Start: 2021-12-21 | End: 2022-01-03 | Stop reason: HOSPADM

## 2021-12-21 RX ADMIN — APIXABAN 5 MG: 5 TABLET, FILM COATED ORAL at 08:16

## 2021-12-21 RX ADMIN — Medication 1 TABLET: at 08:16

## 2021-12-21 RX ADMIN — IPRATROPIUM BROMIDE AND ALBUTEROL SULFATE 3 ML: .5; 3 SOLUTION RESPIRATORY (INHALATION) at 07:57

## 2021-12-21 RX ADMIN — LISINOPRIL 10 MG: 10 TABLET ORAL at 08:16

## 2021-12-21 RX ADMIN — TAMSULOSIN HYDROCHLORIDE 0.4 MG: 0.4 CAPSULE ORAL at 08:16

## 2021-12-21 RX ADMIN — QUETIAPINE FUMARATE 50 MG: 100 TABLET ORAL at 08:16

## 2021-12-21 RX ADMIN — ASPIRIN 81 MG: 81 TABLET, COATED ORAL at 08:16

## 2021-12-21 RX ADMIN — ATORVASTATIN CALCIUM 10 MG: 10 TABLET, FILM COATED ORAL at 20:44

## 2021-12-21 RX ADMIN — Medication 5 MG: at 20:44

## 2021-12-21 RX ADMIN — ACETAMINOPHEN 650 MG: 325 TABLET ORAL at 08:16

## 2021-12-21 RX ADMIN — HYDROXYZINE HYDROCHLORIDE 50 MG: 50 TABLET ORAL at 08:16

## 2021-12-21 RX ADMIN — APIXABAN 5 MG: 5 TABLET, FILM COATED ORAL at 20:44

## 2021-12-21 RX ADMIN — QUETIAPINE FUMARATE 25 MG: 100 TABLET ORAL at 20:44

## 2021-12-21 RX ADMIN — POLYETHYLENE GLYCOL (3350) 17 G: 17 POWDER, FOR SOLUTION ORAL at 08:16

## 2021-12-21 RX ADMIN — DONEPEZIL HYDROCHLORIDE 5 MG: 5 TABLET, FILM COATED ORAL at 20:44

## 2021-12-21 RX ADMIN — METOPROLOL SUCCINATE 25 MG: 25 TABLET, EXTENDED RELEASE ORAL at 08:16

## 2021-12-21 RX ADMIN — IPRATROPIUM BROMIDE AND ALBUTEROL SULFATE 3 ML: .5; 3 SOLUTION RESPIRATORY (INHALATION) at 14:33

## 2021-12-21 RX ADMIN — TRAZODONE HYDROCHLORIDE 50 MG: 50 TABLET ORAL at 20:44

## 2021-12-22 PROCEDURE — 94640 AIRWAY INHALATION TREATMENT: CPT

## 2021-12-22 PROCEDURE — 94760 N-INVAS EAR/PLS OXIMETRY 1: CPT

## 2021-12-22 PROCEDURE — 97116 GAIT TRAINING THERAPY: CPT

## 2021-12-22 PROCEDURE — 94799 UNLISTED PULMONARY SVC/PX: CPT

## 2021-12-22 RX ORDER — QUETIAPINE FUMARATE 100 MG/1
25 TABLET, FILM COATED ORAL NIGHTLY
Status: DISCONTINUED | OUTPATIENT
Start: 2021-12-22 | End: 2021-12-31

## 2021-12-22 RX ADMIN — TRAMADOL HYDROCHLORIDE 50 MG: 50 TABLET, COATED ORAL at 21:04

## 2021-12-22 RX ADMIN — QUETIAPINE FUMARATE 25 MG: 100 TABLET ORAL at 21:04

## 2021-12-22 RX ADMIN — ACETAMINOPHEN 650 MG: 325 TABLET ORAL at 08:30

## 2021-12-22 RX ADMIN — IPRATROPIUM BROMIDE AND ALBUTEROL SULFATE 3 ML: .5; 3 SOLUTION RESPIRATORY (INHALATION) at 23:58

## 2021-12-22 RX ADMIN — ASPIRIN 81 MG: 81 TABLET, COATED ORAL at 08:31

## 2021-12-22 RX ADMIN — METOPROLOL SUCCINATE 25 MG: 25 TABLET, EXTENDED RELEASE ORAL at 08:31

## 2021-12-22 RX ADMIN — QUETIAPINE FUMARATE 25 MG: 100 TABLET ORAL at 08:31

## 2021-12-22 RX ADMIN — Medication 5 MG: at 21:04

## 2021-12-22 RX ADMIN — LISINOPRIL 10 MG: 10 TABLET ORAL at 08:30

## 2021-12-22 RX ADMIN — Medication 1 TABLET: at 08:31

## 2021-12-22 RX ADMIN — DONEPEZIL HYDROCHLORIDE 5 MG: 5 TABLET, FILM COATED ORAL at 21:04

## 2021-12-22 RX ADMIN — APIXABAN 5 MG: 5 TABLET, FILM COATED ORAL at 08:31

## 2021-12-22 RX ADMIN — IPRATROPIUM BROMIDE AND ALBUTEROL SULFATE 3 ML: .5; 3 SOLUTION RESPIRATORY (INHALATION) at 08:14

## 2021-12-22 RX ADMIN — ATORVASTATIN CALCIUM 10 MG: 10 TABLET, FILM COATED ORAL at 21:04

## 2021-12-22 RX ADMIN — TAMSULOSIN HYDROCHLORIDE 0.4 MG: 0.4 CAPSULE ORAL at 08:30

## 2021-12-22 RX ADMIN — APIXABAN 5 MG: 5 TABLET, FILM COATED ORAL at 21:04

## 2021-12-23 PROCEDURE — 99232 SBSQ HOSP IP/OBS MODERATE 35: CPT | Performed by: PSYCHIATRY & NEUROLOGY

## 2021-12-23 PROCEDURE — 94799 UNLISTED PULMONARY SVC/PX: CPT

## 2021-12-23 RX ADMIN — IPRATROPIUM BROMIDE AND ALBUTEROL SULFATE 3 ML: .5; 3 SOLUTION RESPIRATORY (INHALATION) at 20:03

## 2021-12-23 RX ADMIN — QUETIAPINE FUMARATE 25 MG: 100 TABLET ORAL at 20:18

## 2021-12-23 RX ADMIN — TAMSULOSIN HYDROCHLORIDE 0.4 MG: 0.4 CAPSULE ORAL at 08:21

## 2021-12-23 RX ADMIN — TRAZODONE HYDROCHLORIDE 50 MG: 50 TABLET ORAL at 20:18

## 2021-12-23 RX ADMIN — ASPIRIN 81 MG: 81 TABLET, COATED ORAL at 08:21

## 2021-12-23 RX ADMIN — ATORVASTATIN CALCIUM 10 MG: 10 TABLET, FILM COATED ORAL at 20:18

## 2021-12-23 RX ADMIN — IPRATROPIUM BROMIDE AND ALBUTEROL SULFATE 3 ML: .5; 3 SOLUTION RESPIRATORY (INHALATION) at 14:01

## 2021-12-23 RX ADMIN — APIXABAN 5 MG: 5 TABLET, FILM COATED ORAL at 20:18

## 2021-12-23 RX ADMIN — IPRATROPIUM BROMIDE AND ALBUTEROL SULFATE 3 ML: .5; 3 SOLUTION RESPIRATORY (INHALATION) at 08:12

## 2021-12-23 RX ADMIN — POLYETHYLENE GLYCOL (3350) 17 G: 17 POWDER, FOR SOLUTION ORAL at 08:20

## 2021-12-23 RX ADMIN — Medication 5 MG: at 20:18

## 2021-12-23 RX ADMIN — METOPROLOL SUCCINATE 25 MG: 25 TABLET, EXTENDED RELEASE ORAL at 08:20

## 2021-12-23 RX ADMIN — Medication 1 TABLET: at 08:21

## 2021-12-23 RX ADMIN — APIXABAN 5 MG: 5 TABLET, FILM COATED ORAL at 08:21

## 2021-12-23 RX ADMIN — DONEPEZIL HYDROCHLORIDE 5 MG: 5 TABLET, FILM COATED ORAL at 20:18

## 2021-12-23 RX ADMIN — LISINOPRIL 10 MG: 10 TABLET ORAL at 08:21

## 2021-12-24 PROCEDURE — 99232 SBSQ HOSP IP/OBS MODERATE 35: CPT | Performed by: PSYCHIATRY & NEUROLOGY

## 2021-12-24 PROCEDURE — 94799 UNLISTED PULMONARY SVC/PX: CPT

## 2021-12-24 RX ADMIN — APIXABAN 5 MG: 5 TABLET, FILM COATED ORAL at 08:21

## 2021-12-24 RX ADMIN — ATORVASTATIN CALCIUM 10 MG: 10 TABLET, FILM COATED ORAL at 21:20

## 2021-12-24 RX ADMIN — METOPROLOL SUCCINATE 25 MG: 25 TABLET, EXTENDED RELEASE ORAL at 08:21

## 2021-12-24 RX ADMIN — IPRATROPIUM BROMIDE AND ALBUTEROL SULFATE 3 ML: .5; 3 SOLUTION RESPIRATORY (INHALATION) at 07:50

## 2021-12-24 RX ADMIN — LISINOPRIL 10 MG: 10 TABLET ORAL at 08:21

## 2021-12-24 RX ADMIN — Medication 5 MG: at 21:20

## 2021-12-24 RX ADMIN — QUETIAPINE FUMARATE 25 MG: 100 TABLET ORAL at 21:20

## 2021-12-24 RX ADMIN — TRAMADOL HYDROCHLORIDE 50 MG: 50 TABLET, COATED ORAL at 21:20

## 2021-12-24 RX ADMIN — IPRATROPIUM BROMIDE AND ALBUTEROL SULFATE 3 ML: .5; 3 SOLUTION RESPIRATORY (INHALATION) at 19:57

## 2021-12-24 RX ADMIN — POLYETHYLENE GLYCOL (3350) 17 G: 17 POWDER, FOR SOLUTION ORAL at 08:22

## 2021-12-24 RX ADMIN — Medication 1 TABLET: at 08:21

## 2021-12-24 RX ADMIN — ASPIRIN 81 MG: 81 TABLET, COATED ORAL at 08:21

## 2021-12-24 RX ADMIN — DONEPEZIL HYDROCHLORIDE 5 MG: 5 TABLET, FILM COATED ORAL at 21:20

## 2021-12-24 RX ADMIN — HYDROXYZINE HYDROCHLORIDE 50 MG: 50 TABLET ORAL at 13:52

## 2021-12-24 RX ADMIN — TAMSULOSIN HYDROCHLORIDE 0.4 MG: 0.4 CAPSULE ORAL at 08:23

## 2021-12-24 RX ADMIN — IPRATROPIUM BROMIDE AND ALBUTEROL SULFATE 3 ML: .5; 3 SOLUTION RESPIRATORY (INHALATION) at 13:34

## 2021-12-24 RX ADMIN — APIXABAN 5 MG: 5 TABLET, FILM COATED ORAL at 21:20

## 2021-12-25 PROCEDURE — 94799 UNLISTED PULMONARY SVC/PX: CPT

## 2021-12-25 PROCEDURE — 99232 SBSQ HOSP IP/OBS MODERATE 35: CPT | Performed by: PSYCHIATRY & NEUROLOGY

## 2021-12-25 PROCEDURE — 94760 N-INVAS EAR/PLS OXIMETRY 1: CPT

## 2021-12-25 RX ADMIN — TAMSULOSIN HYDROCHLORIDE 0.4 MG: 0.4 CAPSULE ORAL at 08:15

## 2021-12-25 RX ADMIN — ATORVASTATIN CALCIUM 10 MG: 10 TABLET, FILM COATED ORAL at 20:17

## 2021-12-25 RX ADMIN — Medication 1 TABLET: at 08:14

## 2021-12-25 RX ADMIN — QUETIAPINE FUMARATE 25 MG: 100 TABLET ORAL at 20:17

## 2021-12-25 RX ADMIN — APIXABAN 5 MG: 5 TABLET, FILM COATED ORAL at 20:17

## 2021-12-25 RX ADMIN — Medication 5 MG: at 20:17

## 2021-12-25 RX ADMIN — DONEPEZIL HYDROCHLORIDE 5 MG: 5 TABLET, FILM COATED ORAL at 20:17

## 2021-12-25 RX ADMIN — APIXABAN 5 MG: 5 TABLET, FILM COATED ORAL at 08:14

## 2021-12-25 RX ADMIN — POLYETHYLENE GLYCOL (3350) 17 G: 17 POWDER, FOR SOLUTION ORAL at 08:13

## 2021-12-25 RX ADMIN — IPRATROPIUM BROMIDE AND ALBUTEROL SULFATE 3 ML: .5; 3 SOLUTION RESPIRATORY (INHALATION) at 08:01

## 2021-12-25 RX ADMIN — IPRATROPIUM BROMIDE AND ALBUTEROL SULFATE 3 ML: .5; 3 SOLUTION RESPIRATORY (INHALATION) at 22:16

## 2021-12-25 RX ADMIN — IPRATROPIUM BROMIDE AND ALBUTEROL SULFATE 3 ML: .5; 3 SOLUTION RESPIRATORY (INHALATION) at 13:58

## 2021-12-25 RX ADMIN — ASPIRIN 81 MG: 81 TABLET, COATED ORAL at 08:14

## 2021-12-26 PROCEDURE — 99232 SBSQ HOSP IP/OBS MODERATE 35: CPT | Performed by: PSYCHIATRY & NEUROLOGY

## 2021-12-26 PROCEDURE — 94799 UNLISTED PULMONARY SVC/PX: CPT

## 2021-12-26 PROCEDURE — 94760 N-INVAS EAR/PLS OXIMETRY 1: CPT

## 2021-12-26 RX ADMIN — APIXABAN 5 MG: 5 TABLET, FILM COATED ORAL at 08:17

## 2021-12-26 RX ADMIN — LISINOPRIL 10 MG: 10 TABLET ORAL at 08:17

## 2021-12-26 RX ADMIN — QUETIAPINE FUMARATE 25 MG: 100 TABLET ORAL at 20:46

## 2021-12-26 RX ADMIN — IPRATROPIUM BROMIDE AND ALBUTEROL SULFATE 3 ML: .5; 3 SOLUTION RESPIRATORY (INHALATION) at 23:51

## 2021-12-26 RX ADMIN — Medication 5 MG: at 20:46

## 2021-12-26 RX ADMIN — ATORVASTATIN CALCIUM 10 MG: 10 TABLET, FILM COATED ORAL at 20:46

## 2021-12-26 RX ADMIN — ASPIRIN 81 MG: 81 TABLET, COATED ORAL at 08:17

## 2021-12-26 RX ADMIN — DONEPEZIL HYDROCHLORIDE 5 MG: 5 TABLET, FILM COATED ORAL at 20:46

## 2021-12-26 RX ADMIN — APIXABAN 5 MG: 5 TABLET, FILM COATED ORAL at 20:46

## 2021-12-26 RX ADMIN — IPRATROPIUM BROMIDE AND ALBUTEROL SULFATE 3 ML: .5; 3 SOLUTION RESPIRATORY (INHALATION) at 08:03

## 2021-12-26 RX ADMIN — POLYETHYLENE GLYCOL (3350) 17 G: 17 POWDER, FOR SOLUTION ORAL at 08:17

## 2021-12-26 RX ADMIN — Medication 1 TABLET: at 08:17

## 2021-12-26 RX ADMIN — METOPROLOL SUCCINATE 25 MG: 25 TABLET, EXTENDED RELEASE ORAL at 08:17

## 2021-12-26 RX ADMIN — TAMSULOSIN HYDROCHLORIDE 0.4 MG: 0.4 CAPSULE ORAL at 08:18

## 2021-12-26 RX ADMIN — IPRATROPIUM BROMIDE AND ALBUTEROL SULFATE 3 ML: .5; 3 SOLUTION RESPIRATORY (INHALATION) at 13:58

## 2021-12-27 PROCEDURE — 94799 UNLISTED PULMONARY SVC/PX: CPT

## 2021-12-27 PROCEDURE — 99232 SBSQ HOSP IP/OBS MODERATE 35: CPT | Performed by: PSYCHIATRY & NEUROLOGY

## 2021-12-27 RX ADMIN — Medication 5 MG: at 21:28

## 2021-12-27 RX ADMIN — MAGNESIUM HYDROXIDE 10 ML: 2400 SUSPENSION ORAL at 08:38

## 2021-12-27 RX ADMIN — APIXABAN 5 MG: 5 TABLET, FILM COATED ORAL at 08:23

## 2021-12-27 RX ADMIN — DONEPEZIL HYDROCHLORIDE 5 MG: 5 TABLET, FILM COATED ORAL at 21:28

## 2021-12-27 RX ADMIN — METOPROLOL SUCCINATE 25 MG: 25 TABLET, EXTENDED RELEASE ORAL at 08:23

## 2021-12-27 RX ADMIN — ASPIRIN 81 MG: 81 TABLET, COATED ORAL at 08:23

## 2021-12-27 RX ADMIN — TAMSULOSIN HYDROCHLORIDE 0.4 MG: 0.4 CAPSULE ORAL at 08:24

## 2021-12-27 RX ADMIN — POLYETHYLENE GLYCOL (3350) 17 G: 17 POWDER, FOR SOLUTION ORAL at 08:23

## 2021-12-27 RX ADMIN — IPRATROPIUM BROMIDE AND ALBUTEROL SULFATE 3 ML: .5; 3 SOLUTION RESPIRATORY (INHALATION) at 22:02

## 2021-12-27 RX ADMIN — QUETIAPINE FUMARATE 25 MG: 100 TABLET ORAL at 21:28

## 2021-12-27 RX ADMIN — IPRATROPIUM BROMIDE AND ALBUTEROL SULFATE 3 ML: .5; 3 SOLUTION RESPIRATORY (INHALATION) at 07:30

## 2021-12-27 RX ADMIN — ATORVASTATIN CALCIUM 10 MG: 10 TABLET, FILM COATED ORAL at 21:28

## 2021-12-27 RX ADMIN — LISINOPRIL 10 MG: 10 TABLET ORAL at 08:23

## 2021-12-27 RX ADMIN — APIXABAN 5 MG: 5 TABLET, FILM COATED ORAL at 21:28

## 2021-12-27 RX ADMIN — Medication 1 TABLET: at 08:23

## 2021-12-27 RX ADMIN — IPRATROPIUM BROMIDE AND ALBUTEROL SULFATE 3 ML: .5; 3 SOLUTION RESPIRATORY (INHALATION) at 14:06

## 2021-12-28 PROCEDURE — 99232 SBSQ HOSP IP/OBS MODERATE 35: CPT | Performed by: PSYCHIATRY & NEUROLOGY

## 2021-12-28 PROCEDURE — 94799 UNLISTED PULMONARY SVC/PX: CPT

## 2021-12-28 PROCEDURE — 94760 N-INVAS EAR/PLS OXIMETRY 1: CPT

## 2021-12-28 RX ADMIN — Medication 5 MG: at 21:12

## 2021-12-28 RX ADMIN — ASPIRIN 81 MG: 81 TABLET, COATED ORAL at 08:12

## 2021-12-28 RX ADMIN — DONEPEZIL HYDROCHLORIDE 5 MG: 5 TABLET, FILM COATED ORAL at 21:11

## 2021-12-28 RX ADMIN — IPRATROPIUM BROMIDE AND ALBUTEROL SULFATE 3 ML: .5; 3 SOLUTION RESPIRATORY (INHALATION) at 23:04

## 2021-12-28 RX ADMIN — TAMSULOSIN HYDROCHLORIDE 0.4 MG: 0.4 CAPSULE ORAL at 08:12

## 2021-12-28 RX ADMIN — IPRATROPIUM BROMIDE AND ALBUTEROL SULFATE 3 ML: .5; 3 SOLUTION RESPIRATORY (INHALATION) at 07:21

## 2021-12-28 RX ADMIN — HYDROXYZINE HYDROCHLORIDE 50 MG: 50 TABLET ORAL at 08:12

## 2021-12-28 RX ADMIN — IPRATROPIUM BROMIDE AND ALBUTEROL SULFATE 3 ML: .5; 3 SOLUTION RESPIRATORY (INHALATION) at 14:23

## 2021-12-28 RX ADMIN — POLYETHYLENE GLYCOL (3350) 17 G: 17 POWDER, FOR SOLUTION ORAL at 08:12

## 2021-12-28 RX ADMIN — LISINOPRIL 10 MG: 10 TABLET ORAL at 08:12

## 2021-12-28 RX ADMIN — APIXABAN 5 MG: 5 TABLET, FILM COATED ORAL at 08:12

## 2021-12-28 RX ADMIN — Medication 1 TABLET: at 08:12

## 2021-12-28 RX ADMIN — ACETAMINOPHEN 650 MG: 325 TABLET ORAL at 08:12

## 2021-12-28 RX ADMIN — APIXABAN 5 MG: 5 TABLET, FILM COATED ORAL at 21:11

## 2021-12-28 RX ADMIN — QUETIAPINE FUMARATE 25 MG: 100 TABLET ORAL at 21:12

## 2021-12-28 RX ADMIN — ATORVASTATIN CALCIUM 10 MG: 10 TABLET, FILM COATED ORAL at 21:11

## 2021-12-28 RX ADMIN — METOPROLOL SUCCINATE 25 MG: 25 TABLET, EXTENDED RELEASE ORAL at 08:12

## 2021-12-29 ENCOUNTER — APPOINTMENT (OUTPATIENT)
Dept: GENERAL RADIOLOGY | Facility: HOSPITAL | Age: 86
End: 2021-12-29

## 2021-12-29 PROBLEM — I95.9 HYPOTENSION: Status: RESOLVED | Noted: 2021-12-14 | Resolved: 2021-12-29

## 2021-12-29 PROBLEM — R65.10 SIRS (SYSTEMIC INFLAMMATORY RESPONSE SYNDROME) (HCC): Status: RESOLVED | Noted: 2021-12-15 | Resolved: 2021-12-29

## 2021-12-29 PROBLEM — N17.9 AKI (ACUTE KIDNEY INJURY): Status: RESOLVED | Noted: 2021-12-16 | Resolved: 2021-12-29

## 2021-12-29 PROBLEM — N18.31 ACUTE RENAL FAILURE SUPERIMPOSED ON STAGE 3A CHRONIC KIDNEY DISEASE (HCC): Status: RESOLVED | Noted: 2021-06-12 | Resolved: 2021-12-29

## 2021-12-29 PROBLEM — N17.9 ACUTE RENAL FAILURE SUPERIMPOSED ON STAGE 3A CHRONIC KIDNEY DISEASE (HCC): Status: RESOLVED | Noted: 2021-06-12 | Resolved: 2021-12-29

## 2021-12-29 LAB
ANION GAP SERPL CALCULATED.3IONS-SCNC: 9.5 MMOL/L (ref 5–15)
BASOPHILS # BLD AUTO: 0.09 10*3/MM3 (ref 0–0.2)
BASOPHILS NFR BLD AUTO: 1.1 % (ref 0–1.5)
BUN SERPL-MCNC: 28 MG/DL (ref 8–23)
BUN/CREAT SERPL: 24.3 (ref 7–25)
CALCIUM SPEC-SCNC: 9.2 MG/DL (ref 8.6–10.5)
CHLORIDE SERPL-SCNC: 102 MMOL/L (ref 98–107)
CO2 SERPL-SCNC: 27.5 MMOL/L (ref 22–29)
CREAT SERPL-MCNC: 1.15 MG/DL (ref 0.76–1.27)
DEPRECATED RDW RBC AUTO: 48.4 FL (ref 37–54)
EOSINOPHIL # BLD AUTO: 0.45 10*3/MM3 (ref 0–0.4)
EOSINOPHIL NFR BLD AUTO: 5.4 % (ref 0.3–6.2)
ERYTHROCYTE [DISTWIDTH] IN BLOOD BY AUTOMATED COUNT: 13.2 % (ref 12.3–15.4)
GFR SERPL CREATININE-BSD FRML MDRD: 61 ML/MIN/1.73
GLUCOSE SERPL-MCNC: 135 MG/DL (ref 65–99)
HCT VFR BLD AUTO: 37 % (ref 37.5–51)
HGB BLD-MCNC: 11.2 G/DL (ref 13–17.7)
IMM GRANULOCYTES # BLD AUTO: 0.02 10*3/MM3 (ref 0–0.05)
IMM GRANULOCYTES NFR BLD AUTO: 0.2 % (ref 0–0.5)
LYMPHOCYTES # BLD AUTO: 2.31 10*3/MM3 (ref 0.7–3.1)
LYMPHOCYTES NFR BLD AUTO: 27.9 % (ref 19.6–45.3)
MCH RBC QN AUTO: 30.4 PG (ref 26.6–33)
MCHC RBC AUTO-ENTMCNC: 30.3 G/DL (ref 31.5–35.7)
MCV RBC AUTO: 100.5 FL (ref 79–97)
MONOCYTES # BLD AUTO: 0.49 10*3/MM3 (ref 0.1–0.9)
MONOCYTES NFR BLD AUTO: 5.9 % (ref 5–12)
NEUTROPHILS NFR BLD AUTO: 4.91 10*3/MM3 (ref 1.7–7)
NEUTROPHILS NFR BLD AUTO: 59.5 % (ref 42.7–76)
NRBC BLD AUTO-RTO: 0 /100 WBC (ref 0–0.2)
PLATELET # BLD AUTO: 203 10*3/MM3 (ref 140–450)
PMV BLD AUTO: 9.9 FL (ref 6–12)
POTASSIUM SERPL-SCNC: 5.3 MMOL/L (ref 3.5–5.2)
QT INTERVAL: 376 MS
QTC INTERVAL: 402 MS
RBC # BLD AUTO: 3.68 10*6/MM3 (ref 4.14–5.8)
SODIUM SERPL-SCNC: 139 MMOL/L (ref 136–145)
WBC NRBC COR # BLD: 8.27 10*3/MM3 (ref 3.4–10.8)

## 2021-12-29 PROCEDURE — 94760 N-INVAS EAR/PLS OXIMETRY 1: CPT

## 2021-12-29 PROCEDURE — 94799 UNLISTED PULMONARY SVC/PX: CPT

## 2021-12-29 PROCEDURE — 93005 ELECTROCARDIOGRAM TRACING: CPT | Performed by: PHYSICIAN ASSISTANT

## 2021-12-29 PROCEDURE — 71045 X-RAY EXAM CHEST 1 VIEW: CPT

## 2021-12-29 PROCEDURE — 99222 1ST HOSP IP/OBS MODERATE 55: CPT | Performed by: PHYSICIAN ASSISTANT

## 2021-12-29 PROCEDURE — 85025 COMPLETE CBC W/AUTO DIFF WBC: CPT | Performed by: PHYSICIAN ASSISTANT

## 2021-12-29 PROCEDURE — 80048 BASIC METABOLIC PNL TOTAL CA: CPT | Performed by: PHYSICIAN ASSISTANT

## 2021-12-29 PROCEDURE — 99232 SBSQ HOSP IP/OBS MODERATE 35: CPT | Performed by: PSYCHIATRY & NEUROLOGY

## 2021-12-29 RX ORDER — IPRATROPIUM BROMIDE AND ALBUTEROL SULFATE 2.5; .5 MG/3ML; MG/3ML
3 SOLUTION RESPIRATORY (INHALATION)
Status: DISCONTINUED | OUTPATIENT
Start: 2021-12-29 | End: 2022-01-03 | Stop reason: HOSPADM

## 2021-12-29 RX ORDER — IPRATROPIUM BROMIDE AND ALBUTEROL SULFATE 2.5; .5 MG/3ML; MG/3ML
3 SOLUTION RESPIRATORY (INHALATION) 2 TIMES DAILY
Status: DISCONTINUED | OUTPATIENT
Start: 2021-12-29 | End: 2021-12-29 | Stop reason: SDUPTHER

## 2021-12-29 RX ADMIN — QUETIAPINE FUMARATE 25 MG: 100 TABLET ORAL at 20:22

## 2021-12-29 RX ADMIN — TAMSULOSIN HYDROCHLORIDE 0.4 MG: 0.4 CAPSULE ORAL at 08:22

## 2021-12-29 RX ADMIN — LISINOPRIL 10 MG: 10 TABLET ORAL at 08:22

## 2021-12-29 RX ADMIN — DONEPEZIL HYDROCHLORIDE 5 MG: 5 TABLET, FILM COATED ORAL at 20:22

## 2021-12-29 RX ADMIN — ASPIRIN 81 MG: 81 TABLET, COATED ORAL at 08:22

## 2021-12-29 RX ADMIN — METOPROLOL SUCCINATE 25 MG: 25 TABLET, EXTENDED RELEASE ORAL at 08:21

## 2021-12-29 RX ADMIN — APIXABAN 5 MG: 5 TABLET, FILM COATED ORAL at 08:21

## 2021-12-29 RX ADMIN — IPRATROPIUM BROMIDE AND ALBUTEROL SULFATE 3 ML: .5; 3 SOLUTION RESPIRATORY (INHALATION) at 12:27

## 2021-12-29 RX ADMIN — ACETAMINOPHEN 650 MG: 325 TABLET ORAL at 20:22

## 2021-12-29 RX ADMIN — APIXABAN 5 MG: 5 TABLET, FILM COATED ORAL at 20:22

## 2021-12-29 RX ADMIN — POLYETHYLENE GLYCOL (3350) 17 G: 17 POWDER, FOR SOLUTION ORAL at 08:22

## 2021-12-29 RX ADMIN — Medication 1 TABLET: at 08:21

## 2021-12-29 RX ADMIN — IPRATROPIUM BROMIDE AND ALBUTEROL SULFATE 3 ML: .5; 3 SOLUTION RESPIRATORY (INHALATION) at 19:44

## 2021-12-29 RX ADMIN — ACETAMINOPHEN 650 MG: 325 TABLET ORAL at 02:24

## 2021-12-29 RX ADMIN — ATORVASTATIN CALCIUM 10 MG: 10 TABLET, FILM COATED ORAL at 20:22

## 2021-12-29 RX ADMIN — Medication 5 MG: at 20:22

## 2021-12-30 LAB
ANION GAP SERPL CALCULATED.3IONS-SCNC: 6.4 MMOL/L (ref 5–15)
BUN SERPL-MCNC: 25 MG/DL (ref 8–23)
BUN/CREAT SERPL: 19.1 (ref 7–25)
CALCIUM SPEC-SCNC: 9.2 MG/DL (ref 8.6–10.5)
CHLORIDE SERPL-SCNC: 104 MMOL/L (ref 98–107)
CO2 SERPL-SCNC: 28.6 MMOL/L (ref 22–29)
CREAT SERPL-MCNC: 1.31 MG/DL (ref 0.76–1.27)
GFR SERPL CREATININE-BSD FRML MDRD: 52 ML/MIN/1.73
GLUCOSE SERPL-MCNC: 113 MG/DL (ref 65–99)
POTASSIUM SERPL-SCNC: 5.2 MMOL/L (ref 3.5–5.2)
SODIUM SERPL-SCNC: 139 MMOL/L (ref 136–145)

## 2021-12-30 PROCEDURE — 94799 UNLISTED PULMONARY SVC/PX: CPT

## 2021-12-30 PROCEDURE — 94760 N-INVAS EAR/PLS OXIMETRY 1: CPT

## 2021-12-30 PROCEDURE — 99232 SBSQ HOSP IP/OBS MODERATE 35: CPT | Performed by: PSYCHIATRY & NEUROLOGY

## 2021-12-30 PROCEDURE — 80048 BASIC METABOLIC PNL TOTAL CA: CPT | Performed by: PHYSICIAN ASSISTANT

## 2021-12-30 PROCEDURE — 99232 SBSQ HOSP IP/OBS MODERATE 35: CPT | Performed by: PHYSICIAN ASSISTANT

## 2021-12-30 RX ADMIN — Medication 5 MG: at 20:28

## 2021-12-30 RX ADMIN — QUETIAPINE FUMARATE 25 MG: 100 TABLET ORAL at 20:28

## 2021-12-30 RX ADMIN — ASPIRIN 81 MG: 81 TABLET, COATED ORAL at 08:08

## 2021-12-30 RX ADMIN — Medication 1 TABLET: at 08:08

## 2021-12-30 RX ADMIN — APIXABAN 5 MG: 5 TABLET, FILM COATED ORAL at 08:08

## 2021-12-30 RX ADMIN — IPRATROPIUM BROMIDE AND ALBUTEROL SULFATE 3 ML: .5; 3 SOLUTION RESPIRATORY (INHALATION) at 01:20

## 2021-12-30 RX ADMIN — ATORVASTATIN CALCIUM 10 MG: 10 TABLET, FILM COATED ORAL at 20:28

## 2021-12-30 RX ADMIN — METOPROLOL SUCCINATE 25 MG: 25 TABLET, EXTENDED RELEASE ORAL at 08:08

## 2021-12-30 RX ADMIN — HYDROXYZINE HYDROCHLORIDE 50 MG: 50 TABLET ORAL at 17:54

## 2021-12-30 RX ADMIN — APIXABAN 5 MG: 5 TABLET, FILM COATED ORAL at 20:28

## 2021-12-30 RX ADMIN — IPRATROPIUM BROMIDE AND ALBUTEROL SULFATE 3 ML: .5; 3 SOLUTION RESPIRATORY (INHALATION) at 07:43

## 2021-12-30 RX ADMIN — LISINOPRIL 10 MG: 10 TABLET ORAL at 08:08

## 2021-12-30 RX ADMIN — TAMSULOSIN HYDROCHLORIDE 0.4 MG: 0.4 CAPSULE ORAL at 08:12

## 2021-12-30 RX ADMIN — DONEPEZIL HYDROCHLORIDE 5 MG: 5 TABLET, FILM COATED ORAL at 20:28

## 2021-12-30 RX ADMIN — IPRATROPIUM BROMIDE AND ALBUTEROL SULFATE 3 ML: .5; 3 SOLUTION RESPIRATORY (INHALATION) at 18:30

## 2021-12-30 RX ADMIN — IPRATROPIUM BROMIDE AND ALBUTEROL SULFATE 3 ML: .5; 3 SOLUTION RESPIRATORY (INHALATION) at 12:56

## 2021-12-30 RX ADMIN — POLYETHYLENE GLYCOL (3350) 17 G: 17 POWDER, FOR SOLUTION ORAL at 08:08

## 2021-12-31 PROCEDURE — 94799 UNLISTED PULMONARY SVC/PX: CPT

## 2021-12-31 PROCEDURE — 99232 SBSQ HOSP IP/OBS MODERATE 35: CPT | Performed by: PSYCHIATRY & NEUROLOGY

## 2021-12-31 RX ADMIN — ATORVASTATIN CALCIUM 10 MG: 10 TABLET, FILM COATED ORAL at 20:32

## 2021-12-31 RX ADMIN — Medication 1 TABLET: at 08:45

## 2021-12-31 RX ADMIN — LISINOPRIL 10 MG: 10 TABLET ORAL at 08:45

## 2021-12-31 RX ADMIN — METOPROLOL SUCCINATE 25 MG: 25 TABLET, EXTENDED RELEASE ORAL at 08:45

## 2021-12-31 RX ADMIN — IPRATROPIUM BROMIDE AND ALBUTEROL SULFATE 3 ML: .5; 3 SOLUTION RESPIRATORY (INHALATION) at 20:17

## 2021-12-31 RX ADMIN — APIXABAN 5 MG: 5 TABLET, FILM COATED ORAL at 08:45

## 2021-12-31 RX ADMIN — APIXABAN 5 MG: 5 TABLET, FILM COATED ORAL at 20:32

## 2021-12-31 RX ADMIN — IPRATROPIUM BROMIDE AND ALBUTEROL SULFATE 3 ML: .5; 3 SOLUTION RESPIRATORY (INHALATION) at 13:08

## 2021-12-31 RX ADMIN — ASPIRIN 81 MG: 81 TABLET, COATED ORAL at 08:45

## 2021-12-31 RX ADMIN — TAMSULOSIN HYDROCHLORIDE 0.4 MG: 0.4 CAPSULE ORAL at 08:45

## 2021-12-31 RX ADMIN — IPRATROPIUM BROMIDE AND ALBUTEROL SULFATE 3 ML: .5; 3 SOLUTION RESPIRATORY (INHALATION) at 00:00

## 2021-12-31 RX ADMIN — POLYETHYLENE GLYCOL (3350) 17 G: 17 POWDER, FOR SOLUTION ORAL at 08:45

## 2021-12-31 RX ADMIN — DONEPEZIL HYDROCHLORIDE 5 MG: 5 TABLET, FILM COATED ORAL at 20:32

## 2021-12-31 RX ADMIN — Medication 5 MG: at 20:32

## 2021-12-31 RX ADMIN — IPRATROPIUM BROMIDE AND ALBUTEROL SULFATE 3 ML: .5; 3 SOLUTION RESPIRATORY (INHALATION) at 07:53

## 2021-12-31 RX ADMIN — HYDROXYZINE HYDROCHLORIDE 50 MG: 50 TABLET ORAL at 18:08

## 2022-01-01 PROCEDURE — 97110 THERAPEUTIC EXERCISES: CPT

## 2022-01-01 PROCEDURE — 99232 SBSQ HOSP IP/OBS MODERATE 35: CPT | Performed by: PSYCHIATRY & NEUROLOGY

## 2022-01-01 PROCEDURE — 97116 GAIT TRAINING THERAPY: CPT

## 2022-01-01 PROCEDURE — 94799 UNLISTED PULMONARY SVC/PX: CPT

## 2022-01-01 PROCEDURE — 97530 THERAPEUTIC ACTIVITIES: CPT

## 2022-01-01 RX ADMIN — IPRATROPIUM BROMIDE AND ALBUTEROL SULFATE 3 ML: .5; 3 SOLUTION RESPIRATORY (INHALATION) at 12:14

## 2022-01-01 RX ADMIN — ATORVASTATIN CALCIUM 10 MG: 10 TABLET, FILM COATED ORAL at 20:17

## 2022-01-01 RX ADMIN — POLYETHYLENE GLYCOL (3350) 17 G: 17 POWDER, FOR SOLUTION ORAL at 08:24

## 2022-01-01 RX ADMIN — IPRATROPIUM BROMIDE AND ALBUTEROL SULFATE 3 ML: .5; 3 SOLUTION RESPIRATORY (INHALATION) at 20:00

## 2022-01-01 RX ADMIN — APIXABAN 5 MG: 5 TABLET, FILM COATED ORAL at 08:24

## 2022-01-01 RX ADMIN — APIXABAN 5 MG: 5 TABLET, FILM COATED ORAL at 20:17

## 2022-01-01 RX ADMIN — ASPIRIN 81 MG: 81 TABLET, COATED ORAL at 08:24

## 2022-01-01 RX ADMIN — Medication 5 MG: at 20:17

## 2022-01-01 RX ADMIN — IPRATROPIUM BROMIDE AND ALBUTEROL SULFATE 3 ML: .5; 3 SOLUTION RESPIRATORY (INHALATION) at 07:18

## 2022-01-01 RX ADMIN — TAMSULOSIN HYDROCHLORIDE 0.4 MG: 0.4 CAPSULE ORAL at 08:25

## 2022-01-01 RX ADMIN — DONEPEZIL HYDROCHLORIDE 5 MG: 5 TABLET, FILM COATED ORAL at 20:17

## 2022-01-01 RX ADMIN — IPRATROPIUM BROMIDE AND ALBUTEROL SULFATE 3 ML: .5; 3 SOLUTION RESPIRATORY (INHALATION) at 00:54

## 2022-01-01 RX ADMIN — Medication 1 TABLET: at 08:24

## 2022-01-01 RX ADMIN — LISINOPRIL 10 MG: 10 TABLET ORAL at 08:24

## 2022-01-01 NOTE — THERAPY TREATMENT NOTE
Acute Care - Physical Therapy Treatment Note  JD Hines     Patient Name: Felice Aiken  : 1935  MRN: [id number]  Today's Date: 2022      Visit Dx:   No diagnosis found.  Patient Active Problem List   Diagnosis   • Sleep apnea   • Persistent atrial fibrillation (HCC)   • History of ASCVD (atherosclerotic cardiovascular disease), s/p stenting   • PAD (peripheral artery disease), fem pop bypass,3/08   • Dyslipidemia   • Essential hypertension   • Chronic bronchitis (HCC)   • Chronic obstructive pulmonary disease (HCC)   • Status post total left knee replacement   • Neuropathy involving both lower extremities   • Chronic kidney disease, stage III (moderate) (HCC)   • Chronic systolic heart failure (HCC)   • Failure to thrive in adult   • Dementia with behavioral problem (HCC)   • Mood disturbance   • Acute urinary retention   • Elevated troponin   • Prolonged Q-T interval on ECG   • Chronic respiratory failure with hypoxia (HCC)   • Macrocytic anemia   • Alzheimer's dementia with behavioral disturbance (HCC)     Past Medical History:   Diagnosis Date   • Anxiety    • Arthritis    • ASCVD (arteriosclerotic cardiovascular disease)     s/p stenting   • Atrial fibrillation (HCC)    • Chronic kidney disease, stage III (moderate) (HCC) 2017   • Chronic systolic heart failure (HCC) 2019   • COPD (chronic obstructive pulmonary disease) ([facility])    • Dementia ([facility])    • Depression    • Elevated cholesterol    • HTN (hypertension)    • Neuropathy involving both lower extremities 2016   • PAD (peripheral artery disease), fem pop bypass,3/08 2016   • Sleep apnea 2016   • Status post total left knee replacement 2016     Past Surgical History:   Procedure Laterality Date   • HAND SURGERY     • REPLACEMENT TOTAL KNEE Left      PT Assessment (last 12 hours)     PT Evaluation and Treatment     Row Name 22 [id number]          Physical Therapy Time and Intention    Subjective Information complains  of; weakness  -LL     Document Type therapy note (daily note)  -LL     Mode of Treatment physical therapy; individual therapy  -LL     Patient Effort good  -LL     Comment Patient agreeable to PT session this date and did well with all activities.  He ambulated ~100' x 2 and performed sitting ex's on EOB.  -LL     Row Name 01/01/22 1700          Bed Mobility    Bed Mobility supine-sit; sit-supine  -LL     Supine-Sit [place] (Bed Mobility) standby assist  -LL     Sit-Supine [place] (Bed Mobility) standby assist  -LL     Row Name 01/01/22 1700          Transfers    Transfers sit-stand transfer; stand-sit transfer  -LL     Sit-Stand [place] (Transfers) contact guard; minimum assist (75% patient effort)  -LL     Stand-Sit [place] (Transfers) contact guard; minimum assist (75% patient effort)  -LL     Row Name 01/01/22 1700          Sit-Stand Transfer    Assistive Device (Sit-Stand Transfers) walker, front-wheeled  -LL     Row Name 01/01/22 1700          Stand-Sit Transfer    Assistive Device (Stand-Sit Transfers) walker, front-wheeled  -LL     Row Name 01/01/22 1700          Gait/Stairs (Locomotion)    Gait/Stairs Locomotion gait/ambulation assistive device  -LL     [place] Level (Gait) contact guard; minimum assist (75% patient effort)  -LL     Assistive Device (Gait) walker, front-wheeled  -LL     Distance in Feet (Gait) 100' x 2  -LL     Pattern (Gait) step-to  -LL     Deviations/Abnormal Patterns (Gait) gait speed decreased  -LL     Bilateral Gait Deviations forward flexed posture  -LL     Row Name 01/01/22 1700          Balance    Comment, Balance Sitting EOB ex's  -LL     Row Name 01/01/22 1700          Motor Skills    Therapeutic Exercise --  LAQ, seated marching, pillow squeeze & AP x 20 reps each  -LL     Row Name 01/01/22 1700          Positioning and Restraints    Pre-Treatment Position in bed  -LL     Post Treatment Position bed  -LL     In Bed sitting EOB; patient within staff view;  side rails up x2  -LL           User Key  (r) = Recorded By, (t) = Taken By, (c) = Cosigned By    Initials Name Provider Type    Harika Brewer PTA Physical Therapy Assistant                  PT Recommendation and Plan             Time Calculation:    PT Charges     Row Name 01/01/22 1728             Time Calculation    PT Received On 01/01/22  -LL              Time Calculation- PT    Total Timed Code Minutes- PT 40 minute(s)  -LL            User Key  (r) = Recorded By, (t) = Taken By, (c) = Cosigned By    Initials Name Provider Type    Harika Brewer PTA Physical Therapy Assistant              Therapy Charges for Today     Code Description Service Date Service Provider Modifiers Qty    96864329237 HC GAIT TRAINING EA 15 MIN 1/1/2022 Harika Weathers, PABLITO GP 1    52522785529 HC PT THERAPEUTIC ACT EA 15 MIN 1/1/2022 Harika Weathers, PABLITO GP 1    93024812171 HC PT THER PROC EA 15 MIN 1/1/2022 Harika Weathers PTA GP 1               Harika. PABLITO Weathers  1/1/2022

## 2022-01-01 NOTE — PROGRESS NOTES
"INPATIENT PSYCHIATRIC PROGRESS NOTE    Name:  Felice Aiken  :  1935  MRN:  [id number]  Visit Number:  [id number]  Length of stay:  14    SUBJECTIVE  CC/Focus of Exam: dementia    INTERVAL HISTORY:  Pt reports anxiety has improved and he is ready to return home. No SI, HI, AVH.  Discontinued quetiapine yesterday and patient denies any worsening of symptoms or insomnia.  Appears more alert today.     [facility] denied admission to patient.  Referred to inpatient physical rehab, but patient denied.  We will look at home health and PT options, work with patient over the weekend and plan for discharge early next week.  SOB improving.    Depression rating 110  Anxiety rating 1/10  Sleep: good  Withdrawal sx:None reported.   Cravin/10    Review of Systems   Constitutional: Negative for fatigue.   Respiratory: Negative.    Cardiovascular: Negative.    Gastrointestinal: Negative.    Musculoskeletal: Negative.    Neurological: Positive for weakness.   Psychiatric/Behavioral: The patient is nervous/anxious.        OBJECTIVE    Temp:  [97.8 °F (36.6 °C)-97.9 °F (36.6 °C)] 97.8 °F (36.6 °C)  Heart Rate:  [61-78] 75  Resp:  [18] 18  BP: (105-116)/(58-64) 105/58    MENTAL STATUS EXAM:  Appearance:Casually dressed, good hygeine.   Cooperation:Cooperative  Psychomotor: Psychomotor retardation, No EPS, No motor tics  Speech-normal rate, amount.  Mood \"good\"   Affect-congruent, appropriate, stable  Thought Content-goal directed, no delusional material present  Thought process-linear, organized.  Suicidality: No SI  Homicidality: No HI  Perception: No AH/VH  Insight-fair   Judgement-fair    Lab Results (last 24 hours)     ** No results found for the last 24 hours. **             Imaging Results (Last 24 Hours)     ** No results found for the last 24 hours. **             ECG/EMG Results (most recent)     Procedure Component Value Units Date/Time    ECG 12 Lead [[id number]] Collected: 21 1607     Updated: 21 " 2212     QT Interval 376 ms      QTC Interval 402 ms     Narrative:      Test Reason : Dyspnea  Blood Pressure :   */*   mmHG  Vent. Rate :  69 BPM     Atrial Rate :  58 BPM     P-R Int :   * ms          QRS Dur :  98 ms      QT Int : 376 ms       P-R-T Axes :   * -14  31 degrees     QTc Int : 402 ms    Atrial fibrillation  Septal infarct (cited on or before 21-JUL-2021)  Abnormal ECG  Confirmed by Sugey Sung ([id number]) on 12/29/2021 10:11:56 PM    Referred By: JOSE           Confirmed By: Sugey Sung           ALLERGIES: Patient has no known allergies.      Current Facility-Administered Medications:   •  acetaminophen (TYLENOL) tablet 650 mg, 650 mg, Oral, Q6H PRN, Marty Freed MD, 650 mg at 12/29/21 2022  •  aluminum-magnesium hydroxide-simethicone (MAALOX MAX) 400-400-40 MG/5ML suspension 15 mL, 15 mL, Oral, Q6H PRN, Marty Freed MD  •  apixaban (ELIQUIS) tablet 5 mg, 5 mg, Oral, Q12H, Marty Freed MD, 5 mg at 01/01/22 0824  •  aspirin EC tablet 81 mg, 81 mg, Oral, Daily, Marty Freed MD, 81 mg at 01/01/22 0824  •  atorvastatin (LIPITOR) tablet 10 mg, 10 mg, Oral, Nightly, Marty Freed MD, 10 mg at 12/31/21 2032  •  benzonatate (TESSALON) capsule 100 mg, 100 mg, Oral, TID PRN, Marty Freed MD  •  donepezil (ARICEPT) tablet 5 mg, 5 mg, Oral, Nightly, Marty Freed MD, 5 mg at 12/31/21 2032  •  famotidine (PEPCID) tablet 20 mg, 20 mg, Oral, BID PRN, Marty Freed MD  •  hydrOXYzine (ATARAX) tablet 50 mg, 50 mg, Oral, Q6H PRN, Marty Freed MD, 50 mg at 12/31/21 1808  •  ipratropium-albuterol (DUO-NEB) nebulizer solution 3 mL, 3 mL, Nebulization, Q6H - RT, Luciano Hatch MD, 3 mL at 01/01/22 1214  •  lisinopril (PRINIVIL,ZESTRIL) tablet 10 mg, 10 mg, Oral, Q24H, Marty Freed MD, 10 mg at 01/01/22 0824  •  loperamide (IMODIUM) capsule 2 mg, 2 mg, Oral, Q2H PRN, Marty Freed MD  •  magnesium hydroxide (MILK OF MAGNESIA) suspension 10 mL, 10 mL, Oral, Daily  PRN, Marty Freed MD, 10 mL at 12/27/21 0838  •  melatonin tablet 5 mg, 5 mg, Oral, Nightly, Luciano Hatch MD, 5 mg at 12/31/21 2032  •  metoprolol succinate XL (TOPROL-XL) 24 hr tablet 25 mg, 25 mg, Oral, Daily, Marty Freed MD, 25 mg at 12/31/21 0845  •  multivitamin (THERAGRAN) tablet 1 tablet, 1 tablet, Oral, Daily, Marty Freed MD, 1 tablet at 01/01/22 0824  •  ondansetron (ZOFRAN) tablet 4 mg, 4 mg, Oral, Q6H PRN, Marty Freed MD  •  polyethylene glycol (MIRALAX) packet 17 g, 17 g, Oral, Daily, Luciano Hatch MD, 17 g at 01/01/22 0824  •  sodium chloride nasal spray 2 spray, 2 spray, Each Nare, PRN, Marty Freed MD  •  tamsulosin (FLOMAX) 24 hr capsule 0.4 mg, 0.4 mg, Oral, Daily, Marty Freed MD, 0.4 mg at 01/01/22 0825  •  traZODone (DESYREL) tablet 50 mg, 50 mg, Oral, Nightly PRN, Marty Freed MD, 50 mg at 12/23/21 2018    ASSESSMENT & PLAN:    Dementia with behavioral disturbance  -Cointinue Aricept 5 mg at bedtime  -Discontinue quetiapine 25 mg p.o. nightly, given patient's consistent mood stability and preference to minimize medication side effects and polypharmacy  -Pt stable     Shortness of breath - new  -Worse over last 12h, trent tachypneic  -Consult hospitalist  -Was preparing for dc/transfer in next 1-2 days    Fatigue/deconditioning  -Appreciate PT consult and help  -Supplement with multivitamin  -Sitter no longer required     Persistent atrial fibrillation  -Consistent with previous EKGs  -Eliquis 5 mg twice a day  -Aspirin 81 mg daily       Dyslipidemia  -Lipitor 10 mg daily       Essential hypertension  -Lisinopril 10mg daily  -Metoprolol XL 25mg daily       Chronic obstructive pulmonary disease  -DuoNebs and albuterol per home dosing  -Supplemental oxygen as needed       Chronic systolic heart failure  -Lasix as needed     BPH and urinary retention  -Flomax 0.4 mg p.o. daily     Special precautions: Special Precautions Level 3 (q15 min checks)  .    Behavioral Health Treatment Plan and Problem List: I have reviewed and approved the Behavioral Health Treatment Plan and Problem list.  The patient has had a chance to review and agrees with the treatment plan.     Clinician:  Luciano Hatch MD  01/01/22  15:04 EST

## 2022-01-01 NOTE — PLAN OF CARE
Goal Outcome Evaluation:  Plan of Care Reviewed With: patient  Patient Agreement with Plan of Care: agrees     Progress: improving Patient denies any SI, HI, or AVH  He rates Anx and Dep 0 Patient was eating and sleeping well through day.He interacts well with peers and staff Reports that he has improved with breathing

## 2022-01-02 PROCEDURE — 94799 UNLISTED PULMONARY SVC/PX: CPT

## 2022-01-02 PROCEDURE — 99232 SBSQ HOSP IP/OBS MODERATE 35: CPT | Performed by: PSYCHIATRY & NEUROLOGY

## 2022-01-02 RX ADMIN — APIXABAN 5 MG: 5 TABLET, FILM COATED ORAL at 20:49

## 2022-01-02 RX ADMIN — METOPROLOL SUCCINATE 25 MG: 25 TABLET, EXTENDED RELEASE ORAL at 08:47

## 2022-01-02 RX ADMIN — Medication 1 TABLET: at 08:47

## 2022-01-02 RX ADMIN — APIXABAN 5 MG: 5 TABLET, FILM COATED ORAL at 08:47

## 2022-01-02 RX ADMIN — IPRATROPIUM BROMIDE AND ALBUTEROL SULFATE 3 ML: .5; 3 SOLUTION RESPIRATORY (INHALATION) at 07:49

## 2022-01-02 RX ADMIN — IPRATROPIUM BROMIDE AND ALBUTEROL SULFATE 3 ML: .5; 3 SOLUTION RESPIRATORY (INHALATION) at 02:00

## 2022-01-02 RX ADMIN — IPRATROPIUM BROMIDE AND ALBUTEROL SULFATE 3 ML: .5; 3 SOLUTION RESPIRATORY (INHALATION) at 12:11

## 2022-01-02 RX ADMIN — TAMSULOSIN HYDROCHLORIDE 0.4 MG: 0.4 CAPSULE ORAL at 10:14

## 2022-01-02 RX ADMIN — LISINOPRIL 10 MG: 10 TABLET ORAL at 08:47

## 2022-01-02 RX ADMIN — ASPIRIN 81 MG: 81 TABLET, COATED ORAL at 08:48

## 2022-01-02 RX ADMIN — DONEPEZIL HYDROCHLORIDE 5 MG: 5 TABLET, FILM COATED ORAL at 20:49

## 2022-01-02 RX ADMIN — POLYETHYLENE GLYCOL (3350) 17 G: 17 POWDER, FOR SOLUTION ORAL at 10:14

## 2022-01-02 RX ADMIN — ATORVASTATIN CALCIUM 10 MG: 10 TABLET, FILM COATED ORAL at 20:49

## 2022-01-02 RX ADMIN — IPRATROPIUM BROMIDE AND ALBUTEROL SULFATE 3 ML: .5; 3 SOLUTION RESPIRATORY (INHALATION) at 19:54

## 2022-01-02 RX ADMIN — Medication 5 MG: at 20:49

## 2022-01-02 NOTE — PLAN OF CARE
Goal Outcome Evaluation:  Plan of Care Reviewed With: patient  Patient Agreement with Plan of Care: agrees     Progress: improving  Outcome Summary: CLIENT DENIES ANXIETY AND DEPRESSION, OR ANY OTHER ISSUES DURING AM NURSING ASSESSMENT. CONTINUE CURRENT NURSING CARE PLANS.

## 2022-01-02 NOTE — PLAN OF CARE
Goal Outcome Evaluation:  Plan of Care Reviewed With: patient  Patient Agreement with Plan of Care: agrees     Progress: no change  Outcome Summary: Rates anxiety and depression a 0, denies si/hi/avh, no skin breakdown, encouraged to turn freqently, patient has COPD, uses 02@2L, on fall precautions for safety. No changes in treatment this shift.

## 2022-01-02 NOTE — PROGRESS NOTES
"INPATIENT PSYCHIATRIC PROGRESS NOTE    Name:  Felice Aiken  :  1935  MRN:  [id number]  Visit Number:  [id number]  Length of stay:  15    SUBJECTIVE  CC/Focus of Exam: dementia    INTERVAL HISTORY:  Pt continues to do well and he is ready to return home.  Physical ability and fatigue have also improved over the course of his stay.  No SI, HI, AVH.  Discontinued quetiapine on Friday and patient denies any worsening of symptoms or insomnia.  Appears more alert.     [facility] denied admission to patient.  Referred to inpatient physical rehab, but patient denied.  We will look at home health and PT options, work with patient over the weekend and plan for discharge early next week.  SOB improved.    Depression rating 110  Anxiety rating 110  Sleep: good  Withdrawal sx:None reported.   Cravin/10    Review of Systems   Constitutional: Negative for fatigue.   Respiratory: Negative.    Cardiovascular: Negative.    Gastrointestinal: Negative.    Musculoskeletal: Negative.    Neurological: Positive for weakness.   Psychiatric/Behavioral: The patient is nervous/anxious.        OBJECTIVE    Temp:  [97.5 °F (36.4 °C)-98.2 °F (36.8 °C)] 97.5 °F (36.4 °C)  Heart Rate:  [62-74] 74  Resp:  [18] 18  BP: (103-121)/(60-68) 103/68    MENTAL STATUS EXAM:  Appearance:Casually dressed, good hygeine.   Cooperation:Cooperative  Psychomotor: Psychomotor retardation, No EPS, No motor tics  Speech-normal rate, amount.  Mood \"good\"   Affect-congruent, appropriate, stable  Thought Content-goal directed, no delusional material present  Thought process-linear, organized.  Suicidality: No SI  Homicidality: No HI  Perception: No AH/VH  Insight-fair   Judgement-fair    Lab Results (last 24 hours)     ** No results found for the last 24 hours. **             Imaging Results (Last 24 Hours)     ** No results found for the last 24 hours. **             ECG/EMG Results (most recent)     Procedure Component Value Units Date/Time    ECG 12 " Lead [[id number]] Collected: 12/29/21 1607     Updated: 12/29/21 2212     QT Interval 376 ms      QTC Interval 402 ms     Narrative:      Test Reason : Dyspnea  Blood Pressure :   */*   mmHG  Vent. Rate :  69 BPM     Atrial Rate :  58 BPM     P-R Int :   * ms          QRS Dur :  98 ms      QT Int : 376 ms       P-R-T Axes :   * -14  31 degrees     QTc Int : 402 ms    Atrial fibrillation  Septal infarct (cited on or before 21-JUL-2021)  Abnormal ECG  Confirmed by Sugey Sung ([id number]) on 12/29/2021 10:11:56 PM    Referred By: JOSE           Confirmed By: Sugey Sung           ALLERGIES: Patient has no known allergies.      Current Facility-Administered Medications:   •  acetaminophen (TYLENOL) tablet 650 mg, 650 mg, Oral, Q6H PRN, Marty Freed MD, 650 mg at 12/29/21 2022  •  aluminum-magnesium hydroxide-simethicone (MAALOX MAX) 400-400-40 MG/5ML suspension 15 mL, 15 mL, Oral, Q6H PRN, Marty Freed MD  •  apixaban (ELIQUIS) tablet 5 mg, 5 mg, Oral, Q12H, Marty Freed MD, 5 mg at 01/02/22 0847  •  aspirin EC tablet 81 mg, 81 mg, Oral, Daily, Marty Freed MD, 81 mg at 01/02/22 0848  •  atorvastatin (LIPITOR) tablet 10 mg, 10 mg, Oral, Nightly, Marty Freed MD, 10 mg at 01/01/22 2017  •  benzonatate (TESSALON) capsule 100 mg, 100 mg, Oral, TID PRN, Marty Freed MD  •  donepezil (ARICEPT) tablet 5 mg, 5 mg, Oral, Nightly, Marty Freed MD, 5 mg at 01/01/22 2017  •  famotidine (PEPCID) tablet 20 mg, 20 mg, Oral, BID PRN, Marty Freed MD  •  hydrOXYzine (ATARAX) tablet 50 mg, 50 mg, Oral, Q6H PRN, Marty Freed MD, 50 mg at 12/31/21 1808  •  ipratropium-albuterol (DUO-NEB) nebulizer solution 3 mL, 3 mL, Nebulization, Q6H - RT, Luciano Hatch MD, 3 mL at 01/02/22 0749  •  lisinopril (PRINIVIL,ZESTRIL) tablet 10 mg, 10 mg, Oral, Q24H, Marty Freed MD, 10 mg at 01/02/22 0847  •  loperamide (IMODIUM) capsule 2 mg, 2 mg, Oral, Q2H PRN, Marty Freed MD  •  magnesium  hydroxide (MILK OF MAGNESIA) suspension 10 mL, 10 mL, Oral, Daily PRN, Marty Freed MD, 10 mL at 12/27/21 0838  •  melatonin tablet 5 mg, 5 mg, Oral, Nightly, Luciano Hatch MD, 5 mg at 01/01/22 2017  •  metoprolol succinate XL (TOPROL-XL) 24 hr tablet 25 mg, 25 mg, Oral, Daily, Marty Freed MD, 25 mg at 01/02/22 0847  •  multivitamin (THERAGRAN) tablet 1 tablet, 1 tablet, Oral, Daily, Marty Freed MD, 1 tablet at 01/02/22 0847  •  ondansetron (ZOFRAN) tablet 4 mg, 4 mg, Oral, Q6H PRN, Marty Freed MD  •  polyethylene glycol (MIRALAX) packet 17 g, 17 g, Oral, Daily, Luciano Hatch MD, 17 g at 01/02/22 1014  •  sodium chloride nasal spray 2 spray, 2 spray, Each Nare, PRN, Marty Freed MD  •  tamsulosin (FLOMAX) 24 hr capsule 0.4 mg, 0.4 mg, Oral, Daily, Marty Freed MD, 0.4 mg at 01/02/22 1014  •  traZODone (DESYREL) tablet 50 mg, 50 mg, Oral, Nightly PRN, Marty Freed MD, 50 mg at 12/23/21 2018    ASSESSMENT & PLAN:    Dementia with behavioral disturbance  -Cointinue Aricept 5 mg at bedtime  -Discontinue quetiapine 25 mg p.o. nightly, given patient's consistent mood stability and preference to minimize medication side effects and polypharmacy  -Pt stable     Shortness of breath - new  -Worse over last 12h, trent tachypneic  -Consult hospitalist  -Was preparing for dc/transfer in next 1-2 days    Fatigue/deconditioning  -Appreciate PT consult and help  -Supplement with multivitamin  -Sitter no longer required     Persistent atrial fibrillation  -Consistent with previous EKGs  -Eliquis 5 mg twice a day  -Aspirin 81 mg daily       Dyslipidemia  -Lipitor 10 mg daily       Essential hypertension  -Lisinopril 10mg daily  -Metoprolol XL 25mg daily       Chronic obstructive pulmonary disease  -DuoNebs and albuterol per home dosing  -Supplemental oxygen as needed       Chronic systolic heart failure  -Lasix as needed     BPH and urinary retention  -Flomax 0.4 mg p.o. daily     Special  precautions: Special Precautions Level 3 (q15 min checks) .    Behavioral Health Treatment Plan and Problem List: I have reviewed and approved the Behavioral Health Treatment Plan and Problem list.  The patient has had a chance to review and agrees with the treatment plan.     Clinician:  Luciano Hatch MD  01/02/22  13:25 EST

## 2022-01-03 VITALS
TEMPERATURE: 97.7 F | DIASTOLIC BLOOD PRESSURE: 79 MMHG | OXYGEN SATURATION: 99 % | RESPIRATION RATE: 22 BRPM | HEART RATE: 99 BPM | SYSTOLIC BLOOD PRESSURE: 130 MMHG | BODY MASS INDEX: 21.62 KG/M2 | HEIGHT: 69 IN | WEIGHT: 146 LBS

## 2022-01-03 PROCEDURE — 94799 UNLISTED PULMONARY SVC/PX: CPT

## 2022-01-03 PROCEDURE — 99239 HOSP IP/OBS DSCHRG MGMT >30: CPT | Performed by: PSYCHIATRY & NEUROLOGY

## 2022-01-03 RX ORDER — METOPROLOL SUCCINATE 25 MG/1
25 TABLET, EXTENDED RELEASE ORAL DAILY
Qty: 30 TABLET | Refills: 0 | Status: SHIPPED | OUTPATIENT
Start: 2022-01-03

## 2022-01-03 RX ORDER — POLYETHYLENE GLYCOL 3350 17 G/17G
17 POWDER, FOR SOLUTION ORAL DAILY PRN
Qty: 510 G | Refills: 0 | Status: SHIPPED | OUTPATIENT
Start: 2022-01-03

## 2022-01-03 RX ORDER — CHOLECALCIFEROL (VITAMIN D3) 125 MCG
5 CAPSULE ORAL NIGHTLY
Qty: 30 TABLET | Refills: 0 | Status: SHIPPED | OUTPATIENT
Start: 2022-01-03

## 2022-01-03 RX ORDER — ASPIRIN 81 MG/1
81 TABLET ORAL DAILY
Qty: 30 TABLET | Refills: 0 | Status: SHIPPED | OUTPATIENT
Start: 2022-01-03

## 2022-01-03 RX ORDER — IPRATROPIUM BROMIDE AND ALBUTEROL SULFATE 2.5; .5 MG/3ML; MG/3ML
3 SOLUTION RESPIRATORY (INHALATION)
Qty: 360 ML | Refills: 0 | Status: SHIPPED | OUTPATIENT
Start: 2022-01-03

## 2022-01-03 RX ORDER — TAMSULOSIN HYDROCHLORIDE 0.4 MG/1
0.4 CAPSULE ORAL DAILY
Qty: 30 CAPSULE | Refills: 0 | Status: SHIPPED | OUTPATIENT
Start: 2022-01-04

## 2022-01-03 RX ORDER — DIPHENOXYLATE HYDROCHLORIDE AND ATROPINE SULFATE 2.5; .025 MG/1; MG/1
1 TABLET ORAL DAILY
Qty: 30 TABLET | Refills: 0 | Status: SHIPPED | OUTPATIENT
Start: 2022-01-03

## 2022-01-03 RX ORDER — ATORVASTATIN CALCIUM 10 MG/1
10 TABLET, FILM COATED ORAL DAILY
Qty: 30 TABLET | Refills: 0 | Status: SHIPPED | OUTPATIENT
Start: 2022-01-03

## 2022-01-03 RX ORDER — LISINOPRIL 10 MG/1
10 TABLET ORAL DAILY
Qty: 30 TABLET | Refills: 0 | Status: SHIPPED | OUTPATIENT
Start: 2022-01-03

## 2022-01-03 RX ORDER — DONEPEZIL HYDROCHLORIDE 5 MG/1
5 TABLET, FILM COATED ORAL NIGHTLY
Qty: 30 TABLET | Refills: 0 | Status: SHIPPED | OUTPATIENT
Start: 2022-01-03

## 2022-01-03 RX ADMIN — APIXABAN 5 MG: 5 TABLET, FILM COATED ORAL at 09:14

## 2022-01-03 RX ADMIN — IPRATROPIUM BROMIDE AND ALBUTEROL SULFATE 3 ML: .5; 3 SOLUTION RESPIRATORY (INHALATION) at 01:12

## 2022-01-03 RX ADMIN — METOPROLOL SUCCINATE 25 MG: 25 TABLET, EXTENDED RELEASE ORAL at 09:14

## 2022-01-03 RX ADMIN — TAMSULOSIN HYDROCHLORIDE 0.4 MG: 0.4 CAPSULE ORAL at 09:14

## 2022-01-03 RX ADMIN — ASPIRIN 81 MG: 81 TABLET, COATED ORAL at 09:14

## 2022-01-03 RX ADMIN — Medication 1 TABLET: at 09:14

## 2022-01-03 RX ADMIN — IPRATROPIUM BROMIDE AND ALBUTEROL SULFATE 3 ML: .5; 3 SOLUTION RESPIRATORY (INHALATION) at 07:26

## 2022-01-03 RX ADMIN — POLYETHYLENE GLYCOL (3350) 17 G: 17 POWDER, FOR SOLUTION ORAL at 09:33

## 2022-01-03 RX ADMIN — LISINOPRIL 10 MG: 10 TABLET ORAL at 09:14

## 2022-01-03 RX ADMIN — IPRATROPIUM BROMIDE AND ALBUTEROL SULFATE 3 ML: .5; 3 SOLUTION RESPIRATORY (INHALATION) at 14:46

## 2022-01-03 NOTE — DISCHARGE INSTR - LAB
Contacted and faxed information concerning home health referral to [facility] faxed to [phone] The agency number is [phone]  Contacted and faxed information concerning home health referral to [facility] faxed to [phone] The agency number is [phone]

## 2022-01-03 NOTE — PLAN OF CARE
Goal Outcome Evaluation:  Plan of Care Reviewed With: patient  Patient Agreement with Plan of Care: agrees     Progress: improving  Outcome Summary: Deneis anxiety and depression, denies si/hi/avh, no skin issues, paatient has COPD, on 02@2L, compliant with treatment, patient on fall precautions, no current issues, no changes in treatment this shift.

## 2022-01-03 NOTE — DISCHARGE SUMMARY
PSYCHIATRIC DISCHARGE SUMMARY     Patient Identification:  Name:  Felice Aiken  Age:  86 y.o.  Sex:  male  :  1935  MRN:  [id number]  Visit Number:  [id number]    Date of Admission:2021   Date of Discharge:  1/3/2022    Discharge Diagnosis:  Principal Problem:    Dementia with behavioral problem (HCC)  Active Problems:    Persistent atrial fibrillation (HCC)    Dyslipidemia    Essential hypertension    Chronic obstructive pulmonary disease (HCC)      Admission Diagnosis:  Dementia (HCC) [F03.90]     Hospital Course  Patient is a 86 y.o. male presented with behavioral disturbance. Patient with a history of multiple hospitalizations here and surrounding areas, often related to weakness, failure to thrive, intermittent agitation.  History of agitation is usually reported by/related to his family.  He carries a diagnosis of dementia, although I believe it is mild to moderate as he consistently maintains orientation, appropriate behavior, fairly reasonable memory and appropriate communication. Early during this hospitalization, patient was transferred to the medical service for 3 days due to dizziness and hypotension which resolved.    This was patient's second hospitalization at the [facility], with the first being 2021 through 7/15/2021.  Each of his two hospitalizations here have been roughly 3 weeks and patient has been pleasant, appropriate, mostly oriented and organized throughout his roughly 6 weeks in the [facility].  His primary issue each time appears to be physical deconditioning, which also improved each time with the help of PT and nursing staff.  During each visit, patient appeared to maintain decision-making capacity.  He was referred to nursing homes during the first admission, but not the second admission per his request.  He was agreeable to referral to physical rehab during this admission, but was declined by [facility] and our own inpatient rehab.  Physical  "ability improved over the course of this stay, with the patient appropriately moving about the unit, attending meals and using the restroom.  Eventually, patient requested to be discharged home.  Patient's quetiapine was tapered and discontinued over the course of the stay due to uncertain indication and preference to limit polypharmacy and medications with side effects of falls and fatigue.    Wife and son were contacted on day of discharge and expressed frustration with decision to discharge the patient home.  Apparently around the time of this admission, patient's wife obtained an EPO against him, with which the patient disagreed and reported was unnecessary.  When I asked the patient about the implications of the EPO, he said he would just stay away from his wife if that is what he needed to do. When the family was notified the patient would be discharged home, they referenced the EPO as a reason that the patient cannot return home, although eventually reporting that there are two homes on the property and the patient will not have to stay in the same building as his wife. The family persisted in saying the hospital needed to find the patient a nursing home. We spoke for nearly an hour today and there were some inconsistencies in the family's report, with them requesting nursing home placement because \"[the patient] can't even make himself a sandwich,\" while at other times reporting that the patient has no difficulty walking around the property, riding his lawnmower, or engaging in other physical activities. In light of the family's reports, the patient's mental status, and physical improvement each time he is admitted to this facility, there is likely some volitional component to patient's behavior and physical impairment. The family was encouraged to maintain appropriate boundaries with the patient and to call the police if there were safety concerns at any point. I explained to the family that the patient has " "exhibited no behavioral outbursts or agitation during roughly 6 weeks in our hospital and we must treat what we see and observe, especially when it is consistent over such a long period of time.    On the day of discharge, patient denied SI, HI or AVH. Patient was stable and appropriate by the conclusion of this admission, denying significantly debilitating symptoms of mood, psychotic or thought disorder. Patient showed improvement of presenting symptoms and was deemed appropriate for discharge today.    Mental Status Exam upon discharge:   Mood \"ready to go home\"   Affect-congruent, appropriate, stable  Thought Content-goal directed, no delusional material present  Thought process-linear, organized.  Suicidality: No SI  Homicidality: No HI  Perception: No AH/VH    Procedures Performed         Consults:   Consults     Date and Time Order Name Status Description    12/29/2021  9:59 AM Inpatient Hospitalist Consult Completed     12/15/2021 12:15 AM Inpatient Psychiatrist Consult Completed           Pertinent Test Results:   Lab Results (last 7 days)     Procedure Component Value Units Date/Time    Basic Metabolic Panel [[id number]]  (Abnormal) Collected: 12/30/21 0935    Specimen: Blood Updated: 12/30/21 1013     Glucose 113 mg/dL      BUN 25 mg/dL      Creatinine 1.31 mg/dL      Sodium 139 mmol/L      Potassium 5.2 mmol/L      Chloride 104 mmol/L      CO2 28.6 mmol/L      Calcium 9.2 mg/dL      eGFR Non African Amer 52 mL/min/1.73      BUN/Creatinine Ratio 19.1     Anion Gap 6.4 mmol/L     Narrative:      GFR Normal >60  Chronic Kidney Disease <60  Kidney Failure <15      Basic Metabolic Panel [[id number]]  (Abnormal) Collected: 12/29/21 1654    Specimen: Blood Updated: 12/29/21 1740     Glucose 135 mg/dL      BUN 28 mg/dL      Creatinine 1.15 mg/dL      Sodium 139 mmol/L      Potassium 5.3 mmol/L      Chloride 102 mmol/L      CO2 27.5 mmol/L      Calcium 9.2 mg/dL      eGFR Non African Amer 61 mL/min/1.73      " BUN/Creatinine Ratio 24.3     Anion Gap 9.5 mmol/L     Narrative:      GFR Normal >60  Chronic Kidney Disease <60  Kidney Failure <15      CBC & Differential [[id number]]  (Abnormal) Collected: 12/29/21 1654    Specimen: Blood Updated: 12/29/21 1712    Narrative:      The following orders were created for panel order CBC & Differential.  Procedure                               Abnormality         Status                     ---------                               -----------         ------                     CBC Auto Differential[[id number]]        Abnormal            Final result                 Please view results for these tests on the individual orders.    CBC Auto Differential [id number] (Abnormal) Collected: 12/29/21 1654    Specimen: Blood Updated: 12/29/21 1712     WBC 8.27 10*3/mm3      RBC 3.68 10*6/mm3      Hemoglobin 11.2 g/dL      Hematocrit 37.0 %      .5 fL      MCH 30.4 pg      MCHC 30.3 g/dL      RDW 13.2 %      RDW-SD 48.4 fl      MPV 9.9 fL      Platelets 203 10*3/mm3      Neutrophil % 59.5 %      Lymphocyte % 27.9 %      Monocyte % 5.9 %      Eosinophil % 5.4 %      Basophil % 1.1 %      Immature Grans % 0.2 %      Neutrophils, Absolute 4.91 10*3/mm3      Lymphocytes, Absolute 2.31 10*3/mm3      Monocytes, Absolute 0.49 10*3/mm3      Eosinophils, Absolute 0.45 10*3/mm3      Basophils, Absolute 0.09 10*3/mm3      Immature Grans, Absolute 0.02 10*3/mm3      nRBC 0.0 /100 WBC           Condition on Discharge:  improved    Vital Signs  Temp:  [97.3 °F (36.3 °C)-97.5 °F (36.4 °C)] 97.5 °F (36.4 °C)  Heart Rate:  [54-84] 83  Resp:  [17-18] 18  BP: (107-118)/(60-61) 118/60    Discharge Disposition:  Home or Self Care    Discharge Medications:     Discharge Medications      New Medications      Instructions Start Date   melatonin 5 MG tablet tablet   5 mg, Oral, Nightly      polyethylene glycol 17 GM/SCOOP powder  Commonly known as: MIRALAX   Mix 17 grams of powder in 4 to 8 ounces of liquid and  drink by mouth Daily As Needed (constipation).      tamsulosin 0.4 MG capsule 24 hr capsule  Commonly known as: FLOMAX   Take 1 capsule by mouth Daily for Enlargement of Prostate.   Start Date: January 4, 2022        Continue These Medications      Instructions Start Date   apixaban 5 MG tablet tablet  Commonly known as: ELIQUIS   5 mg, Oral, Every 12 Hours Scheduled      aspirin 81 MG EC tablet   81 mg, Oral, Daily      atorvastatin 10 MG tablet  Commonly known as: LIPITOR   10 mg, Oral, Daily      donepezil 5 MG tablet  Commonly known as: ARICEPT   5 mg, Oral, Nightly      ipratropium-albuterol 0.5-2.5 mg/3 ml nebulizer  Commonly known as: DUO-NEB   3 mL, Nebulization, 3 Times Daily - RT      lisinopril 10 MG tablet  Commonly known as: PRINIVIL,ZESTRIL   10 mg, Oral, Daily      metoprolol succinate XL 25 MG 24 hr tablet  Commonly known as: TOPROL-XL   25 mg, Oral, Daily      multivitamin tablet tablet   1 tablet, Oral, Daily         Stop These Medications    QUEtiapine 50 MG tablet  Commonly known as: SEROquel            Discharge Diet: Normal    Activity at Discharge: Normal    Follow-up Appointments  No future appointments.      Test Results Pending at Discharge  None     Time: I spent greater than 120 minutes on this discharge activity which included: face-to-face encounter with the patient, reviewing the data in the system, coordination of the care with the nursing staff as well as consultants, documentation, and entering orders.      Clinician:   Luciano Hatch MD  01/03/22  14:19 EST

## 2022-01-03 NOTE — PROGRESS NOTES
Navigator contacted [facility] for transportation home. Estimated arrival time 5:00pm. They are agreeable to bring 02 and walker back to the hospital once they drop patient off at home.     [facility] - [phone]

## 2022-01-03 NOTE — PLAN OF CARE
Goal Outcome Evaluation:  Plan of Care Reviewed With: patient  Patient Agreement with Plan of Care: agrees     Progress: improving  Outcome Summary: Patient denies Anx, Dep, Si, HI, or AVH Patient has interacted well with staff and peers

## 2022-01-03 NOTE — DISCHARGE INSTR - APPOINTMENTS
[address] [address]  [phone] - phone    Office computers are down today so appt can not be made. Purnima at the office states once computers are working she will schedule patient and call wife/patient with appt.   Contacted and faxed information concerning home health referral to [facility] faxed to [phone] The agency number is [phone]  Contacted and faxed information concerning home health referral to [facility] faxed to [phone] The agency number is [phone]
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
## 2022-01-20 ENCOUNTER — APPOINTMENT (OUTPATIENT)
Dept: CT IMAGING | Facility: HOSPITAL | Age: 87
End: 2022-01-20

## 2022-01-20 ENCOUNTER — HOSPITAL ENCOUNTER (EMERGENCY)
Facility: HOSPITAL | Age: 87
Discharge: HOME | End: 2022-01-20
Attending: STUDENT IN AN ORGANIZED HEALTH CARE EDUCATION/TRAINING PROGRAM | Admitting: STUDENT IN AN ORGANIZED HEALTH CARE EDUCATION/TRAINING PROGRAM

## 2022-01-20 VITALS
OXYGEN SATURATION: 99 % | SYSTOLIC BLOOD PRESSURE: 148 MMHG | HEART RATE: 60 BPM | DIASTOLIC BLOOD PRESSURE: 82 MMHG | WEIGHT: 145 LBS | HEIGHT: 69 IN | TEMPERATURE: 97.7 F | BODY MASS INDEX: 21.48 KG/M2 | RESPIRATION RATE: 18 BRPM

## 2022-01-20 DIAGNOSIS — N40.0 ENLARGED PROSTATE: Primary | ICD-10-CM

## 2022-01-20 DIAGNOSIS — R30.0 DYSURIA: ICD-10-CM

## 2022-01-20 LAB
ALBUMIN SERPL-MCNC: 3.89 G/DL (ref 3.5–5.2)
ALBUMIN/GLOB SERPL: 1.6 G/DL
ALP SERPL-CCNC: 79 U/L (ref 39–117)
ALT SERPL W P-5'-P-CCNC: 13 U/L (ref 1–41)
AMYLASE SERPL-CCNC: 47 U/L (ref 28–100)
ANION GAP SERPL CALCULATED.3IONS-SCNC: 9.7 MMOL/L (ref 5–15)
AST SERPL-CCNC: 20 U/L (ref 1–40)
BASOPHILS # BLD AUTO: 0.04 10*3/MM3 (ref 0–0.2)
BASOPHILS NFR BLD AUTO: 0.6 % (ref 0–1.5)
BILIRUB SERPL-MCNC: 0.7 MG/DL (ref 0–1.2)
BILIRUB UR QL STRIP: NEGATIVE
BUN SERPL-MCNC: 7 MG/DL (ref 8–23)
BUN/CREAT SERPL: 5.6 (ref 7–25)
CALCIUM SPEC-SCNC: 9.4 MG/DL (ref 8.6–10.5)
CHLORIDE SERPL-SCNC: 107 MMOL/L (ref 98–107)
CLARITY UR: CLEAR
CO2 SERPL-SCNC: 26.3 MMOL/L (ref 22–29)
COLOR UR: YELLOW
CREAT SERPL-MCNC: 1.26 MG/DL (ref 0.76–1.27)
CRP SERPL-MCNC: <0.3 MG/DL (ref 0–0.5)
DEPRECATED RDW RBC AUTO: 48.7 FL (ref 37–54)
EOSINOPHIL # BLD AUTO: 0.17 10*3/MM3 (ref 0–0.4)
EOSINOPHIL NFR BLD AUTO: 2.3 % (ref 0.3–6.2)
ERYTHROCYTE [DISTWIDTH] IN BLOOD BY AUTOMATED COUNT: 13.7 % (ref 12.3–15.4)
ERYTHROCYTE [SEDIMENTATION RATE] IN BLOOD: 4 MM/HR (ref 0–20)
GFR SERPL CREATININE-BSD FRML MDRD: 55 ML/MIN/1.73
GLOBULIN UR ELPH-MCNC: 2.4 GM/DL
GLUCOSE SERPL-MCNC: 118 MG/DL (ref 65–99)
GLUCOSE UR STRIP-MCNC: NEGATIVE MG/DL
HCT VFR BLD AUTO: 34.6 % (ref 37.5–51)
HGB BLD-MCNC: 11 G/DL (ref 13–17.7)
HGB UR QL STRIP.AUTO: NEGATIVE
HOLD SPECIMEN: NORMAL
HOLD SPECIMEN: NORMAL
IMM GRANULOCYTES # BLD AUTO: 0.02 10*3/MM3 (ref 0–0.05)
IMM GRANULOCYTES NFR BLD AUTO: 0.3 % (ref 0–0.5)
KETONES UR QL STRIP: NEGATIVE
LEUKOCYTE ESTERASE UR QL STRIP.AUTO: NEGATIVE
LIPASE SERPL-CCNC: 15 U/L (ref 13–60)
LYMPHOCYTES # BLD AUTO: 1.64 10*3/MM3 (ref 0.7–3.1)
LYMPHOCYTES NFR BLD AUTO: 22.6 % (ref 19.6–45.3)
MCH RBC QN AUTO: 31.3 PG (ref 26.6–33)
MCHC RBC AUTO-ENTMCNC: 31.8 G/DL (ref 31.5–35.7)
MCV RBC AUTO: 98.6 FL (ref 79–97)
MONOCYTES # BLD AUTO: 0.53 10*3/MM3 (ref 0.1–0.9)
MONOCYTES NFR BLD AUTO: 7.3 % (ref 5–12)
NEUTROPHILS NFR BLD AUTO: 4.85 10*3/MM3 (ref 1.7–7)
NEUTROPHILS NFR BLD AUTO: 66.9 % (ref 42.7–76)
NITRITE UR QL STRIP: NEGATIVE
NRBC BLD AUTO-RTO: 0 /100 WBC (ref 0–0.2)
PH UR STRIP.AUTO: 6.5 [PH] (ref 5–8)
PLATELET # BLD AUTO: 169 10*3/MM3 (ref 140–450)
PMV BLD AUTO: 9.5 FL (ref 6–12)
POTASSIUM SERPL-SCNC: 3.9 MMOL/L (ref 3.5–5.2)
PROT SERPL-MCNC: 6.3 G/DL (ref 6–8.5)
PROT UR QL STRIP: NEGATIVE
RBC # BLD AUTO: 3.51 10*6/MM3 (ref 4.14–5.8)
SODIUM SERPL-SCNC: 143 MMOL/L (ref 136–145)
SP GR UR STRIP: 1.01 (ref 1–1.03)
UROBILINOGEN UR QL STRIP: NORMAL
WBC NRBC COR # BLD: 7.25 10*3/MM3 (ref 3.4–10.8)
WHOLE BLOOD HOLD SPECIMEN: NORMAL
WHOLE BLOOD HOLD SPECIMEN: NORMAL

## 2022-01-20 PROCEDURE — 85025 COMPLETE CBC W/AUTO DIFF WBC: CPT | Performed by: NURSE PRACTITIONER

## 2022-01-20 PROCEDURE — 86140 C-REACTIVE PROTEIN: CPT | Performed by: NURSE PRACTITIONER

## 2022-01-20 PROCEDURE — 36415 COLL VENOUS BLD VENIPUNCTURE: CPT

## 2022-01-20 PROCEDURE — 74176 CT ABD & PELVIS W/O CONTRAST: CPT | Performed by: RADIOLOGY

## 2022-01-20 PROCEDURE — 74176 CT ABD & PELVIS W/O CONTRAST: CPT

## 2022-01-20 PROCEDURE — 82150 ASSAY OF AMYLASE: CPT | Performed by: NURSE PRACTITIONER

## 2022-01-20 PROCEDURE — 83690 ASSAY OF LIPASE: CPT | Performed by: NURSE PRACTITIONER

## 2022-01-20 PROCEDURE — 85652 RBC SED RATE AUTOMATED: CPT | Performed by: NURSE PRACTITIONER

## 2022-01-20 PROCEDURE — 51798 US URINE CAPACITY MEASURE: CPT

## 2022-01-20 PROCEDURE — 80053 COMPREHEN METABOLIC PANEL: CPT | Performed by: NURSE PRACTITIONER

## 2022-01-20 PROCEDURE — 81003 URINALYSIS AUTO W/O SCOPE: CPT | Performed by: NURSE PRACTITIONER

## 2022-01-20 PROCEDURE — 99283 EMERGENCY DEPT VISIT LOW MDM: CPT

## 2022-01-20 NOTE — ED NOTES
MEDICAL SCREENING:    Reason for Visit: Dysuria, frequency, dribbling, urinary retention.     Patient initially seen in triage.  The patient was advised further evaluation and diagnostic testing will be needed, some of the treatment and testing will be initiated in the lobby in order to begin the process.  The patient will be returned to the waiting area for the time being and possibly be re-assessed by a subsequent ED provider.  The patient will be brought back to the treatment area in as timely manner as possible.       Elda Souza, APRN  01/20/22 1227

## 2022-01-20 NOTE — ED PROVIDER NOTES
"Subjective   86-year-old male with past medical history anxiety, arthritis, atherosclerotic cardiovascular disease with stenting, atrial fibrillation chronically anticoagulated with Eliquis, stage III CKD, systolic heart failure, COPD, dementia, depression, hypercholesterolemia, hypertension, neuropathy, PAD with a femoropopliteal bypass in 2008, & RADHA presents to the emergency room with difficulty with urination for the past week.  Patient states \"my bladder is messed up\".  He does state that he has not taken his medication in 1 week to include Flomax which helps with his urination secondary to not being able to see well enough to take his medications.  He denies fever, chills.  He states he is unsure of when his last normal urination was but states it has been over 24 hours.  He states he knows there is nothing in his bladder and is unsure of why it hurts.  He denies any specific aggravating or alleviating factors.  Denies any other complaints or concerns at this time.      History provided by:  Patient   used: No        Review of Systems   Constitutional: Negative.  Negative for fever.   HENT: Negative.    Respiratory: Negative.    Cardiovascular: Negative.  Negative for chest pain.   Gastrointestinal: Negative.  Negative for abdominal pain.   Endocrine: Negative.    Genitourinary: Positive for dysuria.   Skin: Negative.    Neurological: Negative.    Psychiatric/Behavioral: Negative.    All other systems reviewed and are negative.      Past Medical History:   Diagnosis Date   • Anxiety    • Arthritis    • ASCVD (arteriosclerotic cardiovascular disease)     s/p stenting   • Atrial fibrillation (HCC)    • Chronic kidney disease, stage III (moderate) ([facility]) 8/31/2017   • Chronic systolic heart failure ([facility]) 1/23/2019   • COPD (chronic obstructive pulmonary disease) ([facility])    • Dementia ([facility])    • Depression    • Elevated cholesterol    • HTN (hypertension)    • Neuropathy involving both lower " extremities 9/20/2016   • PAD (peripheral artery disease), fem pop bypass,3/08 8/4/2016   • Sleep apnea 6/2/2016   • Status post total left knee replacement 9/20/2016       No Known Allergies    Past Surgical History:   Procedure Laterality Date   • HAND SURGERY     • REPLACEMENT TOTAL KNEE Left        Family History   Problem Relation Age of Onset   • Hypertension Mother    • Arthritis Mother    • Hypertension Father    • Arthritis Father    • Diabetes Sister    • Cancer Sister    • Diabetes Brother        Social History     Socioeconomic History   • Marital status:    Tobacco Use   • Smoking status: Former Smoker     Years: 40.00     Types: Cigarettes   • Smokeless tobacco: Never Used   • Tobacco comment: quit smoking 35-40 years ago   Vaping Use   • Vaping Use: Never used   Substance and Sexual Activity   • Alcohol use: Not Currently     Comment: occcasionally when younger    • Drug use: No   • Sexual activity: Defer     Partners: Female           Objective   Physical Exam  Vitals and nursing note reviewed.   Constitutional:       General: He is not in acute distress.     Appearance: He is well-developed. He is not diaphoretic.   HENT:      Head: Normocephalic and atraumatic.      Right Ear: External ear normal.      Left Ear: External ear normal.      Nose: Nose normal.   Eyes:      Conjunctiva/sclera: Conjunctivae normal.      Pupils: Pupils are equal, round, and reactive to light.   Neck:      Vascular: No JVD.      Trachea: No tracheal deviation.   Cardiovascular:      Rate and Rhythm: Normal rate and regular rhythm.      Heart sounds: Normal heart sounds. No murmur heard.      Pulmonary:      Effort: Pulmonary effort is normal. No respiratory distress.      Breath sounds: Normal breath sounds. No wheezing.   Abdominal:      General: Abdomen is flat. Bowel sounds are normal. There is no distension.      Palpations: Abdomen is soft.      Tenderness: There is abdominal tenderness in the suprapubic area.    Musculoskeletal:         General: No deformity. Normal range of motion.      Cervical back: Normal range of motion and neck supple.   Skin:     General: Skin is warm and dry.      Coloration: Skin is not pale.      Findings: No erythema or rash.   Neurological:      Mental Status: He is alert and oriented to person, place, and time.      Cranial Nerves: No cranial nerve deficit.   Psychiatric:         Behavior: Behavior normal.         Thought Content: Thought content normal.         Procedures           ED Course  ED Course as of 01/20/22 1520   Thu Jan 20, 2022   1347 CT Abdomen Pelvis Without Contrast [TK]   1415 Bladder with 322cc [TK]   1507 Pt was able to urinate  by self, would not let RNBayron catheterize him. Nurse reports 200cc out. [TK]      ED Course User Index  [TK] Radha Montano PA-C                                                 MDM  Number of Diagnoses or Management Options  Dysuria: new and requires workup  Enlarged prostate: new and requires workup     Amount and/or Complexity of Data Reviewed  Clinical lab tests: reviewed and ordered  Tests in the radiology section of CPT®: reviewed and ordered    Risk of Complications, Morbidity, and/or Mortality  Presenting problems: moderate  Diagnostic procedures: moderate  Management options: low        Final diagnoses:   Enlarged prostate   Dysuria       ED Disposition  ED Disposition     ED Disposition Condition Comment    Discharge Stable           Porfirio Hsu MD  [address] [facility]  [address]  [facility] [id number] [phone]    In 2 days  enlarged prostate         Medication List      No changes were made to your prescriptions during this visit.          Radha Montano PA-C  01/20/22 1520

## 2022-01-20 NOTE — ED NOTES
Bladder scan done and read 322ML, provider at bedside and aware.     Yuridia Beavers  01/20/22 7484

## 2022-01-20 NOTE — DISCHARGE INSTRUCTIONS
Take Flomax as prescribed to avoid dysuria. Follow up with Urology for further evaluation. Return to the ER should you have worsening symptoms.

## 2022-02-25 ENCOUNTER — READMISSION MANAGEMENT (OUTPATIENT)
Dept: CALL CENTER | Facility: HOSPITAL | Age: 87
End: 2022-02-25

## 2022-02-25 ENCOUNTER — TRANSITIONAL CARE MANAGEMENT TELEPHONE ENCOUNTER (OUTPATIENT)
Dept: CALL CENTER | Facility: HOSPITAL | Age: 87
End: 2022-02-25

## 2022-02-25 NOTE — OUTREACH NOTE
Call Center TCM Note      Responses   [facility] patient discharged from? Non-BH   Does the patient have one of the following disease processes/diagnoses(primary or secondary)? Non- Discharge   TCM attempt successful? No   Revoked Reason Other  [does not have appt with [facility] provider and does not have record of seeing [facility] provider]          Pamella Ramirez RN    2/25/2022, 11:01 EST

## 2022-02-25 NOTE — OUTREACH NOTE
Prep Survey      Responses   [facility] facility patient discharged from? Non-BH   Is LACE score < 7 ? Non-BH Discharge   Emergency Room discharge w/ pulse ox? No   Eligibility [facility]   Date of Discharge 02/24/22   Discharge diagnosis Unavailable    Does the patient have one of the following disease processes/diagnoses(primary or secondary)? Non-BH Discharge   Prep survey completed? Yes          Viviana Carbone RN

## 2022-02-26 ENCOUNTER — HOSPITAL ENCOUNTER (EMERGENCY)
Facility: HOSPITAL | Age: 87
Discharge: HOME | End: 2022-02-26
Attending: STUDENT IN AN ORGANIZED HEALTH CARE EDUCATION/TRAINING PROGRAM | Admitting: STUDENT IN AN ORGANIZED HEALTH CARE EDUCATION/TRAINING PROGRAM

## 2022-02-26 ENCOUNTER — APPOINTMENT (OUTPATIENT)
Dept: GENERAL RADIOLOGY | Facility: HOSPITAL | Age: 87
End: 2022-02-26

## 2022-02-26 VITALS
BODY MASS INDEX: 22.22 KG/M2 | OXYGEN SATURATION: 100 % | HEIGHT: 69 IN | SYSTOLIC BLOOD PRESSURE: 133 MMHG | TEMPERATURE: 97.2 F | RESPIRATION RATE: 19 BRPM | WEIGHT: 150 LBS | DIASTOLIC BLOOD PRESSURE: 78 MMHG | HEART RATE: 84 BPM

## 2022-02-26 DIAGNOSIS — J18.9 PNEUMONIA OF BOTH LOWER LOBES DUE TO INFECTIOUS ORGANISM: Primary | ICD-10-CM

## 2022-02-26 LAB
ALBUMIN SERPL-MCNC: 3.12 G/DL (ref 3.5–5.2)
ALBUMIN/GLOB SERPL: 1.2 G/DL
ALP SERPL-CCNC: 115 U/L (ref 39–117)
ALT SERPL W P-5'-P-CCNC: 13 U/L (ref 1–41)
ANION GAP SERPL CALCULATED.3IONS-SCNC: 8.8 MMOL/L (ref 5–15)
AST SERPL-CCNC: 19 U/L (ref 1–40)
BASOPHILS # BLD AUTO: 0.06 10*3/MM3 (ref 0–0.2)
BASOPHILS NFR BLD AUTO: 0.9 % (ref 0–1.5)
BILIRUB SERPL-MCNC: 0.5 MG/DL (ref 0–1.2)
BUN SERPL-MCNC: 14 MG/DL (ref 8–23)
BUN/CREAT SERPL: 13.1 (ref 7–25)
CALCIUM SPEC-SCNC: 8.7 MG/DL (ref 8.6–10.5)
CHLORIDE SERPL-SCNC: 102 MMOL/L (ref 98–107)
CO2 SERPL-SCNC: 26.2 MMOL/L (ref 22–29)
CREAT SERPL-MCNC: 1.07 MG/DL (ref 0.76–1.27)
DEPRECATED RDW RBC AUTO: 47.2 FL (ref 37–54)
EOSINOPHIL # BLD AUTO: 0.39 10*3/MM3 (ref 0–0.4)
EOSINOPHIL NFR BLD AUTO: 5.9 % (ref 0.3–6.2)
ERYTHROCYTE [DISTWIDTH] IN BLOOD BY AUTOMATED COUNT: 13.2 % (ref 12.3–15.4)
GFR SERPL CREATININE-BSD FRML MDRD: 66 ML/MIN/1.73
GLOBULIN UR ELPH-MCNC: 2.6 GM/DL
GLUCOSE SERPL-MCNC: 126 MG/DL (ref 65–99)
HCT VFR BLD AUTO: 36.6 % (ref 37.5–51)
HGB BLD-MCNC: 11 G/DL (ref 13–17.7)
IMM GRANULOCYTES # BLD AUTO: 0.02 10*3/MM3 (ref 0–0.05)
IMM GRANULOCYTES NFR BLD AUTO: 0.3 % (ref 0–0.5)
LYMPHOCYTES # BLD AUTO: 1.14 10*3/MM3 (ref 0.7–3.1)
LYMPHOCYTES NFR BLD AUTO: 17.3 % (ref 19.6–45.3)
MCH RBC QN AUTO: 29.6 PG (ref 26.6–33)
MCHC RBC AUTO-ENTMCNC: 30.1 G/DL (ref 31.5–35.7)
MCV RBC AUTO: 98.4 FL (ref 79–97)
MONOCYTES # BLD AUTO: 0.55 10*3/MM3 (ref 0.1–0.9)
MONOCYTES NFR BLD AUTO: 8.3 % (ref 5–12)
NEUTROPHILS NFR BLD AUTO: 4.43 10*3/MM3 (ref 1.7–7)
NEUTROPHILS NFR BLD AUTO: 67.3 % (ref 42.7–76)
NRBC BLD AUTO-RTO: 0 /100 WBC (ref 0–0.2)
PLATELET # BLD AUTO: 251 10*3/MM3 (ref 140–450)
PMV BLD AUTO: 9.5 FL (ref 6–12)
POTASSIUM SERPL-SCNC: 4.2 MMOL/L (ref 3.5–5.2)
PROT SERPL-MCNC: 5.7 G/DL (ref 6–8.5)
RBC # BLD AUTO: 3.72 10*6/MM3 (ref 4.14–5.8)
SODIUM SERPL-SCNC: 137 MMOL/L (ref 136–145)
WBC NRBC COR # BLD: 6.59 10*3/MM3 (ref 3.4–10.8)

## 2022-02-26 PROCEDURE — 71045 X-RAY EXAM CHEST 1 VIEW: CPT

## 2022-02-26 PROCEDURE — 96372 THER/PROPH/DIAG INJ SC/IM: CPT

## 2022-02-26 PROCEDURE — 99283 EMERGENCY DEPT VISIT LOW MDM: CPT

## 2022-02-26 PROCEDURE — 80053 COMPREHEN METABOLIC PANEL: CPT | Performed by: STUDENT IN AN ORGANIZED HEALTH CARE EDUCATION/TRAINING PROGRAM

## 2022-02-26 PROCEDURE — 36415 COLL VENOUS BLD VENIPUNCTURE: CPT

## 2022-02-26 PROCEDURE — 85025 COMPLETE CBC W/AUTO DIFF WBC: CPT | Performed by: STUDENT IN AN ORGANIZED HEALTH CARE EDUCATION/TRAINING PROGRAM

## 2022-02-26 PROCEDURE — 71045 X-RAY EXAM CHEST 1 VIEW: CPT | Performed by: RADIOLOGY

## 2022-02-26 PROCEDURE — 25010000002 DEXAMETHASONE PER 1 MG: Performed by: STUDENT IN AN ORGANIZED HEALTH CARE EDUCATION/TRAINING PROGRAM

## 2022-02-26 RX ORDER — DEXAMETHASONE SODIUM PHOSPHATE 4 MG/ML
8 INJECTION, SOLUTION INTRA-ARTICULAR; INTRALESIONAL; INTRAMUSCULAR; INTRAVENOUS; SOFT TISSUE ONCE
Status: COMPLETED | OUTPATIENT
Start: 2022-02-26 | End: 2022-02-26

## 2022-02-26 RX ORDER — DOXYCYCLINE 100 MG/1
100 CAPSULE ORAL 2 TIMES DAILY
Qty: 13 CAPSULE | Refills: 0 | OUTPATIENT
Start: 2022-02-26 | End: 2022-03-17

## 2022-02-26 RX ORDER — PREDNISONE 50 MG/1
50 TABLET ORAL DAILY
Qty: 5 TABLET | Refills: 0 | OUTPATIENT
Start: 2022-02-26 | End: 2022-03-17

## 2022-02-26 RX ORDER — DOXYCYCLINE 100 MG/1
100 CAPSULE ORAL ONCE
Status: COMPLETED | OUTPATIENT
Start: 2022-02-26 | End: 2022-02-26

## 2022-02-26 RX ADMIN — DEXAMETHASONE SODIUM PHOSPHATE 8 MG: 4 INJECTION, SOLUTION INTRA-ARTICULAR; INTRALESIONAL; INTRAMUSCULAR; INTRAVENOUS; SOFT TISSUE at 14:04

## 2022-02-26 RX ADMIN — DOXYCYCLINE 100 MG: 100 CAPSULE ORAL at 14:04

## 2022-03-03 ENCOUNTER — HOSPITAL ENCOUNTER (EMERGENCY)
Facility: HOSPITAL | Age: 87
Discharge: HOME | End: 2022-03-03
Attending: EMERGENCY MEDICINE | Admitting: EMERGENCY MEDICINE

## 2022-03-03 ENCOUNTER — APPOINTMENT (OUTPATIENT)
Dept: GENERAL RADIOLOGY | Facility: HOSPITAL | Age: 87
End: 2022-03-03

## 2022-03-03 ENCOUNTER — APPOINTMENT (OUTPATIENT)
Dept: CT IMAGING | Facility: HOSPITAL | Age: 87
End: 2022-03-03

## 2022-03-03 VITALS
HEART RATE: 78 BPM | WEIGHT: 154.32 LBS | DIASTOLIC BLOOD PRESSURE: 78 MMHG | RESPIRATION RATE: 14 BRPM | HEIGHT: 69 IN | OXYGEN SATURATION: 98 % | TEMPERATURE: 98.4 F | BODY MASS INDEX: 22.86 KG/M2 | SYSTOLIC BLOOD PRESSURE: 140 MMHG

## 2022-03-03 DIAGNOSIS — G89.29 CHRONIC BILATERAL LOW BACK PAIN WITHOUT SCIATICA: ICD-10-CM

## 2022-03-03 DIAGNOSIS — M54.50 CHRONIC BILATERAL LOW BACK PAIN WITHOUT SCIATICA: ICD-10-CM

## 2022-03-03 DIAGNOSIS — I50.9 ACUTE ON CHRONIC CONGESTIVE HEART FAILURE, UNSPECIFIED HEART FAILURE TYPE: Primary | ICD-10-CM

## 2022-03-03 LAB
ALBUMIN SERPL-MCNC: 3.49 G/DL (ref 3.5–5.2)
ALBUMIN/GLOB SERPL: 1.2 G/DL
ALP SERPL-CCNC: 94 U/L (ref 39–117)
ALT SERPL W P-5'-P-CCNC: 17 U/L (ref 1–41)
ANION GAP SERPL CALCULATED.3IONS-SCNC: 10.8 MMOL/L (ref 5–15)
AST SERPL-CCNC: 15 U/L (ref 1–40)
BASOPHILS # BLD AUTO: 0.01 10*3/MM3 (ref 0–0.2)
BASOPHILS NFR BLD AUTO: 0.1 % (ref 0–1.5)
BILIRUB SERPL-MCNC: 0.5 MG/DL (ref 0–1.2)
BUN SERPL-MCNC: 27 MG/DL (ref 8–23)
BUN/CREAT SERPL: 22.7 (ref 7–25)
CALCIUM SPEC-SCNC: 9.4 MG/DL (ref 8.6–10.5)
CHLORIDE SERPL-SCNC: 100 MMOL/L (ref 98–107)
CO2 SERPL-SCNC: 26.2 MMOL/L (ref 22–29)
CREAT SERPL-MCNC: 1.19 MG/DL (ref 0.76–1.27)
CRP SERPL-MCNC: <0.3 MG/DL (ref 0–0.5)
DEPRECATED RDW RBC AUTO: 45.6 FL (ref 37–54)
EGFRCR SERPLBLD CKD-EPI 2021: 60.2 ML/MIN/1.73
EOSINOPHIL # BLD AUTO: 0.02 10*3/MM3 (ref 0–0.4)
EOSINOPHIL NFR BLD AUTO: 0.2 % (ref 0.3–6.2)
ERYTHROCYTE [DISTWIDTH] IN BLOOD BY AUTOMATED COUNT: 13.2 % (ref 12.3–15.4)
ERYTHROCYTE [SEDIMENTATION RATE] IN BLOOD: 19 MM/HR (ref 0–20)
FLUAV SUBTYP SPEC NAA+PROBE: NOT DETECTED
FLUBV RNA ISLT QL NAA+PROBE: NOT DETECTED
GLOBULIN UR ELPH-MCNC: 2.9 GM/DL
GLUCOSE SERPL-MCNC: 236 MG/DL (ref 65–99)
HCT VFR BLD AUTO: 35.6 % (ref 37.5–51)
HGB BLD-MCNC: 11.2 G/DL (ref 13–17.7)
IMM GRANULOCYTES # BLD AUTO: 0.13 10*3/MM3 (ref 0–0.05)
IMM GRANULOCYTES NFR BLD AUTO: 1.1 % (ref 0–0.5)
LYMPHOCYTES # BLD AUTO: 1.18 10*3/MM3 (ref 0.7–3.1)
LYMPHOCYTES NFR BLD AUTO: 10.4 % (ref 19.6–45.3)
MCH RBC QN AUTO: 29.6 PG (ref 26.6–33)
MCHC RBC AUTO-ENTMCNC: 31.5 G/DL (ref 31.5–35.7)
MCV RBC AUTO: 93.9 FL (ref 79–97)
MONOCYTES # BLD AUTO: 0.49 10*3/MM3 (ref 0.1–0.9)
MONOCYTES NFR BLD AUTO: 4.3 % (ref 5–12)
NEUTROPHILS NFR BLD AUTO: 83.9 % (ref 42.7–76)
NEUTROPHILS NFR BLD AUTO: 9.55 10*3/MM3 (ref 1.7–7)
NRBC BLD AUTO-RTO: 0 /100 WBC (ref 0–0.2)
NT-PROBNP SERPL-MCNC: 5251 PG/ML (ref 0–1800)
PLATELET # BLD AUTO: 300 10*3/MM3 (ref 140–450)
PMV BLD AUTO: 9.8 FL (ref 6–12)
POTASSIUM SERPL-SCNC: 4.2 MMOL/L (ref 3.5–5.2)
PROT SERPL-MCNC: 6.4 G/DL (ref 6–8.5)
RBC # BLD AUTO: 3.79 10*6/MM3 (ref 4.14–5.8)
SARS-COV-2 RNA PNL SPEC NAA+PROBE: NOT DETECTED
SODIUM SERPL-SCNC: 137 MMOL/L (ref 136–145)
TROPONIN T SERPL-MCNC: <0.01 NG/ML (ref 0–0.03)
WBC NRBC COR # BLD: 11.38 10*3/MM3 (ref 3.4–10.8)

## 2022-03-03 PROCEDURE — 96375 TX/PRO/DX INJ NEW DRUG ADDON: CPT

## 2022-03-03 PROCEDURE — 87636 SARSCOV2 & INF A&B AMP PRB: CPT | Performed by: EMERGENCY MEDICINE

## 2022-03-03 PROCEDURE — 25010000002 MORPHINE PER 10 MG: Performed by: EMERGENCY MEDICINE

## 2022-03-03 PROCEDURE — 80053 COMPREHEN METABOLIC PANEL: CPT | Performed by: EMERGENCY MEDICINE

## 2022-03-03 PROCEDURE — 84484 ASSAY OF TROPONIN QUANT: CPT | Performed by: EMERGENCY MEDICINE

## 2022-03-03 PROCEDURE — 25010000002 ONDANSETRON PER 1 MG: Performed by: EMERGENCY MEDICINE

## 2022-03-03 PROCEDURE — 25010000002 METHYLPREDNISOLONE PER 125 MG: Performed by: EMERGENCY MEDICINE

## 2022-03-03 PROCEDURE — 71045 X-RAY EXAM CHEST 1 VIEW: CPT | Performed by: RADIOLOGY

## 2022-03-03 PROCEDURE — 85652 RBC SED RATE AUTOMATED: CPT | Performed by: EMERGENCY MEDICINE

## 2022-03-03 PROCEDURE — 86140 C-REACTIVE PROTEIN: CPT | Performed by: EMERGENCY MEDICINE

## 2022-03-03 PROCEDURE — 25010000002 FUROSEMIDE PER 20 MG: Performed by: EMERGENCY MEDICINE

## 2022-03-03 PROCEDURE — 96374 THER/PROPH/DIAG INJ IV PUSH: CPT

## 2022-03-03 PROCEDURE — 71045 X-RAY EXAM CHEST 1 VIEW: CPT

## 2022-03-03 PROCEDURE — 83880 ASSAY OF NATRIURETIC PEPTIDE: CPT | Performed by: EMERGENCY MEDICINE

## 2022-03-03 PROCEDURE — 72131 CT LUMBAR SPINE W/O DYE: CPT

## 2022-03-03 PROCEDURE — 85025 COMPLETE CBC W/AUTO DIFF WBC: CPT | Performed by: EMERGENCY MEDICINE

## 2022-03-03 PROCEDURE — 25010000002 DIAZEPAM PER 5 MG: Performed by: EMERGENCY MEDICINE

## 2022-03-03 PROCEDURE — 72131 CT LUMBAR SPINE W/O DYE: CPT | Performed by: RADIOLOGY

## 2022-03-03 PROCEDURE — 99284 EMERGENCY DEPT VISIT MOD MDM: CPT

## 2022-03-03 RX ORDER — FUROSEMIDE 10 MG/ML
40 INJECTION INTRAMUSCULAR; INTRAVENOUS ONCE
Status: COMPLETED | OUTPATIENT
Start: 2022-03-03 | End: 2022-03-03

## 2022-03-03 RX ORDER — METHYLPREDNISOLONE SODIUM SUCCINATE 125 MG/2ML
125 INJECTION, POWDER, LYOPHILIZED, FOR SOLUTION INTRAMUSCULAR; INTRAVENOUS ONCE
Status: COMPLETED | OUTPATIENT
Start: 2022-03-03 | End: 2022-03-03

## 2022-03-03 RX ORDER — DIAZEPAM 5 MG/ML
2.5 INJECTION, SOLUTION INTRAMUSCULAR; INTRAVENOUS ONCE
Status: COMPLETED | OUTPATIENT
Start: 2022-03-03 | End: 2022-03-03

## 2022-03-03 RX ORDER — PREDNISONE 20 MG/1
20 TABLET ORAL 3 TIMES DAILY
Qty: 15 TABLET | Refills: 0 | Status: SHIPPED | OUTPATIENT
Start: 2022-03-03 | End: 2022-03-08

## 2022-03-03 RX ORDER — FUROSEMIDE 20 MG/1
20 TABLET ORAL DAILY
Qty: 5 TABLET | Refills: 0 | Status: SHIPPED | OUTPATIENT
Start: 2022-03-03 | End: 2022-03-08

## 2022-03-03 RX ORDER — ONDANSETRON 2 MG/ML
4 INJECTION INTRAMUSCULAR; INTRAVENOUS ONCE
Status: COMPLETED | OUTPATIENT
Start: 2022-03-03 | End: 2022-03-03

## 2022-03-03 RX ORDER — ONDANSETRON 2 MG/ML
4 INJECTION INTRAMUSCULAR; INTRAVENOUS ONCE
Status: DISCONTINUED | OUTPATIENT
Start: 2022-03-03 | End: 2022-03-03 | Stop reason: SDUPTHER

## 2022-03-03 RX ORDER — HYDROCODONE BITARTRATE AND ACETAMINOPHEN 5; 325 MG/1; MG/1
0.5 TABLET ORAL EVERY 8 HOURS PRN
Qty: 12 TABLET | Refills: 0 | Status: SHIPPED | OUTPATIENT
Start: 2022-03-03

## 2022-03-03 RX ORDER — MORPHINE SULFATE 2 MG/ML
2 INJECTION, SOLUTION INTRAMUSCULAR; INTRAVENOUS ONCE
Status: COMPLETED | OUTPATIENT
Start: 2022-03-03 | End: 2022-03-03

## 2022-03-03 RX ORDER — FUROSEMIDE 10 MG/ML
80 INJECTION INTRAMUSCULAR; INTRAVENOUS ONCE
Status: DISCONTINUED | OUTPATIENT
Start: 2022-03-03 | End: 2022-03-03

## 2022-03-03 RX ORDER — SODIUM CHLORIDE 0.9 % (FLUSH) 0.9 %
10 SYRINGE (ML) INJECTION AS NEEDED
Status: DISCONTINUED | OUTPATIENT
Start: 2022-03-03 | End: 2022-03-03 | Stop reason: HOSPADM

## 2022-03-03 RX ADMIN — SODIUM CHLORIDE 500 ML: 9 INJECTION, SOLUTION INTRAVENOUS at 09:59

## 2022-03-03 RX ADMIN — METHYLPREDNISOLONE SODIUM SUCCINATE 125 MG: 125 INJECTION, POWDER, FOR SOLUTION INTRAMUSCULAR; INTRAVENOUS at 10:00

## 2022-03-03 RX ADMIN — DIAZEPAM 2.5 MG: 5 INJECTION, SOLUTION INTRAMUSCULAR; INTRAVENOUS at 15:40

## 2022-03-03 RX ADMIN — FUROSEMIDE 40 MG: 10 INJECTION, SOLUTION INTRAVENOUS at 15:11

## 2022-03-03 RX ADMIN — ONDANSETRON 4 MG: 2 INJECTION INTRAMUSCULAR; INTRAVENOUS at 10:00

## 2022-03-03 RX ADMIN — MORPHINE SULFATE 2 MG: 2 INJECTION, SOLUTION INTRAMUSCULAR; INTRAVENOUS at 10:00

## 2022-03-03 NOTE — ED NOTES
Pt family has been contacted for a ride and they said they would send someone after him      Michelet Morales RN  03/03/22 5724

## 2022-03-11 ENCOUNTER — HOSPITAL ENCOUNTER (EMERGENCY)
Facility: HOSPITAL | Age: 87
Discharge: HOME | End: 2022-03-11
Attending: EMERGENCY MEDICINE | Admitting: EMERGENCY MEDICINE

## 2022-03-11 ENCOUNTER — APPOINTMENT (OUTPATIENT)
Dept: CT IMAGING | Facility: HOSPITAL | Age: 87
End: 2022-03-11

## 2022-03-11 ENCOUNTER — APPOINTMENT (OUTPATIENT)
Dept: GENERAL RADIOLOGY | Facility: HOSPITAL | Age: 87
End: 2022-03-11

## 2022-03-11 VITALS
OXYGEN SATURATION: 96 % | WEIGHT: 130 LBS | BODY MASS INDEX: 19.26 KG/M2 | HEIGHT: 69 IN | DIASTOLIC BLOOD PRESSURE: 95 MMHG | TEMPERATURE: 98.7 F | HEART RATE: 63 BPM | RESPIRATION RATE: 22 BRPM | SYSTOLIC BLOOD PRESSURE: 121 MMHG

## 2022-03-11 DIAGNOSIS — Z71.1 WORRIED WELL: Primary | ICD-10-CM

## 2022-03-11 LAB
ALBUMIN SERPL-MCNC: 3.39 G/DL (ref 3.5–5.2)
ALBUMIN/GLOB SERPL: 1.3 G/DL
ALP SERPL-CCNC: 86 U/L (ref 39–117)
ALT SERPL W P-5'-P-CCNC: 15 U/L (ref 1–41)
AMPHET+METHAMPHET UR QL: NEGATIVE
AMPHETAMINES UR QL: NEGATIVE
ANION GAP SERPL CALCULATED.3IONS-SCNC: 7.7 MMOL/L (ref 5–15)
APAP SERPL-MCNC: <5 MCG/ML (ref 0–30)
AST SERPL-CCNC: 15 U/L (ref 1–40)
BARBITURATES UR QL SCN: NEGATIVE
BASOPHILS # BLD AUTO: 0.01 10*3/MM3 (ref 0–0.2)
BASOPHILS NFR BLD AUTO: 0.1 % (ref 0–1.5)
BENZODIAZ UR QL SCN: NEGATIVE
BILIRUB SERPL-MCNC: 1.9 MG/DL (ref 0–1.2)
BILIRUB UR QL STRIP: NEGATIVE
BUN SERPL-MCNC: 27 MG/DL (ref 8–23)
BUN/CREAT SERPL: 24.5 (ref 7–25)
BUPRENORPHINE SERPL-MCNC: NEGATIVE NG/ML
CALCIUM SPEC-SCNC: 8.9 MG/DL (ref 8.6–10.5)
CANNABINOIDS SERPL QL: NEGATIVE
CHLORIDE SERPL-SCNC: 98 MMOL/L (ref 98–107)
CLARITY UR: CLEAR
CO2 SERPL-SCNC: 33.3 MMOL/L (ref 22–29)
COCAINE UR QL: NEGATIVE
COLOR UR: ABNORMAL
CREAT SERPL-MCNC: 1.1 MG/DL (ref 0.76–1.27)
CRP SERPL-MCNC: 1.78 MG/DL (ref 0–0.5)
D-LACTATE SERPL-SCNC: 1.3 MMOL/L (ref 0.5–2)
DEPRECATED RDW RBC AUTO: 49 FL (ref 37–54)
EGFRCR SERPLBLD CKD-EPI 2021: 65.8 ML/MIN/1.73
EOSINOPHIL # BLD AUTO: 0.2 10*3/MM3 (ref 0–0.4)
EOSINOPHIL NFR BLD AUTO: 1.7 % (ref 0.3–6.2)
ERYTHROCYTE [DISTWIDTH] IN BLOOD BY AUTOMATED COUNT: 13.8 % (ref 12.3–15.4)
ETHANOL BLD-MCNC: <10 MG/DL (ref 0–10)
ETHANOL UR QL: <0.01 %
GLOBULIN UR ELPH-MCNC: 2.6 GM/DL
GLUCOSE SERPL-MCNC: 133 MG/DL (ref 65–99)
GLUCOSE UR STRIP-MCNC: NEGATIVE MG/DL
HCT VFR BLD AUTO: 41.1 % (ref 37.5–51)
HGB BLD-MCNC: 12.5 G/DL (ref 13–17.7)
HGB UR QL STRIP.AUTO: NEGATIVE
HOLD SPECIMEN: NORMAL
HOLD SPECIMEN: NORMAL
IMM GRANULOCYTES # BLD AUTO: 0.06 10*3/MM3 (ref 0–0.05)
IMM GRANULOCYTES NFR BLD AUTO: 0.5 % (ref 0–0.5)
KETONES UR QL STRIP: NEGATIVE
LEUKOCYTE ESTERASE UR QL STRIP.AUTO: NEGATIVE
LYMPHOCYTES # BLD AUTO: 1.49 10*3/MM3 (ref 0.7–3.1)
LYMPHOCYTES NFR BLD AUTO: 13 % (ref 19.6–45.3)
MAGNESIUM SERPL-MCNC: 2.5 MG/DL (ref 1.6–2.4)
MCH RBC QN AUTO: 29.1 PG (ref 26.6–33)
MCHC RBC AUTO-ENTMCNC: 30.4 G/DL (ref 31.5–35.7)
MCV RBC AUTO: 95.6 FL (ref 79–97)
METHADONE UR QL SCN: NEGATIVE
MONOCYTES # BLD AUTO: 0.78 10*3/MM3 (ref 0.1–0.9)
MONOCYTES NFR BLD AUTO: 6.8 % (ref 5–12)
NEUTROPHILS NFR BLD AUTO: 77.9 % (ref 42.7–76)
NEUTROPHILS NFR BLD AUTO: 8.9 10*3/MM3 (ref 1.7–7)
NITRITE UR QL STRIP: NEGATIVE
NRBC BLD AUTO-RTO: 0 /100 WBC (ref 0–0.2)
NT-PROBNP SERPL-MCNC: 1242 PG/ML (ref 0–1800)
OPIATES UR QL: NEGATIVE
OXYCODONE UR QL SCN: NEGATIVE
PCP UR QL SCN: NEGATIVE
PH UR STRIP.AUTO: 6.5 [PH] (ref 5–8)
PLATELET # BLD AUTO: 181 10*3/MM3 (ref 140–450)
PMV BLD AUTO: 10.2 FL (ref 6–12)
POTASSIUM SERPL-SCNC: 4 MMOL/L (ref 3.5–5.2)
PROPOXYPH UR QL: NEGATIVE
PROT SERPL-MCNC: 6 G/DL (ref 6–8.5)
PROT UR QL STRIP: NEGATIVE
QT INTERVAL: 376 MS
QTC INTERVAL: 419 MS
RBC # BLD AUTO: 4.3 10*6/MM3 (ref 4.14–5.8)
SALICYLATES SERPL-MCNC: <0.3 MG/DL
SODIUM SERPL-SCNC: 139 MMOL/L (ref 136–145)
SP GR UR STRIP: 1.02 (ref 1–1.03)
TRICYCLICS UR QL SCN: NEGATIVE
TROPONIN T SERPL-MCNC: 0.02 NG/ML (ref 0–0.03)
TSH SERPL DL<=0.05 MIU/L-ACNC: 2.04 UIU/ML (ref 0.27–4.2)
UROBILINOGEN UR QL STRIP: ABNORMAL
WBC NRBC COR # BLD: 11.44 10*3/MM3 (ref 3.4–10.8)
WHOLE BLOOD HOLD SPECIMEN: NORMAL
WHOLE BLOOD HOLD SPECIMEN: NORMAL

## 2022-03-11 PROCEDURE — 99284 EMERGENCY DEPT VISIT MOD MDM: CPT

## 2022-03-11 PROCEDURE — 74176 CT ABD & PELVIS W/O CONTRAST: CPT

## 2022-03-11 PROCEDURE — 74176 CT ABD & PELVIS W/O CONTRAST: CPT | Performed by: RADIOLOGY

## 2022-03-11 PROCEDURE — 81003 URINALYSIS AUTO W/O SCOPE: CPT | Performed by: PHYSICIAN ASSISTANT

## 2022-03-11 PROCEDURE — 71045 X-RAY EXAM CHEST 1 VIEW: CPT | Performed by: RADIOLOGY

## 2022-03-11 PROCEDURE — 87040 BLOOD CULTURE FOR BACTERIA: CPT | Performed by: PHYSICIAN ASSISTANT

## 2022-03-11 PROCEDURE — 71045 X-RAY EXAM CHEST 1 VIEW: CPT

## 2022-03-11 PROCEDURE — 93005 ELECTROCARDIOGRAM TRACING: CPT | Performed by: PHYSICIAN ASSISTANT

## 2022-03-11 PROCEDURE — 83735 ASSAY OF MAGNESIUM: CPT | Performed by: PHYSICIAN ASSISTANT

## 2022-03-11 PROCEDURE — 83605 ASSAY OF LACTIC ACID: CPT | Performed by: PHYSICIAN ASSISTANT

## 2022-03-11 PROCEDURE — 80179 DRUG ASSAY SALICYLATE: CPT | Performed by: PHYSICIAN ASSISTANT

## 2022-03-11 PROCEDURE — 36415 COLL VENOUS BLD VENIPUNCTURE: CPT

## 2022-03-11 PROCEDURE — 80053 COMPREHEN METABOLIC PANEL: CPT | Performed by: PHYSICIAN ASSISTANT

## 2022-03-11 PROCEDURE — 70450 CT HEAD/BRAIN W/O DYE: CPT

## 2022-03-11 PROCEDURE — 80143 DRUG ASSAY ACETAMINOPHEN: CPT | Performed by: PHYSICIAN ASSISTANT

## 2022-03-11 PROCEDURE — 80306 DRUG TEST PRSMV INSTRMNT: CPT | Performed by: PHYSICIAN ASSISTANT

## 2022-03-11 PROCEDURE — 83880 ASSAY OF NATRIURETIC PEPTIDE: CPT | Performed by: PHYSICIAN ASSISTANT

## 2022-03-11 PROCEDURE — 70450 CT HEAD/BRAIN W/O DYE: CPT | Performed by: RADIOLOGY

## 2022-03-11 PROCEDURE — 82077 ASSAY SPEC XCP UR&BREATH IA: CPT | Performed by: PHYSICIAN ASSISTANT

## 2022-03-11 PROCEDURE — 93010 ELECTROCARDIOGRAM REPORT: CPT | Performed by: INTERNAL MEDICINE

## 2022-03-11 PROCEDURE — 84443 ASSAY THYROID STIM HORMONE: CPT | Performed by: PHYSICIAN ASSISTANT

## 2022-03-11 PROCEDURE — 85025 COMPLETE CBC W/AUTO DIFF WBC: CPT | Performed by: PHYSICIAN ASSISTANT

## 2022-03-11 PROCEDURE — 86140 C-REACTIVE PROTEIN: CPT | Performed by: PHYSICIAN ASSISTANT

## 2022-03-11 PROCEDURE — 84484 ASSAY OF TROPONIN QUANT: CPT | Performed by: PHYSICIAN ASSISTANT

## 2022-03-11 RX ORDER — SODIUM CHLORIDE 0.9 % (FLUSH) 0.9 %
10 SYRINGE (ML) INJECTION AS NEEDED
Status: DISCONTINUED | OUTPATIENT
Start: 2022-03-11 | End: 2022-03-12 | Stop reason: HOSPADM

## 2022-03-11 NOTE — ED NOTES
Contacted pts son at this time who reports he has POA of pt. Son reports that pt has not been taken care of properly with being fed and getting his medications as pt lives alone. Contacted hospital  at this time also and she says we should get a psych consult and contact APS. Psych consult ordered per provider KHUSHBU Enrique.

## 2022-03-11 NOTE — NURSING NOTE
Intake assessment completed. Patients is an 87 year old male referred for mental health evaluation by hospital case management.  was contacted and recommend psych consult for decision making capacity. Family called ems as patient was unable to ambulate. Patient reports that he has lived alone for past year. States that his wife and son live in a shack across the street but wont ever do anything to help him. He reports that he is not able to walk well and has trouble with remembering things. He reports that he has not had food for several days. He denies any issues with anxiety or depression. He denies SI, HI, or AVH. He denies use of drugs or etoh. Patient is alert and oriented to person and place, but was confused about the date.

## 2022-03-11 NOTE — ED PROVIDER NOTES
Subjective   Patient is an 87-year-old male with COPD, atrial fibrillation, hyperlipidemia, hypertension, dementia, peripheral artery disease, chronic kidney disease, sleep apnea, and CHF that presents to the ED via EMS.  EMS states that they were called by family to bring him to the emergency for a well check.  They state the family went to go check on him and found him in the recliner.  They state that he did not answer them but he did get up and walk and was alert and oriented once EMS arrived.  He has no specific complaints today and denies chest pain, shortness of breath, fever, chills, nausea, vomiting, headache, dizziness, abdominal pain, or dysuria.      History provided by:  Patient and EMS personnel   used: No        Review of Systems   Constitutional: Negative.  Negative for chills, diaphoresis and fever.   HENT: Negative for congestion, drooling, ear discharge, ear pain, facial swelling, nosebleeds, postnasal drip, rhinorrhea, sinus pressure, sinus pain, sneezing, sore throat, tinnitus and trouble swallowing.    Eyes: Negative.  Negative for photophobia, pain, discharge, redness and itching.   Respiratory: Negative.  Negative for cough, shortness of breath and wheezing.    Cardiovascular: Negative.  Negative for chest pain.   Gastrointestinal: Positive for abdominal pain. Negative for abdominal distention, constipation, diarrhea, nausea and vomiting.   Endocrine: Negative.    Genitourinary: Negative.  Negative for dysuria, flank pain and frequency.   Musculoskeletal: Negative.  Negative for arthralgias, back pain, gait problem, joint swelling, myalgias, neck pain and neck stiffness.   Skin: Negative.    Neurological: Positive for headaches. Negative for dizziness, tremors, seizures, syncope, facial asymmetry, speech difficulty, weakness, light-headedness and numbness.   Psychiatric/Behavioral: Positive for confusion.   All other systems reviewed and are negative.      Past Medical  History:   Diagnosis Date   • Anxiety    • Arthritis    • ASCVD (arteriosclerotic cardiovascular disease)     s/p stenting   • Atrial fibrillation ([facility])    • Chronic kidney disease, stage III (moderate) ([facility]) 8/31/2017   • Chronic systolic heart failure ([facility]) 1/23/2019   • COPD (chronic obstructive pulmonary disease) ([facility])    • Dementia ([facility])    • Depression    • Elevated cholesterol    • HTN (hypertension)    • Neuropathy involving both lower extremities 9/20/2016   • PAD (peripheral artery disease), fem pop bypass,3/08 8/4/2016   • Sleep apnea 6/2/2016   • Status post total left knee replacement 9/20/2016       No Known Allergies    Past Surgical History:   Procedure Laterality Date   • HAND SURGERY     • REPLACEMENT TOTAL KNEE Left        Family History   Problem Relation Age of Onset   • Hypertension Mother    • Arthritis Mother    • Hypertension Father    • Arthritis Father    • Diabetes Sister    • Cancer Sister    • Diabetes Brother        Social History     Socioeconomic History   • Marital status:    Tobacco Use   • Smoking status: Former Smoker     Years: 40.00     Types: Cigarettes   • Smokeless tobacco: Never Used   • Tobacco comment: quit smoking 35-40 years ago   Vaping Use   • Vaping Use: Never used   Substance and Sexual Activity   • Alcohol use: Not Currently     Comment: occcasionally when younger    • Drug use: No   • Sexual activity: Defer     Partners: Female           Objective   Physical Exam  Vitals and nursing note reviewed.   Constitutional:       General: He is not in acute distress.     Appearance: He is well-developed. He is not diaphoretic.   HENT:      Head: Normocephalic and atraumatic.      Right Ear: External ear normal.      Left Ear: External ear normal.      Nose: Nose normal.      Mouth/Throat:      Mouth: Mucous membranes are moist.      Pharynx: Oropharynx is clear.   Eyes:      Extraocular Movements: Extraocular movements intact.      Conjunctiva/sclera: Conjunctivae  normal.      Pupils: Pupils are equal, round, and reactive to light.   Neck:      Vascular: No JVD.      Trachea: No tracheal deviation.   Cardiovascular:      Rate and Rhythm: Normal rate and regular rhythm.      Pulses: Normal pulses.      Heart sounds: Normal heart sounds. No murmur heard.  Pulmonary:      Effort: Pulmonary effort is normal. No respiratory distress.      Breath sounds: Normal breath sounds. No wheezing.   Abdominal:      General: Bowel sounds are normal. There is no distension.      Palpations: Abdomen is soft.      Tenderness: There is generalized abdominal tenderness. There is no right CVA tenderness, left CVA tenderness, guarding or rebound.   Musculoskeletal:         General: No deformity. Normal range of motion.      Cervical back: Normal range of motion and neck supple.   Skin:     General: Skin is warm and dry.      Coloration: Skin is not pale.      Findings: No erythema or rash.   Neurological:      Mental Status: He is alert and oriented to person, place, and time. He is confused.      GCS: GCS eye subscore is 4. GCS verbal subscore is 4. GCS motor subscore is 6.      Cranial Nerves: No cranial nerve deficit.   Psychiatric:         Mood and Affect: Mood normal.         Behavior: Behavior normal.         Thought Content: Thought content normal.         Procedures           ED Course  ED Course as of 03/11/22 2105   Fri Mar 11, 2022   1353 EKG atrial fibrillation when checked in the lab   no stimulation.  Flat T waves V6 lead I. [BB]   1425 XR Chest 1 View  IMPRESSION:    No acute cardiopulmonary findings identified.     This report was finalized on 3/11/2022 2:17 PM by Dr. Naldo Dixon MD. [MH]   1425 CT Head Without Contrast  IMPRESSION:  1.  Small vessel ischemic/degenerative changes.  2.  Cerebral and cerebellar atrophy.     This report was finalized on 3/11/2022 2:09 PM by Dr. Naldo Dixon MD. [MH]   1451 CT Abdomen Pelvis Without Contrast  IMPRESSION:  1.   Nonspecific urinary bladder wall thickening that could be related to  chronic outlet obstruction.  2.  Gallstone.  Gallbladder otherwise unremarkable.  3.  Degenerative changes lumbar spine as described.     This report was finalized on 3/11/2022 2:51 PM by Dr. Naldo Dixon MD. []   2103 Discussed plan of care with patient and his son.   are aware and have been consulted.  They do have an active case on him and will make sure they check on this week and make sure he is taking his medication and eating.  They will continue to work on placement.  Advised if they should have any difficulties to return to the ED and follow-up with PCP. [MH]      ED Course User Index  [BB] Alexis Lemons MD  [] Flori Tillman PA-C                                                 [facility]    Final diagnoses:   Worried well       ED Disposition  ED Disposition     ED Disposition   Discharge    Condition   Stable    Comment   --             Rc Ojeda MD  [address] [place] [address]  [phone]    Schedule an appointment as soon as possible for a visit in 1 day           Medication List      No changes were made to your prescriptions during this visit.          Flori Tillman PA-C  03/11/22 3645

## 2022-03-11 NOTE — CASE MANAGEMENT/SOCIAL WORK
Continued Stay Note  [facility] [place]     Patient Name: Felice Aiken  MRN: [id number]  Today's Date: 3/11/2022    Admit Date: 3/11/2022     Discharge Plan     Row Name 03/11/22 1608       Plan    Plan CM received phone call from ER 2-XIOMARA Najera.  Patient was sent to ER for wellness check from family.  Patient states he has not eaten in days because he has no food in his home.  Patient's son states that pt's brother is to go give him his medication and feed him however this has not been happening.  SS is very familiar with patient and family situation. CM suggest to have MD get Psych consult for decisional making compacity and XIOMARA Najera is agreeable to make report to [facility]PS.               Discharge Codes    No documentation.                     Cindy Mathis RN

## 2022-03-12 NOTE — ED PROVIDER NOTES
Subjective     History provided by:  Patient   used: No    Back Pain  Location:  Lumbar spine  Quality:  Aching  Radiates to:  Does not radiate  Pain severity:  Mild  Pain is:  Same all the time  Onset quality:  Gradual  Timing:  Rare  Progression:  Worsening  Chronicity:  Chronic  Context: not emotional stress, not falling, not jumping from heights, not lifting heavy objects, not MCA, not MVA, not occupational injury, not pedestrian accident, not physical stress, not recent illness, not recent injury and not twisting    Relieved by:  Nothing  Worsened by:  Nothing  Ineffective treatments:  None tried  Associated symptoms: no abdominal pain, no abdominal swelling, no bladder incontinence, no bowel incontinence, no chest pain, no dysuria, no fever, no headaches, no leg pain, no numbness, no paresthesias, no pelvic pain, no perianal numbness, no tingling, no weakness and no weight loss    Risk factors: no hx of cancer, no hx of osteoporosis, no lack of exercise, no menopause, not obese, not pregnant, no recent surgery, no steroid use and no vascular disease        Review of Systems   Constitutional: Negative for activity change, appetite change, chills, diaphoresis, fatigue, fever and weight loss.   HENT: Negative for congestion, ear pain and sore throat.    Eyes: Negative for redness.   Respiratory: Negative for cough, chest tightness, shortness of breath and wheezing.    Cardiovascular: Negative for chest pain, palpitations and leg swelling.   Gastrointestinal: Negative for abdominal pain, bowel incontinence, diarrhea, nausea and vomiting.   Genitourinary: Negative for bladder incontinence, dysuria, pelvic pain and urgency.   Musculoskeletal: Positive for back pain. Negative for arthralgias, myalgias and neck pain.   Skin: Negative for pallor, rash and wound.   Neurological: Negative for dizziness, tingling, speech difficulty, weakness, numbness, headaches and paresthesias.    Psychiatric/Behavioral: Negative for agitation, behavioral problems, confusion and decreased concentration.   All other systems reviewed and are negative.      Past Medical History:   Diagnosis Date   • Anxiety    • Arthritis    • ASCVD (arteriosclerotic cardiovascular disease)     s/p stenting   • Atrial fibrillation ([facility])    • Chronic kidney disease, stage III (moderate) ([facility]) 8/31/2017   • Chronic systolic heart failure ([facility]) 1/23/2019   • COPD (chronic obstructive pulmonary disease) ([facility])    • Dementia ([facility])    • Depression    • Elevated cholesterol    • HTN (hypertension)    • Neuropathy involving both lower extremities 9/20/2016   • PAD (peripheral artery disease), fem pop bypass,3/08 8/4/2016   • Sleep apnea 6/2/2016   • Status post total left knee replacement 9/20/2016       No Known Allergies    Past Surgical History:   Procedure Laterality Date   • HAND SURGERY     • REPLACEMENT TOTAL KNEE Left        Family History   Problem Relation Age of Onset   • Hypertension Mother    • Arthritis Mother    • Hypertension Father    • Arthritis Father    • Diabetes Sister    • Cancer Sister    • Diabetes Brother        Social History     Socioeconomic History   • Marital status:    Tobacco Use   • Smoking status: Former Smoker     Years: 40.00     Types: Cigarettes   • Smokeless tobacco: Never Used   • Tobacco comment: quit smoking 35-40 years ago   Vaping Use   • Vaping Use: Never used   Substance and Sexual Activity   • Alcohol use: Not Currently     Comment: occcasionally when younger    • Drug use: No   • Sexual activity: Defer     Partners: Female           Objective   Physical Exam  Vitals and nursing note reviewed.   Constitutional:       General: He is not in acute distress.     Appearance: Normal appearance. He is well-developed. He is not toxic-appearing or diaphoretic.   HENT:      Head: Normocephalic and atraumatic.      Right Ear: External ear normal.      Left Ear: External ear normal.      Nose:  Nose normal.      Mouth/Throat:      Pharynx: No oropharyngeal exudate.      Tonsils: No tonsillar exudate.   Eyes:      General: Lids are normal.      Conjunctiva/sclera: Conjunctivae normal.      Pupils: Pupils are equal, round, and reactive to light.   Neck:      Thyroid: No thyromegaly.   Cardiovascular:      Rate and Rhythm: Normal rate and regular rhythm.      Pulses: Normal pulses.      Heart sounds: Normal heart sounds, S1 normal and S2 normal.   Pulmonary:      Effort: Pulmonary effort is normal. No tachypnea or respiratory distress.      Breath sounds: Normal breath sounds. No decreased breath sounds, wheezing or rales.   Chest:      Chest wall: No tenderness.   Abdominal:      General: Bowel sounds are normal. There is no distension.      Palpations: Abdomen is soft.      Tenderness: There is no abdominal tenderness. There is no guarding or rebound.   Musculoskeletal:         General: Tenderness present. No deformity. Normal range of motion.      Cervical back: Full passive range of motion without pain, normal range of motion and neck supple.      Lumbar back: Tenderness present.        Back:    Lymphadenopathy:      Cervical: No cervical adenopathy.   Skin:     General: Skin is warm and dry.      Coloration: Skin is not pale.      Findings: No erythema or rash.   Neurological:      Mental Status: He is alert and oriented to person, place, and time.      GCS: GCS eye subscore is 4. GCS verbal subscore is 5. GCS motor subscore is 6.      Cranial Nerves: No cranial nerve deficit.      Sensory: No sensory deficit.   Psychiatric:         Speech: Speech normal.         Behavior: Behavior normal.         Thought Content: Thought content normal.         Judgment: Judgment normal.         Procedures           ED Course  ED Course as of 03/12/22 0317   Thu Mar 03, 2022   1256 CT Lumbar Spine Without Contrast  IMPRESSION:     1. Multilevel degenerative disc disease and facet arthropathy with  central canal and  neural foraminal stenosis as described above.  2. No fracture or traumatic malalignment.  3. Bilateral kidney stones.  4. Small left effusion. [ES]      ED Course User Index  [ES] Matthew Rangel MD                                                 [facility]DM  Number of Diagnoses or Management Options  Acute on chronic congestive heart failure, unspecified heart failure type (HCC): new and requires workup  Chronic bilateral low back pain without sciatica: new and requires workup     Amount and/or Complexity of Data Reviewed  Clinical lab tests: reviewed and ordered  Tests in the radiology section of CPT®: reviewed and ordered  Tests in the medicine section of CPT®: ordered and reviewed  Review and summarize past medical records: yes  Independent visualization of images, tracings, or specimens: yes    Risk of Complications, Morbidity, and/or Mortality  Presenting problems: moderate  Diagnostic procedures: moderate  Management options: moderate    Patient Progress  Patient progress: stable      Final diagnoses:   Acute on chronic congestive heart failure, unspecified heart failure type (HCC)   Chronic bilateral low back pain without sciatica       ED Disposition  ED Disposition     ED Disposition   Discharge    Condition   Stable    Comment   Patient is discharged home son came and picked him up and pt left with all of his possessions pt was instructed to come back if need              Rc Ojeda MD  [facility] [address] [place] [address]  [phone]    Schedule an appointment as soon as possible for a visit in 1 day  EVALUATE    [facility]  [address] [place] [id number] [phone]             Medication List      New Prescriptions    furosemide 20 MG tablet  Commonly known as: LASIX  Take 1 tablet by mouth Daily for 5 days.     HYDROcodone-acetaminophen 5-325 MG per tablet  Commonly known as: NORCO  Take 0.5 tablets by mouth Every 8 (Eight) Hours As Needed for Severe  Pain .        ASK your doctor about these medications    predniSONE 20 MG tablet  Commonly known as: DELTASONE  Take 1 tablet by mouth 3 (Three) Times a Day for 5 days.  Ask about: Should I take this medication?           Where to Get Your Medications      These medications were sent to [facility] - [place], [place] - [address] - [phone]  - [phone] FX  [address], [facility] [address]    Phone: [phone]   · furosemide 20 MG tablet  · HYDROcodone-acetaminophen 5-325 MG per tablet  · predniSONE 20 MG tablet          Matthew Rangel MD  03/12/22 4349

## 2022-03-12 NOTE — NURSING NOTE
Presented clinicals to Dr Rodriguez. She states the patient does meet criteria for admission. We are not able to determine decision making capacity via intake assessment. Dr Rodriguez stated to due this on call psychiatrist would have to be consulted for in person assessment in the morning.

## 2022-03-12 NOTE — ED NOTES
Discussed case with on call [facility]PS Kari Leblanc at this time. Advises there is an active  case and they are trying to find placement but will send and investigator to his home to ensure safety. Advised to discharge home in self care.

## 2022-03-16 LAB
BACTERIA SPEC AEROBE CULT: NORMAL
BACTERIA SPEC AEROBE CULT: NORMAL

## 2022-03-17 ENCOUNTER — APPOINTMENT (OUTPATIENT)
Dept: GENERAL RADIOLOGY | Facility: HOSPITAL | Age: 87
End: 2022-03-17

## 2022-03-17 ENCOUNTER — APPOINTMENT (OUTPATIENT)
Dept: CT IMAGING | Facility: HOSPITAL | Age: 87
End: 2022-03-17

## 2022-03-17 ENCOUNTER — HOSPITAL ENCOUNTER (EMERGENCY)
Facility: HOSPITAL | Age: 87
Discharge: HOME | End: 2022-03-17
Attending: EMERGENCY MEDICINE | Admitting: EMERGENCY MEDICINE

## 2022-03-17 VITALS
TEMPERATURE: 97.7 F | WEIGHT: 150 LBS | RESPIRATION RATE: 20 BRPM | HEIGHT: 69 IN | BODY MASS INDEX: 22.22 KG/M2 | DIASTOLIC BLOOD PRESSURE: 85 MMHG | HEART RATE: 112 BPM | OXYGEN SATURATION: 97 % | SYSTOLIC BLOOD PRESSURE: 160 MMHG

## 2022-03-17 DIAGNOSIS — F03.90 DEMENTIA WITHOUT BEHAVIORAL DISTURBANCE, UNSPECIFIED DEMENTIA TYPE: Primary | ICD-10-CM

## 2022-03-17 DIAGNOSIS — R82.81 PYURIA: ICD-10-CM

## 2022-03-17 LAB
A-A DO2: 23.4 MMHG (ref 0–300)
ALBUMIN SERPL-MCNC: 3.24 G/DL (ref 3.5–5.2)
ALBUMIN/GLOB SERPL: 1.2 G/DL
ALP SERPL-CCNC: 98 U/L (ref 39–117)
ALT SERPL W P-5'-P-CCNC: 11 U/L (ref 1–41)
ANION GAP SERPL CALCULATED.3IONS-SCNC: 8.7 MMOL/L (ref 5–15)
ARTERIAL PATENCY WRIST A: ABNORMAL
AST SERPL-CCNC: 13 U/L (ref 1–40)
ATMOSPHERIC PRESS: 726 MMHG
BACTERIA UR QL AUTO: ABNORMAL /HPF
BASE EXCESS BLDA CALC-SCNC: 5.6 MMOL/L (ref 0–2)
BASOPHILS # BLD AUTO: 0.02 10*3/MM3 (ref 0–0.2)
BASOPHILS NFR BLD AUTO: 0.3 % (ref 0–1.5)
BDY SITE: ABNORMAL
BILIRUB SERPL-MCNC: 1.1 MG/DL (ref 0–1.2)
BILIRUB UR QL STRIP: NEGATIVE
BODY TEMPERATURE: 0 C
BUN SERPL-MCNC: 15 MG/DL (ref 8–23)
BUN/CREAT SERPL: 13 (ref 7–25)
CALCIUM SPEC-SCNC: 8.7 MG/DL (ref 8.6–10.5)
CHLORIDE SERPL-SCNC: 101 MMOL/L (ref 98–107)
CK SERPL-CCNC: 84 U/L (ref 20–200)
CLARITY UR: ABNORMAL
CO2 BLDA-SCNC: 32.2 MMOL/L (ref 22–33)
CO2 SERPL-SCNC: 27.3 MMOL/L (ref 22–29)
COHGB MFR BLD: 1.1 % (ref 0–5)
COLOR UR: YELLOW
CREAT SERPL-MCNC: 1.15 MG/DL (ref 0.76–1.27)
CRP SERPL-MCNC: 0.75 MG/DL (ref 0–0.5)
D-LACTATE SERPL-SCNC: 1.7 MMOL/L (ref 0.5–2)
DEPRECATED RDW RBC AUTO: 48.5 FL (ref 37–54)
EGFRCR SERPLBLD CKD-EPI 2021: 62.4 ML/MIN/1.73
EOSINOPHIL # BLD AUTO: 0.16 10*3/MM3 (ref 0–0.4)
EOSINOPHIL NFR BLD AUTO: 2.1 % (ref 0.3–6.2)
ERYTHROCYTE [DISTWIDTH] IN BLOOD BY AUTOMATED COUNT: 13.9 % (ref 12.3–15.4)
FLUAV RNA RESP QL NAA+PROBE: NOT DETECTED
FLUBV RNA RESP QL NAA+PROBE: NOT DETECTED
GLOBULIN UR ELPH-MCNC: 2.7 GM/DL
GLUCOSE SERPL-MCNC: 102 MG/DL (ref 65–99)
GLUCOSE UR STRIP-MCNC: NEGATIVE MG/DL
HCO3 BLDA-SCNC: 30.8 MMOL/L (ref 20–26)
HCT VFR BLD AUTO: 37.9 % (ref 37.5–51)
HCT VFR BLD CALC: 34.4 % (ref 38–51)
HGB BLD-MCNC: 11.8 G/DL (ref 13–17.7)
HGB BLDA-MCNC: 11.2 G/DL (ref 14–18)
HGB UR QL STRIP.AUTO: NEGATIVE
HOLD SPECIMEN: NORMAL
HOLD SPECIMEN: NORMAL
HYALINE CASTS UR QL AUTO: ABNORMAL /LPF
IMM GRANULOCYTES # BLD AUTO: 0.03 10*3/MM3 (ref 0–0.05)
IMM GRANULOCYTES NFR BLD AUTO: 0.4 % (ref 0–0.5)
INHALED O2 CONCENTRATION: 21 %
KETONES UR QL STRIP: NEGATIVE
LEUKOCYTE ESTERASE UR QL STRIP.AUTO: ABNORMAL
LIPASE SERPL-CCNC: 15 U/L (ref 13–60)
LYMPHOCYTES # BLD AUTO: 1.34 10*3/MM3 (ref 0.7–3.1)
LYMPHOCYTES NFR BLD AUTO: 17.5 % (ref 19.6–45.3)
Lab: ABNORMAL
MAGNESIUM SERPL-MCNC: 2 MG/DL (ref 1.6–2.4)
MCH RBC QN AUTO: 29.4 PG (ref 26.6–33)
MCHC RBC AUTO-ENTMCNC: 31.1 G/DL (ref 31.5–35.7)
MCV RBC AUTO: 94.5 FL (ref 79–97)
METHGB BLD QL: 0.3 % (ref 0–3)
MODALITY: ABNORMAL
MONOCYTES # BLD AUTO: 0.53 10*3/MM3 (ref 0.1–0.9)
MONOCYTES NFR BLD AUTO: 6.9 % (ref 5–12)
NEUTROPHILS NFR BLD AUTO: 5.57 10*3/MM3 (ref 1.7–7)
NEUTROPHILS NFR BLD AUTO: 72.8 % (ref 42.7–76)
NITRITE UR QL STRIP: NEGATIVE
NOTE: ABNORMAL
NRBC BLD AUTO-RTO: 0 /100 WBC (ref 0–0.2)
NT-PROBNP SERPL-MCNC: 1790 PG/ML (ref 0–1800)
OXYHGB MFR BLDV: 93.1 % (ref 94–99)
PCO2 BLDA: 46.7 MM HG (ref 35–45)
PCO2 TEMP ADJ BLD: ABNORMAL MM[HG]
PH BLDA: 7.43 PH UNITS (ref 7.35–7.45)
PH UR STRIP.AUTO: 8 [PH] (ref 5–8)
PH, TEMP CORRECTED: ABNORMAL
PLATELET # BLD AUTO: 150 10*3/MM3 (ref 140–450)
PMV BLD AUTO: 10.2 FL (ref 6–12)
PO2 BLDA: 66.5 MM HG (ref 83–108)
PO2 TEMP ADJ BLD: ABNORMAL MM[HG]
POTASSIUM SERPL-SCNC: 3.8 MMOL/L (ref 3.5–5.2)
PROT SERPL-MCNC: 5.9 G/DL (ref 6–8.5)
PROT UR QL STRIP: NEGATIVE
QT INTERVAL: 372 MS
QTC INTERVAL: 415 MS
RBC # BLD AUTO: 4.01 10*6/MM3 (ref 4.14–5.8)
RBC # UR STRIP: ABNORMAL /HPF
REF LAB TEST METHOD: ABNORMAL
SAO2 % BLDCOA: 94.4 % (ref 94–99)
SARS-COV-2 RNA RESP QL NAA+PROBE: NOT DETECTED
SODIUM SERPL-SCNC: 137 MMOL/L (ref 136–145)
SP GR UR STRIP: 1.02 (ref 1–1.03)
SQUAMOUS #/AREA URNS HPF: ABNORMAL /HPF
TROPONIN T SERPL-MCNC: 0.01 NG/ML (ref 0–0.03)
TROPONIN T SERPL-MCNC: <0.01 NG/ML (ref 0–0.03)
TSH SERPL DL<=0.05 MIU/L-ACNC: 2.83 UIU/ML (ref 0.27–4.2)
UROBILINOGEN UR QL STRIP: ABNORMAL
VENTILATOR MODE: ABNORMAL
WBC # UR STRIP: ABNORMAL /HPF
WBC NRBC COR # BLD: 7.65 10*3/MM3 (ref 3.4–10.8)
WHOLE BLOOD HOLD SPECIMEN: NORMAL
WHOLE BLOOD HOLD SPECIMEN: NORMAL

## 2022-03-17 PROCEDURE — 87636 SARSCOV2 & INF A&B AMP PRB: CPT | Performed by: EMERGENCY MEDICINE

## 2022-03-17 PROCEDURE — 83690 ASSAY OF LIPASE: CPT | Performed by: EMERGENCY MEDICINE

## 2022-03-17 PROCEDURE — 84484 ASSAY OF TROPONIN QUANT: CPT | Performed by: EMERGENCY MEDICINE

## 2022-03-17 PROCEDURE — 74176 CT ABD & PELVIS W/O CONTRAST: CPT

## 2022-03-17 PROCEDURE — 82550 ASSAY OF CK (CPK): CPT | Performed by: EMERGENCY MEDICINE

## 2022-03-17 PROCEDURE — 80053 COMPREHEN METABOLIC PANEL: CPT | Performed by: EMERGENCY MEDICINE

## 2022-03-17 PROCEDURE — 74176 CT ABD & PELVIS W/O CONTRAST: CPT | Performed by: RADIOLOGY

## 2022-03-17 PROCEDURE — 36415 COLL VENOUS BLD VENIPUNCTURE: CPT

## 2022-03-17 PROCEDURE — 86140 C-REACTIVE PROTEIN: CPT | Performed by: EMERGENCY MEDICINE

## 2022-03-17 PROCEDURE — 83880 ASSAY OF NATRIURETIC PEPTIDE: CPT | Performed by: EMERGENCY MEDICINE

## 2022-03-17 PROCEDURE — 85025 COMPLETE CBC W/AUTO DIFF WBC: CPT | Performed by: EMERGENCY MEDICINE

## 2022-03-17 PROCEDURE — 71045 X-RAY EXAM CHEST 1 VIEW: CPT | Performed by: RADIOLOGY

## 2022-03-17 PROCEDURE — 87040 BLOOD CULTURE FOR BACTERIA: CPT | Performed by: EMERGENCY MEDICINE

## 2022-03-17 PROCEDURE — 82805 BLOOD GASES W/O2 SATURATION: CPT

## 2022-03-17 PROCEDURE — 99284 EMERGENCY DEPT VISIT MOD MDM: CPT

## 2022-03-17 PROCEDURE — 96374 THER/PROPH/DIAG INJ IV PUSH: CPT

## 2022-03-17 PROCEDURE — 81001 URINALYSIS AUTO W/SCOPE: CPT | Performed by: EMERGENCY MEDICINE

## 2022-03-17 PROCEDURE — 83050 HGB METHEMOGLOBIN QUAN: CPT

## 2022-03-17 PROCEDURE — 83735 ASSAY OF MAGNESIUM: CPT | Performed by: EMERGENCY MEDICINE

## 2022-03-17 PROCEDURE — 25010000002 CEFTRIAXONE PER 250 MG: Performed by: EMERGENCY MEDICINE

## 2022-03-17 PROCEDURE — 93005 ELECTROCARDIOGRAM TRACING: CPT | Performed by: EMERGENCY MEDICINE

## 2022-03-17 PROCEDURE — 36600 WITHDRAWAL OF ARTERIAL BLOOD: CPT

## 2022-03-17 PROCEDURE — 84443 ASSAY THYROID STIM HORMONE: CPT | Performed by: EMERGENCY MEDICINE

## 2022-03-17 PROCEDURE — 87086 URINE CULTURE/COLONY COUNT: CPT | Performed by: EMERGENCY MEDICINE

## 2022-03-17 PROCEDURE — 83605 ASSAY OF LACTIC ACID: CPT | Performed by: EMERGENCY MEDICINE

## 2022-03-17 PROCEDURE — 71045 X-RAY EXAM CHEST 1 VIEW: CPT

## 2022-03-17 PROCEDURE — 82375 ASSAY CARBOXYHB QUANT: CPT

## 2022-03-17 RX ORDER — SODIUM CHLORIDE 0.9 % (FLUSH) 0.9 %
10 SYRINGE (ML) INJECTION AS NEEDED
Status: DISCONTINUED | OUTPATIENT
Start: 2022-03-17 | End: 2022-03-17 | Stop reason: HOSPADM

## 2022-03-17 RX ORDER — CEFDINIR 300 MG/1
300 CAPSULE ORAL 2 TIMES DAILY
Qty: 12 CAPSULE | Refills: 0 | Status: SHIPPED | OUTPATIENT
Start: 2022-03-17 | End: 2022-03-23

## 2022-03-17 RX ADMIN — CEFTRIAXONE 1 G: 10 INJECTION, POWDER, FOR SOLUTION INTRAVENOUS at 18:55

## 2022-03-17 NOTE — ED NOTES
Discharge plan discussed extensively with pt son, Cesar. He states that he is the primary caregiver to pt and he has a court date in place to try to get him in state custody and placed in a nursing home. He states that he will come to  pt soon.    [phone]

## 2022-03-17 NOTE — DISCHARGE INSTRUCTIONS
Home in care of family.  Rest.  Plenty fluids.  Medication as prescribed.  See Dr. Ojeda in the office tomorrow for recheck.  Return to the emergency department right away if symptoms worsen/any problems.

## 2022-03-17 NOTE — ED NOTES
"Patient request \" something to eat\" Patient was provided with a food tray and was able to eat the entire meal unassisted.  "

## 2022-03-17 NOTE — ED PROVIDER NOTES
Subjective   Patient is an 87-year-old male who presents by ambulance denying any complaints.  He states that he does not know who called an ambulance or why he was brought to the hospital.  He denies any symptoms, states that he feels normal.  He denies fever, chills, headaches, neck pain, back pain, chest pain, shortness of breath, hemoptysis, abdominal pain, vomiting, diarrhea, hematemesis, hematochezia or melena, dysuria or hematuria, syncope or near syncope, focal numbness or weakness, other symptoms or other complaints.  Patient does have a history of dementia, coronary artery disease, atrial fibrillation, chronic kidney disease, COPD, chronic systolic heart failure, peripheral arterial disease, neuropathy of both lower extremities, hypertension.  He lives on the same property as family members but in his own home.          Review of Systems   Constitutional: Negative for chills, diaphoresis and fever.   HENT: Negative for ear pain, sore throat and trouble swallowing.    Eyes: Negative for photophobia and pain.   Respiratory: Negative for shortness of breath, wheezing and stridor.    Cardiovascular: Negative for chest pain and palpitations.   Gastrointestinal: Negative for abdominal distention, abdominal pain, blood in stool, diarrhea, nausea and vomiting.   Endocrine: Negative for polydipsia and polyphagia.   Genitourinary: Negative for difficulty urinating and flank pain.   Musculoskeletal: Negative for back pain, neck pain and neck stiffness.   Skin: Negative for color change and pallor.   Neurological: Negative for seizures, syncope and speech difficulty.   Psychiatric/Behavioral: Negative for suicidal ideas.   All other systems reviewed and are negative.      Past Medical History:   Diagnosis Date   • Anxiety    • Arthritis    • ASCVD (arteriosclerotic cardiovascular disease)     s/p stenting   • Atrial fibrillation (HCC)    • Chronic kidney disease, stage III (moderate) (HCC) 8/31/2017   • Chronic  systolic heart failure (HCC) 1/23/2019   • COPD (chronic obstructive pulmonary disease) (HCC)    • Dementia ([facility])    • Depression    • Elevated cholesterol    • HTN (hypertension)    • Neuropathy involving both lower extremities 9/20/2016   • PAD (peripheral artery disease), fem pop bypass,3/08 8/4/2016   • Sleep apnea 6/2/2016   • Status post total left knee replacement 9/20/2016       No Known Allergies    Past Surgical History:   Procedure Laterality Date   • HAND SURGERY     • REPLACEMENT TOTAL KNEE Left        Family History   Problem Relation Age of Onset   • Hypertension Mother    • Arthritis Mother    • Hypertension Father    • Arthritis Father    • Diabetes Sister    • Cancer Sister    • Diabetes Brother        Social History     Socioeconomic History   • Marital status:    Tobacco Use   • Smoking status: Former Smoker     Years: 40.00     Types: Cigarettes   • Smokeless tobacco: Never Used   • Tobacco comment: quit smoking 35-40 years ago   Vaping Use   • Vaping Use: Never used   Substance and Sexual Activity   • Alcohol use: Not Currently     Comment: occcasionally when younger    • Drug use: No   • Sexual activity: Defer     Partners: Female           Objective   Physical Exam  Vitals and nursing note reviewed.   Constitutional:       General: He is not in acute distress.     Appearance: Normal appearance. He is well-developed. He is not ill-appearing, toxic-appearing or diaphoretic.      Comments: Pleasant early male, awake and alert, in no apparent distress.   HENT:      Head: Normocephalic and atraumatic.   Eyes:      General: No scleral icterus.     Pupils: Pupils are equal, round, and reactive to light.   Neck:      Trachea: No tracheal deviation.   Cardiovascular:      Rate and Rhythm: Normal rate and regular rhythm.   Pulmonary:      Effort: Pulmonary effort is normal. No respiratory distress.      Breath sounds: Normal breath sounds.   Chest:      Chest wall: No tenderness.   Abdominal:       General: Bowel sounds are normal.      Palpations: Abdomen is soft.      Tenderness: There is abdominal tenderness (Mild suprapubic tenderness, no other tenderness, no acute peritoneal signs.). There is no right CVA tenderness, left CVA tenderness, guarding or rebound.   Musculoskeletal:         General: No tenderness. Normal range of motion.      Cervical back: Normal range of motion and neck supple. No rigidity or tenderness.      Right lower leg: No edema.      Left lower leg: No edema.   Skin:     General: Skin is warm and dry.      Capillary Refill: Capillary refill takes less than 2 seconds.      Coloration: Skin is not pale.   Neurological:      General: No focal deficit present.      Mental Status: He is alert.      GCS: GCS eye subscore is 4. GCS verbal subscore is 5. GCS motor subscore is 6.      Motor: No abnormal muscle tone.      Comments: Patient is oriented to person and to [facility], not to time, tells me the year is 2006.   Psychiatric:         Mood and Affect: Mood normal.         Behavior: Behavior normal.         Procedures  EKG at 1306 shows atrial fibrillation, rate 75.  QRS duration 104, QTc 415 ms.  Incomplete right bundle branch block.  Nonspecific ST-T changes.  No STEMI criteria.  CT Abdomen Pelvis Without Contrast   Final Result       1. Cholelithiasis       2.Nonobstructing calcifications in both kidneys       3. Other findings as above                   This report was finalized on 3/17/2022 3:20 PM by Dr. Easton Hamm MD.          XR Chest 1 View   Final Result   No evidence of active or acute cardiopulmonary disease on today's chest   radiograph.       This report was finalized on 3/17/2022 1:47 PM by Dr. Easton Hamm MD.            Results for orders placed or performed during the hospital encounter of 03/17/22   COVID-19 and FLU A/B PCR - Swab, Nasopharynx    Specimen: Nasopharynx; Swab   Result Value Ref Range    COVID19 Not Detected Not Detected - Ref. Range    Influenza  A PCR Not Detected Not Detected    Influenza B PCR Not Detected Not Detected   Comprehensive Metabolic Panel    Specimen: Arm, Left; Blood   Result Value Ref Range    Glucose 102 (H) 65 - 99 mg/dL    BUN 15 8 - 23 mg/dL    Creatinine 1.15 0.76 - 1.27 mg/dL    Sodium 137 136 - 145 mmol/L    Potassium 3.8 3.5 - 5.2 mmol/L    Chloride 101 98 - 107 mmol/L    CO2 27.3 22.0 - 29.0 mmol/L    Calcium 8.7 8.6 - 10.5 mg/dL    Total Protein 5.9 (L) 6.0 - 8.5 g/dL    Albumin 3.24 (L) 3.50 - 5.20 g/dL    ALT (SGPT) 11 1 - 41 U/L    AST (SGOT) 13 1 - 40 U/L    Alkaline Phosphatase 98 39 - 117 U/L    Total Bilirubin 1.1 0.0 - 1.2 mg/dL    Globulin 2.7 gm/dL    A/G Ratio 1.2 g/dL    BUN/Creatinine Ratio 13.0 7.0 - 25.0    Anion Gap 8.7 5.0 - 15.0 mmol/L    eGFR 62.4 >60.0 mL/min/1.73   Urinalysis With Culture If Indicated - Urine, Clean Catch    Specimen: Urine, Clean Catch   Result Value Ref Range    Color, UA Yellow Yellow, Straw    Appearance, UA Cloudy (A) Clear    pH, UA 8.0 5.0 - 8.0    Specific Gravity, UA 1.016 1.005 - 1.030    Glucose, UA Negative Negative    Ketones, UA Negative Negative    Bilirubin, UA Negative Negative    Blood, UA Negative Negative    Protein, UA Negative Negative    Leuk Esterase, UA Trace (A) Negative    Nitrite, UA Negative Negative    Urobilinogen, UA 1.0 E.U./dL 0.2 - 1.0 E.U./dL   BNP    Specimen: Arm, Left; Blood   Result Value Ref Range    proBNP 1,790.0 0.0 - 1,800.0 pg/mL   Troponin    Specimen: Arm, Left; Blood   Result Value Ref Range    Troponin T 0.012 0.000 - 0.030 ng/mL   Troponin    Specimen: Arm, Left; Blood   Result Value Ref Range    Troponin T <0.010 0.000 - 0.030 ng/mL   Lactic Acid, Plasma    Specimen: Arm, Left; Blood   Result Value Ref Range    Lactate 1.7 0.5 - 2.0 mmol/L   C-reactive Protein    Specimen: Arm, Left; Blood   Result Value Ref Range    C-Reactive Protein 0.75 (H) 0.00 - 0.50 mg/dL   TSH    Specimen: Arm, Left; Blood   Result Value Ref Range    TSH 2.830 0.270 -  4.200 uIU/mL   Magnesium    Specimen: Arm, Left; Blood   Result Value Ref Range    Magnesium 2.0 1.6 - 2.4 mg/dL   CK    Specimen: Arm, Left; Blood   Result Value Ref Range    Creatine Kinase 84 20 - 200 U/L   CBC Auto Differential    Specimen: Arm, Left; Blood   Result Value Ref Range    WBC 7.65 3.40 - 10.80 10*3/mm3    RBC 4.01 (L) 4.14 - 5.80 10*6/mm3    Hemoglobin 11.8 (L) 13.0 - 17.7 g/dL    Hematocrit 37.9 37.5 - 51.0 %    MCV 94.5 79.0 - 97.0 fL    MCH 29.4 26.6 - 33.0 pg    MCHC 31.1 (L) 31.5 - 35.7 g/dL    RDW 13.9 12.3 - 15.4 %    RDW-SD 48.5 37.0 - 54.0 fl    MPV 10.2 6.0 - 12.0 fL    Platelets 150 140 - 450 10*3/mm3    Neutrophil % 72.8 42.7 - 76.0 %    Lymphocyte % 17.5 (L) 19.6 - 45.3 %    Monocyte % 6.9 5.0 - 12.0 %    Eosinophil % 2.1 0.3 - 6.2 %    Basophil % 0.3 0.0 - 1.5 %    Immature Grans % 0.4 0.0 - 0.5 %    Neutrophils, Absolute 5.57 1.70 - 7.00 10*3/mm3    Lymphocytes, Absolute 1.34 0.70 - 3.10 10*3/mm3    Monocytes, Absolute 0.53 0.10 - 0.90 10*3/mm3    Eosinophils, Absolute 0.16 0.00 - 0.40 10*3/mm3    Basophils, Absolute 0.02 0.00 - 0.20 10*3/mm3    Immature Grans, Absolute 0.03 0.00 - 0.05 10*3/mm3    nRBC 0.0 0.0 - 0.2 /100 WBC   Blood Gas, Arterial With Co-Ox    Specimen: Arterial Blood   Result Value Ref Range    Site Left Brachial     Fantasma's Test N/A     pH, Arterial 7.427 7.350 - 7.450 pH units    pCO2, Arterial 46.7 (H) 35.0 - 45.0 mm Hg    pO2, Arterial 66.5 (L) 83.0 - 108.0 mm Hg    HCO3, Arterial 30.8 (H) 20.0 - 26.0 mmol/L    Base Excess, Arterial 5.6 (H) 0.0 - 2.0 mmol/L    O2 Saturation, Arterial 94.4 94.0 - 99.0 %    Hemoglobin, Blood Gas 11.2 (L) 14 - 18 g/dL    Hematocrit, Blood Gas 34.4 (L) 38.0 - 51.0 %    Oxyhemoglobin 93.1 (L) 94 - 99 %    Methemoglobin 0.30 0.00 - 3.00 %    Carboxyhemoglobin 1.1 0 - 5 %    A-a Gradiant 23.4 0.0 - 300.0 mmHg    CO2 Content 32.2 22 - 33 mmol/L    Temperature 0.0 C    Barometric Pressure for Blood Gas 726 mmHg    Modality Room Air      FIO2 21 %    Ventilator Mode NA     Note      Collected by [id number]     pH, Temp Corrected      pCO2, Temperature Corrected      pO2, Temperature Corrected     Lipase    Specimen: Arm, Left; Blood   Result Value Ref Range    Lipase 15 13 - 60 U/L   Urinalysis, Microscopic Only - Urine, Clean Catch    Specimen: Urine, Clean Catch   Result Value Ref Range    RBC, UA 3-5 (A) None Seen, 0-2 /HPF    WBC, UA 6-12 (A) None Seen, 0-2 /HPF    Bacteria, UA None Seen None Seen /HPF    Squamous Epithelial Cells, UA 0-2 None Seen, 0-2 /HPF    Hyaline Casts, UA None Seen None Seen /LPF    Methodology Automated Microscopy    Green Top (Gel)   Result Value Ref Range    Extra Tube Hold for add-ons.    Lavender Top   Result Value Ref Range    Extra Tube hold for add-on    Gold Top - SST   Result Value Ref Range    Extra Tube Hold for add-ons.    Light Blue Top   Result Value Ref Range    Extra Tube hold for add-on                 ED Course  ED Course as of 03/17/22 1906   Thu Mar 17, 2022   1755 Patient's emergency department stay has been uneventful.  He has shown no signs of distress.  He has been resting comfortably.  I just checked on him, he was sleeping, awakens easily.  He voices that he feels well and he would like to go home.  We have ordered him a meal tray to have while we make arrangements to get him home.  Abdomen is soft and nontender to palpation throughout. [CM]      ED Course User Index  [CM] Myron Holman MD                                                 MDM    Final diagnoses:   Dementia without behavioral disturbance, unspecified dementia type (HCC)   Pyuria       ED Disposition  ED Disposition     ED Disposition   Discharge    Condition   Stable    Comment   --             Rc Ojeda MD  [address] [place] [address]  [phone]    Go to   Tomorrow    [facility] Emergency Department  [address] [place] [id number] [phone]  Go to   If symptoms worsen         Medication  List      New Prescriptions    cefdinir 300 MG capsule  Commonly known as: OMNICEF  Take 1 capsule by mouth 2 (Two) Times a Day for 6 days. Starting tomorrow evening, Friday, March 18, 2022        Stop    doxycycline 100 MG capsule  Commonly known as: MONODOX     predniSONE 50 MG tablet  Commonly known as: DELTASONE           Where to Get Your Medications      You can get these medications from any pharmacy    Bring a paper prescription for each of these medications  · cefdinir 300 MG capsule       Please note that portions of this note were completed with a voice recognition program.        Myron Holman MD  03/17/22 [id number]       Myron Holman MD  03/17/22 [id number]

## 2022-03-17 NOTE — ED NOTES
"Patient states \" I don't know why I am here.\" Patient appears to be confused about time and the reason that an ambulance was called  "

## 2022-03-18 LAB — BACTERIA SPEC AEROBE CULT: NORMAL

## 2022-03-22 LAB
BACTERIA SPEC AEROBE CULT: NORMAL
BACTERIA SPEC AEROBE CULT: NORMAL

## 2022-05-17 ENCOUNTER — TRANSCRIBE ORDERS (OUTPATIENT)
Dept: ADMINISTRATIVE | Facility: HOSPITAL | Age: 87
End: 2022-05-17

## 2022-05-17 ENCOUNTER — HOSPITAL ENCOUNTER (OUTPATIENT)
Dept: GENERAL RADIOLOGY | Facility: HOSPITAL | Age: 87
Discharge: HOME | End: 2022-05-17
Admitting: NURSE PRACTITIONER

## 2022-05-17 DIAGNOSIS — R05.9 COUGH, UNSPECIFIED: Primary | ICD-10-CM

## 2022-05-17 DIAGNOSIS — R05.9 COUGH, UNSPECIFIED: ICD-10-CM

## 2022-05-17 PROCEDURE — 71046 X-RAY EXAM CHEST 2 VIEWS: CPT

## 2022-05-17 PROCEDURE — 71046 X-RAY EXAM CHEST 2 VIEWS: CPT | Performed by: RADIOLOGY

## 2023-10-10 NOTE — PLAN OF CARE
Goal Outcome Evaluation:  Plan of Care Reviewed With: patient  Patient Agreement with Plan of Care: agrees     Progress: improving  Outcome Summary: Pt calm and cooperative. Reports poor sleep and a good appetite. Rates A/D 0/0. Denies SI/HI or hallucinations.   Quality 110: Preventive Care And Screening: Influenza Immunization: Influenza Immunization Administered during Influenza season Quality 226: Preventive Care And Screening: Tobacco Use: Screening And Cessation Intervention: Patient screened for tobacco use and is an ex/non-smoker Detail Level: Detailed Quality 130: Documentation Of Current Medications In The Medical Record: Current Medications Documented